# Patient Record
Sex: MALE | Race: WHITE | Employment: UNEMPLOYED | URBAN - METROPOLITAN AREA
[De-identification: names, ages, dates, MRNs, and addresses within clinical notes are randomized per-mention and may not be internally consistent; named-entity substitution may affect disease eponyms.]

---

## 2019-10-15 ENCOUNTER — HOSPITAL ENCOUNTER (OUTPATIENT)
Dept: MRI IMAGING | Age: 64
Discharge: HOME | End: 2019-10-15
Attending: ORTHOPAEDIC SURGERY
Payer: COMMERCIAL

## 2019-10-15 DIAGNOSIS — M25.552 BILATERAL HIP PAIN: ICD-10-CM

## 2019-10-15 DIAGNOSIS — M25.551 BILATERAL HIP PAIN: ICD-10-CM

## 2019-10-15 DIAGNOSIS — M51.36 DDD (DEGENERATIVE DISC DISEASE), LUMBAR: ICD-10-CM

## 2019-10-15 PROCEDURE — 72148 MRI LUMBAR SPINE W/O DYE: CPT

## 2019-10-30 ENCOUNTER — HOSPITAL ENCOUNTER (OUTPATIENT)
Dept: INTERVENTIONAL RADIOLOGY/VASCULAR | Age: 64
Discharge: HOME | End: 2019-10-30
Attending: ORTHOPAEDIC SURGERY | Admitting: ORTHOPAEDIC SURGERY
Payer: COMMERCIAL

## 2019-10-30 VITALS — WEIGHT: 194 LBS | HEIGHT: 71 IN | BODY MASS INDEX: 27.16 KG/M2

## 2019-10-30 DIAGNOSIS — M70.62 TROCHANTERIC BURSITIS OF BOTH HIPS: ICD-10-CM

## 2019-10-30 DIAGNOSIS — Z98.1 HISTORY OF LUMBAR FUSION: ICD-10-CM

## 2019-10-30 DIAGNOSIS — M70.61 TROCHANTERIC BURSITIS OF BOTH HIPS: ICD-10-CM

## 2019-10-30 DIAGNOSIS — M51.36 DDD (DEGENERATIVE DISC DISEASE), LUMBAR: ICD-10-CM

## 2019-10-30 PROCEDURE — 77030003666 HC NDL SPINAL BD -A

## 2019-10-30 PROCEDURE — 74011636320 HC RX REV CODE- 636/320: Performed by: RADIOLOGY

## 2019-10-30 PROCEDURE — 64483 NJX AA&/STRD TFRM EPI L/S 1: CPT

## 2019-10-30 PROCEDURE — 74011250636 HC RX REV CODE- 250/636: Performed by: RADIOLOGY

## 2019-10-30 PROCEDURE — 64484 NJX AA&/STRD TFRM EPI L/S EA: CPT

## 2019-10-30 PROCEDURE — 74011000250 HC RX REV CODE- 250: Performed by: RADIOLOGY

## 2019-10-30 RX ORDER — SODIUM CHLORIDE 9 MG/ML
10 INJECTION INTRAMUSCULAR; INTRAVENOUS; SUBCUTANEOUS ONCE
Status: DISCONTINUED | OUTPATIENT
Start: 2019-10-30 | End: 2019-10-30 | Stop reason: HOSPADM

## 2019-10-30 RX ORDER — DEXAMETHASONE SODIUM PHOSPHATE 10 MG/ML
10 INJECTION INTRAMUSCULAR; INTRAVENOUS
Status: DISCONTINUED | OUTPATIENT
Start: 2019-10-30 | End: 2019-10-30 | Stop reason: HOSPADM

## 2019-10-30 RX ORDER — LIDOCAINE HYDROCHLORIDE 10 MG/ML
5 INJECTION, SOLUTION EPIDURAL; INFILTRATION; INTRACAUDAL; PERINEURAL ONCE
Status: COMPLETED | OUTPATIENT
Start: 2019-10-30 | End: 2019-10-30

## 2019-10-30 RX ADMIN — LIDOCAINE HYDROCHLORIDE 10 ML: 10 INJECTION, SOLUTION EPIDURAL; INFILTRATION; INTRACAUDAL; PERINEURAL at 09:01

## 2019-10-30 RX ADMIN — IOPAMIDOL 10 ML: 408 INJECTION, SOLUTION INTRATHECAL at 09:04

## 2019-10-30 RX ADMIN — DEXAMETHASONE SODIUM PHOSPHATE 10 MG: 10 INJECTION, SOLUTION INTRAMUSCULAR; INTRAVENOUS at 09:03

## 2019-10-30 NOTE — PROGRESS NOTES
TRANSFER - IN REPORT:    Verbal report received from Yasmine RN(name) on [facility]  being received from [facility](unit) for ordered procedure      Report consisted of patients Situation, Background, Assessment and   Recommendations(SBAR). Information from the following report(s) SBAR was reviewed with the receiving nurse. Opportunity for questions and clarification was provided. Assessment completed upon patients arrival to unit and care assumed.

## 2019-10-30 NOTE — PROGRESS NOTES
TRANSFER - OUT REPORT:    Verbal report given to Betty Masters RN(name) on [facility]       Report consisted of patient's Situation, Background, Assessment and   Recommendations(SBAR). Information from the following report(s) Procedure Summary was reviewed with the receiving nurse. Opportunity for questions and clarification was provided.       Patient transported with:   Monitor

## 2019-10-30 NOTE — PROGRESS NOTES
0800 Received patient from waiting area. Armband and allergies verbally confirmed with patient. Procedure explained and consents signed. [id number] Dr. Marleny Hess in to see risks and benefits explained  0845 TRANSFER - OUT REPORT:    Verbal report given to Luz Maria(name) on Deborah Koroma  being transferred to [facility]io lab(unit) for routine progression of care       Report consisted of patients Situation, Background, Assessment and   Recommendations(SBAR). Information from the following report(s) Procedure Summary was reviewed with the receiving nurse. Lines:       Opportunity for questions and clarification was provided. Patient transported with:   Registered Nurse   4311 TRANSFER - IN REPORT:    Verbal report received from Luz Maria(name) on Deborah Koroma  being received from angio lab(unit) for routine progression of care      Report consisted of patients Situation, Background, Assessment and   Recommendations(SBAR). Information from the following report(s) Procedure Summary was reviewed with the receiving nurse. Opportunity for questions and clarification was provided. Assessment completed upon patients arrival to unit and care assumed. 0930 pt able to move legs and feet stand, reviewed discharge instructions with pt and wife, bandaids times two on lower back dry in tact preparing for discharge,Copy of printed discharge instructions given to patient and wife, Patient escorted via wheelchair to entrance. Wife  driving. Patient discharged into care of wife. Joshua Whatley

## 2019-10-30 NOTE — DISCHARGE INSTRUCTIONS
Patient Education        Learning About Lumbar Epidural Steroid Injections  What is a lumbar epidural steroid injection? A doctor may give you a lumbar epidural steroid injection to try to decrease pain, tingling, or numbness in your back, buttock, or leg. These might be the result of a back or disc problem. The injection goes directly into your epidural space. This is the area in your back around your spinal cord. This injection may have both a local anesthetic and a steroid medicine. Or it may only have a steroid. Local anesthetic medicines numb your nerves right away for a short time. Steroids reduce swelling and pain. But they take a few days to start working. Some people get a series of these injections over weeks or months. How is a lumbar epidural done? The doctor may use an imaging test before or during your injection. This can be an MRI, a CT scan, or an X-ray. These tests can show where your nerve problems are. After finding the right spot, the doctor may inject a numbing medicine into the skin where you will get the steroid injection. Then he or she puts the needle for the steroid into the numbed area. You may feel some pressure. You could feel some stinging or burning during the injection. It takes about 10 to 15 minutes to get this injection. You will probably go home about 20 to 30 minutes after you get it. You will need someone to drive you home. What can you expect after a lumbar epidural?  If your injection had local anesthetic and a steroid, your legs may feel heavy or numb right after. You will probably be able to walk. But you may need to be extra careful. Take care not to lose your balance and be sure to follow your doctor's instructions. If your injection contained local anesthetic, you may feel better right away. But this pain relief will last only a few hours. Your pain will probably return. This is because the steroids have not started working yet.  Before the steroids start to work, your back may be sore for a few days. These injections don't always work. When they do, it takes 1 to 5 days. This pain relief can last for several days to a few months or longer. You may want to do less than normal for a few days. But you may also be able to return to your daily routine. Some people are dizzy or feel sick to their stomach after getting this injection. These symptoms usually do not last very long. If your pain is better, you may be able to keep doing your normal activities or physical therapy. But try not to overdo it, even if your back pain has improved a lot. If your pain is only a little better or if it comes back, your doctor may recommend another injection in a few weeks. If your pain has not changed, talk to your doctor about other treatment choices. Side effects from an epidural steroid injection include headache, fever, or infection. Serious side effects are rare. But they can include stroke, paralysis, or loss of vision. Follow-up care is a key part of your treatment and safety. Be sure to make and go to all appointments, and call your doctor if you are having problems. It's also a good idea to know your test results and keep a list of the medicines you take. Where can you learn more? Go to http://lan-patrice.info/. Enter Elisa Davis in the search box to learn more about \"Learning About Lumbar Epidural Steroid Injections. \"  Current as of: June 26, 2019  Content Version: 12.2  © 1899-1321 [email or web address]. Care instructions adapted under license by [employer] (which disclaims liability or warranty for this information). If you have questions about a medical condition or this instruction, always ask your healthcare professional. [address] any warranty or liability for your use of this information.

## 2019-10-31 NOTE — H&P
0800 Received patient from waiting area. Armband and allergies verbally confirmed with patient. Procedure explained and consents signed. 0072 Dr. Roman Coon in to see risks and benefits explained  0845 TRANSFER - OUT REPORT:     Verbal report given to Luz Maria(name) on Karrie Arzate  being transferred to angio lab(unit) for routine progression of care        Report consisted of patients Situation, Background, Assessment and   Recommendations(SBAR).    Information from the following report(s) Procedure Summary was reviewed with the receiving nurse.     Lines:        Opportunity for questions and clarification was provided.       Patient transported with:   Registered Nurse   0139 TRANSFER - IN REPORT:     Verbal report received from Luz Maria(name) on Karrie Arzate  being received from angio lab(unit) for routine progression of care       Report consisted of patients Situation, Background, Assessment and   Recommendations(SBAR).    Information from the following report(s) Procedure Summary was reviewed with the receiving nurse.     Opportunity for questions and clarification was provided.       Assessment completed upon patients arrival to unit and care assumed. 0930 pt able to move legs and feet stand, reviewed discharge instructions with pt and wife, bandaids times two on lower back dry in tact preparing for discharge,Copy of printed discharge instructions given to patient and wife, Patient escorted via wheelchair to entrance. Wife  driving. Patient discharged into care of wife.               .

## 2021-09-28 ENCOUNTER — TRANSCRIBE ORDER (OUTPATIENT)
Dept: SCHEDULING | Age: 66
End: 2021-09-28

## 2021-09-28 DIAGNOSIS — M51.36 DEGENERATION OF LUMBAR INTERVERTEBRAL DISC: ICD-10-CM

## 2021-09-28 DIAGNOSIS — M48.061 SPINAL STENOSIS, LUMBAR REGION, WITHOUT NEUROGENIC CLAUDICATION: Primary | ICD-10-CM

## 2021-09-28 DIAGNOSIS — Z98.1 ARTHRODESIS STATUS: ICD-10-CM

## 2021-10-01 ENCOUNTER — HOSPITAL ENCOUNTER (OUTPATIENT)
Dept: INTERVENTIONAL RADIOLOGY/VASCULAR | Age: 66
Discharge: HOME | End: 2021-10-01
Attending: ORTHOPAEDIC SURGERY | Admitting: ORTHOPAEDIC SURGERY
Payer: COMMERCIAL

## 2021-10-01 VITALS — WEIGHT: 203.8 LBS | HEIGHT: 71 IN | BODY MASS INDEX: 28.53 KG/M2

## 2021-10-01 DIAGNOSIS — M51.36 DEGENERATION OF LUMBAR INTERVERTEBRAL DISC: ICD-10-CM

## 2021-10-01 DIAGNOSIS — Z98.1 ARTHRODESIS STATUS: ICD-10-CM

## 2021-10-01 DIAGNOSIS — M48.061 SPINAL STENOSIS, LUMBAR REGION, WITHOUT NEUROGENIC CLAUDICATION: ICD-10-CM

## 2021-10-01 PROCEDURE — 74011000250 HC RX REV CODE- 250: Performed by: RADIOLOGY

## 2021-10-01 PROCEDURE — 64483 NJX AA&/STRD TFRM EPI L/S 1: CPT

## 2021-10-01 PROCEDURE — 74011250636 HC RX REV CODE- 250/636: Performed by: RADIOLOGY

## 2021-10-01 PROCEDURE — 74011000636 HC RX REV CODE- 636: Performed by: RADIOLOGY

## 2021-10-01 RX ORDER — DEXAMETHASONE SODIUM PHOSPHATE 10 MG/ML
10 INJECTION INTRAMUSCULAR; INTRAVENOUS ONCE
Status: COMPLETED | OUTPATIENT
Start: 2021-10-01 | End: 2021-10-01

## 2021-10-01 RX ORDER — LIDOCAINE HYDROCHLORIDE 10 MG/ML
8 INJECTION, SOLUTION EPIDURAL; INFILTRATION; INTRACAUDAL; PERINEURAL
Status: DISCONTINUED | OUTPATIENT
Start: 2021-10-01 | End: 2021-10-01 | Stop reason: HOSPADM

## 2021-10-01 RX ADMIN — DEXAMETHASONE SODIUM PHOSPHATE 10 MG: 10 INJECTION, SOLUTION INTRAMUSCULAR; INTRAVENOUS at 08:54

## 2021-10-01 RX ADMIN — DEXAMETHASONE SODIUM PHOSPHATE 10 MG: 10 INJECTION, SOLUTION INTRAMUSCULAR; INTRAVENOUS at 08:45

## 2021-10-01 RX ADMIN — LIDOCAINE HYDROCHLORIDE 8 ML: 10 INJECTION, SOLUTION EPIDURAL; INFILTRATION; INTRACAUDAL; PERINEURAL at 08:44

## 2021-10-01 RX ADMIN — IOPAMIDOL 3 ML: 408 INJECTION, SOLUTION INTRATHECAL at 08:44

## 2021-10-01 NOTE — DISCHARGE INSTRUCTIONS
Patient Education        Lumbar Epidural Steroid Injection: What to Expect at [address] [place] [address]  During your lumbar epidural injection, your doctor injected steroid medicine into the area around your spinal cord to help with pain, tingling, or numbness. Steroids don't always work. And when they do, it takes a few days. But the pain relief can last for several days to a few months or longer. Your injection may also have included a numbing medicine that works right away for a short time. It may leave your legs feeling heavy or numb at first. You will probably be able to walk. But you may need to be extra careful. The effects of the numbing medicine should wear off in a few hours. This care sheet gives you a general idea about how long it will take for you to recover. But each person recovers at a different pace. Follow the steps below to feel better as quickly as possible. How can you care for yourself at home? Activity    · You may want to do less than normal for a few days. But you may also be able to return to your daily routine.     · You may shower if your doctor okays it. Do not take a bath for the first 24 hours, or until your doctor tells you it is okay. Diet    · You can eat your normal diet. Medicines    · Your doctor will tell you if and when you can restart your medicines. He or she will also give you instructions about taking any new medicines.     · If you take aspirin or some other blood thinner, ask your doctor if and when to start taking it again. Make sure that you understand exactly what your doctor wants you to do.     · Be safe with medicines. Read and follow all instructions on the label. ? If the doctor gave you a prescription medicine for pain, take it as prescribed. ? If you are not taking a prescription pain medicine, ask your doctor if you can take an over-the-counter medicine.    Ice    · If the site of your injection feels sore or tender, put ice or a cold pack on it for 10 to 20 minutes at a time. Put a thin cloth between the ice and your skin. Follow-up care is a key part of your treatment and safety. Be sure to make and go to all appointments, and call your doctor if you are having problems. It's also a good idea to know your test results and keep a list of the medicines you take. When should you call for help? Call 911 anytime you think you may need emergency care. For example, call if:    · You passed out (lost consciousness).     · You have trouble breathing. Call your doctor now or seek immediate medical care if:    · You have new pain, or your pain gets worse.     · You have new numbness in your buttocks or legs.     · You have new weakness in your buttocks or legs.     · You have a severe headache.     · You have new loss of bowel or bladder control.     · You have signs of infection, such as:  ? Increased pain, swelling, warmth, or redness. ? A fever. Watch closely for any changes in your health, and be sure to contact your doctor if:    · You do not get better as expected. Where can you learn more? Go to http://www.gray.com/  Enter K493 in the search box to learn more about \"Lumbar Epidural Steroid Injection: What to Expect at Home. \"  Current as of: July 1, 2021               Content Version: 13.0  © 2006-2021 [employer]. Care instructions adapted under license by [employer] (which disclaims liability or warranty for this information). If you have questions about a medical condition or this instruction, always ask your healthcare professional. [place] [address] any warranty or liability for your use of this information.

## 2021-10-01 NOTE — PROGRESS NOTES
7:55 AM  Patient arrived. ID and allergies verified verbally with patient. Pt voices understanding of procedure to be performed. Consent obtained. Pt prepped for procedure. Pt denies contrast allergy. Patient denies taking any blood thinners. 9:01 AM  TRANSFER - IN REPORT:    Verbal report received from [facility]) on Meghan Gamez  being received from angio lab(unit) for routine post - op      Report consisted of patients Situation, Background, Assessment and   Recommendations(SBAR). Information from the following report(s) SBAR and Procedure Summary was reviewed with the receiving nurse. Opportunity for questions and clarification was provided. Assessment completed upon patients arrival to unit and care assumed.     9:01 AM  Patient laying flat for five minutes post procedure. Procedural site dry and intact. No signs of bleeding, swelling, or hematoma. Patient having left foot pain, present on arrival. Sensation present in bilateral lower extremities. 9:06 AM  Patient sitting 60 degrees for five minutes post procedure. Procedural site dry and intact. No signs of bleeding, swelling, or hematoma. Patient having left foot pain, present on arrival. Sensation present in bilateral lower extremities. 9:12 AM  Patient sitting on side of bed. Procedural site dry and intact. No signs of bleeding, swelling, or hematoma. Patient having left foot pain, present on arrival. Sensation present in bilateral lower extremities. Patient standing with this RN, patient states he can feel floor under his feet, patient has steady gait. Patient able to dress independently. 9:15 AM  Discharge instructions reviewed with patient and family. Voiced understanding. Patient given copy of discharge instructions to take home. 9:25 AM  Pt discharged via wheelchair with spouse. Personal belongings with patient upon discharge.

## 2021-11-04 ENCOUNTER — HOSPITAL ENCOUNTER (OUTPATIENT)
Dept: PREADMISSION TESTING | Age: 66
Discharge: HOME | End: 2021-11-04
Attending: ORTHOPAEDIC SURGERY
Payer: COMMERCIAL

## 2021-11-04 VITALS
OXYGEN SATURATION: 96 % | SYSTOLIC BLOOD PRESSURE: 184 MMHG | WEIGHT: 206.79 LBS | TEMPERATURE: 98 F | RESPIRATION RATE: 20 BRPM | BODY MASS INDEX: 28.95 KG/M2 | HEART RATE: 92 BPM | DIASTOLIC BLOOD PRESSURE: 67 MMHG | HEIGHT: 71 IN

## 2021-11-04 LAB
ABO + RH BLD: NORMAL
ALBUMIN SERPL-MCNC: 3 G/DL (ref 3.5–5)
ALBUMIN/GLOB SERPL: 0.7 {RATIO} (ref 1.1–2.2)
ALP SERPL-CCNC: 162 U/L (ref 45–117)
ALT SERPL-CCNC: 25 U/L (ref 12–78)
ANION GAP SERPL CALC-SCNC: 7 MMOL/L (ref 5–15)
APPEARANCE UR: CLEAR
AST SERPL-CCNC: 15 U/L (ref 15–37)
ATRIAL RATE: 77 BPM
BACTERIA URNS QL MICRO: NEGATIVE /HPF
BILIRUB SERPL-MCNC: 0.2 MG/DL (ref 0.2–1)
BILIRUB UR QL: NEGATIVE
BLOOD GROUP ANTIBODIES SERPL: NORMAL
BUN SERPL-MCNC: 7 MG/DL (ref 6–20)
BUN/CREAT SERPL: 11 (ref 12–20)
CALCIUM SERPL-MCNC: 9.5 MG/DL (ref 8.5–10.1)
CALCULATED P AXIS, ECG09: 73 DEGREES
CALCULATED R AXIS, ECG10: -32 DEGREES
CALCULATED T AXIS, ECG11: 40 DEGREES
CHLORIDE SERPL-SCNC: 105 MMOL/L (ref 97–108)
CO2 SERPL-SCNC: 29 MMOL/L (ref 21–32)
COLOR UR: NORMAL
CREAT SERPL-MCNC: 0.64 MG/DL (ref 0.7–1.3)
DIAGNOSIS, 93000: NORMAL
EPITH CASTS URNS QL MICRO: NORMAL /LPF
ERYTHROCYTE [DISTWIDTH] IN BLOOD BY AUTOMATED COUNT: 13 % (ref 11.5–14.5)
EST. AVERAGE GLUCOSE BLD GHB EST-MCNC: 120 MG/DL
GLOBULIN SER CALC-MCNC: 4.6 G/DL (ref 2–4)
GLUCOSE SERPL-MCNC: 129 MG/DL (ref 65–100)
GLUCOSE UR STRIP.AUTO-MCNC: NEGATIVE MG/DL
HBA1C MFR BLD: 5.8 % (ref 4–5.6)
HCT VFR BLD AUTO: 35.9 % (ref 36.6–50.3)
HGB BLD-MCNC: 11.5 G/DL (ref 12.1–17)
HGB UR QL STRIP: NEGATIVE
HYALINE CASTS URNS QL MICRO: NORMAL /LPF (ref 0–5)
INR PPP: 1 (ref 0.9–1.1)
KETONES UR QL STRIP.AUTO: NEGATIVE MG/DL
LEUKOCYTE ESTERASE UR QL STRIP.AUTO: NEGATIVE
MCH RBC QN AUTO: 33.3 PG (ref 26–34)
MCHC RBC AUTO-ENTMCNC: 32 G/DL (ref 30–36.5)
MCV RBC AUTO: 104.1 FL (ref 80–99)
NITRITE UR QL STRIP.AUTO: NEGATIVE
NRBC # BLD: 0 K/UL (ref 0–0.01)
NRBC BLD-RTO: 0 PER 100 WBC
P-R INTERVAL, ECG05: 204 MS
PH UR STRIP: 6 [PH] (ref 5–8)
PLATELET # BLD AUTO: 276 K/UL (ref 150–400)
PMV BLD AUTO: 10.7 FL (ref 8.9–12.9)
POTASSIUM SERPL-SCNC: 3.6 MMOL/L (ref 3.5–5.1)
PROT SERPL-MCNC: 7.6 G/DL (ref 6.4–8.2)
PROT UR STRIP-MCNC: NEGATIVE MG/DL
PROTHROMBIN TIME: 10.6 SEC (ref 9–11.1)
Q-T INTERVAL, ECG07: 414 MS
QRS DURATION, ECG06: 106 MS
QTC CALCULATION (BEZET), ECG08: 468 MS
RBC # BLD AUTO: 3.45 M/UL (ref 4.1–5.7)
RBC #/AREA URNS HPF: NORMAL /HPF (ref 0–5)
SODIUM SERPL-SCNC: 141 MMOL/L (ref 136–145)
SP GR UR REFRACTOMETRY: 1.02 (ref 1–1.03)
SPECIMEN EXP DATE BLD: NORMAL
UA: UC IF INDICATED,UAUC: NORMAL
UROBILINOGEN UR QL STRIP.AUTO: 0.2 EU/DL (ref 0.2–1)
VENTRICULAR RATE, ECG03: 77 BPM
WBC # BLD AUTO: 9.5 K/UL (ref 4.1–11.1)
WBC URNS QL MICRO: NORMAL /HPF (ref 0–4)

## 2021-11-04 PROCEDURE — 93005 ELECTROCARDIOGRAM TRACING: CPT

## 2021-11-04 PROCEDURE — 80053 COMPREHEN METABOLIC PANEL: CPT

## 2021-11-04 PROCEDURE — 36415 COLL VENOUS BLD VENIPUNCTURE: CPT

## 2021-11-04 PROCEDURE — 83036 HEMOGLOBIN GLYCOSYLATED A1C: CPT

## 2021-11-04 PROCEDURE — 85610 PROTHROMBIN TIME: CPT

## 2021-11-04 PROCEDURE — 86901 BLOOD TYPING SEROLOGIC RH(D): CPT

## 2021-11-04 PROCEDURE — 85027 COMPLETE CBC AUTOMATED: CPT

## 2021-11-04 PROCEDURE — 81001 URINALYSIS AUTO W/SCOPE: CPT

## 2021-11-04 PROCEDURE — 97116 GAIT TRAINING THERAPY: CPT

## 2021-11-04 PROCEDURE — 97161 PT EVAL LOW COMPLEX 20 MIN: CPT

## 2021-11-04 RX ORDER — ALBUTEROL SULFATE 0.63 MG/3ML
0.63 SOLUTION RESPIRATORY (INHALATION)
COMMUNITY

## 2021-11-04 RX ORDER — LISINOPRIL 20 MG/1
20 TABLET ORAL EVERY MORNING
COMMUNITY

## 2021-11-04 RX ORDER — BUDESONIDE, GLYCOPYRROLATE, AND FORMOTEROL FUMARATE 160; 9; 4.8 UG/1; UG/1; UG/1
2 AEROSOL, METERED RESPIRATORY (INHALATION) 2 TIMES DAILY
COMMUNITY

## 2021-11-04 RX ORDER — HYDROCODONE BITARTRATE AND ACETAMINOPHEN 5; 325 MG/1; MG/1
1 TABLET ORAL
COMMUNITY
End: 2021-12-04

## 2021-11-04 RX ORDER — PANTOPRAZOLE SODIUM 40 MG/1
40 TABLET, DELAYED RELEASE ORAL EVERY MORNING
COMMUNITY

## 2021-11-04 NOTE — PERIOP NOTES
Hibiclens/Chlorhexidine    Preventing Infections Before and After - Your Surgery    IMPORTANT INSTRUCTIONS    Please read and follow these instructions carefully. If you are unable to comply with the below instructions your procedure will be cancelled. Every Night for Three (3) nights before your surgery:  1. Shower with an antibacterial soap, such as Dial, or the soap provided at your preassessment appointment. A shower is better than a bath for cleaning your skin. 2. If needed, ask someone to help you reach all areas of your body. Dont forget to clean your belly button with every shower. The night before your surgery: If you lose your Hibiclens/chlorhexidine please contact surgery center or you can purchase it at a local pharmacy  1. On the night before your surgery, shower with an antibacterial soap, such as Dial, or the soap provided at your preassessment appointment. 2. With one packet of Hibiclens/Chlorhexidine in hand, turn water off.  3. Apply Hibiclens antiseptic skin cleanser with a clean, freshly washed washcloth. ? Gently apply to your body from chin to toes (except the genital area) and especially the area(s) where your incision(s) will be. ? Leave Hibiclens/Chlorhexidine on your skin for at least 20 seconds. CAUTION: If needed, Hibiclens/chlorhexidine may be used to clean the folds of skin of the legs (such as in the area of the groin) and on your buttocks and hips. However, do not use Hibiclens/Chlorhexidine above the neck or in the genital area (your bottom) or put inside any area of your body. 4. Turn the water back on and rinse. 5. Dry gently with a clean, freshly washed towel. 6. After your shower, do not use any powder, deodorant, perfumes or lotion. 7. Use clean, freshly washed towels and washcloths every time you shower. 8. Wear clean, freshly washed pajamas to bed the night before surgery. 9. Sleep on clean, freshly washed sheets.   10. Do not allow pets to sleep in your bed with you. The Morning of your surgery:  1. Shower again thoroughly with an antibacterial soap, such as Dial or the soap provided at your preassessment appointment. If needed, ask someone for help to reach all areas of your body. Dont forget to clean your belly button! Rinse. 2. Dry gently with a clean, freshly washed towel. 3. After your shower, do not use any powder, deodorant, perfumes or lotion prior to surgery. 4. Put on clean, freshly washed clothing. Tips to help prevent infections after your surgery:  1. Protect your surgical wound from germs:  ? Hand washing is the most important thing you and your caregivers can do to prevent infections. ? Keep your bandage clean and dry! ? Do not touch your surgical wound. 2. Use clean, freshly washed towels and washcloths every time you shower; do not share bath linens with others. 3. Until your surgical wound is healed, wear clothing and sleep on bed linens each day that are clean and freshly washed. 4. Do not allow pets to sleep in your bed with you or touch your surgical wound. 5. Do not smoke - smoking delays wound healing. This may be a good time to stop smoking. 6. If you have diabetes, it is important for you to manage your blood sugar levels properly before your surgery as well as after your surgery. Poorly managed blood sugar levels slow down wound healing and prevent you from healing completely. If you lose your Hibiclens/chlorhexidine, please call the [facility], or it is available for purchase at your pharmacy.                ___________________      ___________________      ________________  (Signature of Patient)          (Witness)                   (Date and Time)

## 2021-11-04 NOTE — PROGRESS NOTES
[facility]  Physical Therapy Pre-surgery evaluation  [address] [facility] [facility], [address]    PHYSICAL THERAPY PRE SPINE SURGERY EVALUATION  Patient:Antoni Wyatt (74 y.o. male)  Date: 11/4/2021    pat  Procedure(s) (LRB):  REVISION LAMINECTOMY L3-S1 WITH L3-L5 REVISION FUSION (N/A)     Precautions:         ASSESSMENT :  Based on the objective data described below, the patient presents with impaired gait, balance, overall strength and activity tolerance due to end stage degenerative joint disease in the spinal level: lumbar spine. Pt reports pain in L low back radiating to LLE down to toe in L4-L5 distribution. He does reports some stumbling due to pain but no falls. This is his 4th back surgery with the last one 11 years ago. Discussed anticipated disposition to home with possible discharge within a 1 to 2 day time frame post-surgery. Patient and  in agreement. Anticipate patient will need acute PT orders based on current and exptected deficits post surgery. GOALS: (Goals have been discussed and agreed upon with patient.)  DISCHARGE GOALS: Time Frame: 1 DAY  1. Patient will demonstrate increased strength, range of motion, and pain control via a home exercise program in order to minimize functional deficits in preparation for their upcoming surgery. This will be achieved by using education, demonstration and through the use of an informational handout including a home exercise program.  REHABILITATION POTENTIAL FOR STATED GOALS: Good     RECOMMENDATIONS AND PLANNED INTERVENTIONS: (Benefits and precautions of physical therapy have been discussed with the patient.)  · Home Exercise Program  TREATMENT PLAN EFFECTIVE DATES: 11/4/2021 to 11/4/2021   FREQUENCY/DURATION: Patient to continue to perform home exercise program at least twice daily until surgery. SUBJECTIVE:   Patient stated this is my 4th surgery.     OBJECTIVE DATA SUMMARY:   HISTORY:      Past Medical History:   Diagnosis Date    Chronic obstructive pulmonary disease (HCC)     GERD (gastroesophageal reflux disease)     Hypertension     Polyp of colon     Sleep apnea     Uses CPAP     Past Surgical History:   Procedure Laterality Date    HX COLONOSCOPY      HX LUMBAR FUSION      x 3    HX LUMBAR LAMINECTOMY      x 3    HX VEIN STRIPPING      IR INJ FORAMIN EPID LUMB ANES/STER SNGL  10/30/2019    IR INJ FORAMIN EPID LUMB ANES/STER SNGL  10/1/2021    [place] CARD[place]AC SUR[place] PROCEDURE UNLIST  2019    negative Cath Lab @ [facility]    [place] CARDI[place] PROCEDURE UNLIST      negative cardiac stress @ [facility]       Prior Level of Function/Home Situation: independent without device since starting on pain meds, prior when very painful used SPC; independent with ADLS and still does some light work around the house  Personal factors and/or comorbidities impacting plan of care: 3 previous back surgeries      Home Situation  Home Environment: Private residence  # Steps to Enter: 5  Rails to Enter: Yes  Hand Rails : Left  One/Two Story Residence: One story  Living Alone: Yes  Support Systems: Spouse/Significant Other  Current DME Used/Available at Home: Cane, straight, Other (comment) (built in shower seat)  Tub or Shower Type: Shower        EXAMINATION/PRESENTATION/DECISION MAKING:     ADLs (Current Functional Status):    Bathing/Showering:   [x] Independent  [] Requires Assistance from Someone  [] Sponge Bath Only   Ambulation:  [x] Independent  [] Walk Indoors Only  [] Walk Outdoors  [x] Use Assistive Device (did use one when pain was worse)  [] Use Wheelchair Only     Dressing:  [x] [address] from Someone for:  [] Sock/Shoes  [] Pants  [] Everything   Household Activities:  [] Routine house and yard work  [x] Light Housework Only  [] None       Critical Behavior:  Neurologic State: Alert  Orientation Level: Oriented X4  Cognition: Follows commands       Strength:     Strength: Generally decreased, functional (painful for testing LLE)                       Tone & Sensation:   Tone: Normal              Sensation: Impaired (LLE in L4-L5 distribution)                  Range Of Motion:     AROM: Within functional limits                            Coordination:   Coordination: Within functional limits    Functional Mobility:  Transfers:  Sit to Stand: Modified independent, Additional time  Stand to Sit: Modified independent, Additional time                       Balance:   Sitting: Intact  Standing: Impaired  Standing - Static: Good  Standing - Dynamic : Good, Other (comment) (states he stumbles occasionally d/t pain; no falls)  Ambulation/Gait Training:  Distance (ft): 50 Feet (ft)  Assistive Device:  (none)  Ambulation - Level of Assistance: Independent        Gait Abnormalities: Antalgic        Base of Support: Widened, Shift to right     Speed/Cathie: Slow  Step Length: Right shortened, Left shortened         Functional Measure:  10 Meter walk test:  (Specify if any supplemental oxygen is used, the type, pre, during and post sats.)    Self-Selected Or Fast-Velocity: Self Selected Velocity  Note: pt only able to tolerate one pass of amb and needed to sit due to pain  Trial 1 (Time to Walk 10 Meters): 18.91 Seconds  Trial 2 (Time to Walk 10 Meters): 18.91 Seconds  Trial 3 (Time to Walk 10 Meters): 18.91 Seconds  Average : 18.9 Seconds  Score (Meters/Second): 0.5 Meters/Second             Walking Speed (m/s)  Modifier Scale Age 52-63 Age 61-76 Age 66-77 Age 80-80    Male Female Male Female Male Female Male Female   CH   0% Impaired ?  1.39 ? 1.40 ? 1.36 ? 1.30 ? 1.33 ? 1.27 ? 1.21 ? 1.15   CI   1-19% Impaired 1.11-1.38 1.12-1.39 1.09-1.35 1.04-1.29 1.06-1.32 1.01-1.26 0.96-1.20 0.92-1.14   CJ   20-39% Impaired 0.83-1.10 0.84-1.11 0.82-1.08 0.78-1.03 0.80-1.05 0.76-1.00 0.72-0.95 0.69-0.91   CK   40-59% Impaired 0.56-0.82 0.57-0.83 0.54-0.81 0.52-0.77 0.53-0.79 0.51-0.75 0.48-0.71 0.46-0.68   CL   60-79% Impaired 0. 28-0.55 0.28-0.56 0.27-0.53 0.26-0.51 0.27-0.52 0.25-0.50 0.24-0.49 0.23-0.45   CM   80-99% Impaired 0.01-0.28 < 0.01-0.28 < 0.01-0.27 < 0.01-0.26 0.01-0.27 0.01-0.24 0.01-0.23 0.01-0.22   CN   100% Impaired Cannot Perform   Minimal Detectable Change (MDC-90) = 0.1 m/s  Linsey DUBOIS. \"Comfortable and maximum walking speed of adults aged 20-79 years: reference values and determinants. \" Age and Agin Volume 26(1):15-9. Zaki Batres. \"Age- and gender-related test performance in community-dwelling elderly people: Six-Minute Walk Test, Stark Balance Scale, Timed Up & Go Test, and gait speeds. \" Physical Therapy: 2002 Volume 82(2):128-37. Valeria DM, Yeny PW, Jelly Jeong JD, [facility] ORR. \"Assessing stability and change of four performance measures: a longitudinal study evaluating outcome following total hip and knee arthroplasty. \" [facility] Musculoskeletal Disorders: 2005 Volume 6(3). Moni Caceres, PhD; Taisha Swartz, PhD. Juan Luis Maloney Paper: \"Walking Speed: the Sixth Vital Sign\" Journal of Geriatric Physical Therapy: 2009 - Volume 32 - Issue 2 - p 2-5 . Pain:  Pain Scale 1: Numeric (0 - 10)  Pain Intensity 1: 8  Pain Location 1: Back; Leg  Pain Orientation 1: Left  Pain Description 1: Constant; Aching; Burning; Tingling; Numb       Radicular pain:   LLE pain and numbness low back to buttocks to top of foot and toe    Activity Tolerance:   Limited by pain, only able to tolerate one pass of amb for 10M walk test   Patient []   does  [x]   does not demonstrate signs/symptoms of shortness of breath/dyspnea on exertion/respiratory distress. COMMUNICATION/EDUCATION:   The patient was educated on:  [x]         Early post operative mobility is imperative to achieve a patient's desired outcomes and to restore biological function. [x]         Post operative spinal precautions may/may not be applicable.  These precautions are based on the patient's physician and the procedure(s) performed. [x]         Spinal precautions including:  ·   No bending forward, sideways, or backwards  ·   No twisting   ·   No lifting more than 5-10 pounds  ·   No sitting longer than 30-60 minutes at a time  ·   Justin brace when out of bed and mobilizing    The patients plan of care was discussed as follows:   [x]         The patient verbalized understanding of his/her plan in preparation for their upcoming surgery  [x]         The patient's  was present for this session  []        The patient reports that he/she does not have a  identified at this time  [x]         The  verbalized understanding of the education regarding the patient's upcoming surgery  [x]         Patient/family agree to work toward stated goals and plan of care. []         Patient understands intent and goals of therapy, but is neutral about his/her participation. []         Patient is unable to participate in goal setting and plan of care.       Thank you for this referral.  Nicolette Vinson, PT    Time Calculation: 15 mins

## 2021-11-04 NOTE — H&P
Denisse Mckeon  Location: - [facility] OFFICE  Patient #: [id number]  : 1955   / Language: English / Race: White  Male      History of Present Illness   The patient is a 74year old male who presents for a recheck of Low Back Pain. Note for \"Low Back Pain\": The patient comes in today for follow-up. Tyra Leal has new left-sided radicular symptoms.  His history is significant for previous fusion by myself approximately in .  The pain radiates roughly in the L5-S1 distribution. Blenda Lair is severe. Tyra Leal is unable to sleep. Tyra Leal has been trying conservative treatment without improvement.  He denies any bowel bladder difficulties.  The pain is worse with prolonged standing and walking.  Is better with sitting. He comes in today for follow-up of his epidural injections. Tyra Leal has had 1 set of epidurals that did not provide significant relief.  He still has left leg pain with radiation what appears to be in the L4 distribution. Tyra Leal has weakness in dorsiflexion of the left foot.  Increased pain with any type of prolonged standing walking.  His L5-S1 injection did not provide any relief.  He denies any bowel bladder difficulties.  His ADLs are significantly decreased. Problem List/Past Medical   REVIEW OF SYSTEMS: Systems were reviewed by the provider.    Trochanteric bursitis of both hips (726.5  M70.61, M70.62)    Bilateral hip pain (719.45  M25.551, M25.552)    Lumbar stenosis (724.02  M48.061)    DDD (degenerative disc disease), lumbar (722.52  M51.36)    History of lumbar fusion (V45.4  Z98.1)      Allergies  Penicillins   [2021]:  No Known Drug Allergies   [2021]:    Social History   Alcohol use   3-5 drinks per occasion, Drinks beer, Drinks hard liquor, Drinks wine, Rarely drinks more than 5 drinks per occasion. Caffeine use   1-2 drinks per day, Coffee. Current work status   Disabled. Exercise   1-2 times per week, walking. Marital status   .   No drug use    Seat Belt Use   Always uses seat belts. Sun Exposure   Occasionally. Tobacco / smoke exposure   None. Tobacco use   Former smoker, Smokes 1 pack of cigarettes per day. Medication History   Medications Reconciled     Past Surgical History   Joint Fusion    Spinal Decompression   lower back  Spinal Fusion   lower back  Spinal Surgery    Vasectomy        Physical Exam   Neurologic  Sensory  Light Touch - Intact - Globally. Overall Assessment of Muscle Strength and Tone reveals  Lower Extremities - Right Iliopsoas - 5/5. Left Iliopsoas - 5/5. Right Tibialis Anterior - 5/5. Left Tibialis Anterior - 4-/5. Right Gastroc-Soleus - 5/5. Left Gastroc-Soleus - 5/5. Right EHL - 5/5. Left EHL - 4-/5. General Assessment of Reflexes  Right Ankle - Clonus is not present. Left Ankle - Clonus is not present. Reflexes (Dermatomes)  2/2 Normal - Left Achilles (L5-S2), Left Knee (L2-4), Right Achilles (L5-S2) and Right Knee (L2-4). Musculoskeletal  Global Assessment  Examination of related systems reveals - well-developed, well-nourished, in no acute distress, alert and oriented x 3. Gait and Station - normal gait and station and normal posture. Right Lower Extremity - normal strength and tone, normal range of motion without pain and no instability, subluxation or laxity. Left Lower Extremity - normal strength and tone, normal range of motion without pain and no instability, subluxation or laxity. Spine/Ribs/Pelvis  Cervical Spine - Examination of the cervical spine reveals - no tenderness to palpation, no pain, no swelling, edema or erythema, normal cervical spine movements and normal sensation. Thoracic (Dorsal) Spine - Examination of the thoracic spine reveals - no tenderness over thoracic vertebrae, no pain, normal sensation and normal thoracic spine movements. Lumbosacral Spine - Examination of the lumbosacral spine reveals - no known fractures or deformities. Inspection and Palpation - Tenderness - moderate.  Assessment of pain reveals the following findings - The pain is characterized as - moderate (The patient continues with severe low back pain left-hand side.) . Location - pain refers to lower back bilaterally. ROJM - Trunk Extension - 15 degrees. Lumbar Spine Flexion - 35 °. Lumbosacral Spine - Functional Testing - Babinski Test negative, Prone Knee Bending Test negative, Slump Test negative, Straight Leg Raising Test negative. Assessment & Plan    Lumbar stenosis (724.02  M48.061)  Impression: At this point he is having severe left leg symptoms that have not improved with the injections. He also has had a foot drop now. I think he is develops fairly significant stenosis at L3-4 above his prior fusion. I think is a candidate for revision laminectomy from L3-S1 and ex exploration of the L4 nerve root as well as the L5 nerve root. I think he will need extension of his fusion to L3. Risk and benefits were discussed with him and his wife. The risks and benefits were discussed at length with the patient and the patient has elected to proceed. Indications for surgery include failed conservative treatment. Alternative treatments, risks and the perioperative course were discussed with the patient. All questions were answered. The risks and benefits of the procedure were explained. Benefits include definitive diagnosis, relief of pain, elimination of deformity and improved function. Risks of surgery including bleeding, infection, weakness, numbness, CSF leak, failure to improve symptoms, exacerbation of medical co-morbidities and even death were discussed with the patient. Current Plans  Started HYDROcodone-Acetaminophen 5-325 MG Oral Tablet, 1 (one) Tablet every 4 to 6 hours as needed for pain, #50, 10/28/2021, No Refill.     Spondylolisthesis (Acquired) (738.4  M43.10)      Treatment options were discussed with the patient in full.(V65.49)    Current Plans  Presurgical planning was preformed with the patient today  Surgery to be scheduled  Procedure: Revision laminectomy L3-S1 with L3-L5 revision fusion        Signed by Christiano Gibson MD

## 2021-11-04 NOTE — PERIOP NOTES
Orthopedic and Spine Patients: Instructions on When You Can   Eat or Drink Before Surgery      You have been provided 2 pre-surgery drinks received at your pre-admission testing appointment.  Night before surgery:  o You should drink one bottle of the  pre-surgery drink at bedtime. No food after midnight!  Day of Surgery:  o Complete 2nd bottle of the pre-surgery drink 1 hour prior to arrival at hospital.  For questions call Pre-Admission Testing at [phone]. They are available from 8:00am-5:00pm, Monday through Friday.

## 2021-11-04 NOTE — PERIOP NOTES
[facility]  \"We've Got Your Back! Caring For Yourself Before and After Spine Surgery\" handbook was provided & reviewed with the patient during the pre-admission testing (PAT) appointment. An opportunity for questions was provided, patient verbalized understanding.

## 2021-11-04 NOTE — PERIOP NOTES
[facility]  Joint/Spine Preoperative Instructions        Surgery Date Fri., 11/12/21          Time of Arrival Maria Isabel Maza @ [phone] (wife Neela Blanco)    1. On the day of your surgery, please report to the Surgical Services Registration Desk and sign in at your designated time. The Surgery Center is located to the right of the Emergency Room. 2. You must have someone with you to drive you home. You should not drive a car for 24 hours following surgery. Please make arrangements for a friend or family member to stay with you for the first 24 hours after your surgery. 3. No food after midnight Thu., 11/11/21. Medications morning of surgery should be taken with a sip of water. Please follow pre-surgery drink instructions that were given at your Pre Admission Testing appointment. 4. We recommend you do not drink any alcoholic beverages for 24 hours before and after your surgery. 5. Contact your surgeons office for instructions on the following medications: non-steroidal anti-inflammatory drugs (i.e. Advil, Aleve), vitamins, and supplements. (Some surgeons will want you to stop these medications prior to surgery and others may allow you to take them)  **If you are currently taking Plavix, Coumadin, Aspirin and/or other blood-thinning agents, contact your surgeon for instructions. ** Your surgeon will partner with the physician prescribing these medications to determine if it is safe to stop or if you need to continue taking. Please do not stop taking these medications without instructions from your surgeon    6. Wear comfortable clothes. Wear glasses instead of contacts. Do not bring any money or jewelry. Please bring picture ID, insurance card, and any prearranged co-payment or hospital payment. Do not wear make-up, particularly mascara the morning of your surgery. Do not wear nail polish, particularly if you are having foot /hand surgery.   Wear your hair loose or down, no ponytails, buns, sharron pins or clips. All body piercings must be removed. Please shower with antibacterial soap for three consecutive days before and on the morning of surgery, but do not apply any lotions, powders or deodorants after the shower on the day of surgery. Please use a fresh towels after each shower. Please sleep in clean clothes and change bed linens the night before surgery. Please do not shave for 48 hours prior to surgery. Shaving of the face is acceptable. 7. You should understand that if you do not follow these instructions your surgery may be cancelled. If your physical condition changes (I.e. fever, cold or flu) please contact your surgeon as soon as possible. 8. It is important that you be on time. If a situation occurs where you may be late, please call [phone] (OR Holding Area). 9. If you have any questions and or problems, please call [phone] (Pre-admission Testing). 10. Your surgery time may be subject to change. You will receive a phone call the evening prior if your time changes. 11.  If having outpatient surgery, you must have someone to drive you here, stay with you during the duration of your stay, and to drive you home at time of discharge. 12. The following link is for the educational video for patients and/or families. [email or web address]-[email or web address]-[email or web address]-materials    Special Instructions:   * Patient is scheduled for a Covid-19 test @ [facility] between 7am - 12/noon on Mon., 11/8/21; patient was instructed to self quarantine after test through day of surgery/procedure. * Bring CPAP & inhalers day of surgery      TAKE ALL MEDICATIONS THE DAY OF SURGERY EXCEPT: Follow your physician/surgeon instructions for holding all non-steroidal anti-inflammatory drugs/NSAIDs, blood-thinning agents, vitamins & supplements prior to surgery.         I understand a pre-operative phone call will be made to verify my surgery time. In the event that I am not available, I give permission for a message to be left on my answering service and/or with another person?   yes         ___________________      __________   _________    (Signature of Patient)             (Witness)                (Date and Time)

## 2021-11-05 LAB
BACTERIA SPEC CULT: NORMAL
BACTERIA SPEC CULT: NORMAL
SERVICE CMNT-IMP: NORMAL

## 2021-11-08 ENCOUNTER — TRANSCRIBE ORDER (OUTPATIENT)
Dept: REGISTRATION | Age: 66
End: 2021-11-08

## 2021-11-08 ENCOUNTER — HOSPITAL ENCOUNTER (OUTPATIENT)
Dept: PREADMISSION TESTING | Age: 66
Discharge: HOME | End: 2021-11-08

## 2021-11-08 ENCOUNTER — HOSPITAL ENCOUNTER (OUTPATIENT)
Dept: LAB | Age: 66
Discharge: HOME | End: 2021-11-08
Payer: COMMERCIAL

## 2021-11-08 DIAGNOSIS — Z20.822 ENCOUNTER FOR PREOPERATIVE SCREENING LABORATORY TESTING FOR COVID-19 VIRUS: ICD-10-CM

## 2021-11-08 DIAGNOSIS — Z01.812 ENCOUNTER FOR PREOPERATIVE SCREENING LABORATORY TESTING FOR COVID-19 VIRUS: ICD-10-CM

## 2021-11-08 DIAGNOSIS — Z01.812 ENCOUNTER FOR PREOPERATIVE SCREENING LABORATORY TESTING FOR COVID-19 VIRUS: Primary | ICD-10-CM

## 2021-11-08 DIAGNOSIS — Z20.822 ENCOUNTER FOR PREOPERATIVE SCREENING LABORATORY TESTING FOR COVID-19 VIRUS: Primary | ICD-10-CM

## 2021-11-08 PROCEDURE — U0003 INFECTIOUS AGENT DETECTION BY NUCLEIC ACID (DNA OR RNA); SEVERE ACUTE RESPIRATORY SYNDROME CORONAVIRUS 2 (SARS-COV-2) (CORONAVIRUS DISEASE [COVID-19]), AMPLIFIED PROBE TECHNIQUE, MAKING USE OF HIGH THROUGHPUT TECHNOLOGIES AS DESCRIBED BY CMS-2020-01-R: HCPCS

## 2021-11-08 NOTE — ADVANCED PRACTICE NURSE
PAT Nurse Practitioner   Pre-Operative Chart Review/Assessment:-ORTHOPEDIC/NEUROSURGICAL SPINE                Patient Name:  Brenna Sanchez                                                           Age:   74 y.o.    :  1955     Today's Date:  2021     Date of PAT:   21     Date of Surgery:    2021      Procedure(s):    Revision Laminectomy L3-S1 with revision L3-L5 revision fusion      Surgeon:   Radha     Medical Clearance:  Dr. Sanchez Marking                   PLAN:      1)  Cardiac Clearance:  Not requested       2)  Program for Diabetes Health Consult:  Not indicated-A1C 5.8      3)  Sleep Apnea evaluation:   Has dx of EDWARD-compliant w/ CPAP      4) Treatment for MRSA/Staph Aureus:  Negative      5) Additional Concerns:  COPD, EDWARD, former smoker                 Vital Signs:         Visit Vitals  BP (!) 184/67 (BP 1 Location: Left upper arm, BP Patient Position: Sitting) Comment: pt. has not taken lisinopril yet today and has used albuterol & Breztri inhalers   Pulse 92   Temp 98 °F (36.7 °C)   Resp 20   Ht 5' 10.5\" (1.791 m)   Wt 93.8 kg (206 lb 12.7 oz)   SpO2 96%   BMI 29.25 kg/m²                        ____________________________________________  PAST MEDICAL HISTORY  Past Medical History:   Diagnosis Date    Chronic obstructive pulmonary disease (HCC)     GERD (gastroesophageal reflux disease)     Hypertension     Polyp of colon     Sleep apnea     Uses CPAP      ____________________________________________  PAST SURGICAL HISTORY  Past Surgical History:   Procedure Laterality Date    HX COLONOSCOPY      HX LUMBAR FUSION      x 3    HX LUMBAR LAMINECTOMY      x 3    HX VEIN STRIPPING      IR INJ FORAMIN EPID LUMB ANES/STER SNGL  10/30/2019    IR INJ FORAMIN EPID LUMB ANES/STER SNGL  10/1/2021    LA CARDIAC SURG PROCEDURE UNLIST  2019    negative Cath Lab @ [facility]    LA CARDIAC SURG PROCEDURE UNLIST      negative cardiac stress @ [facility] ____________________________________________  HOME MEDICATIONS    Current Outpatient Medications   Medication Sig    pantoprazole (PROTONIX) 40 mg tablet Take 40 mg by mouth Every morning.  HYDROcodone-acetaminophen (NORCO) 5-325 mg per tablet Take 1 Tablet by mouth every four (4) hours as needed for Pain.  lisinopriL (PRINIVIL, ZESTRIL) 20 mg tablet Take 20 mg by mouth Every morning.  budesonide-glycopyr-formoterol (Breztri Aerosphere) 160-9-4.8 mcg/actuation HFAA Take 2 Puffs by inhalation two (2) times a day.  albuterol (ACCUNEB) 0.63 mg/3 mL nebulizer solution 0.63 mg by Nebulization route every six (6) hours as needed for Wheezing. No current facility-administered medications for this encounter.      ____________________________________________  ALLERGIES  Allergies   Allergen Reactions    Pcn [Penicillins] Rash     Has taken amoxicillin without a reaction;       ____________________________________________  SOCIAL HISTORY  Social History     Tobacco Use    Smoking status: Former Smoker     Packs/day: 2.00     Years: 50.00     Pack years: 100.00     Quit date: 2021     Years since quittin.3    Smokeless tobacco: Never Used   Substance Use Topics    Alcohol use:  Yes     Alcohol/week: 21.0 standard drinks     Types: 21 Shots of liquor per week      ____________________________________________  COVID VACCINATION STATUS:      Internal Administration   First Dose COVID-19, [address] [place], Primary or Immunocompromised Series, MRNA, PF, 100mcg/0.5mL  2021   Second Dose COVID-19, [address] [place], Primary or Immunocompromised Series, MRNA, PF, 100mcg/0.5mL  2021      Last COVID Lab No results found for: [facility], RCV2CT, CVD2M, COV2, XPLCVT, [id number], JDGVVXW2BLEX, COVV, [facility] Outpatient Visit on 2021   Component Date Value Ref Range Status    WBC 2021 9.5  4.1 - 11.1 K/uL Final    RBC 2021 3.45* 4.10 - 5.70 M/uL Final    HGB 11/04/2021 11.5* 12.1 - 17.0 g/dL Final    HCT 11/04/2021 35.9* 36.6 - 50.3 % Final    MCV 11/04/2021 104.1* 80.0 - 99.0 FL Final    MCH 11/04/2021 33.3  26.0 - 34.0 PG Final    MCHC 11/04/2021 32.0  30.0 - 36.5 g/dL Final    RDW 11/04/2021 13.0  11.5 - 14.5 % Final    PLATELET 67/74/3603 572  150 - 400 K/uL Final    MPV 11/04/2021 10.7  8.9 - 12.9 FL Final    NRBC 11/04/2021 0.0  0  WBC Final    ABSOLUTE NRBC 11/04/2021 0.00  0.00 - 0.01 K/uL Final    Special Requests: 11/04/2021 NO SPECIAL REQUESTS    Final    Culture result: 11/04/2021 MRSA NOT PRESENT    Final    Culture result: 11/04/2021 Screening of patient nares for MRSA is for surveillance purposes and, if positive, to facilitate isolation considerations in high risk settings. It is not intended for automatic decolonization interventions per se as regimens are not sufficiently effective to warrant routine use.     Final    Ventricular Rate 11/04/2021 77  BPM Final    Atrial Rate 11/04/2021 77  BPM Final    P-R Interval 11/04/2021 204  ms Final    QRS Duration 11/04/2021 106  ms Final    Q-T Interval 11/04/2021 414  ms Final    QTC Calculation (Bezet) 11/04/2021 468  ms Final    Calculated P Axis 11/04/2021 73  degrees Final    Calculated R Axis 11/04/2021 -32  degrees Final    Calculated T Axis 11/04/2021 40  degrees Final    Diagnosis 11/04/2021    Final                    Value:Normal sinus rhythm  Left axis deviation  Loss of anteroseptal forces  When compared with ECG of 15-OCT-2015 22:15,  No significant change was found  Confirmed by Darren Del Castillo ([id number]) on 11/4/2021 11:13:01 AM      Hemoglobin A1c 11/04/2021 5.8* 4.0 - 5.6 % Final    Comment: NEW METHOD  PLEASE NOTE NEW REFERENCE RANGE  (NOTE)  HbA1C Interpretive Ranges  <5.7              Normal  5.7 - 6.4         Consider Prediabetes  >6.5              Consider Diabetes      Est. average glucose 11/04/2021 120  mg/dL Final    INR 11/04/2021 1.0  0.9 - 1.1   Final    A single therapeutic range for Vit K antagonists may not be optimal for all indications - see June, 2008 issue of Chest, American College of Chest Physicians Evidence-Based Clinical Practice Guidelines, 8th Edition.  Prothrombin time 11/04/2021 10.6  9.0 - 11.1 sec Final    Color 11/04/2021 YELLOW/STRAW    Final    Color Reference Range: Straw, Yellow or Dark Yellow    Appearance 11/04/2021 CLEAR  CLEAR   Final    Specific gravity 11/04/2021 1.017  1.003 - 1.030   Final    pH (UA) 11/04/2021 6.0  5.0 - 8.0   Final    Protein 11/04/2021 Negative  NEG mg/dL Final    Glucose 11/04/2021 Negative  NEG mg/dL Final    Ketone 11/04/2021 Negative  NEG mg/dL Final    Bilirubin 11/04/2021 Negative  NEG   Final    Blood 11/04/2021 Negative  NEG   Final    Urobilinogen 11/04/2021 0.2  0.2 - 1.0 EU/dL Final    Nitrites 11/04/2021 Negative  NEG   Final    Leukocyte Esterase 11/04/2021 Negative  NEG   Final    WBC 11/04/2021 0-4  0 - 4 /hpf Final    RBC 11/04/2021 0-5  0 - 5 /hpf Final    Epithelial cells 11/04/2021 FEW  FEW /lpf Final    Epithelial cell category consists of squamous cells and /or transitional urothelial cells. Renal tubular cells, if present, are separately identified as such.     Bacteria 11/04/2021 Negative  NEG /hpf Final    UA:UC IF INDICATED 11/04/2021 CULTURE NOT INDICATED BY UA RESULT  CNI   Final    Hyaline cast 11/04/2021 0-2  0 - 5 /lpf Final    Sodium 11/04/2021 141  136 - 145 mmol/L Final    Potassium 11/04/2021 3.6  3.5 - 5.1 mmol/L Final    Chloride 11/04/2021 105  97 - 108 mmol/L Final    CO2 11/04/2021 29  21 - 32 mmol/L Final    Anion gap 11/04/2021 7  5 - 15 mmol/L Final    Glucose 11/04/2021 129* 65 - 100 mg/dL Final    BUN 11/04/2021 7  6 - 20 MG/DL Final    Creatinine 11/04/2021 0.64* 0.70 - 1.30 MG/DL Final    BUN/Creatinine ratio 11/04/2021 11* 12 - 20   Final    GFR est AA 11/04/2021 >60  >60 ml/min/1.73m2 Final    GFR est non-AA 11/04/2021 >60  >60 ml/min/1.73m2 Final    Estimated GFR is calculated using the IDMS-traceable Modification of Diet in Renal Disease (MDRD) Study equation, reported for both  Americans (GFRAA) and non- Americans (GFRNA), and normalized to 1.73m2 body surface area. The physician must decide which value applies to the patient.  Calcium 11/04/2021 9.5  8.5 - 10.1 MG/DL Final    Bilirubin, total 11/04/2021 0.2  0.2 - 1.0 MG/DL Final    ALT (SGPT) 11/04/2021 25  12 - 78 U/L Final    AST (SGOT) 11/04/2021 15  15 - 37 U/L Final    Alk. phosphatase 11/04/2021 162* 45 - 117 U/L Final    Protein, total 11/04/2021 7.6  6.4 - 8.2 g/dL Final    Albumin 11/04/2021 3.0* 3.5 - 5.0 g/dL Final    Globulin 11/04/2021 4.6* 2.0 - 4.0 g/dL Final    A-G Ratio 11/04/2021 0.7* 1.1 - 2.2   Final    Crossmatch Expiration 11/04/2021 11/15/2021,2359   Final    ABO/Rh(D) 11/04/2021 B POSITIVE   Final    Antibody screen 11/04/2021 NEG   Final        XR Results (most recent):    Results from [place] encounter on 02/10/10    XR SPINE CERV SINGLE VIEW CROSS TABLE LAT    Narrative  ***Final Report***      ICD Codes / Adm. Diagnosis:    /   STENOSIS  Examination:  SPINE 1   - [id number] - Feb 10 2010  5:54PM  Accession No:  [id number]  Reason:  X-TABLE LUMBAR      REPORT:  INDICATION:  See above    FINDINGS:  Single  portable lateral view of the lumbar spine demonstrates fusion at the  presumed L4-L5 level. Alignment is maintained. IMPRESSION:  Lumbar fusion as described. Interpreting/Reading Doctor: Evette Treadwell ([id number])  Transcribed: n/a on 02/10/2010  Approved: Evette Treadwell ([id number])  02/10/2010        Distribution:  Attending Doctor: Charisse Woo  Alternate Doctor: Charisse Woo         Skin:   Denies open wounds, cuts, sores, rashes or other areas of concern in PAT assessment.         Mayra Prado, NP

## 2021-11-09 LAB
SARS-COV-2, XPLCVT: NOT DETECTED
SOURCE, COVRS: NORMAL

## 2021-11-12 ENCOUNTER — HOSPITAL ENCOUNTER (INPATIENT)
Age: 66
LOS: 2 days | Discharge: HOME | DRG: 153 | End: 2021-11-14
Attending: ORTHOPAEDIC SURGERY | Admitting: ORTHOPAEDIC SURGERY
Payer: COMMERCIAL

## 2021-11-12 ENCOUNTER — APPOINTMENT (OUTPATIENT)
Dept: GENERAL RADIOLOGY | Age: 66
DRG: 153 | End: 2021-11-12
Attending: ANESTHESIOLOGY
Payer: COMMERCIAL

## 2021-11-12 ENCOUNTER — ANESTHESIA (OUTPATIENT)
Dept: SURGERY | Age: 66
DRG: 153 | End: 2021-11-12
Payer: COMMERCIAL

## 2021-11-12 ENCOUNTER — TELEPHONE (OUTPATIENT)
Dept: ORTHOPEDIC SURGERY | Age: 66
End: 2021-11-12

## 2021-11-12 ENCOUNTER — ANESTHESIA EVENT (OUTPATIENT)
Dept: SURGERY | Age: 66
DRG: 153 | End: 2021-11-12
Payer: COMMERCIAL

## 2021-11-12 ENCOUNTER — APPOINTMENT (OUTPATIENT)
Dept: GENERAL RADIOLOGY | Age: 66
DRG: 153 | End: 2021-11-12
Attending: ORTHOPAEDIC SURGERY
Payer: COMMERCIAL

## 2021-11-12 PROBLEM — M48.062 LUMBAR STENOSIS WITH NEUROGENIC CLAUDICATION: Status: ACTIVE | Noted: 2021-11-12

## 2021-11-12 LAB

## 2021-11-12 PROCEDURE — 74011250637 HC RX REV CODE- 250/637: Performed by: ORTHOPAEDIC SURGERY

## 2021-11-12 PROCEDURE — 77030031139 HC SUT VCRL2 J&J -A: Performed by: ORTHOPAEDIC SURGERY

## 2021-11-12 PROCEDURE — 94640 AIRWAY INHALATION TREATMENT: CPT

## 2021-11-12 PROCEDURE — 65660000000 HC RM CCU STEPDOWN

## 2021-11-12 PROCEDURE — 74011250637 HC RX REV CODE- 250/637: Performed by: ANESTHESIOLOGY

## 2021-11-12 PROCEDURE — 76210000008 HC OR PH I REC 6 TO 6.5 HR: Performed by: ORTHOPAEDIC SURGERY

## 2021-11-12 PROCEDURE — 74011250636 HC RX REV CODE- 250/636: Performed by: NURSE ANESTHETIST, CERTIFIED REGISTERED

## 2021-11-12 PROCEDURE — 2709999900 HC NON-CHARGEABLE SUPPLY: Performed by: ORTHOPAEDIC SURGERY

## 2021-11-12 PROCEDURE — 74011250636 HC RX REV CODE- 250/636: Performed by: ANESTHESIOLOGY

## 2021-11-12 PROCEDURE — 71045 X-RAY EXAM CHEST 1 VIEW: CPT

## 2021-11-12 PROCEDURE — 77030026438 HC STYL ET INTUB CARD -A: Performed by: NURSE ANESTHETIST, CERTIFIED REGISTERED

## 2021-11-12 PROCEDURE — 77030014007 HC SPNG HEMSTAT J&J -B: Performed by: ORTHOPAEDIC SURGERY

## 2021-11-12 PROCEDURE — 74011250636 HC RX REV CODE- 250/636: Performed by: INTERNAL MEDICINE

## 2021-11-12 PROCEDURE — 77030040361 HC SLV COMPR DVT MDII -B: Performed by: ORTHOPAEDIC SURGERY

## 2021-11-12 PROCEDURE — 76060000033 HC ANESTHESIA 1 TO 1.5 HR: Performed by: ORTHOPAEDIC SURGERY

## 2021-11-12 PROCEDURE — 76010000161 HC OR TIME 1 TO 1.5 HR INTENSV-TIER 1: Performed by: ORTHOPAEDIC SURGERY

## 2021-11-12 PROCEDURE — 74011000250 HC RX REV CODE- 250: Performed by: ANESTHESIOLOGY

## 2021-11-12 PROCEDURE — 77030013079 HC BLNKT BAIR HGGR 3M -A: Performed by: NURSE ANESTHETIST, CERTIFIED REGISTERED

## 2021-11-12 PROCEDURE — 76000 FLUOROSCOPY <1 HR PHYS/QHP: CPT

## 2021-11-12 PROCEDURE — 77030038600 HC TU BPLR IRR DISP STRY -B: Performed by: ORTHOPAEDIC SURGERY

## 2021-11-12 PROCEDURE — 77030008684 HC TU ET CUF COVD -B: Performed by: NURSE ANESTHETIST, CERTIFIED REGISTERED

## 2021-11-12 PROCEDURE — 0202U NFCT DS 22 TRGT SARS-COV-2: CPT

## 2021-11-12 PROCEDURE — 74011250636 HC RX REV CODE- 250/636: Performed by: ORTHOPAEDIC SURGERY

## 2021-11-12 PROCEDURE — 74011250637 HC RX REV CODE- 250/637: Performed by: NURSE ANESTHETIST, CERTIFIED REGISTERED

## 2021-11-12 PROCEDURE — 74011000250 HC RX REV CODE- 250

## 2021-11-12 PROCEDURE — 74011000250 HC RX REV CODE- 250: Performed by: NURSE ANESTHETIST, CERTIFIED REGISTERED

## 2021-11-12 PROCEDURE — 77030005513 HC CATH URETH FOL11 MDII -B: Performed by: ORTHOPAEDIC SURGERY

## 2021-11-12 PROCEDURE — 74011250637 HC RX REV CODE- 250/637: Performed by: INTERNAL MEDICINE

## 2021-11-12 PROCEDURE — 74011000250 HC RX REV CODE- 250: Performed by: ORTHOPAEDIC SURGERY

## 2021-11-12 RX ORDER — OXYCODONE HYDROCHLORIDE 5 MG/1
5 TABLET ORAL
Status: CANCELLED | OUTPATIENT
Start: 2021-11-12

## 2021-11-12 RX ORDER — CYCLOBENZAPRINE HCL 10 MG
10 TABLET ORAL
Status: CANCELLED | OUTPATIENT
Start: 2021-11-12

## 2021-11-12 RX ORDER — BUDESONIDE AND FORMOTEROL FUMARATE DIHYDRATE 160; 4.5 UG/1; UG/1
2 AEROSOL RESPIRATORY (INHALATION)
Status: DISCONTINUED | OUTPATIENT
Start: 2021-11-12 | End: 2021-11-14 | Stop reason: HOSPADM

## 2021-11-12 RX ORDER — SODIUM CHLORIDE 9 MG/ML
25 INJECTION, SOLUTION INTRAVENOUS CONTINUOUS
Status: DISCONTINUED | OUTPATIENT
Start: 2021-11-12 | End: 2021-11-12 | Stop reason: HOSPADM

## 2021-11-12 RX ORDER — MIDAZOLAM HYDROCHLORIDE 1 MG/ML
1 INJECTION, SOLUTION INTRAMUSCULAR; INTRAVENOUS AS NEEDED
Status: DISCONTINUED | OUTPATIENT
Start: 2021-11-12 | End: 2021-11-12 | Stop reason: HOSPADM

## 2021-11-12 RX ORDER — SODIUM CHLORIDE, SODIUM LACTATE, POTASSIUM CHLORIDE, CALCIUM CHLORIDE 600; 310; 30; 20 MG/100ML; MG/100ML; MG/100ML; MG/100ML
100 INJECTION, SOLUTION INTRAVENOUS CONTINUOUS
Status: DISCONTINUED | OUTPATIENT
Start: 2021-11-12 | End: 2021-11-12 | Stop reason: HOSPADM

## 2021-11-12 RX ORDER — FENTANYL CITRATE 50 UG/ML
50 INJECTION, SOLUTION INTRAMUSCULAR; INTRAVENOUS AS NEEDED
Status: DISCONTINUED | OUTPATIENT
Start: 2021-11-12 | End: 2021-11-12 | Stop reason: HOSPADM

## 2021-11-12 RX ORDER — IPRATROPIUM BROMIDE 0.5 MG/2.5ML
0.5 SOLUTION RESPIRATORY (INHALATION)
Status: DISCONTINUED | OUTPATIENT
Start: 2021-11-12 | End: 2021-11-12 | Stop reason: HOSPADM

## 2021-11-12 RX ORDER — FACIAL-BODY WIPES
10 EACH TOPICAL DAILY PRN
Status: CANCELLED | OUTPATIENT
Start: 2021-11-14

## 2021-11-12 RX ORDER — SODIUM CHLORIDE, SODIUM LACTATE, POTASSIUM CHLORIDE, CALCIUM CHLORIDE 600; 310; 30; 20 MG/100ML; MG/100ML; MG/100ML; MG/100ML
25 INJECTION, SOLUTION INTRAVENOUS CONTINUOUS
Status: DISCONTINUED | OUTPATIENT
Start: 2021-11-12 | End: 2021-11-12 | Stop reason: HOSPADM

## 2021-11-12 RX ORDER — DIPHENHYDRAMINE HYDROCHLORIDE 50 MG/ML
12.5 INJECTION, SOLUTION INTRAMUSCULAR; INTRAVENOUS
Status: CANCELLED | OUTPATIENT
Start: 2021-11-12 | End: 2021-11-13

## 2021-11-12 RX ORDER — ROPIVACAINE HYDROCHLORIDE 5 MG/ML
30 INJECTION, SOLUTION EPIDURAL; INFILTRATION; PERINEURAL AS NEEDED
Status: DISCONTINUED | OUTPATIENT
Start: 2021-11-12 | End: 2021-11-12 | Stop reason: HOSPADM

## 2021-11-12 RX ORDER — SODIUM CHLORIDE 0.9 % (FLUSH) 0.9 %
5-40 SYRINGE (ML) INJECTION EVERY 8 HOURS
Status: CANCELLED | OUTPATIENT
Start: 2021-11-12

## 2021-11-12 RX ORDER — MORPHINE SULFATE 2 MG/ML
2 INJECTION, SOLUTION INTRAMUSCULAR; INTRAVENOUS
Status: DISCONTINUED | OUTPATIENT
Start: 2021-11-12 | End: 2021-11-12 | Stop reason: HOSPADM

## 2021-11-12 RX ORDER — HYDROCODONE BITARTRATE AND ACETAMINOPHEN 5; 325 MG/1; MG/1
1 TABLET ORAL
Status: COMPLETED | OUTPATIENT
Start: 2021-11-12 | End: 2021-11-12

## 2021-11-12 RX ORDER — PANTOPRAZOLE SODIUM 40 MG/1
40 TABLET, DELAYED RELEASE ORAL EVERY MORNING
Status: DISCONTINUED | OUTPATIENT
Start: 2021-11-13 | End: 2021-11-14 | Stop reason: HOSPADM

## 2021-11-12 RX ORDER — HYDROMORPHONE HYDROCHLORIDE 1 MG/ML
0.5 INJECTION, SOLUTION INTRAMUSCULAR; INTRAVENOUS; SUBCUTANEOUS
Status: DISCONTINUED | OUTPATIENT
Start: 2021-11-12 | End: 2021-11-12 | Stop reason: HOSPADM

## 2021-11-12 RX ORDER — AMOXICILLIN 250 MG
1 CAPSULE ORAL 2 TIMES DAILY
Status: CANCELLED | OUTPATIENT
Start: 2021-11-12

## 2021-11-12 RX ORDER — DIPHENHYDRAMINE HYDROCHLORIDE 50 MG/ML
12.5 INJECTION, SOLUTION INTRAMUSCULAR; INTRAVENOUS AS NEEDED
Status: DISCONTINUED | OUTPATIENT
Start: 2021-11-12 | End: 2021-11-12 | Stop reason: HOSPADM

## 2021-11-12 RX ORDER — SODIUM CHLORIDE 0.9 % (FLUSH) 0.9 %
5-40 SYRINGE (ML) INJECTION AS NEEDED
Status: CANCELLED | OUTPATIENT
Start: 2021-11-12

## 2021-11-12 RX ORDER — HYDROCODONE BITARTRATE AND ACETAMINOPHEN 5; 325 MG/1; MG/1
TABLET ORAL
Status: DISPENSED
Start: 2021-11-12 | End: 2021-11-13

## 2021-11-12 RX ORDER — FUROSEMIDE 10 MG/ML
20 INJECTION INTRAMUSCULAR; INTRAVENOUS
Status: COMPLETED | OUTPATIENT
Start: 2021-11-12 | End: 2021-11-12

## 2021-11-12 RX ORDER — ALBUTEROL SULFATE 90 UG/1
AEROSOL, METERED RESPIRATORY (INHALATION) AS NEEDED
Status: DISCONTINUED | OUTPATIENT
Start: 2021-11-12 | End: 2021-11-12 | Stop reason: HOSPADM

## 2021-11-12 RX ORDER — FENTANYL CITRATE 50 UG/ML
INJECTION, SOLUTION INTRAMUSCULAR; INTRAVENOUS AS NEEDED
Status: DISCONTINUED | OUTPATIENT
Start: 2021-11-12 | End: 2021-11-12 | Stop reason: HOSPADM

## 2021-11-12 RX ORDER — SODIUM CHLORIDE 0.9 % (FLUSH) 0.9 %
5-40 SYRINGE (ML) INJECTION AS NEEDED
Status: DISCONTINUED | OUTPATIENT
Start: 2021-11-12 | End: 2021-11-12 | Stop reason: HOSPADM

## 2021-11-12 RX ORDER — SODIUM CHLORIDE 9 MG/ML
125 INJECTION, SOLUTION INTRAVENOUS CONTINUOUS
Status: CANCELLED | OUTPATIENT
Start: 2021-11-12 | End: 2021-11-13

## 2021-11-12 RX ORDER — MORPHINE SULFATE 2 MG/ML
2 INJECTION, SOLUTION INTRAMUSCULAR; INTRAVENOUS
Status: CANCELLED | OUTPATIENT
Start: 2021-11-12 | End: 2021-11-13

## 2021-11-12 RX ORDER — POLYETHYLENE GLYCOL 3350 17 G/17G
17 POWDER, FOR SOLUTION ORAL DAILY
Status: CANCELLED | OUTPATIENT
Start: 2021-11-12

## 2021-11-12 RX ORDER — IPRATROPIUM BROMIDE AND ALBUTEROL SULFATE 2.5; .5 MG/3ML; MG/3ML
SOLUTION RESPIRATORY (INHALATION)
Status: DISPENSED
Start: 2021-11-12 | End: 2021-11-12

## 2021-11-12 RX ORDER — LISINOPRIL 20 MG/1
20 TABLET ORAL EVERY MORNING
Status: DISCONTINUED | OUTPATIENT
Start: 2021-11-13 | End: 2021-11-14 | Stop reason: HOSPADM

## 2021-11-12 RX ORDER — MIDAZOLAM HYDROCHLORIDE 1 MG/ML
INJECTION, SOLUTION INTRAMUSCULAR; INTRAVENOUS AS NEEDED
Status: DISCONTINUED | OUTPATIENT
Start: 2021-11-12 | End: 2021-11-12 | Stop reason: HOSPADM

## 2021-11-12 RX ORDER — PROPOFOL 10 MG/ML
INJECTION, EMULSION INTRAVENOUS
Status: DISCONTINUED | OUTPATIENT
Start: 2021-11-12 | End: 2021-11-12 | Stop reason: HOSPADM

## 2021-11-12 RX ORDER — IPRATROPIUM BROMIDE AND ALBUTEROL SULFATE 2.5; .5 MG/3ML; MG/3ML
3 SOLUTION RESPIRATORY (INHALATION)
Status: DISCONTINUED | OUTPATIENT
Start: 2021-11-12 | End: 2021-11-12

## 2021-11-12 RX ORDER — KETAMINE HYDROCHLORIDE 10 MG/ML
INJECTION, SOLUTION INTRAMUSCULAR; INTRAVENOUS AS NEEDED
Status: DISCONTINUED | OUTPATIENT
Start: 2021-11-12 | End: 2021-11-12 | Stop reason: HOSPADM

## 2021-11-12 RX ORDER — PROPOFOL 10 MG/ML
INJECTION, EMULSION INTRAVENOUS AS NEEDED
Status: DISCONTINUED | OUTPATIENT
Start: 2021-11-12 | End: 2021-11-12 | Stop reason: HOSPADM

## 2021-11-12 RX ORDER — SODIUM CHLORIDE 0.9 % (FLUSH) 0.9 %
5-40 SYRINGE (ML) INJECTION EVERY 8 HOURS
Status: DISCONTINUED | OUTPATIENT
Start: 2021-11-12 | End: 2021-11-12 | Stop reason: HOSPADM

## 2021-11-12 RX ORDER — IPRATROPIUM BROMIDE AND ALBUTEROL SULFATE 2.5; .5 MG/3ML; MG/3ML
SOLUTION RESPIRATORY (INHALATION)
Status: DISPENSED
Start: 2021-11-12 | End: 2021-11-13

## 2021-11-12 RX ORDER — GUAIFENESIN 600 MG/1
1200 TABLET, EXTENDED RELEASE ORAL 2 TIMES DAILY
Status: DISCONTINUED | OUTPATIENT
Start: 2021-11-12 | End: 2021-11-14 | Stop reason: HOSPADM

## 2021-11-12 RX ORDER — IPRATROPIUM BROMIDE AND ALBUTEROL SULFATE 2.5; .5 MG/3ML; MG/3ML
SOLUTION RESPIRATORY (INHALATION) AS NEEDED
Status: DISCONTINUED | OUTPATIENT
Start: 2021-11-12 | End: 2021-11-12 | Stop reason: HOSPADM

## 2021-11-12 RX ORDER — NALOXONE HYDROCHLORIDE 0.4 MG/ML
0.4 INJECTION, SOLUTION INTRAMUSCULAR; INTRAVENOUS; SUBCUTANEOUS AS NEEDED
Status: CANCELLED | OUTPATIENT
Start: 2021-11-12

## 2021-11-12 RX ORDER — OXYCODONE HYDROCHLORIDE 5 MG/1
10 TABLET ORAL
Status: CANCELLED | OUTPATIENT
Start: 2021-11-12

## 2021-11-12 RX ORDER — SUCCINYLCHOLINE CHLORIDE 20 MG/ML
INJECTION INTRAMUSCULAR; INTRAVENOUS AS NEEDED
Status: DISCONTINUED | OUTPATIENT
Start: 2021-11-12 | End: 2021-11-12 | Stop reason: HOSPADM

## 2021-11-12 RX ORDER — LIDOCAINE HYDROCHLORIDE 20 MG/ML
INJECTION, SOLUTION EPIDURAL; INFILTRATION; INTRACAUDAL; PERINEURAL AS NEEDED
Status: DISCONTINUED | OUTPATIENT
Start: 2021-11-12 | End: 2021-11-12 | Stop reason: HOSPADM

## 2021-11-12 RX ORDER — FENTANYL CITRATE 50 UG/ML
25 INJECTION, SOLUTION INTRAMUSCULAR; INTRAVENOUS
Status: DISCONTINUED | OUTPATIENT
Start: 2021-11-12 | End: 2021-11-12 | Stop reason: HOSPADM

## 2021-11-12 RX ORDER — ONDANSETRON 2 MG/ML
4 INJECTION INTRAMUSCULAR; INTRAVENOUS AS NEEDED
Status: DISCONTINUED | OUTPATIENT
Start: 2021-11-12 | End: 2021-11-12 | Stop reason: HOSPADM

## 2021-11-12 RX ORDER — MIDAZOLAM HYDROCHLORIDE 1 MG/ML
0.5 INJECTION, SOLUTION INTRAMUSCULAR; INTRAVENOUS
Status: DISCONTINUED | OUTPATIENT
Start: 2021-11-12 | End: 2021-11-12 | Stop reason: HOSPADM

## 2021-11-12 RX ORDER — ALBUTEROL SULFATE 0.83 MG/ML
2.5 SOLUTION RESPIRATORY (INHALATION)
Status: DISCONTINUED | OUTPATIENT
Start: 2021-11-12 | End: 2021-11-12

## 2021-11-12 RX ORDER — ENOXAPARIN SODIUM 100 MG/ML
40 INJECTION SUBCUTANEOUS
Status: DISCONTINUED | OUTPATIENT
Start: 2021-11-13 | End: 2021-11-14 | Stop reason: HOSPADM

## 2021-11-12 RX ORDER — ACETAMINOPHEN 500 MG
1000 TABLET ORAL EVERY 6 HOURS
Status: CANCELLED | OUTPATIENT
Start: 2021-11-12

## 2021-11-12 RX ORDER — ALBUTEROL SULFATE 90 UG/1
2 AEROSOL, METERED RESPIRATORY (INHALATION)
Status: DISCONTINUED | OUTPATIENT
Start: 2021-11-12 | End: 2021-11-14 | Stop reason: HOSPADM

## 2021-11-12 RX ORDER — ONDANSETRON 2 MG/ML
4 INJECTION INTRAMUSCULAR; INTRAVENOUS
Status: CANCELLED | OUTPATIENT
Start: 2021-11-12 | End: 2021-11-13

## 2021-11-12 RX ORDER — MORPHINE SULFATE IN 0.9 % NACL 150MG/30ML
PATIENT CONTROLLED ANALGESIA SYRINGE INTRAVENOUS
Status: CANCELLED | OUTPATIENT
Start: 2021-11-12

## 2021-11-12 RX ORDER — HYDROCODONE BITARTRATE AND ACETAMINOPHEN 5; 325 MG/1; MG/1
1 TABLET ORAL
Status: DISCONTINUED | OUTPATIENT
Start: 2021-11-12 | End: 2021-11-14 | Stop reason: HOSPADM

## 2021-11-12 RX ORDER — ACETAMINOPHEN 325 MG/1
650 TABLET ORAL ONCE
Status: COMPLETED | OUTPATIENT
Start: 2021-11-12 | End: 2021-11-12

## 2021-11-12 RX ORDER — PHENYLEPHRINE HCL IN 0.9% NACL 0.4MG/10ML
SYRINGE (ML) INTRAVENOUS
Status: DISCONTINUED | OUTPATIENT
Start: 2021-11-12 | End: 2021-11-12 | Stop reason: HOSPADM

## 2021-11-12 RX ORDER — LIDOCAINE HYDROCHLORIDE 10 MG/ML
0.1 INJECTION, SOLUTION EPIDURAL; INFILTRATION; INTRACAUDAL; PERINEURAL AS NEEDED
Status: DISCONTINUED | OUTPATIENT
Start: 2021-11-12 | End: 2021-11-12 | Stop reason: HOSPADM

## 2021-11-12 RX ORDER — DEXAMETHASONE SODIUM PHOSPHATE 4 MG/ML
INJECTION, SOLUTION INTRA-ARTICULAR; INTRALESIONAL; INTRAMUSCULAR; INTRAVENOUS; SOFT TISSUE AS NEEDED
Status: DISCONTINUED | OUTPATIENT
Start: 2021-11-12 | End: 2021-11-12 | Stop reason: HOSPADM

## 2021-11-12 RX ADMIN — HYDROCODONE BITARTRATE AND ACETAMINOPHEN 1 TABLET: 5; 325 TABLET ORAL at 15:36

## 2021-11-12 RX ADMIN — ALBUTEROL SULFATE 4 PUFF: 90 AEROSOL, METERED RESPIRATORY (INHALATION) at 10:47

## 2021-11-12 RX ADMIN — IPRATROPIUM BROMIDE AND ALBUTEROL 1 PUFF: 20; 100 SPRAY, METERED RESPIRATORY (INHALATION) at 23:30

## 2021-11-12 RX ADMIN — IPRATROPIUM BROMIDE AND ALBUTEROL 1 PUFF: 20; 100 SPRAY, METERED RESPIRATORY (INHALATION) at 20:59

## 2021-11-12 RX ADMIN — IPRATROPIUM BROMIDE 0.5 MG: 0.5 SOLUTION RESPIRATORY (INHALATION) at 13:00

## 2021-11-12 RX ADMIN — LIDOCAINE HYDROCHLORIDE 100 MG: 20 INJECTION, SOLUTION INTRAVENOUS at 10:23

## 2021-11-12 RX ADMIN — MIDAZOLAM HYDROCHLORIDE 2 MG: 1 INJECTION, SOLUTION INTRAMUSCULAR; INTRAVENOUS at 10:16

## 2021-11-12 RX ADMIN — Medication 40 MCG/MIN: at 10:30

## 2021-11-12 RX ADMIN — ALBUTEROL SULFATE 4 PUFF: 90 AEROSOL, METERED RESPIRATORY (INHALATION) at 10:52

## 2021-11-12 RX ADMIN — HYDROCODONE BITARTRATE AND ACETAMINOPHEN 1 TABLET: 5; 325 TABLET ORAL at 20:07

## 2021-11-12 RX ADMIN — IPRATROPIUM BROMIDE AND ALBUTEROL SULFATE 3 ML: .5; 2.5 SOLUTION RESPIRATORY (INHALATION) at 11:25

## 2021-11-12 RX ADMIN — SODIUM CHLORIDE, POTASSIUM CHLORIDE, SODIUM LACTATE AND CALCIUM CHLORIDE 25 ML/HR: 600; 310; 30; 20 INJECTION, SOLUTION INTRAVENOUS at 08:56

## 2021-11-12 RX ADMIN — METHYLPREDNISOLONE SODIUM SUCCINATE 40 MG: 40 INJECTION, POWDER, FOR SOLUTION INTRAMUSCULAR; INTRAVENOUS at 20:06

## 2021-11-12 RX ADMIN — FUROSEMIDE 5 MG: 10 INJECTION, SOLUTION INTRAMUSCULAR; INTRAVENOUS at 14:58

## 2021-11-12 RX ADMIN — PROPOFOL 50 MG: 10 INJECTION, EMULSION INTRAVENOUS at 10:38

## 2021-11-12 RX ADMIN — WATER 2 G: 1 INJECTION INTRAMUSCULAR; INTRAVENOUS; SUBCUTANEOUS at 10:35

## 2021-11-12 RX ADMIN — ACETAMINOPHEN 650 MG: 325 TABLET ORAL at 08:56

## 2021-11-12 RX ADMIN — Medication 3 AMPULE: at 08:56

## 2021-11-12 RX ADMIN — KETAMINE HYDROCHLORIDE 40 MG: 10 INJECTION, SOLUTION INTRAMUSCULAR; INTRAVENOUS at 10:30

## 2021-11-12 RX ADMIN — PROPOFOL 150 MG: 10 INJECTION, EMULSION INTRAVENOUS at 10:23

## 2021-11-12 RX ADMIN — RACEPINEPHRINE HYDROCHLORIDE: 11.25 SOLUTION RESPIRATORY (INHALATION) at 15:00

## 2021-11-12 RX ADMIN — GUAIFENESIN 1200 MG: 600 TABLET ORAL at 20:06

## 2021-11-12 RX ADMIN — SUCCINYLCHOLINE CHLORIDE 160 MG: 20 INJECTION, SOLUTION INTRAMUSCULAR; INTRAVENOUS at 10:16

## 2021-11-12 RX ADMIN — PROPOFOL 75 MCG/KG/MIN: 10 INJECTION, EMULSION INTRAVENOUS at 10:25

## 2021-11-12 RX ADMIN — FENTANYL CITRATE 100 MCG: 50 INJECTION, SOLUTION INTRAMUSCULAR; INTRAVENOUS at 10:23

## 2021-11-12 RX ADMIN — DEXAMETHASONE SODIUM PHOSPHATE 10 MG: 4 INJECTION, SOLUTION INTRAMUSCULAR; INTRAVENOUS at 10:29

## 2021-11-12 NOTE — ROUTINE PROCESS
TRANSFER - IN REPORT:    Verbal report received from Tony Mackenzie(name) on Sheridan Goltz  being received from PACU(unit) for routine progression of care      Report consisted of patients Situation, Background, Assessment and   Recommendations(SBAR). Information from the following report(s) SBAR, Kardex, Intake/Output, MAR and Accordion was reviewed with the receiving nurse. Opportunity for questions and clarification was provided.

## 2021-11-12 NOTE — ADDENDUM NOTE
Addendum  created 11/12/21 1320 by Juanjose Fontaine CRNA    Intraprocedure Event edited, Intraprocedure Meds edited

## 2021-11-12 NOTE — ANESTHESIA PREPROCEDURE EVALUATION
Relevant Problems   No relevant active problems       Anesthetic History   No history of anesthetic complications            Review of Systems / Medical History  Patient summary reviewed, nursing notes reviewed and pertinent labs reviewed    Pulmonary  Within defined limits  COPD    Sleep apnea           Neuro/Psych   Within defined limits           Cardiovascular  Within defined limits  Hypertension              Exercise tolerance: >4 METS     GI/Hepatic/Renal  Within defined limits   GERD           Endo/Other  Within defined limits           Other Findings              Physical Exam    Airway  Mallampati: II  TM Distance: > 6 cm  Neck ROM: normal range of motion   Mouth opening: Normal     Cardiovascular  Regular rate and rhythm,  S1 and S2 normal,  no murmur, click, rub, or gallop             Dental  No notable dental hx       Pulmonary  Breath sounds clear to auscultation               Abdominal  GI exam deferred       Other Findings            Anesthetic Plan    ASA: 2  Anesthesia type: general          Induction: Intravenous  Anesthetic plan and risks discussed with: Patient

## 2021-11-12 NOTE — PROGRESS NOTES
Called wife to give him an update. Patient's wife is extremely upset that we are testing him for Covid amongst other viruses. I explained that we are also treating him for COPD and giving him breathing treatments and steroids. Patient's wife continued to be extremely upset. I asked what we should do differently to help the situation, she just states that she does not like the situation and condescendingly laughed. I said if there is anything else that we could do for her please let us know and we will accommodate.

## 2021-11-12 NOTE — PROGRESS NOTES
Pt brought home BIPAP. [employer] notified for safe use in the hospital check of home bipap. Confirmation number [id number] given.

## 2021-11-12 NOTE — PROGRESS NOTES
Dr. Jodi Mitchell paged per nursing supervisor's request for new bed type due to pt's new orders for droplet plus/airborne for Covid rule out due to symptoms. Call back number [phone] given. Order for medical bed given.

## 2021-11-12 NOTE — PERIOP NOTES
TRANSFER - OUT REPORT:    Verbal report given to RN on Verdie Ee  being transferred to [address] [place] [address] routine progression of care       Report consisted of patients Situation, Background, Assessment and   Recommendations(SBAR). Information from the following report(s) SBAR was reviewed with the receiving nurse. Opportunity for questions and clarification was provided.       Patient transported with:   O2 @ 2 liters  Registered Nurse  [facility]

## 2021-11-12 NOTE — PERIOP NOTES
[facility] to hospitalist per Dr. Juan Jose Kolb. Awaiting call back. 0 Spoke with Admitting hospitalist.  He will review with Director and reach back out. 1550 Patient sitting up in bed, eating a grilled cheese sandwich. Some Dyspnea noted, sat's decrease to 90-92% while eating. [address] [facility] Dr. [place] Client returned call and requesting to have Dr. Juan Jose Kolb contact him. Dr. Juan Jose Kolb called and number provided.

## 2021-11-12 NOTE — PERIOP NOTES
Dr. Don Marrufo at bedside evaluating patient. Patient admitted with history of COPD and currently has a cold per patient. Patient sounds congested when speaking. Patient requested to be brought back in wheelchair due to SOB and back pain. Patient was 90-91% on room air and was placed on 3 liters oxygen for comfort and saturation went to 96-97%. Dr. Don Marrufo request to take patient off oxygen and see how he does on room air. Patient currently 95% on room air. Patient stated he used his inhalers this morning. Upon auscultation patient lung sounds diminished, and left lower lobe expiratory wheezing. Incentive spirometer given to patient per Dr. Don Marrufo request. Patient states no SOB at present.

## 2021-11-12 NOTE — ROUTINE PROCESS
TRANSFER - OUT REPORT:    Verbal report given to ConyRn(name) on Ivana Liang  being transferred to PCU(unit) for routine progression of care       Report consisted of patients Situation, Background, Assessment and   Recommendations(SBAR). Information from the following report(s) SBAR, Kardex, Intake/Output, MAR and Accordion was reviewed with the receiving nurse. Lines:   Peripheral IV 11/12/21 Anterior; Right Forearm (Active)   Site Assessment Clean, dry, & intact 11/12/21 1127   Phlebitis Assessment 0 11/12/21 1127   Infiltration Assessment 0 11/12/21 1127   Dressing Status Clean, dry, & intact 11/12/21 1127   Dressing Type Transparent 11/12/21 1127   Hub Color/Line Status Pink 11/12/21 1127        Opportunity for questions and clarification was provided. Patient transported with:   O2 @ 2 liters    Pt's wife is so upset about transferring him as covid rullout, stating I will take him home tomorrow, asked dr Brandie Worthy to call wife.

## 2021-11-12 NOTE — PERIOP NOTES
TRANSFER - IN REPORT:    Verbal report received from [facility] RN on Verfredricke Ee  being received from [facility] routine progression of care      Report consisted of patients Situation, Background, Assessment and   Recommendations(SBAR). Information from the following report(s) OR Summary, Procedure Summary, Intake/Output and MAR was reviewed with the receiving nurse. Opportunity for questions and clarification was provided. Assessment completed upon patients arrival to unit and care assumed.

## 2021-11-12 NOTE — H&P
Hospitalist Admission Note    NAME: Ivana Liang   :  1955   MRN:  [id number]     Date/Time:  2021 4:54 PM    Patient PCP: Corrine Bai MD  _____________________________________________________________________  Given the patient's current clinical presentation, I have a high level of concern for decompensation if discharged from the emergency department. Complex decision making was performed, which includes reviewing the patient's available past medical records, laboratory results, and x-ray films. My assessment of this patient's clinical condition and my plan of care is as follows. Assessment / Plan:  COPD with hypoxia  Viral infection  Check respiratory biofire  Maintain SPO2 > 88 %  Mucinex  Solumedrol 40 mg IV Q12  duonebs ATC  Sputum culture    HTN  C/w lisinopril    GERD  C/w PPI    Sleep apnea  BiPAP QHs      Code Status: Full  Surrogate Decision Maker: Wife, Alecia Resendiz    DVT Prophylaxis: lovenox  GI Prophylaxis: not indicated    Baseline: Independent        Subjective:   CHIEF COMPLAINT:  SOB, cough    HISTORY OF PRESENT ILLNESS:     Ivana Liang is a 74 y.o. male with past medical history is taken for COPD who does not wear oxygen at home, hypertension, lower back pain who presented to the hospital to undergo a laminectomy today. However, patient was hypoxic and cannot undergo the procedure due to poor ventilation. Patient states she started not feel well this past Tuesday. He states his granddaughter came over on Monday who was not feeling well at the time. Then, on Monday patient began to feel rundown and short of breath. Patient had exertional dyspnea. Patient was very fatigued and had a slight productive cough. Patient had been feeling better, but woke up very congested this morning, but then felt better in the car. He denies chest pain. He said he's been tight.   He has been taking his inhalers as prescribed and he has been sleeping with BiPAP QHs. He denies fevers/chills. We were asked to admit for work up and evaluation of the above problems. Past Medical History:   Diagnosis Date    Chronic obstructive pulmonary disease (HCC)     GERD (gastroesophageal reflux disease)     Hypertension     Polyp of colon     Sleep apnea     Uses CPAP        Past Surgical History:   Procedure Laterality Date    HX COLONOSCOPY      HX LUMBAR FUSION      x 3    HX LUMBAR LAMINECTOMY      x 3    HX VEIN STRIPPING      IR INJ FORAMIN EPID LUMB ANES/STER SNGL  10/30/2019    IR INJ FORAMIN EPID LUMB ANES/STER SNGL  10/1/2021    [place] CARDIAC SURG PROCEDURE UNLIST  2019    negative Cath Lab @ [facility]    CO CARDIAC SURG PROCEDURE UNLIST      negative cardiac stress @ [facility]       Social History     Tobacco Use    Smoking status: Former Smoker     Packs/day: 2.00     Years: 50.00     Pack years: 100.00     Quit date: 2021     Years since quittin.3    Smokeless tobacco: Never Used   Substance Use Topics    Alcohol use: Yes     Alcohol/week: 21.0 standard drinks     Types: 21 Shots of liquor per week        Family History   Problem Relation Age of Onset    COPD Mother     Hypertension Mother     High Cholesterol Mother     Alcohol abuse Father     Stroke Father     No Known Problems Sister      Allergies   Allergen Reactions    Pcn [Penicillins] Rash     Has taken amoxicillin without a reaction;         Prior to Admission medications    Medication Sig Start Date End Date Taking? Authorizing Provider   pantoprazole (PROTONIX) 40 mg tablet Take 40 mg by mouth Every morning. Yes Provider, Historical   HYDROcodone-acetaminophen (NORCO) 5-325 mg per tablet Take 1 Tablet by mouth every four (4) hours as needed for Pain. Yes Provider, Historical   lisinopriL (PRINIVIL, ZESTRIL) 20 mg tablet Take 20 mg by mouth Every morning.    Yes Provider, Historical   budesonide-glycopyr-formoterol (Breztri Aerosphere) 160-9-4.8 mcg/actuation HFAA Take 2 Puffs by inhalation two (2) times a day. Yes Provider, Historical   albuterol (ACCUNEB) 0.63 mg/3 mL nebulizer solution 0.63 mg by Nebulization route every six (6) hours as needed for Wheezing. Yes Provider, Historical       REVIEW OF SYSTEMS:     I am not able to complete the review of systems because: The patient is intubated and sedated    The patient has altered mental status due to his acute medical problems    The patient has baseline aphasia from prior stroke(s)    The patient has baseline dementia and is not reliable historian    The patient is in acute medical distress and unable to provide information           Total of 12 systems reviewed as follows:       POSITIVE= underlined text  Negative = text not underlined  General:  fever, chills, sweats, generalized weakness, weight loss/gain,      loss of appetite   Eyes:    blurred vision, eye pain, loss of vision, double vision  ENT:    rhinorrhea, pharyngitis   Cardiology:   chest pain, palpitations, orthopnea, PND, edema, syncope   Gastrointestinal:  abdominal pain , N/V, diarrhea, dysphagia, constipation, bleeding   Genitourinary:  frequency, urgency, dysuria, hematuria, incontinence   Muskuloskeletal :  arthralgia, myalgia, back pain  Hematology:  easy bruising, nose or gum bleeding, lymphadenopathy   Dermatological: rash, ulceration, pruritis, color change / jaundice  Endocrine:   hot flashes or polydipsia   Neurological:  headache, dizziness, confusion, focal weakness, paresthesia,     Speech difficulties, memory loss, gait difficulty  Psychological: Feelings of anxiety, depression, agitation    Objective:   VITALS:    Visit Vitals  /69 (BP 1 Location: Left upper arm, BP Patient Position: At rest)   Pulse (!) 102   Temp 98 °F (36.7 °C)   Resp 17   Ht 5' 10.5\" (1.791 m)   Wt 90.1 kg (198 lb 10.2 oz)   SpO2 95%   BMI 28.10 kg/m²       PHYSICAL EXAM:    General:    Alert, cooperative, no distress, appears stated age.      HEENT: Atraumatic, anicteric sclerae, pink conjunctivae     No oral ulcers, mucosa moist, throat clear, dentition fair  Neck:  Supple, symmetrical,  thyroid: non tender  Lungs:   Decreased breath sounds throughout. Expiratory wheezing throughout  Chest wall:  No tenderness  No Accessory muscle use. Heart:   Regular  rhythm,  No  murmur   No edema  Abdomen:   Soft, non-tender. Not distended. Bowel sounds normal  Extremities: No cyanosis. No clubbing,      Skin turgor normal, Capillary refill normal, Radial dial pulse 2+  Skin:     Not pale. Not Jaundiced  No rashes   Psych:  Good insight. Not depressed. Not anxious or agitated. Neurologic: EOMs intact. No facial asymmetry. No aphasia or slurred speech. Symmetrical strength, Sensation grossly intact. Alert and oriented X 4.     _______________________________________________________________________  Care Plan discussed with:    Comments   Patient     Family      RN     Care Manager                    Consultant:      _______________________________________________________________________  Expected  Disposition:   Home with Family    HH/PT/OT/RN    SNF/LTC    NICHELLE    ________________________________________________________________________  TOTAL TIME:  32  Minutes    Critical Care Provided     Minutes non procedure based      Comments     Reviewed previous records   >50% of visit spent in counseling and coordination of care  Discussion with patient and/or family and questions answered       Given the patient's current clinical presentation, I have a high level of concern for decompensation if discharged from the ED. Complex decision making was performed which includes reviewing the patient's available past medical records, laboratory results, and Xray films.  I have also directly communicated my plan and discussed this case with the involved ED physician.     ____________________________________________________________________  Nesha Huang MD    Procedures: see electronic medical records for all procedures/Xrays and details which were not copied into this note but were reviewed prior to creation of Plan. LAB DATA REVIEWED:    No results found for this or any previous visit (from the past 24 hour(s)).

## 2021-11-12 NOTE — PERIOP NOTES
TRANSFER - OUT REPORT:    Verbal report given to RN on [facility]  being transferred to [facility] [id number] for routine progression of care       Report consisted of patients Situation, Background, Assessment and   Recommendations(SBAR). Information from the following report(s) SBAR was reviewed with the receiving nurse. Opportunity for questions and clarification was provided.       Patient transported with:   O2 @ 2 liters  Registered Nurse

## 2021-11-13 PROCEDURE — 74011250636 HC RX REV CODE- 250/636: Performed by: INTERNAL MEDICINE

## 2021-11-13 PROCEDURE — 94640 AIRWAY INHALATION TREATMENT: CPT

## 2021-11-13 PROCEDURE — 65270000029 HC RM PRIVATE

## 2021-11-13 PROCEDURE — 77010033678 HC OXYGEN DAILY

## 2021-11-13 PROCEDURE — 74011250637 HC RX REV CODE- 250/637: Performed by: INTERNAL MEDICINE

## 2021-11-13 PROCEDURE — 74011250637 HC RX REV CODE- 250/637: Performed by: ORTHOPAEDIC SURGERY

## 2021-11-13 PROCEDURE — 74011250637 HC RX REV CODE- 250/637: Performed by: STUDENT IN AN ORGANIZED HEALTH CARE EDUCATION/TRAINING PROGRAM

## 2021-11-13 RX ORDER — SIMETHICONE 80 MG
80 TABLET,CHEWABLE ORAL
Status: DISCONTINUED | OUTPATIENT
Start: 2021-11-13 | End: 2021-11-14 | Stop reason: HOSPADM

## 2021-11-13 RX ADMIN — IPRATROPIUM BROMIDE AND ALBUTEROL 1 PUFF: 20; 100 SPRAY, METERED RESPIRATORY (INHALATION) at 15:32

## 2021-11-13 RX ADMIN — BUDESONIDE AND FORMOTEROL FUMARATE DIHYDRATE 2 PUFF: 160; 4.5 AEROSOL RESPIRATORY (INHALATION) at 20:28

## 2021-11-13 RX ADMIN — GUAIFENESIN 1200 MG: 600 TABLET ORAL at 18:18

## 2021-11-13 RX ADMIN — HYDROCODONE BITARTRATE AND ACETAMINOPHEN 1 TABLET: 5; 325 TABLET ORAL at 08:41

## 2021-11-13 RX ADMIN — SALINE NASAL SPRAY 2 SPRAY: 1.5 SOLUTION NASAL at 14:43

## 2021-11-13 RX ADMIN — ENOXAPARIN SODIUM 40 MG: 100 INJECTION SUBCUTANEOUS at 08:41

## 2021-11-13 RX ADMIN — IPRATROPIUM BROMIDE AND ALBUTEROL 1 PUFF: 20; 100 SPRAY, METERED RESPIRATORY (INHALATION) at 03:40

## 2021-11-13 RX ADMIN — Medication 1 AMPULE: at 08:41

## 2021-11-13 RX ADMIN — BUDESONIDE AND FORMOTEROL FUMARATE DIHYDRATE 2 PUFF: 160; 4.5 AEROSOL RESPIRATORY (INHALATION) at 08:43

## 2021-11-13 RX ADMIN — HYDROCODONE BITARTRATE AND ACETAMINOPHEN 1 TABLET: 5; 325 TABLET ORAL at 14:43

## 2021-11-13 RX ADMIN — SALINE NASAL SPRAY 2 SPRAY: 1.5 SOLUTION NASAL at 18:19

## 2021-11-13 RX ADMIN — IPRATROPIUM BROMIDE AND ALBUTEROL 1 PUFF: 20; 100 SPRAY, METERED RESPIRATORY (INHALATION) at 11:31

## 2021-11-13 RX ADMIN — IPRATROPIUM BROMIDE AND ALBUTEROL 1 PUFF: 20; 100 SPRAY, METERED RESPIRATORY (INHALATION) at 08:42

## 2021-11-13 RX ADMIN — SIMETHICONE 80 MG: 80 TABLET, CHEWABLE ORAL at 15:34

## 2021-11-13 RX ADMIN — IPRATROPIUM BROMIDE AND ALBUTEROL 1 PUFF: 20; 100 SPRAY, METERED RESPIRATORY (INHALATION) at 20:27

## 2021-11-13 RX ADMIN — PANTOPRAZOLE SODIUM 40 MG: 40 TABLET, DELAYED RELEASE ORAL at 08:41

## 2021-11-13 RX ADMIN — METHYLPREDNISOLONE SODIUM SUCCINATE 40 MG: 40 INJECTION, POWDER, FOR SOLUTION INTRAMUSCULAR; INTRAVENOUS at 08:41

## 2021-11-13 RX ADMIN — METHYLPREDNISOLONE SODIUM SUCCINATE 40 MG: 40 INJECTION, POWDER, FOR SOLUTION INTRAMUSCULAR; INTRAVENOUS at 21:21

## 2021-11-13 RX ADMIN — GUAIFENESIN 1200 MG: 600 TABLET ORAL at 08:41

## 2021-11-13 RX ADMIN — LISINOPRIL 20 MG: 20 TABLET ORAL at 08:41

## 2021-11-13 RX ADMIN — HYDROCODONE BITARTRATE AND ACETAMINOPHEN 1 TABLET: 5; 325 TABLET ORAL at 00:50

## 2021-11-13 RX ADMIN — HYDROCODONE BITARTRATE AND ACETAMINOPHEN 1 TABLET: 5; 325 TABLET ORAL at 18:54

## 2021-11-13 NOTE — PROGRESS NOTES
Hospitalist Progress Note    NAME: Crissy Oneal   :  1955   MRN:  [id number]       Assessment / Plan:  COPD with hypoxia  Viral upper respiratory infection due to rhinovirus  Respiratory virus panel is positive for rhinovirus  Maintain SPO2 > 88 %, patient is currently on 3 L  Mucinex  Solumedrol 40 mg IV Q12  duonebs ATC  Sputum culture  Reports nasal congestion, add Ocean nasal spray     HTN  C/w lisinopril     GERD  C/w PPI     Sleep apnea  BiPAP QHs        Code Status: Full  Surrogate Decision Maker: Wife, Albert Rodriguez     DVT Prophylaxis: lovenox  GI Prophylaxis: not indicated     Baseline: Independent        Recommended Disposition: Home w/Family     Subjective:     Chief Complaint / Reason for Physician Visit  \" Ports improvement in shortness of breath but mentions nasal congestion and upper respiratory symptoms. \". Discussed with RN events overnight. Review of Systems:  Symptom Y/N Comments  Symptom Y/N Comments   Fever/Chills n   Chest Pain n    Poor Appetite n   Edema n    Cough n   Abdominal Pain n    Sputum n   Joint Pain     SOB/MONTEMAYOR n   Pruritis/Rash     Nausea/vomit n   Tolerating PT/OT     Diarrhea n   Tolerating Diet     Constipation n   Other       Could NOT obtain due to:      Objective:     VITALS:   Last 24hrs VS reviewed since prior progress note.  Most recent are:  Patient Vitals for the past 24 hrs:   Temp Pulse Resp BP SpO2   21 1132 -- -- -- -- 94 %   21 1038 98.1 °F (36.7 °C) 88 22 (!) 158/67 94 %   21 0846 -- -- -- -- 94 %   21 0757 97.6 °F (36.4 °C) 86 17 (!) 158/86 92 %   21 0400 98 °F (36.7 °C) 75 16 (!) 150/74 98 %   21 0340 -- -- -- -- 95 %   21 0000 98.3 °F (36.8 °C) 88 18 (!) 153/68 97 %   21 2330 -- -- -- -- 95 %   21 2100 -- -- -- -- 96 %   21 98 °F (36.7 °C) 89 14 134/63 96 %   21 1900 98 °F (36.7 °C) 92 15 (!) 158/74 96 %   21 1840 -- 95 14 (!) 175/68 91 %   21 1724 98 °F (36.7 °C) 97 13 131/76 92 %   11/12/21 1630 -- 92 11 (!) 140/70 96 %   11/12/21 1600 -- (!) 102 17 117/69 95 %   11/12/21 1545 -- 93 13 -- 100 %   11/12/21 1530 -- 96 14 (!) 162/69 96 %   11/12/21 1515 -- 95 11 -- 95 %   11/12/21 1500 -- 86 14 (!) 151/76 95 %   11/12/21 1445 -- 86 13 (!) 178/26 94 %   11/12/21 1430 -- 89 15 (!) 156/82 90 %   11/12/21 1415 -- 82 11 (!) 151/63 96 %   11/12/21 1400 -- 87 12 119/66 92 %   11/12/21 1345 -- 85 16 (!) 123/57 93 %   11/12/21 1315 -- 84 10 (!) 119/53 90 %   11/12/21 1300 -- 86 -- (!) 119/48 93 %   11/12/21 1245 98 °F (36.7 °C) 88 9 (!) 118/47 92 %   11/12/21 1230 -- 88 14 (!) 134/44 96 %   11/12/21 1215 -- 83 11 (!) 140/65 100 %   11/12/21 1200 -- 86 16 (!) 126/109 92 %   11/12/21 1145 -- 84 15 (!) 129/50 99 %   11/12/21 1140 -- 84 15 115/65 98 %       Intake/Output Summary (Last 24 hours) at 11/13/2021 1137  Last data filed at 11/13/2021 0000  Gross per 24 hour   Intake 480 ml   Output 1000 ml   Net -520 ml        PHYSICAL EXAM:  General: WD, WN. Alert, cooperative, no acute distress    EENT:  EOMI. Anicteric sclerae. MMM  Resp:  Prolonged expiration, wheezing in all the lung fields. No accessory muscle use  CV:  Regular  rhythm,  No edema  GI:  Soft, Non distended, Non tender.  +Bowel sounds  Neurologic:  Alert and oriented X 3, normal speech,   Psych:   Good insight. Not anxious nor agitated  Skin:  No rashes.   No jaundice    Reviewed most current lab test results and cultures  YES  Reviewed most current radiology test results   YES  Review and summation of old records today    NO  Reviewed patient's current orders and MAR    YES  PMH/SH reviewed - no change compared to H&P  ________________________________________________________________________  Care Plan discussed with:    Comments   Patient x    Family      RN x    Care Manager     Consultant                        Multidiciplinary team rounds were held today with , nursing, pharmacist and clinical coordinator. Patient's plan of care was discussed; medications were reviewed and discharge planning was addressed. ________________________________________________________________________  Total NON critical care TIME: 30  Minutes    Total CRITICAL CARE TIME Spent:   Minutes non procedure based      Comments   >50% of visit spent in counseling and coordination of care     ________________________________________________________________________  Bala Pyle MD     Procedures: see electronic medical records for all procedures/Xrays and details which were not copied into this note but were reviewed prior to creation of Plan. LABS:  I reviewed today's most current labs and imaging studies. Pertinent labs include:  No results for input(s): WBC, HGB, HCT, PLT, HGBEXT, HCTEXT, PLTEXT in the last 72 hours. No results for input(s): NA, K, CL, CO2, GLU, BUN, CREA, CA, MG, PHOS, ALB, TBIL, TBILI, ALT, INR, INREXT in the last 72 hours.     No lab exists for component: SGOT    Signed: Bala Pyle MD

## 2021-11-14 VITALS
SYSTOLIC BLOOD PRESSURE: 158 MMHG | WEIGHT: 198.63 LBS | HEART RATE: 87 BPM | TEMPERATURE: 98.2 F | HEIGHT: 71 IN | RESPIRATION RATE: 16 BRPM | BODY MASS INDEX: 27.81 KG/M2 | DIASTOLIC BLOOD PRESSURE: 70 MMHG | OXYGEN SATURATION: 91 %

## 2021-11-14 PROCEDURE — 74011250636 HC RX REV CODE- 250/636: Performed by: INTERNAL MEDICINE

## 2021-11-14 PROCEDURE — 74011636637 HC RX REV CODE- 636/637: Performed by: INTERNAL MEDICINE

## 2021-11-14 PROCEDURE — 74011250637 HC RX REV CODE- 250/637: Performed by: INTERNAL MEDICINE

## 2021-11-14 PROCEDURE — 77010033678 HC OXYGEN DAILY

## 2021-11-14 PROCEDURE — 94640 AIRWAY INHALATION TREATMENT: CPT

## 2021-11-14 PROCEDURE — 74011250637 HC RX REV CODE- 250/637: Performed by: STUDENT IN AN ORGANIZED HEALTH CARE EDUCATION/TRAINING PROGRAM

## 2021-11-14 RX ORDER — PREDNISONE 20 MG/1
40 TABLET ORAL
Status: DISCONTINUED | OUTPATIENT
Start: 2021-11-14 | End: 2021-11-14 | Stop reason: HOSPADM

## 2021-11-14 RX ORDER — PREDNISONE 20 MG/1
40 TABLET ORAL
Qty: 10 TABLET | Refills: 0 | Status: SHIPPED | OUTPATIENT
Start: 2021-11-15 | End: 2021-11-20

## 2021-11-14 RX ADMIN — IPRATROPIUM BROMIDE AND ALBUTEROL 1 PUFF: 20; 100 SPRAY, METERED RESPIRATORY (INHALATION) at 08:45

## 2021-11-14 RX ADMIN — HYDROCODONE BITARTRATE AND ACETAMINOPHEN 1 TABLET: 5; 325 TABLET ORAL at 09:03

## 2021-11-14 RX ADMIN — PANTOPRAZOLE SODIUM 40 MG: 40 TABLET, DELAYED RELEASE ORAL at 09:04

## 2021-11-14 RX ADMIN — BUDESONIDE AND FORMOTEROL FUMARATE DIHYDRATE 2 PUFF: 160; 4.5 AEROSOL RESPIRATORY (INHALATION) at 08:45

## 2021-11-14 RX ADMIN — IPRATROPIUM BROMIDE AND ALBUTEROL 1 PUFF: 20; 100 SPRAY, METERED RESPIRATORY (INHALATION) at 00:01

## 2021-11-14 RX ADMIN — IPRATROPIUM BROMIDE AND ALBUTEROL 1 PUFF: 20; 100 SPRAY, METERED RESPIRATORY (INHALATION) at 04:00

## 2021-11-14 RX ADMIN — IPRATROPIUM BROMIDE AND ALBUTEROL 1 PUFF: 20; 100 SPRAY, METERED RESPIRATORY (INHALATION) at 11:24

## 2021-11-14 RX ADMIN — ENOXAPARIN SODIUM 40 MG: 100 INJECTION SUBCUTANEOUS at 09:04

## 2021-11-14 RX ADMIN — PREDNISONE 40 MG: 20 TABLET ORAL at 09:03

## 2021-11-14 RX ADMIN — LISINOPRIL 20 MG: 20 TABLET ORAL at 09:04

## 2021-11-14 RX ADMIN — GUAIFENESIN 1200 MG: 600 TABLET ORAL at 09:03

## 2021-11-14 NOTE — PROGRESS NOTES
Bedside and Verbal shift change report given to DOMINGO meneses (oncoming nurse) by Nate Arnett (offgoing nurse). Report included the following information SBAR, Kardex, MAR, Recent Results and Cardiac Rhythm NSR.

## 2021-11-14 NOTE — RT PROTOCOL NOTE
ADULT PROTOCOL: JET AEROSOL ASSESSMENT    Patient  Hollis Salazar     74 y.o.   male     2021  10:24 AM    Breath Sounds Pre Procedure: Right Breath Sounds: Expiratory wheezing                               Left Breath Sounds: Expiratory wheezing    Breath Sounds Post Procedure: Right Breath Sounds: Expiratory wheezing                                 Left Breath Sounds: Expiratory wheezing    Breathing pattern: Pre procedure Breathing Pattern: Regular          Post procedure Breathing Pattern: Regular    Heart Rate: Pre procedure Pulse: 86           Post procedure Pulse: 83    Resp Rate: Pre procedure Respirations: 16           Post procedure Respirations: 20    Cough: Pre procedure Cough: Strong               Post procedure      Suctioned: NO    Sputum: Pre procedure                   Post procedure      Oxygen: O2 Device: Nasal cannula   O2 Flow Rate (L/min): 2 l/min     Changed: NO    SpO2: Pre procedure SpO2: 92 %   with oxygen              Post procedure SpO2: 97 %  with oxygen    Nebulizer Therapy: Current medications Aerosolized Medications: Flovent (Combivent)      Changed: NO    Smoking History:   Social History     Tobacco Use   Smoking Status Former Smoker    Packs/day: 2.00    Years: 50.00    Pack years: 100.00    Quit date: 2021    Years since quittin.3   Smokeless Tobacco Never Used       Problem List:   Patient Active Problem List   Diagnosis Code    Lumbar stenosis with neurogenic claudication M48.062       Respiratory Therapist: Carlos Castro Capp, RT

## 2021-11-14 NOTE — DISCHARGE INSTRUCTIONS
DISCHARGE SUMMARY from Nurse    PATIENT INSTRUCTIONS:                        HOSPITALIST DISCHARGE INSTRUCTIONS    NAME: Geetha Curyr   :  1955   MRN:  [id number]     Date/Time:  2021 10:13 AM    ADMIT DATE: 2021     DISCHARGE DATE: 2021     DISCHARGE DIAGNOSIS:  COPD Exacerbation due to Rhinovirus Infection    MEDICATIONS:  · It is important that you take the medication exactly as they are prescribed. · Keep your medication in the bottles provided by the pharmacist and keep a list of the medication names, dosages, and times to be taken in your wallet. · Do not take other medications without consulting your doctor. Pain Management: per above medications    What to do at Home    Recommended diet:  Regular Diet    Recommended activity: Activity as tolerated    If you have questions regarding the hospital related prescriptions or hospital related issues please call [facility] kristen Leal at [phone]. If you experience any of the following symptoms then please call your primary care physician or return to the emergency room if you cannot get hold of your doctor:  Fever, chills, nausea, vomiting, diarrhea, change in mentation, falling, bleeding, shortness of breath    Follow Up:  Dr. Nancy Pabon MD  you are to call and set up an appointment to see them in 7-10 days. Information obtained by :  I understand that if any problems occur once I am at home I am to contact my physician. I understand and acknowledge receipt of the instructions indicated above.                                                                                                                                            Physician's or R.N.'s Signature                                                                  Date/Time                                                                                                                                              Patient or Representative Signature Date/Time    After general anesthesia or intravenous sedation, for 24 hours or while taking prescription Narcotics:  · Limit your activities  · Do not drive and operate hazardous machinery  · Do not make important personal or business decisions  · Do  not drink alcoholic beverages  · If you have not urinated within 8 hours after discharge, please contact your surgeon on call.    :  ·   · Temperature over 100.5  · Nausea and vomiting lasting longer than 4 hours or if unable to take medications  ·   Any questions , any chest pain or difficulty breathing. A common side effect of anesthesia following surgery is nausea and/or vomiting. In order to decrease symptoms, it is wise to avoid foods that are high in fat, greasy foods, milk products, and spicy foods for the first 24 hours. Followup to reschedule            Please take time to review all of your Home Care Instructions and Medication Information sheets provided in your discharge packet. If you have any questions, please contact your surgeons office. Thank you.   ·       ________________________________________________________________________

## 2021-11-14 NOTE — DISCHARGE SUMMARY
Hospitalist Discharge Summary     Patient ID:  Hollis Salazar  [id number]  81 y.o.  1955 11/12/2021    PCP on record: Nanette Dumont MD    Admit date: 11/12/2021  Discharge date and time: 11/14/2021    DISCHARGE DIAGNOSIS:    COPD with hypoxia  Viral upper respiratory infection due to rhinovirus  HTN  GERD  Sleep apnea      CONSULTATIONS:  IP CONSULT TO HOSPITALIST    Excerpted HPI from H&P of Kamila Main MD:    Hollis Salazar is a 74 y.o. male with past medical history is taken for COPD who does not wear oxygen at home, hypertension, lower back pain who presented to the hospital to undergo a laminectomy today. However, patient was hypoxic and cannot undergo the procedure due to poor ventilation. Patient states she started not feel well this past Tuesday. He states his granddaughter came over on Monday who was not feeling well at the time. Then, on Monday patient began to feel rundown and short of breath. Patient had exertional dyspnea. Patient was very fatigued and had a slight productive cough. Patient had been feeling better, but woke up very congested this morning, but then felt better in the car. He denies chest pain. He said he's been tight. He has been taking his inhalers as prescribed and he has been sleeping with BiPAP QHs. He denies fevers/chills.     ______________________________________________________________________  DISCHARGE SUMMARY/HOSPITAL COURSE:  for full details see H&P, daily progress notes, labs, consult notes.        COPD with hypoxia  Viral upper respiratory infection due to rhinovirus  Respiratory virus panel is positive for rhinovirus  Maintain SPO2 > 88 %, patient is currently on1 L down from 3 liters  S/p Solumedrol 40 mg IV Q12, transitioned to Prednsione 40mg daily for 5 days  Wheezing improved  DC home with PCP follow up  Patient has oxygen at home which he uses with CPAP, advised to use PRN to maintain sats >88%    HTN  C/w lisinopril     GERD  C/w PPI     Sleep apnea  BiPAP QHs         _______________________________________________________________________  Patient seen and examined by me on discharge day. Pertinent Findings:  Gen:    Not in distress  Chest: Clear lungs  CVS:   Regular rhythm. No edema  Abd:  Soft, not distended, not tender  Neuro:  Alert, oriented x4  _______________________________________________________________________  DISCHARGE MEDICATIONS:   Current Discharge Medication List      START taking these medications    Details   predniSONE (DELTASONE) 20 mg tablet Take 40 mg by mouth daily (with breakfast) for 5 days. Qty: 10 Tablet, Refills: 0  Start date: 11/15/2021, End date: 11/20/2021         CONTINUE these medications which have NOT CHANGED    Details   pantoprazole (PROTONIX) 40 mg tablet Take 40 mg by mouth Every morning. HYDROcodone-acetaminophen (NORCO) 5-325 mg per tablet Take 1 Tablet by mouth every four (4) hours as needed for Pain. lisinopriL (PRINIVIL, ZESTRIL) 20 mg tablet Take 20 mg by mouth Every morning. budesonide-glycopyr-formoterol (Breztri Aerosphere) 160-9-4.8 mcg/actuation HFAA Take 2 Puffs by inhalation two (2) times a day. albuterol (ACCUNEB) 0.63 mg/3 mL nebulizer solution 0.63 mg by Nebulization route every six (6) hours as needed for Wheezing. Patient Follow Up Instructions:    Activity: Activity as tolerated  Diet: Regular Diet  Wound Care: None needed    Follow-up with PCP in one week   Follow-up tests/labs none  Follow-up Information     Follow up With Specialties Details Why Contact Info    Dennise Olivarez MD Orthopedic Surgery   [address] [facility]  [phone]      Irasema Pollard MD Internal Medicine In 1 week  [address] [address]  [phone]          ________________________________________________________________    Risk of deterioration: High    Condition at Discharge: Stable  __________________________________________________________________    Disposition  Home with family, no needs    ____________________________________________________________________    Code Status: Full Code  ___________________________________________________________________      Total time in minutes spent coordinating this discharge (includes going over instructions, follow-up, prescriptions, and preparing report for sign off to her PCP) :  35 minutes    Signed:  Vonda Schmid MD

## 2021-11-23 NOTE — PERIOP NOTES
SPOKE WITH PATIENT'S WIFE, ANGÉLICA. SHE SAID PATIENT WILL HAVE COVID TEST DONE AT [facility]. INSTRUCTED HER TO CONTACT [facility] PRE ADMISSION TESTING TO CONFIRM TESTING SITE LOCATION AND HOURS. UNDERSTANDS NEED TO QUARANTINE FROM TIME OF TEST UNTIL DOS.

## 2021-11-24 NOTE — H&P
Juan Carlos Banda  : 1955   / Language: English / Race: White  Male      History of Present Illness   The patient is a 74year old male who presents for a recheck of Low Back Pain. Note for \"Low Back Pain\": The patient comes in today for follow-up. Walker Bains has new left-sided radicular symptoms.  His history is significant for previous fusion by myself approximately in .  The pain radiates roughly in the L5-S1 distribution. [place] is severe. Walker Bains is unable to sleep. Walker Bains has been trying conservative treatment without improvement.  He denies any bowel bladder difficulties.  The pain is worse with prolonged standing and walking.  Is better with sitting. He comes in today for follow-up of his epidural injections. Walker Bains has had 1 set of epidurals that did not provide significant relief.  He still has left leg pain with radiation what appears to be in the L4 distribution. Walker Bains has weakness in dorsiflexion of the left foot.  Increased pain with any type of prolonged standing walking.  His L5-S1 injection did not provide any relief.  He denies any bowel bladder difficulties.  His ADLs are significantly decreased. Problem List/Past Medical  REVIEW OF SYSTEMS: Systems were reviewed by the provider.    Trochanteric bursitis of both hips (726.5  M70.61, M70.62)    Bilateral hip pain (719.45  M25.551, M25.552)    Lumbar stenosis (724.02  M48.061)    DDD (degenerative disc disease), lumbar (722.52  M51.36)    History of lumbar fusion (V45.4  Z98.1)      Allergies   Penicillins   [2021]:  No Known Drug Allergies   [2021]:    Social History   Alcohol use   3-5 drinks per occasion, Drinks beer, Drinks hard liquor, Drinks wine, Rarely drinks more than 5 drinks per occasion. Caffeine use   1-2 drinks per day, Coffee. Current work status   Disabled. Exercise   1-2 times per week, walking. Marital status   . No drug use    Seat Belt Use   Always uses seat belts.   Samira Milan Exposure   Occasionally. Tobacco / smoke exposure   None. Tobacco use   Former smoker, Smokes 1 pack of cigarettes per day. Medication History   Medications Reconciled     Past Surgical History  Joint Fusion    Spinal Decompression   lower back  Spinal Fusion   lower back  Spinal Surgery    Vasectomy        Physical Exam   Neurologic  Sensory  Light Touch - Intact - Globally. Overall Assessment of Muscle Strength and Tone reveals  Lower Extremities - Right Iliopsoas - 5/5. Left Iliopsoas - 5/5. Right Tibialis Anterior - 5/5. Left Tibialis Anterior - 4-/5. Right Gastroc-Soleus - 5/5. Left Gastroc-Soleus - 5/5. Right EHL - 5/5. Left EHL - 4-/5. General Assessment of Reflexes  Right Ankle - Clonus is not present. Left Ankle - Clonus is not present. Reflexes (Dermatomes)  2/2 Normal - Left Achilles (L5-S2), Left Knee (L2-4), Right Achilles (L5-S2) and Right Knee (L2-4). Musculoskeletal  Global Assessment  Examination of related systems reveals - well-developed, well-nourished, in no acute distress, alert and oriented x 3. Gait and Station - normal gait and station and normal posture. Right Lower Extremity - normal strength and tone, normal range of motion without pain and no instability, subluxation or laxity. Left Lower Extremity - normal strength and tone, normal range of motion without pain and no instability, subluxation or laxity. Spine/Ribs/Pelvis  Cervical Spine - Examination of the cervical spine reveals - no tenderness to palpation, no pain, no swelling, edema or erythema, normal cervical spine movements and normal sensation. Thoracic (Dorsal) Spine - Examination of the thoracic spine reveals - no tenderness over thoracic vertebrae, no pain, normal sensation and normal thoracic spine movements. Lumbosacral Spine - Examination of the lumbosacral spine reveals - no known fractures or deformities. Inspection and Palpation - Tenderness - moderate.  Assessment of pain reveals the following findings - The pain is characterized as - moderate (The patient continues with severe low back pain left-hand side.) . Location - pain refers to lower back bilaterally. ROJM - Trunk Extension - 15 degrees. Lumbar Spine Flexion - 35 °. Lumbosacral Spine - Functional Testing - Babinski Test negative, Prone Knee Bending Test negative, Slump Test negative, Straight Leg Raising Test negative. Assessment & Plan     Lumbar stenosis (724.02  M48.061)  Impression: At this point he is having severe left leg symptoms that have not improved with the injections. He also has had a foot drop now. I think he is develops fairly significant stenosis at L3-4 above his prior fusion. I think is a candidate for revision laminectomy from L3-S1 and ex exploration of the L4 nerve root as well as the L5 nerve root. I think he will need extension of his fusion to L3. Risk and benefits were discussed with him and his wife. The risks and benefits were discussed at length with the patient and the patient has elected to proceed. Indications for surgery include failed conservative treatment. Alternative treatments, risks and the perioperative course were discussed with the patient. All questions were answered. The risks and benefits of the procedure were explained. Benefits include definitive diagnosis, relief of pain, elimination of deformity and improved function. Risks of surgery including bleeding, infection, weakness, numbness, CSF leak, failure to improve symptoms, exacerbation of medical co-morbidities and even death were discussed with the patient.       Spondylolisthesis (Acquired) (738.4  M43.10)

## 2021-11-29 ENCOUNTER — TRANSCRIBE ORDER (OUTPATIENT)
Dept: REGISTRATION | Age: 66
End: 2021-11-29

## 2021-11-29 ENCOUNTER — HOSPITAL ENCOUNTER (OUTPATIENT)
Dept: LAB | Age: 66
Discharge: HOME | End: 2021-11-29
Payer: COMMERCIAL

## 2021-11-29 DIAGNOSIS — Z01.812 PRE-OPERATIVE LABORATORY EXAMINATION: Primary | ICD-10-CM

## 2021-11-29 DIAGNOSIS — Z01.812 PRE-OPERATIVE LABORATORY EXAMINATION: ICD-10-CM

## 2021-11-29 PROCEDURE — U0003 INFECTIOUS AGENT DETECTION BY NUCLEIC ACID (DNA OR RNA); SEVERE ACUTE RESPIRATORY SYNDROME CORONAVIRUS 2 (SARS-COV-2) (CORONAVIRUS DISEASE [COVID-19]), AMPLIFIED PROBE TECHNIQUE, MAKING USE OF HIGH THROUGHPUT TECHNOLOGIES AS DESCRIBED BY CMS-2020-01-R: HCPCS

## 2021-11-30 LAB
SARS-COV-2, XPLCVT: NOT DETECTED
SOURCE, COVRS: NORMAL

## 2021-12-02 ENCOUNTER — ANESTHESIA (OUTPATIENT)
Dept: SURGERY | Age: 66
DRG: 460 | End: 2021-12-02
Payer: COMMERCIAL

## 2021-12-02 ENCOUNTER — ANESTHESIA EVENT (OUTPATIENT)
Dept: SURGERY | Age: 66
DRG: 460 | End: 2021-12-02
Payer: COMMERCIAL

## 2021-12-02 ENCOUNTER — APPOINTMENT (OUTPATIENT)
Dept: GENERAL RADIOLOGY | Age: 66
DRG: 460 | End: 2021-12-02
Attending: ORTHOPAEDIC SURGERY
Payer: COMMERCIAL

## 2021-12-02 ENCOUNTER — HOSPITAL ENCOUNTER (INPATIENT)
Age: 66
LOS: 2 days | Discharge: HOME | DRG: 460 | End: 2021-12-04
Attending: ORTHOPAEDIC SURGERY | Admitting: ORTHOPAEDIC SURGERY
Payer: COMMERCIAL

## 2021-12-02 DIAGNOSIS — M48.062 SPINAL STENOSIS OF LUMBAR REGION WITH NEUROGENIC CLAUDICATION: ICD-10-CM

## 2021-12-02 PROBLEM — M48.061 LUMBAR STENOSIS: Status: ACTIVE | Noted: 2021-12-02

## 2021-12-02 LAB
ABO + RH BLD: NORMAL
BLOOD GROUP ANTIBODIES SERPL: NORMAL
GLUCOSE BLD STRIP.AUTO-MCNC: 112 MG/DL (ref 65–117)
SERVICE CMNT-IMP: NORMAL
SPECIMEN EXP DATE BLD: NORMAL

## 2021-12-02 PROCEDURE — 77030013079 HC BLNKT BAIR HGGR 3M -A: Performed by: ANESTHESIOLOGY

## 2021-12-02 PROCEDURE — 22633 ARTHRD CMBN 1NTRSPC LUMBAR: CPT | Performed by: PHYSICIAN ASSISTANT

## 2021-12-02 PROCEDURE — C1713 ANCHOR/SCREW BN/BN,TIS/BN: HCPCS | Performed by: ORTHOPAEDIC SURGERY

## 2021-12-02 PROCEDURE — 77030004391 HC BUR FLUT MEDT -C: Performed by: ORTHOPAEDIC SURGERY

## 2021-12-02 PROCEDURE — 22633 ARTHRD CMBN 1NTRSPC LUMBAR: CPT | Performed by: ORTHOPAEDIC SURGERY

## 2021-12-02 PROCEDURE — 77030019908 HC STETH ESOPH SIMS -A: Performed by: ANESTHESIOLOGY

## 2021-12-02 PROCEDURE — 22853 INSJ BIOMECHANICAL DEVICE: CPT | Performed by: ORTHOPAEDIC SURGERY

## 2021-12-02 PROCEDURE — 77030005513 HC CATH URETH FOL11 MDII -B: Performed by: ORTHOPAEDIC SURGERY

## 2021-12-02 PROCEDURE — 77030034475 HC MISC IMPL SPN: Performed by: ORTHOPAEDIC SURGERY

## 2021-12-02 PROCEDURE — 36415 COLL VENOUS BLD VENIPUNCTURE: CPT

## 2021-12-02 PROCEDURE — 74011250637 HC RX REV CODE- 250/637: Performed by: PHYSICIAN ASSISTANT

## 2021-12-02 PROCEDURE — 74011250636 HC RX REV CODE- 250/636: Performed by: STUDENT IN AN ORGANIZED HEALTH CARE EDUCATION/TRAINING PROGRAM

## 2021-12-02 PROCEDURE — 65270000029 HC RM PRIVATE

## 2021-12-02 PROCEDURE — 22853 INSJ BIOMECHANICAL DEVICE: CPT | Performed by: PHYSICIAN ASSISTANT

## 2021-12-02 PROCEDURE — 22842 INSERT SPINE FIXATION DEVICE: CPT | Performed by: ORTHOPAEDIC SURGERY

## 2021-12-02 PROCEDURE — 00UT0JZ SUPPLEMENT SPINAL MENINGES WITH SYNTHETIC SUBSTITUTE, OPEN APPROACH: ICD-10-PCS | Performed by: ORTHOPAEDIC SURGERY

## 2021-12-02 PROCEDURE — 76060000037 HC ANESTHESIA 3 TO 3.5 HR: Performed by: ORTHOPAEDIC SURGERY

## 2021-12-02 PROCEDURE — 74011250636 HC RX REV CODE- 250/636: Performed by: PHYSICIAN ASSISTANT

## 2021-12-02 PROCEDURE — 77030012893

## 2021-12-02 PROCEDURE — 0ST20ZZ RESECTION OF LUMBAR VERTEBRAL DISC, OPEN APPROACH: ICD-10-PCS | Performed by: ORTHOPAEDIC SURGERY

## 2021-12-02 PROCEDURE — 77030040922 HC BLNKT HYPOTHRM STRY -A

## 2021-12-02 PROCEDURE — 74011250636 HC RX REV CODE- 250/636: Performed by: NURSE ANESTHETIST, CERTIFIED REGISTERED

## 2021-12-02 PROCEDURE — 74011250636 HC RX REV CODE- 250/636: Performed by: ANESTHESIOLOGY

## 2021-12-02 PROCEDURE — 86901 BLOOD TYPING SEROLOGIC RH(D): CPT

## 2021-12-02 PROCEDURE — 74011000258 HC RX REV CODE- 258: Performed by: NURSE ANESTHETIST, CERTIFIED REGISTERED

## 2021-12-02 PROCEDURE — 77030003152 HC GRFT COLGN DURAL INLC -H: Performed by: ORTHOPAEDIC SURGERY

## 2021-12-02 PROCEDURE — 63047 LAM FACETEC & FORAMOT LUMBAR: CPT | Performed by: ORTHOPAEDIC SURGERY

## 2021-12-02 PROCEDURE — 72100 X-RAY EXAM L-S SPINE 2/3 VWS: CPT

## 2021-12-02 PROCEDURE — 22830 EXPLORATION OF SPINAL FUSION: CPT | Performed by: ORTHOPAEDIC SURGERY

## 2021-12-02 PROCEDURE — 77030008684 HC TU ET CUF COVD -B: Performed by: ANESTHESIOLOGY

## 2021-12-02 PROCEDURE — 82962 GLUCOSE BLOOD TEST: CPT

## 2021-12-02 PROCEDURE — 74011000250 HC RX REV CODE- 250: Performed by: NURSE ANESTHETIST, CERTIFIED REGISTERED

## 2021-12-02 PROCEDURE — 77030031139 HC SUT VCRL2 J&J -A: Performed by: ORTHOPAEDIC SURGERY

## 2021-12-02 PROCEDURE — 77030029099 HC BN WAX SSPC -A: Performed by: ORTHOPAEDIC SURGERY

## 2021-12-02 PROCEDURE — 77030038552 HC DRN WND MDII -A: Performed by: ORTHOPAEDIC SURGERY

## 2021-12-02 PROCEDURE — 77030032958 HC GRFT BN SUB ELITE TRNTY MUSC -K1: Performed by: ORTHOPAEDIC SURGERY

## 2021-12-02 PROCEDURE — 2709999900 HC NON-CHARGEABLE SUPPLY: Performed by: ORTHOPAEDIC SURGERY

## 2021-12-02 PROCEDURE — 22842 INSERT SPINE FIXATION DEVICE: CPT | Performed by: PHYSICIAN ASSISTANT

## 2021-12-02 PROCEDURE — 76210000006 HC OR PH I REC 0.5 TO 1 HR: Performed by: ORTHOPAEDIC SURGERY

## 2021-12-02 PROCEDURE — 74011250636 HC RX REV CODE- 250/636: Performed by: ORTHOPAEDIC SURGERY

## 2021-12-02 PROCEDURE — 51798 US URINE CAPACITY MEASURE: CPT

## 2021-12-02 PROCEDURE — 77030037808 HC GRFT SPNG OSTEOAMP BTUS -H1: Performed by: ORTHOPAEDIC SURGERY

## 2021-12-02 PROCEDURE — 77030038600 HC TU BPLR IRR DISP STRY -B: Performed by: ORTHOPAEDIC SURGERY

## 2021-12-02 PROCEDURE — 0SG00AJ FUSION OF LUMBAR VERTEBRAL JOINT WITH INTERBODY FUSION DEVICE, POSTERIOR APPROACH, ANTERIOR COLUMN, OPEN APPROACH: ICD-10-PCS | Performed by: ORTHOPAEDIC SURGERY

## 2021-12-02 PROCEDURE — 01NB0ZZ RELEASE LUMBAR NERVE, OPEN APPROACH: ICD-10-PCS | Performed by: ORTHOPAEDIC SURGERY

## 2021-12-02 PROCEDURE — 74011000250 HC RX REV CODE- 250: Performed by: ORTHOPAEDIC SURGERY

## 2021-12-02 PROCEDURE — 63047 LAM FACETEC & FORAMOT LUMBAR: CPT | Performed by: PHYSICIAN ASSISTANT

## 2021-12-02 PROCEDURE — 77030026438 HC STYL ET INTUB CARD -A: Performed by: ANESTHESIOLOGY

## 2021-12-02 PROCEDURE — 4A11X4G MONITORING OF PERIPHERAL NERVOUS ELECTRICAL ACTIVITY, INTRAOPERATIVE, EXTERNAL APPROACH: ICD-10-PCS | Performed by: ORTHOPAEDIC SURGERY

## 2021-12-02 PROCEDURE — 74011250637 HC RX REV CODE- 250/637: Performed by: ANESTHESIOLOGY

## 2021-12-02 PROCEDURE — 77030013951 HC SEAL TISS ADH DURASL KT INLC -G1: Performed by: ORTHOPAEDIC SURGERY

## 2021-12-02 PROCEDURE — 74011000250 HC RX REV CODE- 250: Performed by: STUDENT IN AN ORGANIZED HEALTH CARE EDUCATION/TRAINING PROGRAM

## 2021-12-02 PROCEDURE — 76010000173 HC OR TIME 3 TO 3.5 HR INTENSV-TIER 1: Performed by: ORTHOPAEDIC SURGERY

## 2021-12-02 PROCEDURE — 22830 EXPLORATION OF SPINAL FUSION: CPT | Performed by: PHYSICIAN ASSISTANT

## 2021-12-02 DEVICE — SCREW 55840007545 5.5/6 MAS 7.5X45 CC
Type: IMPLANTABLE DEVICE | Site: SPINE LUMBAR | Status: FUNCTIONAL
Brand: CD HORIZON® SPINAL SYSTEM

## 2021-12-02 DEVICE — GRAFT BNE SUB M W20XH7XL30MM COMPRESSIBLE SPNG STRP PROVIDE: Type: IMPLANTABLE DEVICE | Site: SPINE LUMBAR | Status: FUNCTIONAL

## 2021-12-02 DEVICE — GRAFT BNE SUB L CANC FRZN MORSELIZED W/ VIABLE CELL TRINITY: Type: IMPLANTABLE DEVICE | Site: SPINE LUMBAR | Status: FUNCTIONAL

## 2021-12-02 DEVICE — DURAGEN® PLUS DURAL REGENERATION MATRIX, 2 IN X 2 IN (5 CM X 5 CM)
Type: IMPLANTABLE DEVICE | Site: SPINE LUMBAR | Status: FUNCTIONAL
Brand: DURAGEN® PLUS

## 2021-12-02 DEVICE — GRAFT BNE SUB 15GM GRN CA COMPND PTTY AND SILICATE BASE INJ: Type: IMPLANTABLE DEVICE | Site: SPINE LUMBAR | Status: FUNCTIONAL

## 2021-12-02 RX ORDER — POLYETHYLENE GLYCOL 3350 17 G/17G
17 POWDER, FOR SOLUTION ORAL DAILY
Status: DISCONTINUED | OUTPATIENT
Start: 2021-12-03 | End: 2021-12-04 | Stop reason: HOSPADM

## 2021-12-02 RX ORDER — ONDANSETRON 2 MG/ML
4 INJECTION INTRAMUSCULAR; INTRAVENOUS
Status: ACTIVE | OUTPATIENT
Start: 2021-12-02 | End: 2021-12-03

## 2021-12-02 RX ORDER — PHENYLEPHRINE HCL IN 0.9% NACL 0.4MG/10ML
SYRINGE (ML) INTRAVENOUS AS NEEDED
Status: DISCONTINUED | OUTPATIENT
Start: 2021-12-02 | End: 2021-12-02 | Stop reason: HOSPADM

## 2021-12-02 RX ORDER — SODIUM CHLORIDE 0.9 % (FLUSH) 0.9 %
5-40 SYRINGE (ML) INJECTION AS NEEDED
Status: DISCONTINUED | OUTPATIENT
Start: 2021-12-02 | End: 2021-12-02 | Stop reason: HOSPADM

## 2021-12-02 RX ORDER — REMIFENTANIL HYDROCHLORIDE 1 MG/ML
INJECTION, POWDER, LYOPHILIZED, FOR SOLUTION INTRAVENOUS
Status: DISCONTINUED | OUTPATIENT
Start: 2021-12-02 | End: 2021-12-02 | Stop reason: HOSPADM

## 2021-12-02 RX ORDER — DIPHENHYDRAMINE HYDROCHLORIDE 50 MG/ML
12.5 INJECTION, SOLUTION INTRAMUSCULAR; INTRAVENOUS AS NEEDED
Status: DISCONTINUED | OUTPATIENT
Start: 2021-12-02 | End: 2021-12-02 | Stop reason: HOSPADM

## 2021-12-02 RX ORDER — ROCURONIUM BROMIDE 10 MG/ML
INJECTION, SOLUTION INTRAVENOUS AS NEEDED
Status: DISCONTINUED | OUTPATIENT
Start: 2021-12-02 | End: 2021-12-02 | Stop reason: HOSPADM

## 2021-12-02 RX ORDER — OXYCODONE HYDROCHLORIDE 5 MG/1
10 TABLET ORAL
Status: DISCONTINUED | OUTPATIENT
Start: 2021-12-02 | End: 2021-12-04 | Stop reason: HOSPADM

## 2021-12-02 RX ORDER — MIDAZOLAM HYDROCHLORIDE 1 MG/ML
0.5 INJECTION, SOLUTION INTRAMUSCULAR; INTRAVENOUS
Status: DISCONTINUED | OUTPATIENT
Start: 2021-12-02 | End: 2021-12-02 | Stop reason: HOSPADM

## 2021-12-02 RX ORDER — SUCCINYLCHOLINE CHLORIDE 20 MG/ML
INJECTION INTRAMUSCULAR; INTRAVENOUS AS NEEDED
Status: DISCONTINUED | OUTPATIENT
Start: 2021-12-02 | End: 2021-12-02 | Stop reason: HOSPADM

## 2021-12-02 RX ORDER — FACIAL-BODY WIPES
10 EACH TOPICAL DAILY PRN
Status: DISCONTINUED | OUTPATIENT
Start: 2021-12-04 | End: 2021-12-04 | Stop reason: HOSPADM

## 2021-12-02 RX ORDER — AMOXICILLIN 250 MG
1 CAPSULE ORAL 2 TIMES DAILY
Status: DISCONTINUED | OUTPATIENT
Start: 2021-12-02 | End: 2021-12-04 | Stop reason: HOSPADM

## 2021-12-02 RX ORDER — MIDAZOLAM HYDROCHLORIDE 1 MG/ML
1 INJECTION, SOLUTION INTRAMUSCULAR; INTRAVENOUS AS NEEDED
Status: DISCONTINUED | OUTPATIENT
Start: 2021-12-02 | End: 2021-12-02 | Stop reason: HOSPADM

## 2021-12-02 RX ORDER — NALOXONE HYDROCHLORIDE 0.4 MG/ML
0.4 INJECTION, SOLUTION INTRAMUSCULAR; INTRAVENOUS; SUBCUTANEOUS AS NEEDED
Status: DISCONTINUED | OUTPATIENT
Start: 2021-12-02 | End: 2021-12-04 | Stop reason: HOSPADM

## 2021-12-02 RX ORDER — KETOROLAC TROMETHAMINE 30 MG/ML
15 INJECTION, SOLUTION INTRAMUSCULAR; INTRAVENOUS EVERY 6 HOURS
Status: COMPLETED | OUTPATIENT
Start: 2021-12-02 | End: 2021-12-03

## 2021-12-02 RX ORDER — SODIUM CHLORIDE 0.9 % (FLUSH) 0.9 %
5-40 SYRINGE (ML) INJECTION EVERY 8 HOURS
Status: DISCONTINUED | OUTPATIENT
Start: 2021-12-02 | End: 2021-12-02 | Stop reason: HOSPADM

## 2021-12-02 RX ORDER — ONDANSETRON 2 MG/ML
INJECTION INTRAMUSCULAR; INTRAVENOUS AS NEEDED
Status: DISCONTINUED | OUTPATIENT
Start: 2021-12-02 | End: 2021-12-02 | Stop reason: HOSPADM

## 2021-12-02 RX ORDER — SODIUM CHLORIDE 0.9 % (FLUSH) 0.9 %
5-40 SYRINGE (ML) INJECTION AS NEEDED
Status: DISCONTINUED | OUTPATIENT
Start: 2021-12-02 | End: 2021-12-04 | Stop reason: HOSPADM

## 2021-12-02 RX ORDER — MORPHINE SULFATE 2 MG/ML
2 INJECTION, SOLUTION INTRAMUSCULAR; INTRAVENOUS
Status: ACTIVE | OUTPATIENT
Start: 2021-12-02 | End: 2021-12-03

## 2021-12-02 RX ORDER — OXYCODONE HYDROCHLORIDE 5 MG/1
5 TABLET ORAL
Status: DISCONTINUED | OUTPATIENT
Start: 2021-12-02 | End: 2021-12-04 | Stop reason: HOSPADM

## 2021-12-02 RX ORDER — SODIUM CHLORIDE 9 MG/ML
125 INJECTION, SOLUTION INTRAVENOUS CONTINUOUS
Status: DISPENSED | OUTPATIENT
Start: 2021-12-02 | End: 2021-12-03

## 2021-12-02 RX ORDER — MIDAZOLAM HYDROCHLORIDE 1 MG/ML
INJECTION, SOLUTION INTRAMUSCULAR; INTRAVENOUS AS NEEDED
Status: DISCONTINUED | OUTPATIENT
Start: 2021-12-02 | End: 2021-12-02 | Stop reason: HOSPADM

## 2021-12-02 RX ORDER — HYDROMORPHONE HYDROCHLORIDE 1 MG/ML
0.5 INJECTION, SOLUTION INTRAMUSCULAR; INTRAVENOUS; SUBCUTANEOUS
Status: COMPLETED | OUTPATIENT
Start: 2021-12-02 | End: 2021-12-02

## 2021-12-02 RX ORDER — HYDROMORPHONE HYDROCHLORIDE 2 MG/ML
INJECTION, SOLUTION INTRAMUSCULAR; INTRAVENOUS; SUBCUTANEOUS AS NEEDED
Status: DISCONTINUED | OUTPATIENT
Start: 2021-12-02 | End: 2021-12-02 | Stop reason: HOSPADM

## 2021-12-02 RX ORDER — SODIUM CHLORIDE, SODIUM LACTATE, POTASSIUM CHLORIDE, CALCIUM CHLORIDE 600; 310; 30; 20 MG/100ML; MG/100ML; MG/100ML; MG/100ML
25 INJECTION, SOLUTION INTRAVENOUS CONTINUOUS
Status: DISCONTINUED | OUTPATIENT
Start: 2021-12-02 | End: 2021-12-02 | Stop reason: HOSPADM

## 2021-12-02 RX ORDER — FENTANYL CITRATE 50 UG/ML
50 INJECTION, SOLUTION INTRAMUSCULAR; INTRAVENOUS AS NEEDED
Status: DISCONTINUED | OUTPATIENT
Start: 2021-12-02 | End: 2021-12-02 | Stop reason: HOSPADM

## 2021-12-02 RX ORDER — VANCOMYCIN HYDROCHLORIDE 1 G/20ML
INJECTION, POWDER, LYOPHILIZED, FOR SOLUTION INTRAVENOUS AS NEEDED
Status: DISCONTINUED | OUTPATIENT
Start: 2021-12-02 | End: 2021-12-02 | Stop reason: HOSPADM

## 2021-12-02 RX ORDER — FENTANYL CITRATE 50 UG/ML
INJECTION, SOLUTION INTRAMUSCULAR; INTRAVENOUS AS NEEDED
Status: DISCONTINUED | OUTPATIENT
Start: 2021-12-02 | End: 2021-12-02 | Stop reason: HOSPADM

## 2021-12-02 RX ORDER — DEXAMETHASONE SODIUM PHOSPHATE 4 MG/ML
INJECTION, SOLUTION INTRA-ARTICULAR; INTRALESIONAL; INTRAMUSCULAR; INTRAVENOUS; SOFT TISSUE AS NEEDED
Status: DISCONTINUED | OUTPATIENT
Start: 2021-12-02 | End: 2021-12-02 | Stop reason: HOSPADM

## 2021-12-02 RX ORDER — GLYCOPYRROLATE 0.2 MG/ML
INJECTION INTRAMUSCULAR; INTRAVENOUS AS NEEDED
Status: DISCONTINUED | OUTPATIENT
Start: 2021-12-02 | End: 2021-12-02 | Stop reason: HOSPADM

## 2021-12-02 RX ORDER — LIDOCAINE HYDROCHLORIDE 10 MG/ML
0.1 INJECTION, SOLUTION EPIDURAL; INFILTRATION; INTRACAUDAL; PERINEURAL AS NEEDED
Status: DISCONTINUED | OUTPATIENT
Start: 2021-12-02 | End: 2021-12-02 | Stop reason: HOSPADM

## 2021-12-02 RX ORDER — SODIUM CHLORIDE, SODIUM LACTATE, POTASSIUM CHLORIDE, CALCIUM CHLORIDE 600; 310; 30; 20 MG/100ML; MG/100ML; MG/100ML; MG/100ML
100 INJECTION, SOLUTION INTRAVENOUS CONTINUOUS
Status: DISCONTINUED | OUTPATIENT
Start: 2021-12-02 | End: 2021-12-02 | Stop reason: HOSPADM

## 2021-12-02 RX ORDER — LIDOCAINE HYDROCHLORIDE 20 MG/ML
INJECTION, SOLUTION EPIDURAL; INFILTRATION; INTRACAUDAL; PERINEURAL AS NEEDED
Status: DISCONTINUED | OUTPATIENT
Start: 2021-12-02 | End: 2021-12-02 | Stop reason: HOSPADM

## 2021-12-02 RX ORDER — PROPOFOL 10 MG/ML
INJECTION, EMULSION INTRAVENOUS
Status: DISCONTINUED | OUTPATIENT
Start: 2021-12-02 | End: 2021-12-02 | Stop reason: HOSPADM

## 2021-12-02 RX ORDER — NEOSTIGMINE METHYLSULFATE 1 MG/ML
INJECTION, SOLUTION INTRAVENOUS AS NEEDED
Status: DISCONTINUED | OUTPATIENT
Start: 2021-12-02 | End: 2021-12-02 | Stop reason: HOSPADM

## 2021-12-02 RX ORDER — KETAMINE HYDROCHLORIDE 10 MG/ML
INJECTION, SOLUTION INTRAMUSCULAR; INTRAVENOUS AS NEEDED
Status: DISCONTINUED | OUTPATIENT
Start: 2021-12-02 | End: 2021-12-02 | Stop reason: HOSPADM

## 2021-12-02 RX ORDER — PANTOPRAZOLE SODIUM 40 MG/1
40 TABLET, DELAYED RELEASE ORAL EVERY MORNING
Status: DISCONTINUED | OUTPATIENT
Start: 2021-12-03 | End: 2021-12-04 | Stop reason: HOSPADM

## 2021-12-02 RX ORDER — DIPHENHYDRAMINE HYDROCHLORIDE 50 MG/ML
12.5 INJECTION, SOLUTION INTRAMUSCULAR; INTRAVENOUS
Status: ACTIVE | OUTPATIENT
Start: 2021-12-02 | End: 2021-12-03

## 2021-12-02 RX ORDER — SODIUM CHLORIDE 9 MG/ML
25 INJECTION, SOLUTION INTRAVENOUS CONTINUOUS
Status: DISCONTINUED | OUTPATIENT
Start: 2021-12-02 | End: 2021-12-02 | Stop reason: HOSPADM

## 2021-12-02 RX ORDER — MORPHINE SULFATE 2 MG/ML
2 INJECTION, SOLUTION INTRAMUSCULAR; INTRAVENOUS
Status: DISCONTINUED | OUTPATIENT
Start: 2021-12-02 | End: 2021-12-02 | Stop reason: HOSPADM

## 2021-12-02 RX ORDER — LISINOPRIL 20 MG/1
20 TABLET ORAL EVERY MORNING
Status: DISCONTINUED | OUTPATIENT
Start: 2021-12-03 | End: 2021-12-04 | Stop reason: HOSPADM

## 2021-12-02 RX ORDER — PROPOFOL 10 MG/ML
INJECTION, EMULSION INTRAVENOUS AS NEEDED
Status: DISCONTINUED | OUTPATIENT
Start: 2021-12-02 | End: 2021-12-02 | Stop reason: HOSPADM

## 2021-12-02 RX ORDER — ALBUTEROL SULFATE 0.83 MG/ML
2.5 SOLUTION RESPIRATORY (INHALATION)
Status: DISCONTINUED | OUTPATIENT
Start: 2021-12-02 | End: 2021-12-04 | Stop reason: HOSPADM

## 2021-12-02 RX ORDER — SODIUM CHLORIDE 0.9 % (FLUSH) 0.9 %
5-40 SYRINGE (ML) INJECTION EVERY 8 HOURS
Status: DISCONTINUED | OUTPATIENT
Start: 2021-12-02 | End: 2021-12-04 | Stop reason: HOSPADM

## 2021-12-02 RX ORDER — FENTANYL CITRATE 50 UG/ML
25 INJECTION, SOLUTION INTRAMUSCULAR; INTRAVENOUS
Status: DISCONTINUED | OUTPATIENT
Start: 2021-12-02 | End: 2021-12-02 | Stop reason: HOSPADM

## 2021-12-02 RX ORDER — ACETAMINOPHEN 325 MG/1
650 TABLET ORAL ONCE
Status: COMPLETED | OUTPATIENT
Start: 2021-12-02 | End: 2021-12-02

## 2021-12-02 RX ORDER — CYCLOBENZAPRINE HCL 10 MG
10 TABLET ORAL
Status: DISCONTINUED | OUTPATIENT
Start: 2021-12-02 | End: 2021-12-04 | Stop reason: HOSPADM

## 2021-12-02 RX ORDER — ROPIVACAINE HYDROCHLORIDE 5 MG/ML
30 INJECTION, SOLUTION EPIDURAL; INFILTRATION; PERINEURAL AS NEEDED
Status: DISCONTINUED | OUTPATIENT
Start: 2021-12-02 | End: 2021-12-02 | Stop reason: HOSPADM

## 2021-12-02 RX ORDER — ONDANSETRON 2 MG/ML
4 INJECTION INTRAMUSCULAR; INTRAVENOUS AS NEEDED
Status: DISCONTINUED | OUTPATIENT
Start: 2021-12-02 | End: 2021-12-02 | Stop reason: HOSPADM

## 2021-12-02 RX ORDER — MORPHINE SULFATE IN 0.9 % NACL 150MG/30ML
PATIENT CONTROLLED ANALGESIA SYRINGE INTRAVENOUS
Status: DISCONTINUED | OUTPATIENT
Start: 2021-12-02 | End: 2021-12-03

## 2021-12-02 RX ORDER — ACETAMINOPHEN 500 MG
1000 TABLET ORAL EVERY 6 HOURS
Status: DISCONTINUED | OUTPATIENT
Start: 2021-12-02 | End: 2021-12-04 | Stop reason: HOSPADM

## 2021-12-02 RX ADMIN — TRANEXAMIC ACID 1 G: 100 INJECTION, SOLUTION INTRAVENOUS at 09:59

## 2021-12-02 RX ADMIN — PROPOFOL 150 MCG/KG/MIN: 10 INJECTION, EMULSION INTRAVENOUS at 09:26

## 2021-12-02 RX ADMIN — KETOROLAC TROMETHAMINE 15 MG: 30 INJECTION, SOLUTION INTRAMUSCULAR; INTRAVENOUS at 12:44

## 2021-12-02 RX ADMIN — DEXAMETHASONE SODIUM PHOSPHATE 4 MG: 4 INJECTION, SOLUTION INTRAMUSCULAR; INTRAVENOUS at 09:51

## 2021-12-02 RX ADMIN — GLYCOPYRROLATE 0.4 MG: 0.2 INJECTION, SOLUTION INTRAMUSCULAR; INTRAVENOUS at 12:01

## 2021-12-02 RX ADMIN — HYDROMORPHONE HYDROCHLORIDE 0.6 MG: 2 INJECTION, SOLUTION INTRAMUSCULAR; INTRAVENOUS; SUBCUTANEOUS at 12:33

## 2021-12-02 RX ADMIN — DOCUSATE SODIUM 50 MG AND SENNOSIDES 8.6 MG 1 TABLET: 8.6; 5 TABLET, FILM COATED ORAL at 17:49

## 2021-12-02 RX ADMIN — PHENYLEPHRINE HYDROCHLORIDE 50 MCG/MIN: 10 INJECTION INTRAVENOUS at 09:39

## 2021-12-02 RX ADMIN — FENTANYL CITRATE 25 MCG: 50 INJECTION, SOLUTION INTRAMUSCULAR; INTRAVENOUS at 12:34

## 2021-12-02 RX ADMIN — Medication 5 ML: at 18:00

## 2021-12-02 RX ADMIN — SODIUM CHLORIDE, POTASSIUM CHLORIDE, SODIUM LACTATE AND CALCIUM CHLORIDE 25 ML/HR: 600; 310; 30; 20 INJECTION, SOLUTION INTRAVENOUS at 07:42

## 2021-12-02 RX ADMIN — NEOSTIGMINE METHYLSULFATE 3 MG: 1 INJECTION, SOLUTION INTRAVENOUS at 12:01

## 2021-12-02 RX ADMIN — HYDROMORPHONE HYDROCHLORIDE 0.5 MG: 1 INJECTION, SOLUTION INTRAMUSCULAR; INTRAVENOUS; SUBCUTANEOUS at 13:17

## 2021-12-02 RX ADMIN — MIDAZOLAM HYDROCHLORIDE 0.5 MG: 1 INJECTION, SOLUTION INTRAMUSCULAR; INTRAVENOUS at 13:15

## 2021-12-02 RX ADMIN — SUCCINYLCHOLINE CHLORIDE 160 MG: 20 INJECTION, SOLUTION INTRAMUSCULAR; INTRAVENOUS at 09:27

## 2021-12-02 RX ADMIN — ONDANSETRON HYDROCHLORIDE 4 MG: 2 INJECTION, SOLUTION INTRAMUSCULAR; INTRAVENOUS at 11:51

## 2021-12-02 RX ADMIN — SODIUM CHLORIDE 125 ML/HR: 9 INJECTION, SOLUTION INTRAVENOUS at 21:40

## 2021-12-02 RX ADMIN — REMIFENTANIL HYDROCHLORIDE 0.3 MCG/KG/MIN: 1 INJECTION, POWDER, LYOPHILIZED, FOR SOLUTION INTRAVENOUS at 09:26

## 2021-12-02 RX ADMIN — KETAMINE HYDROCHLORIDE 30 MG: 10 INJECTION, SOLUTION INTRAMUSCULAR; INTRAVENOUS at 09:57

## 2021-12-02 RX ADMIN — SODIUM CHLORIDE 125 ML/HR: 9 INJECTION, SOLUTION INTRAVENOUS at 13:12

## 2021-12-02 RX ADMIN — FENTANYL CITRATE 25 MCG: 50 INJECTION, SOLUTION INTRAMUSCULAR; INTRAVENOUS at 12:33

## 2021-12-02 RX ADMIN — FENTANYL CITRATE 50 MCG: 50 INJECTION, SOLUTION INTRAMUSCULAR; INTRAVENOUS at 09:26

## 2021-12-02 RX ADMIN — MIDAZOLAM 2 MG: 1 INJECTION INTRAMUSCULAR; INTRAVENOUS at 09:13

## 2021-12-02 RX ADMIN — MIDAZOLAM HYDROCHLORIDE 0.5 MG: 1 INJECTION, SOLUTION INTRAMUSCULAR; INTRAVENOUS at 13:00

## 2021-12-02 RX ADMIN — HYDROMORPHONE HYDROCHLORIDE 0.5 MG: 1 INJECTION, SOLUTION INTRAMUSCULAR; INTRAVENOUS; SUBCUTANEOUS at 12:46

## 2021-12-02 RX ADMIN — PROPOFOL 150 MG: 10 INJECTION, EMULSION INTRAVENOUS at 09:26

## 2021-12-02 RX ADMIN — HYDROMORPHONE HYDROCHLORIDE 0.6 MG: 2 INJECTION, SOLUTION INTRAMUSCULAR; INTRAVENOUS; SUBCUTANEOUS at 12:09

## 2021-12-02 RX ADMIN — ROCURONIUM BROMIDE 5 MG: 10 SOLUTION INTRAVENOUS at 09:26

## 2021-12-02 RX ADMIN — ROCURONIUM BROMIDE 10 MG: 10 SOLUTION INTRAVENOUS at 10:07

## 2021-12-02 RX ADMIN — KETOROLAC TROMETHAMINE 15 MG: 30 INJECTION, SOLUTION INTRAMUSCULAR; INTRAVENOUS at 17:49

## 2021-12-02 RX ADMIN — ACETAMINOPHEN 1000 MG: 500 TABLET ORAL at 17:49

## 2021-12-02 RX ADMIN — ROCURONIUM BROMIDE 25 MG: 10 SOLUTION INTRAVENOUS at 09:32

## 2021-12-02 RX ADMIN — Medication 80 MCG: at 11:47

## 2021-12-02 RX ADMIN — Medication 80 MCG: at 09:48

## 2021-12-02 RX ADMIN — ACETAMINOPHEN 650 MG: 325 TABLET ORAL at 08:33

## 2021-12-02 RX ADMIN — LIDOCAINE HYDROCHLORIDE 60 MG: 20 INJECTION, SOLUTION EPIDURAL; INFILTRATION; INTRACAUDAL; PERINEURAL at 09:26

## 2021-12-02 RX ADMIN — Medication: at 13:04

## 2021-12-02 RX ADMIN — WATER 2 G: 1 INJECTION INTRAMUSCULAR; INTRAVENOUS; SUBCUTANEOUS at 09:46

## 2021-12-02 RX ADMIN — HYDROMORPHONE HYDROCHLORIDE 0.4 MG: 2 INJECTION, SOLUTION INTRAMUSCULAR; INTRAVENOUS; SUBCUTANEOUS at 12:16

## 2021-12-02 NOTE — ROUTINE PROCESS
Patient: Vanessa Whitehead MRN: [id number]  SSN: [id number]   YOB: 1955  Age: 74 y.o. Sex: male     Patient is status post Procedure(s):  REVISION LAMINECTOMY L3-S1 WITH L3-L5 REVISION FUSION. Surgeon(s) and Role:      Fan Bean MD - Primary    Local/Dose/Irrigation:  60 mL surgical pain solution                  Peripheral IV 12/02/21 Left Hand (Active)                Dressing/Packing:  Incision 12/02/21 Back-Dressing/Treatment: Other (Comment) (mastisol, steri strips, ABD, tape) (12/02/21 4258)

## 2021-12-02 NOTE — PERIOP NOTES
TRANSFER - OUT REPORT:    Verbal report given to [address] RN(name) on [facility]  being transferred to 54(unit) for routine post - op       Report consisted of patients Situation, Background, Assessment and   Recommendations(SBAR). Time Pre op antibiotic given:946  Anesthesia Stop time: 1185  Magana Present on Transfer to floor:na  Order for Magana on Chart:na  Discharge Prescriptions with Chart:na    Information from the following report(s) SBAR, Kardex, Intake/Output and MAR was reviewed with the receiving nurse. Opportunity for questions and clarification was provided. Is the patient on 02? NO       L/Min na       Other na    Is the patient on a monitor? NO    Is the nurse transporting with the patient? NO    Surgical Waiting Area notified of patient's transfer from PACU? YES      Clohes sent with pt    The following personal items collected during your admission accompanied patient upon transfer:   Dental Appliance: Dental Appliances: None  Vision:    Hearing Aid:    Jewelry: Jewelry: None  Clothing: Clothing: Other (comment) (CLOTHING & SHOES TO PACU)  Other Valuables:  Other Valuables: None  Valuables sent to safe:

## 2021-12-02 NOTE — BRIEF OP NOTE
Brief Postoperative Note    Patient: Vanessa Whitehead  YOB: 1955  MRN: [id number]    Date of Procedure: 12/2/2021     Pre-Op Diagnosis: STENOSIS  SPONDYLOLISTHESIS    Post-Op Diagnosis: Same as preoperative diagnosis. Procedure(s):  REVISION LAMINECTOMY L3-S1 WITH L3-L5 REVISION FUSION    Surgeon(s):  Nataly Dela Cruz MD    Surgical Assistant: Physician Assistant: MISSY Zacarias    Anesthesia: General     Estimated Blood Loss (mL): less than 426     Complications: None    Specimens: * No specimens in log *     Implants:   Implant Name Type Inv. Item Serial No.  Lot No. LRB No. Used Action   GRAFT BNE SUB 15GM GRN CA COMPND PTTY AND SILICATE BASE INJ - SN/A  GRAFT BNE SUB 15GM GRN CA COMPND PTTY AND SILICATE BASE INJ N/A [employer] [id number] N/A 1 Implanted   GRAFT BNE SUB L CANC FRZN MORSELIZED W/ VIABLE CELL AGNES - [id number]  GRAFT BNE SUB L CANC FRZN MORSELIZED W/ VIABLE CELL [place] [id number] MUSCULOSKELETAL TRANSPLANT [facility] N/A N/A 1 Implanted   GRAFT BNE SUB M [id number] COMPRESSIBLE SPNG STRP PROVIDE - [id number]  GRAFT BNE SUB M [id number] COMPRESSIBLE SPNG STRP PROVIDE [id number] BIOVENTUS [facility] N/A N/A 1 Implanted   Medtronic Catalyft PL Expandable Internbody System Interbody Cage    N/A MED[employer]ONIC [id number] N/A 2 Implanted   GRAFT DURA [id number] ULTRAPURE DURAGN + 5 PER PK - SN/A  GRAFT DURA [id number] ULTRAPURE DURAGN + 5 PER PK N/A [facility] [id number] N/A 1 Implanted   SCREW SPNL MULTAXL 6.5X50 MM REDUCTION CD HORZ SOLERA 5.5/6 - SNA  SCREW SPNL MULTAXL 6.5X50 MM REDUCTION CD HORZ SOLERA 5.5/6 NA [employer] [place] [employer]_ NA N/A 2 Implanted   SCREW SPNL L45MM DIA7. 5MM POST THORACOLUMBOSACRAL CO CHROM - SNA  SCREW SPNL L45MM DIA7. 5MM POST THORACOLUMBOSACRAL CO CHROM NA MEDTRONIC SOFAMOR DANEK_WD NA N/A 4 Implanted   SET SCR SPNL L6MM DIA5. 5MM TI BRK OFF RHEA W/ DETACH [address] - SNA  SET SCR SPNL L6MM DIA5. 5MM TI [facility] OFF RHEA W/ DETACH [address] NA MEDTRONIC SOFAMOR DANEK_WD NA N/A 6 Implanted   ANDREW SPNL L60MM DIA5. 5MM ANT POST THORACOLUMBOSACRAL TI - SNA  ANDREW SPNL L60MM DIA5. 5MM ANT POST THORACOLUMBOSACRAL TI NA MEDTRONIC SOFAMOR DANEK_WD NA N/A 2 Implanted       Drains:   Hemovac Lower Back (Active)       Findings: stenosis    Electronically Signed by MISSY Henry on 12/2/2021 at 12:16 PM

## 2021-12-03 ENCOUNTER — APPOINTMENT (OUTPATIENT)
Dept: VASCULAR SURGERY | Age: 66
DRG: 460 | End: 2021-12-03
Attending: PHYSICIAN ASSISTANT
Payer: COMMERCIAL

## 2021-12-03 LAB
ANION GAP SERPL CALC-SCNC: 8 MMOL/L (ref 5–15)
BUN SERPL-MCNC: 15 MG/DL (ref 6–20)
BUN/CREAT SERPL: 23 (ref 12–20)
CALCIUM SERPL-MCNC: 8.3 MG/DL (ref 8.5–10.1)
CHLORIDE SERPL-SCNC: 108 MMOL/L (ref 97–108)
CO2 SERPL-SCNC: 23 MMOL/L (ref 21–32)
CREAT SERPL-MCNC: 0.65 MG/DL (ref 0.7–1.3)
GLUCOSE SERPL-MCNC: 122 MG/DL (ref 65–100)
HGB BLD-MCNC: 11.3 G/DL (ref 12.1–17)
POTASSIUM SERPL-SCNC: 4 MMOL/L (ref 3.5–5.1)
SODIUM SERPL-SCNC: 139 MMOL/L (ref 136–145)

## 2021-12-03 PROCEDURE — 74011250637 HC RX REV CODE- 250/637: Performed by: PHYSICIAN ASSISTANT

## 2021-12-03 PROCEDURE — 85018 HEMOGLOBIN: CPT

## 2021-12-03 PROCEDURE — 36415 COLL VENOUS BLD VENIPUNCTURE: CPT

## 2021-12-03 PROCEDURE — 74011250636 HC RX REV CODE- 250/636: Performed by: PHYSICIAN ASSISTANT

## 2021-12-03 PROCEDURE — L0628 LSO FLEX NO RI STAYS PRE OTS: HCPCS

## 2021-12-03 PROCEDURE — 80048 BASIC METABOLIC PNL TOTAL CA: CPT

## 2021-12-03 PROCEDURE — 97161 PT EVAL LOW COMPLEX 20 MIN: CPT

## 2021-12-03 PROCEDURE — 65270000029 HC RM PRIVATE

## 2021-12-03 PROCEDURE — 51798 US URINE CAPACITY MEASURE: CPT

## 2021-12-03 PROCEDURE — 97165 OT EVAL LOW COMPLEX 30 MIN: CPT

## 2021-12-03 PROCEDURE — 74011250637 HC RX REV CODE- 250/637: Performed by: ORTHOPAEDIC SURGERY

## 2021-12-03 PROCEDURE — 93971 EXTREMITY STUDY: CPT

## 2021-12-03 PROCEDURE — 97116 GAIT TRAINING THERAPY: CPT

## 2021-12-03 RX ORDER — OXYCODONE HYDROCHLORIDE 5 MG/1
5-10 TABLET ORAL
Qty: 60 TABLET | Refills: 0 | Status: SHIPPED | OUTPATIENT
Start: 2021-12-03 | End: 2021-12-09 | Stop reason: SDUPTHER

## 2021-12-03 RX ORDER — TAMSULOSIN HYDROCHLORIDE 0.4 MG/1
0.4 CAPSULE ORAL DAILY
Status: DISCONTINUED | OUTPATIENT
Start: 2021-12-04 | End: 2021-12-03

## 2021-12-03 RX ORDER — TAMSULOSIN HYDROCHLORIDE 0.4 MG/1
0.4 CAPSULE ORAL DAILY
Status: DISCONTINUED | OUTPATIENT
Start: 2021-12-03 | End: 2021-12-04 | Stop reason: HOSPADM

## 2021-12-03 RX ORDER — CELECOXIB 200 MG/1
200 CAPSULE ORAL 2 TIMES DAILY
Status: COMPLETED | OUTPATIENT
Start: 2021-12-03 | End: 2021-12-04

## 2021-12-03 RX ORDER — GABAPENTIN 300 MG/1
300 CAPSULE ORAL 2 TIMES DAILY
Status: DISCONTINUED | OUTPATIENT
Start: 2021-12-03 | End: 2021-12-04 | Stop reason: HOSPADM

## 2021-12-03 RX ORDER — DEXAMETHASONE SODIUM PHOSPHATE 4 MG/ML
4 INJECTION, SOLUTION INTRA-ARTICULAR; INTRALESIONAL; INTRAMUSCULAR; INTRAVENOUS; SOFT TISSUE EVERY 6 HOURS
Status: COMPLETED | OUTPATIENT
Start: 2021-12-03 | End: 2021-12-04

## 2021-12-03 RX ADMIN — OXYCODONE 10 MG: 5 TABLET ORAL at 11:52

## 2021-12-03 RX ADMIN — DEXAMETHASONE SODIUM PHOSPHATE 4 MG: 4 INJECTION, SOLUTION INTRAMUSCULAR; INTRAVENOUS at 19:32

## 2021-12-03 RX ADMIN — DOCUSATE SODIUM 50 MG AND SENNOSIDES 8.6 MG 1 TABLET: 8.6; 5 TABLET, FILM COATED ORAL at 10:11

## 2021-12-03 RX ADMIN — CELECOXIB 200 MG: 200 CAPSULE ORAL at 17:39

## 2021-12-03 RX ADMIN — VANCOMYCIN HYDROCHLORIDE 1000 MG: 1 INJECTION, POWDER, LYOPHILIZED, FOR SOLUTION INTRAVENOUS at 00:20

## 2021-12-03 RX ADMIN — PANTOPRAZOLE SODIUM 40 MG: 40 TABLET, DELAYED RELEASE ORAL at 07:12

## 2021-12-03 RX ADMIN — DEXAMETHASONE SODIUM PHOSPHATE 4 MG: 4 INJECTION, SOLUTION INTRAMUSCULAR; INTRAVENOUS at 15:08

## 2021-12-03 RX ADMIN — DOCUSATE SODIUM 50 MG AND SENNOSIDES 8.6 MG 1 TABLET: 8.6; 5 TABLET, FILM COATED ORAL at 17:39

## 2021-12-03 RX ADMIN — KETOROLAC TROMETHAMINE 15 MG: 30 INJECTION, SOLUTION INTRAMUSCULAR; INTRAVENOUS at 00:20

## 2021-12-03 RX ADMIN — Medication 10 ML: at 15:08

## 2021-12-03 RX ADMIN — LISINOPRIL 20 MG: 20 TABLET ORAL at 07:12

## 2021-12-03 RX ADMIN — TAMSULOSIN HYDROCHLORIDE 0.4 MG: 0.4 CAPSULE ORAL at 15:08

## 2021-12-03 RX ADMIN — ACETAMINOPHEN 1000 MG: 500 TABLET ORAL at 07:04

## 2021-12-03 RX ADMIN — ACETAMINOPHEN 1000 MG: 500 TABLET ORAL at 11:52

## 2021-12-03 RX ADMIN — OXYCODONE 10 MG: 5 TABLET ORAL at 19:32

## 2021-12-03 RX ADMIN — OXYCODONE 10 MG: 5 TABLET ORAL at 15:08

## 2021-12-03 RX ADMIN — ACETAMINOPHEN 1000 MG: 500 TABLET ORAL at 00:21

## 2021-12-03 RX ADMIN — GABAPENTIN 300 MG: 300 CAPSULE ORAL at 17:39

## 2021-12-03 RX ADMIN — POLYETHYLENE GLYCOL 3350 17 G: 17 POWDER, FOR SOLUTION ORAL at 10:11

## 2021-12-03 RX ADMIN — KETOROLAC TROMETHAMINE 15 MG: 30 INJECTION, SOLUTION INTRAMUSCULAR; INTRAVENOUS at 07:05

## 2021-12-03 RX ADMIN — SODIUM CHLORIDE 125 ML/HR: 9 INJECTION, SOLUTION INTRAVENOUS at 07:01

## 2021-12-03 RX ADMIN — OXYCODONE 10 MG: 5 TABLET ORAL at 08:13

## 2021-12-03 NOTE — PROGRESS NOTES
Order acknowledged and chart reviewed. Per MD, \"elevate HOB 30 degrees in am; advance in PM.\"  Per nursing, hold therapy today. Will f/u tomorrow. Thanks!

## 2021-12-03 NOTE — PROGRESS NOTES
Orthopedic Spine Progress Note  Post Op day: 1 Day Post-Op    December 3, 2021 7:45 AM   Admit Date: 2021  Procedure: Procedure(s):  REVISION LAMINECTOMY L3-S1 WITH L3-L5 REVISION FUSION    Subjective: Jason Morales has no complaints. No headaches noted. Tolerating diet. No N/V. Pain Control:   Pain Assessment  Pain Scale 1: Numeric (0 - 10)  Pain Intensity 1: 7  Pain Location 1: Back, Leg  Pain Orientation 1: Lower, Left  Pain Description 1: Aching, Burning, Numb, Tingling, Radiating    Objective:          Physical Exam:  General:  Alert and oriented. No acute distress. Heart:  Respirations unlabored. Abdomen:   Extremities: Soft, non-tender. No evidence of cyanosis. Pulses palpable in both upper and lower extremities. Neurologic:  Musculoskeletal:  No new motor deficits. Neurovascular exam within normal limits. Sensation stable. Motor: unchanged C5-T1 and L2-S1. Weakness of dorsiflexion and plantar flexion on left. Chad's sign negative in bilateral lower extremities. Calves soft, nontender upon palpation and with passive twitch. Moves both upper and lower extremities. Incision: clean, dry, and intact. No significant erythema or swelling. No active drainage noted. Vital Signs:    Blood pressure 127/74, pulse 70, temperature 97.7 °F (36.5 °C), resp. rate 16, height 5' 10.5\" (1.791 m), weight 205 lb (93 kg), SpO2 92 %. Temp (24hrs), Av °F (36.7 °C), Min:97.7 °F (36.5 °C), Max:98.4 °F (36.9 °C)      LAB:    Recent Labs     21  0051   HGB 11.3*     Lab Results   Component Value Date/Time    Sodium 139 2021 12:51 AM    Potassium 4.0 2021 12:51 AM    Chloride 108 2021 12:51 AM    CO2 23 2021 12:51 AM    Glucose 122 (H) 2021 12:51 AM    BUN 15 2021 12:51 AM    Creatinine 0.65 (L) 2021 12:51 AM    Calcium 8.3 (L) 2021 12:51 AM       Intake/Output:No intake/output data recorded.    1901 -  0700  In: 900 [I.V.:900]  Out: 56 [Urine:890]    PT/OT:   Gait:                    Assessment:   Patient is 1 Day Post-Op s/p Procedure(s):  REVISION LAMINECTOMY L3-S1 WITH L3-L5 REVISION FUSION    Plan:     1. Continue PT/OT  2. Continue established methods of pain control  3. VTE Prophylaxes - TEDS &/or SCDs   4. Advance diet  5. Elevate head of bed 30 degrees in am; advance in PM   6.   Decadron IV      Signed By: Milan Addison MD

## 2021-12-03 NOTE — PROGRESS NOTES
OCCUPATIONAL THERAPY EVALUATION/DISCHARGE  Patient: Gianna Wright (14 y.o. male)  Date: 12/3/2021  Primary Diagnosis: Lumbar stenosis [M48.061]  Procedure(s) (LRB):  REVISION LAMINECTOMY L3-S1 WITH L3-L5 REVISION FUSION (N/A) 1 Day Post-Op   Precautions: Back       ASSESSMENT  Based on the objective data described below, the patient presents with back precautions and limited mobility. Supportive wife present in the room. This is pt's 4th back surgery and is cognizant of all back precautions. [facility] to [facility] with all mobility and min to mod assist with LB dressing. Per wife, pt has all the AE at home for LB dressing, but doesn't use it. Wife plans on helping him at discharge. Physical Therapy to continue to follow. No further skilled acute OT needed at this time. Will complete order. Current Level of Function (ADLs/self-care): Other factors to consider for discharge: none noted     PLAN :  Recommend with staff:     Recommendation for discharge: (in order for the patient to meet his/her long term goals)  No skilled occupational therapy/ follow up rehabilitation needs identified at this time. This discharge recommendation:  A follow-up discussion with the attending provider and/or case management is planned    IF patient discharges home will need the following DME: none       SUBJECTIVE:   Patient stated Debbi Humphrey will help me.     OBJECTIVE DATA SUMMARY:   HISTORY:   Past Medical History:   Diagnosis Date    Chronic obstructive pulmonary disease ([address]     GERD (gastroesophageal reflux disease)     Hypertension     Polyp of colon     Sleep apnea     Uses CPAP     Past Surgical History:   Procedure Laterality Date    HX COLONOSCOPY      HX LUMBAR FUSION      x 3    HX LUMBAR LAMINECTOMY      x 3    HX VEIN STRIPPING      IR INJ FORAMIN EPID LUMB ANES/STER SNGL  10/30/2019    IR INJ FORAMIN EPID LUMB ANES/STER SNGL  10/1/2021    OK CARDIAC SURG PROCEDURE UNLIST  2019    negative Cath Lab @ [facility]    PA CARDIAC SURG PROCEDURE UNLIST      negative cardiac stress @ [facility]       Prior Level of Function/Environment/Context:   Expanded or extensive additional review of patient history:   Home Situation  Home Environment: Private residence  # Steps to Enter: 5  Rails to Enter: Yes  Hand Rails : Left  One/Two Story Residence: One story  Living Alone: No  Support Systems: Spouse/Significant Other  Patient Expects to be Discharged to[de-identified] House  Current DME Used/Available at Home: Cane, straight, Shower chair, Raised toilet seat  Tub or Shower Type: Shower        EXAMINATION OF PERFORMANCE DEFICITS:  Cognitive/Behavioral Status:  Neurologic State: Alert  Orientation Level: Oriented X4                Skin: intact    Edema:  None noted    Hearing:       Vision/Perceptual:                                     Range of Motion:    AROM: Within functional limits                         Strength:    Strength: Generally decreased, functional                Coordination:  Coordination: Within functional limits            Tone & Sensation:    Tone: Normal  Sensation: Intact                      Balance:  Sitting: Intact  Standing: With support;  Intact    Functional Mobility and Transfers for ADLs:  Bed Mobility:       Transfers:  Sit to Stand: Contact guard assistance  Stand to Sit: Stand-by assistance  Bed to Chair: Stand-by assistance  Bathroom Mobility: Stand-by assistance  Toilet Transfer : Stand-by assistance    ADL Assessment:  Feeding: Independent    Oral Facial Hygiene/Grooming: Independent         Upper Body Dressing: Independent    Lower Body Dressing: Minimum assistance    Toileting: Stand by assistance                Occupational Therapy Evaluation Charge Determination   History Examination Decision-Making   LOW Complexity : Brief history review  LOW Complexity : 1-3 performance deficits relating to physical, cognitive , or psychosocial skils that result in activity limitations and / or participation restrictions  LOW Complexity : No comorbidities that affect functional and no verbal or physical assistance needed to complete eval tasks       Based on the above components, the patient evaluation is determined to be of the following complexity level: LOW   Pain Rating:      Activity Tolerance:   Good    After treatment patient left in no apparent distress:    Sitting in chair, Call bell within reach and Caregiver / family present    COMMUNICATION/EDUCATION:   The patients plan of care was discussed with: Physical therapist and Registered nurse.      Thank you for this referral.  GIULIANA Bills/L  Time Calculation: 15 mins

## 2021-12-03 NOTE — PROGRESS NOTES
Spine Surgery Progress Note    Admit Date: 12/2/2021   LOS: 1 day      Daily Progress Note: 12/3/2021    POD:1 Day Post-Op    S/P: Procedure(s):  REVISION LAMINECTOMY L3-S1 WITH L3-L5 REVISION FUSION    Patient doing well overall. No nausea or vomiting. Pain well controlled on current medications. Denies nausea, vomiting, fever, chills, chest pain, dyspnea, headache. Visit Vitals  BP (!) 143/72 (BP Patient Position: Sitting)   Pulse 80   Temp 98.4 °F (36.9 °C)   Resp 18   Ht 5' 10.5\" (1.791 m)   Wt 93 kg (205 lb)   SpO2 95%   BMI 29.00 kg/m²      Lab Results   Component Value Date/Time    HGB 11.3 (L) 12/03/2021 12:51 AM    INR 1.0 11/04/2021 07:30 AM     Calves soft/NTTP Bilaterally. Moving LE well. LLE plantar and dorsiflexion weakness; remained of neurovasc exam WNL  Sensation intact. Incision OK; no drainage. Dressing clean and dry.     Plan:  Bladder checks per protocol; add flomax  Pain control PRN  Decadron x 3 doses  Daily protonix  Daily miralax & pericolace  Return to lay flat if develops HA  Doppler for LLE swelling and pain  Daily dressing change to incision  OK to work with PT/OT as long as no HA; lumbar brace    Discharge pending PT clearance    MISSY Aguilar

## 2021-12-03 NOTE — OP NOTES
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 POSTOPERATIVE DIAGNOSES:  Status post lumbar fusion, L4-L5; junctional stenosis, L3-L4, L5-S1. PROCEDURES PERFORMED:  Exploration of fusion, revision laminectomy with partial facet resection at L3-L4 and partial facet decompression at L5-S1. Posterior revision fusion L3 to L5, transforaminal interbody fusion at L3-L4. SURGEON:  Kerrie Parra MD    ASSISTANT:  Deidre Rm PA-C    ANESTHESIA:  Anesthesia. COMPLICATIONS:  None. SPECIMENS REMOVED:  No specimens. IMPLANTS:  Instrumentation includes posterior Medtronic Solera screws. ESTIMATED BLOOD LOSS:  Approximately 100 mL. INDICATIONS:  This is a 58-year-old gentleman with ongoing severe back pain and left leg symptoms. He is several years status post L4-L5 posterior lumbar fusion, now with retrolisthesis and junctional stenosis bilaterally left greater than right at L3-L4. He had failed extensive conservative treatment, understood the risks and benefits and elected to proceed. PROCEDURE:  The patient was identified, brought to the operative suite, underwent general anesthesia without difficulty. Preoperative neuromonitoring was placed, baselines obtained. There was some mild decrease SSEPs on the left hand side although we removed some of the bolsters  with stabilization. No other changes noted. After time-out was confirmed, we did a midline incision. Dissection was carried down to the midline. We exposed previous instrumentation hardware at L4-L5 which was stable. We also identified the previous laminectomy site at L4-L5 and the residual lamina of L3, inferior portion of L2.   Transverse processes of L3 were identified as well as the facet joints at L3-L4 and the previous fusion mass. We did dissect down to the posterior pelvis and L5 lamina region and sacrum. We identified that he did have basically an area of spina bifida and had some areas of thinning in that region. This was covered with a Duragen patch as well with no further issues noted. We then started wide revision laminectomy removing the remainder of the spinous process of L3. Central laminectomy was performed undercutting the lamina and the remainder of the ligamentum for lateral recess and foraminal decompression. Facet resections were performed bilaterally with the exiting transverse nerve roots identified. At which time, the wide laminectomy was performed with the exiting L3 nerve roots completely decompressed as well as the transversing L4. No central stenosis residual.  Then, using standard bony landmarks, we did a Midas bur to bur the initial  hole followed by gearshift to 50 mm followed by tapping and placement of pedicle screw instrumentation. Good fixation was obtained and these tested out greater than 20 milliamps as well. We then did bilateral annulotomies, removed the remainder of the disk material at L3-L4. I did evacuate the disk and then did trial spacers to approximately 11 mm height. We then placed an OsteoAMP sponge soaked in the patient's blood in the anterior one-third column followed by 2 Medtronic Elevate interbody grafts which were expanded to approximately 11 mm of height. These were large Medtronic Catalyft implants. Neuromonitoring was stable. Wound was irrigated with Irrisept as well as pulse irrigation. We then decorticated the transverse process of previous fusion mass at L4-L5. The previous instrumentation at L4-L5 had been exchanged for Medtronic Solera pedicle screws. 60-mm rods attached to the pedicle screws and set screws and final tightening performed.   Allograft autograft was placed into the lateral gutters followed by final x-rays which demonstrated stable fixation. At that time, we irrigated again lightly. Peg Fetch patch was placed over the area of the spina bifida occulta area that had the thinning followed by some Duragen gel for standard closure. #1 Stratafix on the fascial layer, 2-0 Stratafix on the subcutaneous layer, and 3-0 Monocryl on the skin. Steri-Strips placed. The patient returned to the PACU in stable condition. The physician assistant was present during the entire operative procedure and assisted in all critical elements of the surgery. No surgical assist or resident was available.      Kaden Salas MD      [id number]  D:  12/02/2021 12:06  T:  12/02/2021 23:18  JOB #:  [id number]

## 2021-12-03 NOTE — PROGRESS NOTES
Primary Nurse Alena Schmitz RN and Lori Lai RN performed a dual skin assessment on this patient No impairment noted  Kyle score is 19

## 2021-12-03 NOTE — ANESTHESIA POSTPROCEDURE EVALUATION
Procedure(s):  REVISION LAMINECTOMY L3-S1 WITH L3-L5 REVISION FUSION. general    Anesthesia Post Evaluation        Patient location during evaluation: PACU  Note status: Adequate. Level of consciousness: responsive to verbal stimuli and sleepy but conscious  Pain management: satisfactory to patient  Airway patency: patent  Anesthetic complications: no  Cardiovascular status: acceptable  Respiratory status: acceptable  Hydration status: acceptable  Comments: +Post-Anesthesia Evaluation and Assessment    Patient: Germain Joyner MRN: [id number]  SSN: [id number]   YOB: 1955  Age: 74 y.o. Sex: male          Cardiovascular Function/Vital Signs    BP (!) 143/73 (BP 1 Location: Left arm, BP Patient Position: At rest)   Pulse 71   Temp 36.4 °C (97.5 °F)   Resp 16   Ht 5' 10.5\" (1.791 m)   Wt 93 kg (205 lb)   SpO2 96%   BMI 29.00 kg/m²     Patient is status post Procedure(s):  REVISION LAMINECTOMY L3-S1 WITH L3-L5 REVISION FUSION. Nausea/Vomiting: Controlled. Postoperative hydration reviewed and adequate. Pain:  Pain Scale 1: Numeric (0 - 10) (12/03/21 0757)  Pain Intensity 1: 7 (12/03/21 0757)   Managed. Neurological Status:   Neuro (WDL): Within Defined Limits (12/02/21 0739)   At baseline. Mental Status and Level of Consciousness: Arousable. Pulmonary Status:   O2 Device: None (Room air) (12/03/21 0757)   Adequate oxygenation and airway patent. Complications related to anesthesia: None    Post-anesthesia assessment completed. No concerns. I have evaluated the patient and the patient is stable and ready to be discharged from PACU . Signed By: Yohana Samaniego MD    12/3/2021        INITIAL Post-op Vital signs:   Vitals Value Taken Time   /70 12/02/21 1600   Temp 36.9 °C (98.4 °F) 12/02/21 1233   Pulse 94 12/02/21 1550   Resp 18 12/02/21 1550   SpO2 84 % 12/02/21 1641   Vitals shown include unvalidated device data.

## 2021-12-03 NOTE — DISCHARGE INSTRUCTIONS
After Hospital Care Plan:  Discharge Instructions Lumbar Fusion Surgery   Dr. Ray Ahumada   Patient Name: Gianna Wright    Date of procedure: 12/2/2021  Date of discharge: 12/3/2021    Procedure: Procedure(s):  REVISION LAMINECTOMY L3-S1 WITH L3-L5 REVISION FUSION  PCP: Pascual Hernandez MD    Follow up appointments  -follow up with Dr. Dr. Ray Ahumada in 2 weeks. Call [phone] to make an appointment as soon as you get home from the hospital.    [address] [place], [place] [address]y: ____________________   phone: _______________________  The agency will contact you to arrange dates/times for visits. Please call them if you do not hear from them within 24 hours after you are discharged  Physical therapy 3 times a week for 3 weeks  Nursing-initial assessment and as needed    When to call your Orthopaedic Surgeon:  -Signs of infection-if your incision is red; continues to have drainage; drainage has a foul odor or if you have a persistent fever over 101 degrees for 24 hours  -Nausea or vomiting, severe headache  -Loss of bowel or bladder function, inability to urinate  -Changes in sensation in your arms or legs (numbness, tingling, loss of color)  -Increased weakness-greater than before your surgery  -Severe pain or pain not relieved by medications  -Signs of a blood clot in your leg-calf pain, tenderness, redness, swelling of lower leg    When to call your Primary Care Physician:  -Concerns about medical conditions such as diabetes, high blood pressure, asthma, congestive heart failure  -Call if blood sugars are elevated, persistent headache or dizziness, coughing or congestion, constipation or diarrhea, burning with urination, abnormal heart rate    When to call 911 and go to the nearest emergency room:  -Acute onset of chest pain, shortness of breath, difficulty breathing    Activity  -You are going home a well person, be as active as possible. Your only exercise should be walking.   Start with short frequent walks and increase your walking distance each day.  -Limit the amount of time you sit to 20-30 minute intervals. Sitting for prolonged periods of time will be uncomfortable for you following surgery.  -Do NOT lift anything over 5 pounds  -Do NOT do any straining, twisting or bending  -When you are in bed, you may lay on your back or on either side. Do NOT lie on your stomach    Brace  -If you have a back brace, you should wear your brace at all times when you are out of bed. Do not wear the brace while in bed or showering.  -Remember to always wear a cotton t-shirt underneath your brace.  -Do not bend or twist when your brace is off    Diet  -Resume usual diet; drink plenty of fluids; eat foods high in fiber  -It is important to have regular bowel movements. Pain medications may cause constipation. You may want to take a stool softener (such as Senokot-S or Colace) to prevent constipation.   -If constipation occurs, take a laxative (such as Dulcolax tablets, Milk of Magnesia, or a suppository). Laxatives should only be used if the above preventable measures have failed and you still have not had a bowel movement after three days    Driving  -You may not drive or return to work until instructed by your physician. However, you may ride in the car for short periods of time. Incision Care  Your incision has been closed with absorbable sutures and the Zipline skin closure system. This will assist with healing. The Zachary Mulders is to remain on your incision for 2 weeks. A dry dressing (ABD and tape) will be placed over it and should be changed daily, for at least the first several days after your surgery. If you have no incisional drainage, you may leave the incision open to air if you wish, still leaving the Zipline in place. Please make sure to wash your hands prior to touching your dressing. You may take brief showers but do not run the water directly onto the wound.  After your shower, blot your incision dry with a soft towel and replace the dry dressing. Do not allow the tape to come in contact with the Zipline. Do not rub or apply any lotions or ointments to your incision site. Do not soak or scrub your wound. The Zipline dressing will be removed during your two week follow-up appointment. If you experience drainage leaking from underneath the Zipline or if it peels off before 2 weeks, please contact your orthopedic surgeons office. Showering  -You may shower in approximately 4 days after your surgery.    -Leave the dressing on during your shower. Do NOT allow the water to run directly onto your dressing. Once you get out of the shower, gently pat the dressing dry. -Reminder- your brace can be removed while showering. Remember to not bend or twist while your brace is off.    -Do not take a tub bath. Preventing blood clots  -You have been given T.E.D. stockings to wear. Continue to wear these for 7 days after your discharge. Put them on in the morning and take them off at night.    -They are used to increase your circulation and prevent blood clots from forming in your legs  -T. E.D. stockings can be machine washed, temperature not to exceed 160° F (71°C) and machine dried for 15 to 20 minutes, temperature not to exceed 250° F (121°C). Pain management  -Take pain medication as prescribed; decrease the amount you use as your pain lessens  -DO not wait until you are in extreme pain to take your medication.  -Avoid alcoholic beverages while taking pain medication    Pain Medication Safety  DO:  -Read the Medication Guide   -Take your medicine exactly as prescribed   -Store your medicine away from children and in a safe place   -Flush unused medicine down the toilet   -Call your healthcare provider for medical advice about side effects. You may report side effects to FDA at 0-874-FDA-4250.   -Please be aware that many medications contain Tylenol.   We do not want you to over medicate so please read the information below as a guide. Do not take more than 4 Grams of Tylenol in a 24 hour period. (There are 1000 milligrams in one Gram)                                                                                                                                                                                                                                                Percocet contains 325 mg of Tylenol per tablet (do not take more than 12 tablets in 24 hours)  Lortab contains 500 mg of Tylenol per tablet (do not take more than 8 tablets in 24 hours)  Norco contains 325 mg of Tylenol per tablet (do not take more than 12 tablets in 24 hours). DO NOT:  -Do not give your medicine to others   -Do not take medicine unless it was prescribed for you   -Do not stop taking your medicine without talking to your healthcare provider   -Do not break, chew, crush, dissolve, or inject your medicine. If you cannot swallow your medicine whole, talk to your healthcare provider.  -Do not drink alcohol while taking this medicine  -Do not take anti-inflammatory medications or aspirin unless instructed by your     physician.

## 2021-12-03 NOTE — PROGRESS NOTES
Physical Therapy  12/3/2021    Orders received and acknowledged. Chart reviewed and spoke with RN. MD note specified elevate head in AM and progress this afternoon. On hold for the AM will follow up this afternoon as able and appropriate.      Thank Maryam Bergman, PT, DPT'

## 2021-12-04 VITALS
WEIGHT: 205 LBS | BODY MASS INDEX: 28.7 KG/M2 | SYSTOLIC BLOOD PRESSURE: 166 MMHG | RESPIRATION RATE: 18 BRPM | DIASTOLIC BLOOD PRESSURE: 83 MMHG | TEMPERATURE: 97.9 F | HEIGHT: 71 IN | HEART RATE: 78 BPM | OXYGEN SATURATION: 98 %

## 2021-12-04 LAB — HGB BLD-MCNC: 11 G/DL (ref 12.1–17)

## 2021-12-04 PROCEDURE — 85018 HEMOGLOBIN: CPT

## 2021-12-04 PROCEDURE — 74011250636 HC RX REV CODE- 250/636: Performed by: PHYSICIAN ASSISTANT

## 2021-12-04 PROCEDURE — 36415 COLL VENOUS BLD VENIPUNCTURE: CPT

## 2021-12-04 PROCEDURE — 74011250637 HC RX REV CODE- 250/637: Performed by: PHYSICIAN ASSISTANT

## 2021-12-04 PROCEDURE — 74011250637 HC RX REV CODE- 250/637: Performed by: ORTHOPAEDIC SURGERY

## 2021-12-04 PROCEDURE — 97116 GAIT TRAINING THERAPY: CPT

## 2021-12-04 RX ADMIN — OXYCODONE 10 MG: 5 TABLET ORAL at 09:46

## 2021-12-04 RX ADMIN — POLYETHYLENE GLYCOL 3350 17 G: 17 POWDER, FOR SOLUTION ORAL at 09:46

## 2021-12-04 RX ADMIN — OXYCODONE 10 MG: 5 TABLET ORAL at 01:00

## 2021-12-04 RX ADMIN — OXYCODONE 10 MG: 5 TABLET ORAL at 12:33

## 2021-12-04 RX ADMIN — PANTOPRAZOLE SODIUM 40 MG: 40 TABLET, DELAYED RELEASE ORAL at 06:56

## 2021-12-04 RX ADMIN — OXYCODONE 10 MG: 5 TABLET ORAL at 06:53

## 2021-12-04 RX ADMIN — DOCUSATE SODIUM 50 MG AND SENNOSIDES 8.6 MG 1 TABLET: 8.6; 5 TABLET, FILM COATED ORAL at 09:46

## 2021-12-04 RX ADMIN — LISINOPRIL 20 MG: 20 TABLET ORAL at 06:56

## 2021-12-04 RX ADMIN — ACETAMINOPHEN 1000 MG: 500 TABLET ORAL at 12:33

## 2021-12-04 RX ADMIN — GABAPENTIN 300 MG: 300 CAPSULE ORAL at 09:46

## 2021-12-04 RX ADMIN — CELECOXIB 200 MG: 200 CAPSULE ORAL at 09:46

## 2021-12-04 RX ADMIN — ACETAMINOPHEN 1000 MG: 500 TABLET ORAL at 06:53

## 2021-12-04 RX ADMIN — TAMSULOSIN HYDROCHLORIDE 0.4 MG: 0.4 CAPSULE ORAL at 09:46

## 2021-12-04 RX ADMIN — ACETAMINOPHEN 1000 MG: 500 TABLET ORAL at 01:00

## 2021-12-04 RX ADMIN — DEXAMETHASONE SODIUM PHOSPHATE 4 MG: 4 INJECTION, SOLUTION INTRAMUSCULAR; INTRAVENOUS at 01:00

## 2021-12-04 RX ADMIN — Medication 10 ML: at 06:53

## 2021-12-04 NOTE — PROGRESS NOTES
Problem: Falls - Risk of  Goal: *Absence of Falls  Description: Document Cj Herrera Fall Risk and appropriate interventions in the flowsheet.   12/4/2021 1414 by Yola Khan RN  Outcome: Resolved/Met  12/4/2021 1414 by Yola Khan, RN  Outcome: Progressing Towards Goal  Note: Fall Risk Interventions:

## 2021-12-04 NOTE — PROGRESS NOTES
Spine Surgery Progress Note    Admit Date: 12/2/2021   LOS: 2 days      Daily Progress Note: 12/4/2021    POD:2 Days Post-Op    S/P: Procedure(s):  REVISION LAMINECTOMY L3-S1 WITH L3-L5 REVISION FUSION    Patient doing well overall. No nausea or vomiting. Pain well controlled on current medications. Has been OOB with brace. Denies nausea, vomiting, fever, chills, chest pain, dyspnea, headache. Visit Vitals  BP (!) 159/83 (BP 1 Location: Right arm, BP Patient Position: At rest)   Pulse 78   Temp 98.2 °F (36.8 °C)   Resp 16   Ht 5' 10.5\" (1.791 m)   Wt 205 lb (93 kg)   SpO2 98%   BMI 29.00 kg/m²      Lab Results   Component Value Date/Time    HGB 11.0 (L) 12/04/2021 01:07 AM    INR 1.0 11/04/2021 07:30 AM     Calves soft/NTTP Bilaterally. Moving LE well. LLE plantar and dorsiflexion weakness; remained of neurovasc exam WNL  Sensation intact. Incision OK; no drainage. Dressing clean and dry.     Plan:  Bladder checks per protocol; add flomax  Pain control PRN  Decadron x 3 doses  Daily protonix  Daily miralax & pericolace  Return to lay flat if develops HA  Doppler for LLE swelling and pain  Daily dressing change to incision  OK to work with PT/OT as long as no HA; lumbar brace    Discharge pending PT clearance    Kimani Beauchamp PA-C

## 2021-12-04 NOTE — PROGRESS NOTES
Problem: Mobility Impaired (Adult and Pediatric)  Goal: *Acute Goals and Plan of Care (Insert Text)  Description: FUNCTIONAL STATUS PRIOR TO ADMISSION: Patient was independent and active without use of DME. This is patient's 4th back surgery and wife has assisted after each surgery. HOME SUPPORT PRIOR TO ADMISSION: The patient lived with spouse but did not require assist.    Physical Therapy Goals  Initiated 12/3/2021    1. Patient will move from supine to sit and sit to supine , scoot up and down, and roll side to side in bed with independence within 4 days. 2. Patient will perform sit to stand with independence within 4 days. 3. Patient will ambulate with independence for 300 feet with the least restrictive device within 4 days. 4. Patient will ascend/descend 5 stairs with 1 handrail(s) with independence within 4 days. 5. Patient will verbalize and demonstrate understanding of spinal precautions (No bending, lifting greater than 5 lbs, or twisting; log-roll technique; frequent repositioning as instructed) within 4 days. Outcome: Progressing Towards Goal   PHYSICAL THERAPY TREATMENT  Patient: Corey Benedict (89 y.o. male)  Date: 12/4/2021  Diagnosis: Lumbar stenosis [M48.061]   <principal problem not specified>  Procedure(s) (LRB):  REVISION LAMINECTOMY L3-S1 WITH L3-L5 REVISION FUSION (N/A) 2 Days Post-Op  Precautions: Fall, Back  Chart, physical therapy assessment, plan of care and goals were reviewed. ASSESSMENT  Patient continues with skilled PT services and is progressing towards goals. He demonstrates the ability to transition supine to sit with HOB elevated, use of bed rail and CGA. Patient and spouse report they have an adjustable bed to increase ease to supine to sit transition. He demonstrates the ability to ambulate with SBA increased distance. No over LOB is noted. Patient ambulates on the lateral borders of bilateral feel and he reports significant increased L foot pain.  He believes this is due to steroid completion. He requires seated rest break between gait and stairs due to O2 sats and foot pain. Patient is then able to ascend and descend 5 stairs with R rail and good stability. Patient is cleared by PT at this time. Documentation for home O2:     ROOM AIR    AT REST   O2 SATS  85   (1) LITERS OF O2 WITH ACTIVITY O2 SATS  87   (2) LITERS OF O2 WITH ACTIVITY O2 SATS  92   (2    )LITERS OF 02 PATIENT LEFT COMFORTABLY  SITTING/SUPINE 02 SATS  94     Current Level of Function Impacting Discharge (mobility/balance): SBA     Other factors to consider for discharge: medical stability          PLAN :  Patient continues to benefit from skilled intervention to address the above impairments. Continue treatment per established plan of care. to address goals. Recommendation for discharge: (in order for the patient to meet his/her long term goals)  No skilled physical therapy/ follow up rehabilitation needs identified at this time. This discharge recommendation:  Has been made in collaboration with the attending provider and/or case management    IF patient discharges home will need the following DME: patient owns DME required for discharge       SUBJECTIVE:   Patient stated i'm ready to get out of here.     OBJECTIVE DATA SUMMARY:   Critical Behavior:  Neurologic State: Alert  Orientation Level: Oriented X4  Cognition: Appropriate decision making     Functional Mobility Training:  Bed Mobility:     Supine to Sit: Contact guard assistance     Scooting: Contact guard assistance        Transfers:  Sit to Stand: Contact guard assistance  Stand to Sit: Contact guard assistance        Bed to Chair: Contact guard assistance                    Balance:  Sitting: Intact  Standing: Intact;  Without support  Ambulation/Gait Training:  Distance (ft): 100 Feet (ft)  Assistive Device: Gait belt; Brace/Splint  Ambulation - Level of Assistance: Stand-by assistance        Gait Abnormalities: Decreased step clearance; Antalgic        Base of Support: Widened     Speed/Cathie: Slow  Step Length: Right shortened; Left shortened                    Stairs:  Number of Stairs Trained: 5  Stairs - Level of Assistance: Stand-by assistance   Rail Use: Right     Therapeutic Exercises:     Pain Rating:      Activity Tolerance:   Good and desaturates with exertion and requires oxygen    After treatment patient left in no apparent distress:   Sitting in chair, Call bell within reach, and Caregiver / family present    COMMUNICATION/COLLABORATION:   The patients plan of care was discussed with: Registered nurse.      Jim You PT   Time Calculation: 34 mins

## 2021-12-08 NOTE — DISCHARGE SUMMARY
Procedure(s):  REVISION LAMINECTOMY L3-S1 WITH L3-L5 REVISION FUSION Operative Report      Date of Surgery: 12/2/2021     Preoperative Diagnosis:  STENOSIS  SPONDYLOLISTHESIS    Postoperative Diagnosis: STENOSIS  SPONDYLOLISTHESIS    Procedure: Procedure(s):  REVISION LAMINECTOMY L3-S1 WITH L3-L5 REVISION FUSION     Surgeon: Richar Wilks MD    History and Hospital Course: Pamela He is a pleasant 74y.o. year old male who has complaints of low back pain. Diagnostic testing found lumbar stenosis. Having failed conservative treatment, the patient elected to undergo operative intervention. He tolerated the procedure well and was admitted post-operatively for antibiotics and pain control. On post-op day 1, the patient was doing well. He had some complaints of lower back pain. He was started on a clear liquid diet. He was allowed to dangle on the edge of the bed with physical therapy. PCA was discontinued. IV antibiotics were continued. On post-op day 2, the patient was deemed ready for discharge. He was discharged to home. He was tolerating a regular diet and pain was well controlled with oral pain medications. The patient was discharged with prescriptions for pain medications. He was instructed to wear his brace at all times when out of bed. He was instructed to limit his bending, lifting and twisting. The patient will follow-up in 10-14 days for repeat x-rays and a wound check.       Signed By: Richar Wilks MD

## 2021-12-09 DIAGNOSIS — Z98.1 S/P SPINAL FUSION: Primary | ICD-10-CM

## 2021-12-09 DIAGNOSIS — M48.062 SPINAL STENOSIS OF LUMBAR REGION WITH NEUROGENIC CLAUDICATION: ICD-10-CM

## 2021-12-09 RX ORDER — CYCLOBENZAPRINE HCL 10 MG
TABLET ORAL
COMMUNITY
Start: 2021-10-09 | End: 2021-12-09 | Stop reason: SDUPTHER

## 2021-12-09 RX ORDER — GABAPENTIN 600 MG/1
600 TABLET ORAL 2 TIMES DAILY
Qty: 60 TABLET | Refills: 0 | Status: SHIPPED | OUTPATIENT
Start: 2021-12-09 | End: 2021-12-11 | Stop reason: SDUPTHER

## 2021-12-09 RX ORDER — OXYCODONE HYDROCHLORIDE 5 MG/1
5-10 TABLET ORAL
Qty: 60 TABLET | Refills: 0 | Status: SHIPPED | OUTPATIENT
Start: 2021-12-09 | End: 2021-12-11 | Stop reason: SDUPTHER

## 2021-12-09 RX ORDER — CYCLOBENZAPRINE HCL 10 MG
10 TABLET ORAL
Qty: 30 TABLET | Refills: 0 | Status: SHIPPED | OUTPATIENT
Start: 2021-12-09 | End: 2021-12-27 | Stop reason: SDUPTHER

## 2021-12-09 RX ORDER — GABAPENTIN 600 MG/1
600 TABLET ORAL 2 TIMES DAILY
COMMUNITY
Start: 2021-09-21 | End: 2021-12-09 | Stop reason: SDUPTHER

## 2021-12-10 ENCOUNTER — PATIENT MESSAGE (OUTPATIENT)
Dept: ORTHOPEDIC SURGERY | Age: 66
End: 2021-12-10

## 2021-12-10 DIAGNOSIS — M48.062 LUMBAR STENOSIS WITH NEUROGENIC CLAUDICATION: Primary | ICD-10-CM

## 2021-12-10 DIAGNOSIS — M48.062 SPINAL STENOSIS OF LUMBAR REGION WITH NEUROGENIC CLAUDICATION: ICD-10-CM

## 2021-12-11 DIAGNOSIS — Z98.1 S/P SPINAL FUSION: ICD-10-CM

## 2021-12-11 DIAGNOSIS — M48.062 SPINAL STENOSIS OF LUMBAR REGION WITH NEUROGENIC CLAUDICATION: ICD-10-CM

## 2021-12-11 RX ORDER — OXYCODONE HYDROCHLORIDE 5 MG/1
5-10 TABLET ORAL
Qty: 60 TABLET | Refills: 0 | Status: SHIPPED | OUTPATIENT
Start: 2021-12-11 | End: 2021-12-18

## 2021-12-11 RX ORDER — GABAPENTIN 600 MG/1
600 TABLET ORAL 2 TIMES DAILY
Qty: 60 TABLET | Refills: 0 | Status: SHIPPED | OUTPATIENT
Start: 2021-12-11 | End: 2022-01-19 | Stop reason: SDUPTHER

## 2021-12-14 ENCOUNTER — OFFICE VISIT (OUTPATIENT)
Dept: ORTHOPEDIC SURGERY | Age: 66
End: 2021-12-14
Payer: COMMERCIAL

## 2021-12-14 DIAGNOSIS — Z98.1 S/P SPINAL FUSION: Primary | ICD-10-CM

## 2021-12-14 PROCEDURE — 99024 POSTOP FOLLOW-UP VISIT: CPT | Performed by: ORTHOPAEDIC SURGERY

## 2021-12-14 RX ORDER — ESCITALOPRAM OXALATE 10 MG/1
TABLET ORAL
COMMUNITY
Start: 2021-11-10

## 2021-12-14 RX ORDER — METHYLPREDNISOLONE 4 MG/1
TABLET ORAL
Qty: 1 DOSE PACK | Refills: 0 | Status: SHIPPED | OUTPATIENT
Start: 2021-12-14

## 2021-12-14 RX ORDER — ALBUTEROL SULFATE 90 UG/1
AEROSOL, METERED RESPIRATORY (INHALATION)
COMMUNITY
Start: 2021-12-11

## 2021-12-14 NOTE — PROGRESS NOTES
Felipa Post (: 1955) is a 74 y.o. male, patient, here for evaluation of the following chief complaint(s):  Surgical Follow-up       ASSESSMENT/PLAN:  Below is the assessment and plan developed based on review of pertinent history, physical exam, labs, studies, and medications. Patient presents today approximately 2 weeks status post recent revision fusion. I am happy with his progress thus far. He is currently taking 1-2 tabs of 5 mg oxycodone every 4-6 hours. He is also taking Flexeril twice daily and Tylenol every 4-6 hours. He does complain of bilateral low back pain, as well as tingling into his left leg. Incision clean, dry, without drainage for the last few days, wound edges well approximated. Medrol dosepack prescribed. Continued restrictions discussed. He will remain in his [facility] until his next visit. Radiologic findings reviewed in patient with the detail. Informed the patient to call the office with any worsening of symptoms. He will follow up in 4 weeks. 1. S/P spinal fusion  -     XR SPINE LUMB 2 OR 3 V; Future      Return in about 4 weeks (around 2022). SUBJECTIVE/OBJECTIVE:  Felipa Post (: 1955) is a 74 y.o. male. No flowsheet data found. Patient presents today approximately 2 weeks status post recent revision fusion. He is doing well overall. He does of bilateral low back pain, as well as tingling into his left lower leg. He does state that his shooting pains in his lower extremities has resolved. He is currently taking 1-2 tabs of oxycodone every 4-6 hours, Flexeril twice daily and Tylenol every 4-6 hours. His wife reports mild amount of incisional drainage on dressings, which has resolved over the last few days. Denies fevers or chills. He has been compliant with wearing his brace. He has been compliant with his bending, lifting and twisting restrictions. Denies changes in bowel or bladder control.     Imaging:    XR Results (most recent):  Results from Appointment encounter on 12/14/21    XR SPINE LUMB 2 OR 3 V    Narrative  AP and lateral of the lumbar spine reviewed today. There is a stable L3-L5 extension fusion with no acute changes. MRI Results (most recent):  Results from [place] encounter on 10/15/19    MRI LUMB SPINE WO CONT    Narrative  EXAM: MRI LUMB SPINE WO CONT    INDICATION: DDD (degenerative disc disease), lumbar. Other intervertebral disc  degeneration, lumbar region    COMPARISON: No recent comparisons    TECHNIQUE: MR imaging of the lumbar spine was performed using the following  sequences: sagittal T1, T2, STIR;  axial T1, T2.    CONTRAST:  None. FINDINGS:    There is normal alignment of the lumbar spine. Vertebral body heights are  maintained. Marrow signal is normal. Patient is status post laminectomy and  posterior fusion at L4-5. There is severe disc space narrowing at that level. The conus medullaris terminates at L1. Signal and caliber of the distal spinal  cord are within normal limits. The paraspinal soft tissues are within normal limits. Lower thoracic spine: No herniation or stenosis. L1-L2: No herniation or stenosis. L2-L3: No herniation or stenosis. L3-L4: Mild disc bulge and facet arthrosis with mild canal stenosis. Neural  foramen are patent. L4-L5: Post surgical changes. No evidence of canal stenosis or definite  foraminal narrowing    L5-S1: Small left paracentral disc protrusion without canal stenosis or  foraminal narrowing    Impression  IMPRESSION:  Status post L4-5 laminectomy and fusion. Mild degenerative changes at L3-4 with  canal stenosis.          Allergies   Allergen Reactions    Pcn [Penicillins] Rash     Has taken amoxicillin without a reaction;        Current Outpatient Medications   Medication Sig    escitalopram oxalate (LEXAPRO) 10 mg tablet     albuterol (PROVENTIL HFA, VENTOLIN HFA, PROAIR HFA) 90 mcg/actuation inhaler     oxyCODONE IR (ROXICODONE) 5 mg immediate release tablet Take 1-2 Tablets by mouth every four (4) hours as needed for Pain for up to 7 days. Max Daily Amount: 60 mg.    gabapentin (NEURONTIN) 600 mg tablet Take 1 Tablet by mouth two (2) times a day. Max Daily Amount: 1,200 mg.  cyclobenzaprine (FLEXERIL) 10 mg tablet Take 1 Tablet by mouth three (3) times daily as needed for Muscle Spasm(s).  pantoprazole (PROTONIX) 40 mg tablet Take 40 mg by mouth Every morning.  lisinopriL (PRINIVIL, ZESTRIL) 20 mg tablet Take 20 mg by mouth Every morning.  budesonide-glycopyr-formoterol (Breztri Aerosphere) 160-9-4.8 mcg/actuation HFAA Take 2 Puffs by inhalation two (2) times a day.  albuterol (ACCUNEB) 0.63 mg/3 mL nebulizer solution 0.63 mg by Nebulization route every six (6) hours as needed for Wheezing. No current facility-administered medications for this visit.        Past Medical History:   Diagnosis Date    Chronic obstructive pulmonary disease (HCC)     GERD (gastroesophageal reflux disease)     Hypertension     Polyp of colon     Sleep apnea     Uses CPAP        Past Surgical History:   Procedure Laterality Date    HX COLONOSCOPY      HX LUMBAR FUSION      x 3    HX LUMBAR LAMINECTOMY      x 3    HX VEIN STRIPPING      IR INJ FORAMIN EPID LUMB ANES/STER SNGL  10/30/2019    IR INJ FORAMIN EPID LUMB ANES/STER SNGL  10/1/2021    [place] CARDIAC SURG PROCEDURE UNLIST  2019    negative [facility] @ [facility]    [place] CARDIAC SURG PROCEDURE UNLIST      negative cardiac stress @ [facility]       Family History   Problem Relation Age of Onset    COPD Mother     Hypertension Mother     High Cholesterol Mother     Alcohol abuse Father     Stroke Father     No Known Problems Sister         Social History     Tobacco Use    Smoking status: Former Smoker     Packs/day: 2.00     Years: 50.00     Pack years: 100.00     Quit date: 2021     Years since quittin.4    Smokeless tobacco: Never Used   Vaping Use    Vaping Use: Never used   Substance Use Topics    Alcohol use: Yes     Alcohol/week: 21.0 standard drinks     Types: 21 Shots of liquor per week    Drug use: Yes     Types: Prescription, OTC, Marijuana        Review of Systems   Constitutional: Negative for chills, fatigue and fever. Respiratory: Negative for cough and shortness of breath. Cardiovascular: Negative for chest pain. Gastrointestinal: Negative for nausea and vomiting. Musculoskeletal: Positive for back pain. Negative for neck pain. Skin: Negative for pallor and wound. Neurological: Negative for dizziness, weakness and numbness. Vitals: There were no vitals taken for this visit. There is no height or weight on file to calculate BMI. Physical Exam  Integumentary  Assessment of Surgical Incision - healing and consistent with normal anticipated wound healing. Wound edges well approximated. No erythema, non-fluctuant. Non-tender to palpation. Neurologic  Sensory  Light Touch - Intact - Globally. Overall Assessment of Muscle Strength and Tone reveals  Lower Extremities - Right Iliopsoas - 5/5. Left Iliopsoas - 5/5. Right Tibialis Anterior - 5/5. Left Tibialis Anterior - 4/5. Right Gastroc-Soleus - 5/5. Left Gastroc-Soleus - 5/5. Right EHL - 5/5. Left EHL - 4/5. General Assessment of Reflexes  Right Ankle - Clonus is not present. Left Ankle - Clonus is not present. Reflexes (Dermatomes)  2/2 Normal - Left Achilles (L5-S2), Left Knee (L2-4), Right Achilles (L5-S2) and Right Knee (L2-4). Musculoskeletal  Global Assessment  Examination of related systems reveals - well-developed, well-nourished, in no acute distress, alert and oriented x 3 and normal coordination. Spine/Ribs/Pelvis  Lumbosacral Spine - Examination of the lumbosacral spine reveals - no known fractures or deformities. Inspection and Palpation - Tenderness - moderate. Assessment of pain reveals the following findings - The pain is characterized as - moderate.  Location - pain refers to lower back bilaterally. Dr. Rio Ramires was available for immediate consult during this encounter. An electronic signature was used to authenticate this note.   -- MISSY Sanford

## 2021-12-27 RX ORDER — CYCLOBENZAPRINE HCL 10 MG
10 TABLET ORAL
Qty: 30 TABLET | Refills: 0 | Status: SHIPPED | OUTPATIENT
Start: 2021-12-27 | End: 2022-02-17

## 2021-12-27 RX ORDER — OXYCODONE HYDROCHLORIDE 5 MG/1
10 TABLET ORAL
Qty: 30 TABLET | Refills: 0 | Status: SHIPPED | OUTPATIENT
Start: 2021-12-27 | End: 2021-12-30

## 2021-12-27 NOTE — TELEPHONE ENCOUNTER
PROCEDURES PERFORMED:  Exploration of fusion, revision laminectomy with partial facet resection at L3-L4 and partial facet decompression at L5-S1. Posterior revision fusion L3 to L5, transforaminal interbody fusion at L3-L4. ASSESSMENT/PLAN: 12/14/2021  Below is the assessment and plan developed based on review of pertinent history, physical exam, labs, studies, and medications.     Patient presents today approximately 2 weeks status post recent revision fusion. I am happy with his progress thus far. He is currently taking 1-2 tabs of 5 mg oxycodone every 4-6 hours. He is also taking Flexeril twice daily and Tylenol every 4-6 hours. He does complain of bilateral low back pain, as well as tingling into his left leg. Incision clean, dry, without drainage for the last few days, wound edges well approximated. Medrol dosepack prescribed. Continued restrictions discussed. He will remain in his [facility] until his next visit. Radiologic findings reviewed in patient with the detail. Informed the patient to call the office with any worsening of symptoms. He will follow up in 4 weeks. Orthopedic

## 2022-01-18 ENCOUNTER — OFFICE VISIT (OUTPATIENT)
Dept: ORTHOPEDIC SURGERY | Age: 67
End: 2022-01-18
Payer: COMMERCIAL

## 2022-01-18 VITALS — HEIGHT: 71 IN | BODY MASS INDEX: 29.12 KG/M2 | WEIGHT: 208 LBS

## 2022-01-18 DIAGNOSIS — Z98.1 S/P SPINAL FUSION: Primary | ICD-10-CM

## 2022-01-18 PROCEDURE — 99024 POSTOP FOLLOW-UP VISIT: CPT | Performed by: ORTHOPAEDIC SURGERY

## 2022-01-18 RX ORDER — OXYCODONE HYDROCHLORIDE 5 MG/1
5 TABLET ORAL
COMMUNITY

## 2022-01-18 NOTE — PATIENT INSTRUCTIONS
Lumbar Laminectomy: What to Expect at [address] [facility]  A lumbar laminectomy is surgery to ease pressure on the spinal cord and nerves of the lower spine. The doctor took out pieces of bone that were squeezing the spinal cord and nerves. You can expect your back to feel stiff or sore after surgery. This should improve in the weeks after surgery. You may have trouble sitting or standing in one position for very long and may need pain medicine in the weeks after your surgery. Your doctor may advise you to work with a physical therapist to strengthen the muscles around your spine and trunk. You will need to learn how to lift, twist, and bend so that you don't put too much strain on your back. This care sheet gives you a general idea about how long it will take for you to recover. But each person recovers at a different pace. Follow the steps below to get better as quickly as possible. How can you care for yourself at home? Activity    · Rest when you feel tired. Getting enough sleep will help you recover.     · Try to walk each day. Start by walking a little more than you did the day before. Bit by bit, increase the amount you walk. Walking boosts blood flow and helps prevent pneumonia and constipation. Walking may also decrease your muscle soreness after surgery.     · If advised by your doctor, you may need to avoid lifting anything that would cause excessive strain on your back. This may include a child, heavy grocery bags and milk containers, a heavy briefcase or backpack, cat litter or dog food bags, or a vacuum .     · Avoid strenuous activities, such as bicycle riding, jogging, weight lifting, or aerobic exercise, until your doctor says it is okay.     · Do not drive for 2 to 4 weeks after your surgery or until your doctor says it is okay.     · Avoid riding in a car for more than 30 minutes at a time for 2 to 4 weeks after surgery.  If you must ride in a car for a longer distance, stop often to walk and stretch your legs.     · Try to change your position about every 30 minutes while sitting or standing. This will help decrease your back pain while you are healing.     · You will probably need to take 4 to 6 weeks off from work. It depends on the type of work you do and how you feel.     · You may have sex as soon as you feel able, but avoid positions that put stress on your back or cause pain. Diet    · You can eat your normal diet. If your stomach is upset, try bland, low-fat foods like plain rice, broiled chicken, toast, and yogurt.     · Drink plenty of fluids (unless your doctor tells you not to).     · You may notice that your bowel movements are not regular right after your surgery. This is common. Try to avoid constipation and straining with bowel movements. You may want to take a fiber supplement every day. If you have not had a bowel movement after a couple of days, ask your doctor about taking a mild laxative. Medicines    · Your doctor will tell you if and when you can restart your medicines. He or she will also give you instructions about taking any new medicines.     · If you take aspirin or some other blood thinner, ask your doctor if and when to start taking it again. Make sure that you understand exactly what your doctor wants you to do.     · Take pain medicines exactly as directed. ? If the doctor gave you a prescription medicine for pain, take it as prescribed. ? If you are not taking a prescription pain medicine, ask your doctor if you can take an over-the-counter medicine.     · If your doctor prescribed antibiotics, take them as directed. Do not stop taking them just because you feel better. You need to take the full course of antibiotics.     · If you think your pain medicine is making you sick to your stomach:  ? Take your medicine after meals (unless your doctor has told you not to). ? Ask your doctor for a different pain medicine.    Incision care    · If you have strips of tape on the cut (incision) the doctor made, leave the tape on for a week or until it falls off.     · Wash the area daily with warm, soapy water and pat it dry.     · Keep the area clean and dry. You may cover it with a gauze bandage if it weeps or rubs against clothing. Change the bandage every day. Exercise    · Do back exercises as instructed by your doctor.     · Your doctor may advise you to work with a physical therapist to improve the strength and flexibility of your back. Other instructions    · To reduce stiffness and help sore muscles, use a warm water bottle, a heating pad set on low, or a warm cloth on your back. Do not put heat right over the incision. Do not go to sleep with a heating pad on your skin. Follow-up care is a key part of your treatment and safety. Be sure to make and go to all appointments, and call your doctor if you are having problems. It's also a good idea to know your test results and keep a list of the medicines you take. When should you call for help? Call 911 anytime you think you may need emergency care. For example, call if:    · You passed out (lost consciousness).     · You have sudden chest pain and shortness of breath, or you cough up blood.     · You are unable to move a leg at all. Call your doctor now or seek immediate medical care if:    · You have new or worse symptoms in your legs or buttocks. Symptoms may include:  ? Numbness or tingling. ? Weakness. ? Pain.     · You lose bladder or bowel control.     · You have loose stitches, or your incision comes open.     · You have blood or fluid draining from the incision.     · You have signs of infection, such as:  ? Increased pain, swelling, warmth, or redness. ? Pus draining from the incision. ? A fever. ? Red streaks leading from the incision.    Watch closely for changes in your health, and be sure to contact your doctor if:    · You do not have a bowel movement after taking a laxative.     · You are not getting better as expected. Where can you learn more? Go to http://www.gray.com/  Enter G962 in the search box to learn more about \"Lumbar Laminectomy: What to Expect at Home. \"  Current as of: July 1, 2021               Content Version: 13.0  © 2606-2448 [employer]. Care instructions adapted under license by [employer] (which disclaims liability or warranty for this information). If you have questions about a medical condition or this instruction, always ask your healthcare professional. [place] [address] any warranty or liability for your use of this information.

## 2022-01-18 NOTE — PROGRESS NOTES
Oren Watts (: 1955) is a 74 y.o. male, patient, here for evaluation of the following chief complaint(s):  Surgical Follow-up       ASSESSMENT/PLAN:    Below is the assessment and plan developed based on review of pertinent history, physical exam, labs, studies, and medications. Overall doing fairly well. He has minimal back pain. His only complaints are some distal L5-S1 nerve root type symptoms residual from the surgery. It is improving. I will put him on some oral Neurontin interim. We will see him back in 6 weeks to start physical therapy. 1. S/P spinal fusion  -     XR SPINE LUMB 2 OR 3 V; Future      No follow-ups on file. SUBJECTIVE/OBJECTIVE:  Oren Watts (: 1955) is a 74 y.o. male. No flowsheet data found. He comes in today for a follow-up. He 6 weeks out. Minimal back pain at this time. His real complaints at this time is the left lower extremity distal to the knee. He is having some numbness and tingling in roughly the L5 and S1 distribution. Initially had some weakness but that is improving. He only takes oxycodone occasionally    Imaging:          XR Results (most recent):  Results from Appointment encounter on 22    XR SPINE LUMB 2 OR 3 V    Narrative  AP and lateral of the lumbar spine were reviewed today. He has a stable L3-L5 revision fusion with no acute changes       MRI Results (most recent):  Results from Hospital Encounter encounter on 10/15/19    MRI LUMB SPINE WO CONT    Narrative  EXAM: MRI LUMB SPINE WO CONT    INDICATION: DDD (degenerative disc disease), lumbar. Other intervertebral disc  degeneration, lumbar region    COMPARISON: No recent comparisons    TECHNIQUE: MR imaging of the lumbar spine was performed using the following  sequences: sagittal T1, T2, STIR;  axial T1, T2.    CONTRAST:  None. FINDINGS:    There is normal alignment of the lumbar spine. Vertebral body heights are  maintained.  Marrow signal is normal. Patient is status post laminectomy and  posterior fusion at L4-5. There is severe disc space narrowing at that level. The conus medullaris terminates at L1. Signal and caliber of the distal spinal  cord are within normal limits. The paraspinal soft tissues are within normal limits. Lower thoracic spine: No herniation or stenosis. L1-L2: No herniation or stenosis. L2-L3: No herniation or stenosis. L3-L4: Mild disc bulge and facet arthrosis with mild canal stenosis. Neural  foramen are patent. L4-L5: Post surgical changes. No evidence of canal stenosis or definite  foraminal narrowing    L5-S1: Small left paracentral disc protrusion without canal stenosis or  foraminal narrowing    Impression  IMPRESSION:  Status post L4-5 laminectomy and fusion. Mild degenerative changes at L3-4 with  canal stenosis. Allergies   Allergen Reactions    Pcn [Penicillins] Rash     Has taken amoxicillin without a reaction;        Current Outpatient Medications   Medication Sig    oxyCODONE IR (ROXICODONE) 5 mg immediate release tablet Take 5 mg by mouth every four (4) hours as needed for Pain.  cyclobenzaprine (FLEXERIL) 10 mg tablet Take 1 Tablet by mouth three (3) times daily as needed for Muscle Spasm(s).  escitalopram oxalate (LEXAPRO) 10 mg tablet     albuterol (PROVENTIL HFA, VENTOLIN HFA, PROAIR HFA) 90 mcg/actuation inhaler     gabapentin (NEURONTIN) 600 mg tablet Take 1 Tablet by mouth two (2) times a day. Max Daily Amount: 1,200 mg.  pantoprazole (PROTONIX) 40 mg tablet Take 40 mg by mouth Every morning.  lisinopriL (PRINIVIL, ZESTRIL) 20 mg tablet Take 20 mg by mouth Every morning.  budesonide-glycopyr-formoterol (Breztri Aerosphere) 160-9-4.8 mcg/actuation HFAA Take 2 Puffs by inhalation two (2) times a day.  albuterol (ACCUNEB) 0.63 mg/3 mL nebulizer solution 0.63 mg by Nebulization route every six (6) hours as needed for Wheezing.     methylPREDNISolone (MEDROL DOSEPACK) 4 mg tablet Follow packet instructions (Patient not taking: Reported on 2022)     No current facility-administered medications for this visit. Past Medical History:   Diagnosis Date    Chronic obstructive pulmonary disease (HCC)     GERD (gastroesophageal reflux disease)     Hypertension     Polyp of colon     Sleep apnea     Uses CPAP        Past Surgical History:   Procedure Laterality Date    HX COLONOSCOPY      HX LUMBAR FUSION      x 3    HX LUMBAR LAMINECTOMY      x 3    HX VEIN STRIPPING      IR INJ FORAMIN EPID LUMB ANES/STER SNGL  10/30/2019    IR INJ FORAMIN EPID LUMB ANES/STER SNGL  10/1/2021    [place] CARDIAC SURG PROCEDURE UNLIST  2019    negative Cath Lab @ [facility]    [place] CARDIAC SURG PROCEDURE UNLIST      negative cardiac stress @ [facility]       Family History   Problem Relation Age of Onset    COPD Mother     Hypertension Mother     High Cholesterol Mother     Alcohol abuse Father     Stroke Father     No Known Problems Sister         Social History     Tobacco Use    Smoking status: Former Smoker     Packs/day: 2.00     Years: 50.00     Pack years: 100.00     Quit date: 2021     Years since quittin.5    Smokeless tobacco: Never Used   Vaping Use    Vaping Use: Never used   Substance Use Topics    Alcohol use: Yes     Alcohol/week: 21.0 standard drinks     Types: 21 Shots of liquor per week    Drug use: Yes     Types: Prescription, OTC, Marijuana        Review of Systems       Vitals:  Ht 5' 11\" (1.803 m)   Wt 208 lb (94.3 kg)   BMI 29.01 kg/m²    Body mass index is 29.01 kg/m². Ortho Exam       Integumentary  Assessment of Surgical Incision - healing and consistent with normal anticipated wound healing. Neurologic  Sensory  Light Touch - Intact - Globally. Some decreased sensation along the L5 dermatome and S1 dermatome on left side. Overall Assessment of Muscle Strength and Tone reveals  Lower Extremities - Right Iliopsoas - 5/5. Left Iliopsoas - 5/5.  Right Tibialis Anterior - 5/5. Left Tibialis Anterior - 5/5. Right Gastroc-Soleus - 5/5. Left Gastroc-Soleus - 3/5. Right EHL - 5/5. Left EHL - 3/5. General Assessment of Reflexes  Right Ankle - Clonus is not present. Left Ankle - Clonus is not present. Reflexes (Dermatomes)  2/2 Normal - Left Achilles (L5-S2), Left Knee (L2-4), Right Achilles (L5-S2) and Right Knee (L2-4). Musculoskeletal  Global Assessment  Examination of related systems reveals - well-developed, well-nourished, in no acute distress, alert and oriented x 3 and normal coordination. Spine/Ribs/Pelvis  Lumbosacral Spine - Examination of the lumbosacral spine reveals - no known fractures or deformities. Inspection and Palpation - Tenderness - moderate. Assessment of pain reveals the following findings - The pain is characterized as - moderate. Location - pain refers to lower back bilaterally. An electronic signature was used to authenticate this note.   -- Bartolo Browning MD

## 2022-01-19 DIAGNOSIS — Z98.1 S/P SPINAL FUSION: ICD-10-CM

## 2022-01-19 DIAGNOSIS — M48.062 SPINAL STENOSIS OF LUMBAR REGION WITH NEUROGENIC CLAUDICATION: ICD-10-CM

## 2022-01-19 RX ORDER — GABAPENTIN 600 MG/1
600 TABLET ORAL 2 TIMES DAILY
Qty: 60 TABLET | Refills: 0 | Status: SHIPPED | OUTPATIENT
Start: 2022-01-19 | End: 2022-02-17

## 2022-01-20 DIAGNOSIS — Z98.1 S/P LUMBAR FUSION: Primary | ICD-10-CM

## 2022-01-20 RX ORDER — HYDROCODONE BITARTRATE AND ACETAMINOPHEN 5; 325 MG/1; MG/1
1 TABLET ORAL
Qty: 30 TABLET | Refills: 0 | Status: SHIPPED | OUTPATIENT
Start: 2022-01-20 | End: 2022-01-23

## 2022-02-17 DIAGNOSIS — M48.062 SPINAL STENOSIS OF LUMBAR REGION WITH NEUROGENIC CLAUDICATION: ICD-10-CM

## 2022-02-17 DIAGNOSIS — Z98.1 S/P SPINAL FUSION: ICD-10-CM

## 2022-02-17 DIAGNOSIS — M48.062 LUMBAR STENOSIS WITH NEUROGENIC CLAUDICATION: ICD-10-CM

## 2022-02-17 RX ORDER — CYCLOBENZAPRINE HCL 10 MG
TABLET ORAL
Qty: 30 TABLET | Refills: 0 | Status: SHIPPED | OUTPATIENT
Start: 2022-02-17

## 2022-02-17 RX ORDER — GABAPENTIN 600 MG/1
600 TABLET ORAL 2 TIMES DAILY
Qty: 60 TABLET | Refills: 0 | Status: SHIPPED | OUTPATIENT
Start: 2022-02-17 | End: 2022-07-05

## 2022-03-01 ENCOUNTER — OFFICE VISIT (OUTPATIENT)
Dept: ORTHOPEDIC SURGERY | Age: 67
End: 2022-03-01
Payer: COMMERCIAL

## 2022-03-01 VITALS — WEIGHT: 210 LBS | HEIGHT: 71 IN | BODY MASS INDEX: 29.4 KG/M2

## 2022-03-01 DIAGNOSIS — Z98.1 S/P SPINAL FUSION: Primary | ICD-10-CM

## 2022-03-01 PROCEDURE — 99024 POSTOP FOLLOW-UP VISIT: CPT | Performed by: ORTHOPAEDIC SURGERY

## 2022-03-01 NOTE — LETTER
3/1/2022    Patient: Era Foster   YOB: 1955   Date of Visit: 3/1/2022     Karishma Shepherd MD  [address] [facility]. [address]  Via Fax: [phone]    Dear Karishma Shepherd MD,      Thank you for referring Mr. Alyssa Schmidt to [facility] for evaluation. My notes for this consultation are attached. If you have questions, please do not hesitate to call me. I look forward to following your patient along with you.       Sincerely,    Citlalli Benoit MD

## 2022-03-01 NOTE — PROGRESS NOTES
Nathaniel Colon (: 1955) is a 74 y.o. male, patient, here for evaluation of the following chief complaint(s):  LOW BACK PAIN       ASSESSMENT/PLAN:    Below is the assessment and plan developed based on review of pertinent history, physical exam, labs, studies, and medications. Overall doing fairly well. He has minimal back pain. He is having some resolution of the nerve symptoms that he was having postoperatively. He is not taking any pain medication. He is increasing activities. I will let him do home exercises. We will see me back in 3 months for repeat x-rays. He can see me sooner if any symptoms arise. 1. S/P spinal fusion  -     XR SPINE LUMB 2 OR 3 V; Future      No follow-ups on file. SUBJECTIVE/OBJECTIVE:  Nathaniel Colon (: 1955) is a 74 y.o. male. No flowsheet data found. He comes in today for a follow-up. He is 3 months out from his revision lumbar fusion. His radicular symptoms do seem to improve significantly recently. He is on minimal medications. He is increasing his activities around the house. He denies any bowel bladder difficulties. XR Results (most recent):  Results from Appointment encounter on 22    XR SPINE LUMB 2 OR 3 V    Narrative  AP and lateral of the lumbar spine were reviewed today. He has a stable L3-L5 revision fusion with no acute changes       MRI Results (most recent):  Results from Hospital Encounter encounter on 10/15/19    MRI LUMB SPINE WO CONT    Narrative  EXAM: MRI LUMB SPINE WO CONT    INDICATION: DDD (degenerative disc disease), lumbar. Other intervertebral disc  degeneration, lumbar region    COMPARISON: No recent comparisons    TECHNIQUE: MR imaging of the lumbar spine was performed using the following  sequences: sagittal T1, T2, STIR;  axial T1, T2.    CONTRAST:  None. FINDINGS:    There is normal alignment of the lumbar spine. Vertebral body heights are  maintained.  Marrow signal is normal. Patient is status post laminectomy and  posterior fusion at L4-5. There is severe disc space narrowing at that level. The conus medullaris terminates at L1. Signal and caliber of the distal spinal  cord are within normal limits. The paraspinal soft tissues are within normal limits. Lower thoracic spine: No herniation or stenosis. L1-L2: No herniation or stenosis. L2-L3: No herniation or stenosis. L3-L4: Mild disc bulge and facet arthrosis with mild canal stenosis. Neural  foramen are patent. L4-L5: Post surgical changes. No evidence of canal stenosis or definite  foraminal narrowing    L5-S1: Small left paracentral disc protrusion without canal stenosis or  foraminal narrowing    Impression  IMPRESSION:  Status post L4-5 laminectomy and fusion. Mild degenerative changes at L3-4 with  canal stenosis. Allergies   Allergen Reactions    Pcn [Penicillins] Rash     Has taken amoxicillin without a reaction;        Current Outpatient Medications   Medication Sig    escitalopram oxalate (LEXAPRO) 10 mg tablet     albuterol (PROVENTIL HFA, VENTOLIN HFA, PROAIR HFA) 90 mcg/actuation inhaler     pantoprazole (PROTONIX) 40 mg tablet Take 40 mg by mouth Every morning.  lisinopriL (PRINIVIL, ZESTRIL) 20 mg tablet Take 20 mg by mouth Every morning.  budesonide-glycopyr-formoterol (Breztri Aerosphere) 160-9-4.8 mcg/actuation HFAA Take 2 Puffs by inhalation two (2) times a day.  albuterol (ACCUNEB) 0.63 mg/3 mL nebulizer solution 0.63 mg by Nebulization route every six (6) hours as needed for Wheezing.  cyclobenzaprine (FLEXERIL) 10 mg tablet TAKE 1 TABLET BY MOUTH THREE TIMES A DAY AS NEEDED FOR MUSCLE SPASMS (Patient not taking: Reported on 3/1/2022)    gabapentin (NEURONTIN) 600 mg tablet TAKE 1 TABLET BY MOUTH TWO (2) TIMES A DAY. MAX DAILY AMOUNT: 1,200 MG.  (Patient not taking: Reported on 3/1/2022)    oxyCODONE IR (ROXICODONE) 5 mg immediate release tablet Take 5 mg by mouth every four (4) hours as needed for Pain. (Patient not taking: Reported on 3/1/2022)    methylPREDNISolone (MEDROL DOSEPACK) 4 mg tablet Follow packet instructions (Patient not taking: Reported on 3/1/2022)     No current facility-administered medications for this visit. Past Medical History:   Diagnosis Date    Chronic obstructive pulmonary disease (HCC)     GERD (gastroesophageal reflux disease)     Hypertension     Polyp of colon     Sleep apnea     Uses CPAP        Past Surgical History:   Procedure Laterality Date    HX COLONOSCOPY      HX LUMBAR FUSION      x 3    HX LUMBAR LAMINECTOMY      x 3    HX VEIN STRIPPING      IR INJ FORAMIN EPID LUMB ANES/STER SNGL  10/30/2019    IR INJ FORAMIN EPID LUMB ANES/STER SNGL  10/1/2021    [place] CARD[place] PROCEDURE [place]  2019    negative Cath Lab [facility]    [place] CARD[place] PROCEDURE [place]LIST      negative cardiac stress @ [facility]       Family History   Problem Relation Age of Onset    COPD Mother     Hypertension Mother     High Cholesterol Mother     Alcohol abuse Father     Stroke Father     No Known Problems Sister         Social History     Tobacco Use    Smoking status: Former Smoker     Packs/day: 2.00     Years: 50.00     Pack years: 100.00     Quit date: 2021     Years since quittin.6    Smokeless tobacco: Never Used   Vaping Use    Vaping Use: Never used   Substance Use Topics    Alcohol use: Yes     Alcohol/week: 21.0 standard drinks     Types: 21 Shots of liquor per week    Drug use: Yes     Types: Prescription, OTC, Marijuana        Review of Systems   Musculoskeletal: Positive for back pain. All other systems reviewed and are negative. Vitals:  Ht 5' 11\" (1.803 m)   Wt 210 lb (95.3 kg)   BMI 29.29 kg/m²    Body mass index is 29.29 kg/m². Ortho Exam       Integumentary  Assessment of Surgical Incision - healing and consistent with normal anticipated wound healing.     Neurologic  Sensory  Light Touch - Intact - Globally. Some decreased sensation along the L5 dermatome and S1 dermatome on left side. Overall Assessment of Muscle Strength and Tone reveals  Lower Extremities - Right Iliopsoas - 5/5. Left Iliopsoas - 5/5. Right Tibialis Anterior - 5/5. Left Tibialis Anterior - 5/5. Right Gastroc-Soleus - 5/5. Left Gastroc-Soleus - 3/5. Right EHL - 5/5. Left EHL - 3/5. General Assessment of Reflexes  Right Ankle - Clonus is not present. Left Ankle - Clonus is not present. Reflexes (Dermatomes)  2/2 Normal - Left Achilles (L5-S2), Left Knee (L2-4), Right Achilles (L5-S2) and Right Knee (L2-4). Musculoskeletal  Global Assessment  Examination of related systems reveals - well-developed, well-nourished, in no acute distress, alert and oriented x 3 and normal coordination. Spine/Ribs/Pelvis  Lumbosacral Spine - Examination of the lumbosacral spine reveals - no known fractures or deformities. Inspection and Palpation - Tenderness - moderate. Assessment of pain reveals the following findings - The pain is characterized as - moderate. Location - pain refers to lower back bilaterally. An electronic signature was used to authenticate this note.   -- Sravani Retana MD

## 2022-03-01 NOTE — PATIENT INSTRUCTIONS
Spondylolysis and Spondylolisthesis: Exercises  Introduction  Here are some examples of exercises for you to try. The exercises may be suggested for a condition or for rehabilitation. Start each exercise slowly. Ease off the exercises if you start to have pain. You will be told when to start these exercises and which ones will work best for you. How to do the exercises  Single knee-to-chest    1. Lie on your back with your knees bent and your feet flat on the floor. You can put a small pillow under your head and neck if it is more comfortable. 2. Bring one knee to your chest, keeping the other foot flat on the floor. 3. Keep your lower back pressed to the floor. Hold for 15 to 30 seconds. 4. Relax, and lower the knee to the starting position. 5. Repeat with the other leg. Repeat 2 to 4 times with each leg. 6. To get more stretch, put your other leg flat on the floor while pulling your knee to your chest.  Double knee-to-chest    1. Lie on your back with your knees bent and your feet flat on the floor. You can put a small pillow under your head and neck if it is more comfortable. 2. Bring both knees to your chest.  3. Keep your lower back pressed to the floor. Hold for 15 to 30 seconds. 4. Relax, and lower your knees to the starting position. 5. Repeat 2 to 4 times. Alternate arm and leg (bird dog) exercise    Do this exercise slowly. Try to keep your body straight at all times. 1. Start on the floor, on your hands and knees. 2. Tighten your belly muscles by pulling your belly button in toward your spine. Be sure you continue to breathe normally and do not hold your breath. 3. Raise one arm off the floor, and hold it straight out in front of you. Be careful not to let your shoulder drop down, because that will twist your trunk. 4. Hold for about 6 seconds, then lower your arm and switch to your other arm. 5. Repeat 8 to 12 times on each arm.   6. When you can do this exercise with ease and no pain, repeat steps 1 through 5. But this time do it with one leg raised off the floor, holding your leg straight out behind you. Be careful not to let your hip drop down, because that will twist your trunk. 7. When holding your leg straight out becomes easier, try raising your opposite arm at the same time, and repeat steps 1 through 5. Bridging    1. Lie on your back with both knees bent. Your knees should be bent about 90 degrees. 2. Then push your feet into the floor, squeeze your buttocks, and lift your hips off the floor until your shoulders, hips, and knees are all in a straight line. 3. Hold for about 6 seconds as you continue to breathe normally, and then slowly lower your hips back down to the floor and rest for up to 10 seconds. 4. Repeat 8 to 12 times. Curl-ups    1. Lie on the floor on your back with your knees bent at a 90-degree angle. Your feet should be flat on the floor, about 12 inches from your buttocks. 2. Cross your arms over your chest. If this bothers your neck, try putting your hands behind your neck (not your head), with your elbows spread apart. 3. Slowly tighten your belly muscles and raise your shoulder blades off the floor. 4. Keep your head in line with your body, and do not press your chin to your chest.  5. Hold this position for 1 or 2 seconds, then slowly lower yourself back down to the floor. 6. Repeat 8 to 12 times. Plank    Do this exercise slowly. Try to keep your body straight at all times, and do not let one hip drop lower than the other. 1. Lie on your stomach, resting your upper body on your forearms. 2. Tighten your belly muscles by pulling your belly button in toward your spine. 3. Keeping your knees on the floor, press down with your forearms to lift your upper body off the floor. 4. Hold for about 6 seconds, then lower your body to the floor. Rest for up to 10 seconds. 5. Repeat 8 to 12 times.   6. Over time, work up to holding for 15 to 30 seconds each time.  7. If this exercise is easy to do with your knees on the floor, try doing this exercise with your knees and legs straight, supported by your toes on the floor. Follow-up care is a key part of your treatment and safety. Be sure to make and go to all appointments, and call your doctor if you are having problems. It's also a good idea to know your test results and keep a list of the medicines you take. Where can you learn more? Go to http://www.fox.com/  Enter M245 in the search box to learn more about \"Spondylolysis and Spondylolisthesis: Exercises. \"  Current as of: July 1, 2021               Content Version: 13.0  © 2006-2021 [employer]. Care instructions adapted under license by [employer] (which disclaims liability or warranty for this information). If you have questions about a medical condition or this instruction, always ask your healthcare professional. [address] any warranty or liability for your use of this information.

## 2022-03-19 PROBLEM — M48.062 LUMBAR STENOSIS WITH NEUROGENIC CLAUDICATION: Status: ACTIVE | Noted: 2021-11-12

## 2022-03-20 PROBLEM — M48.061 LUMBAR STENOSIS: Status: ACTIVE | Noted: 2021-12-02

## 2022-06-07 ENCOUNTER — OFFICE VISIT (OUTPATIENT)
Dept: ORTHOPEDIC SURGERY | Age: 67
End: 2022-06-07
Payer: COMMERCIAL

## 2022-06-07 VITALS — BODY MASS INDEX: 29.29 KG/M2 | WEIGHT: 210 LBS

## 2022-06-07 DIAGNOSIS — Z98.1 S/P SPINAL FUSION: Primary | ICD-10-CM

## 2022-06-07 DIAGNOSIS — G62.9 NEUROPATHY: ICD-10-CM

## 2022-06-07 PROCEDURE — 99214 OFFICE O/P EST MOD 30 MIN: CPT | Performed by: ORTHOPAEDIC SURGERY

## 2022-06-07 PROCEDURE — 1123F ACP DISCUSS/DSCN MKR DOCD: CPT | Performed by: ORTHOPAEDIC SURGERY

## 2022-06-07 RX ORDER — PREGABALIN 75 MG/1
75 CAPSULE ORAL 2 TIMES DAILY
Qty: 60 CAPSULE | Refills: 0 | Status: SHIPPED | OUTPATIENT
Start: 2022-06-07 | End: 2022-07-05

## 2022-06-07 RX ORDER — IBUPROFEN 200 MG
TABLET ORAL
COMMUNITY

## 2022-06-07 NOTE — PROGRESS NOTES
Akbar Monique (: 1955) is a 74 y.o. male, patient, here for evaluation of the following chief complaint(s):  LOW BACK PAIN       ASSESSMENT/PLAN:    Below is the assessment and plan developed based on review of pertinent history, physical exam, labs, studies, and medications. Overall doing fairly well. He has minimal back pain. He is having some resolution of the nerve symptoms that he was having postoperatively. 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 No other changes. The Neurontin did not do much. We will switch him to Lyrica at this time. I am also going to send him for some EMGs of his left lower extremity. He will see me back after the results are completed. We will repeat his x-rays in 3 months of his lower back. 1. S/P spinal fusion  -     XR SPINE LUMB MIN 4 V; Future      No follow-ups on file. SUBJECTIVE/OBJECTIVE:  Akbar Monique (: 1955) is a 74 y.o. male. No flowsheet data found. He comes in today for a follow-up. He is 3 months out from his revision lumbar fusion. His radicular symptoms do seem to improve significantly recently. He is on minimal medications. He is increasing his activities around the house. He denies any bowel bladder difficulties. XR Results (most recent):  Results from Appointment encounter on 22    XR SPINE LUMB 2 OR 3 V    Narrative  AP and lateral of the lumbar spine today demonstrates a stable L3-L5 revision fusion. No acute changes noted. MRI Results (most recent):  Results from [place] encounter on 10/15/19    MRI LUMB SPINE WO CONT    Narrative  EXAM: MRI LUMB SPINE WO CONT    INDICATION: DDD (degenerative disc disease), lumbar.  Other intervertebral disc  degeneration, lumbar region    COMPARISON: No recent comparisons    TECHNIQUE: MR imaging of the lumbar spine was performed using the following  sequences: sagittal T1, T2, STIR;  axial T1, T2.    CONTRAST:  None. FINDINGS:    There is normal alignment of the lumbar spine. Vertebral body heights are  maintained. Marrow signal is normal. Patient is status post laminectomy and  posterior fusion at L4-5. There is severe disc space narrowing at that level. The conus medullaris terminates at L1. Signal and caliber of the distal spinal  cord are within normal limits. The paraspinal soft tissues are within normal limits. Lower thoracic spine: No herniation or stenosis. L1-L2: No herniation or stenosis. L2-L3: No herniation or stenosis. L3-L4: Mild disc bulge and facet arthrosis with mild canal stenosis. Neural  foramen are patent. L4-L5: Post surgical changes. No evidence of canal stenosis or definite  foraminal narrowing    L5-S1: Small left paracentral disc protrusion without canal stenosis or  foraminal narrowing    Impression  IMPRESSION:  Status post L4-5 laminectomy and fusion. Mild degenerative changes at L3-4 with  canal stenosis. Allergies   Allergen Reactions    Pcn [Penicillins] Rash     Has taken amoxicillin without a reaction;        Current Outpatient Medications   Medication Sig    ibuprofen (AdviL) 200 mg tablet Take  by mouth.  cyclobenzaprine (FLEXERIL) 10 mg tablet TAKE 1 TABLET BY MOUTH THREE TIMES A DAY AS NEEDED FOR MUSCLE SPASMS    escitalopram oxalate (LEXAPRO) 10 mg tablet     albuterol (PROVENTIL HFA, VENTOLIN HFA, PROAIR HFA) 90 mcg/actuation inhaler     pantoprazole (PROTONIX) 40 mg tablet Take 40 mg by mouth Every morning.  lisinopriL (PRINIVIL, ZESTRIL) 20 mg tablet Take 20 mg by mouth Every morning.  budesonide-glycopyr-formoterol (Breztri Aerosphere) 160-9-4.8 mcg/actuation HFAA Take 2 Puffs by inhalation two (2) times a day.  albuterol (ACCUNEB) 0.63 mg/3 mL nebulizer solution 0.63 mg by Nebulization route every six (6) hours as needed for Wheezing.     gabapentin (NEURONTIN) 600 mg tablet TAKE 1 TABLET BY MOUTH TWO (2) TIMES A DAY. MAX DAILY AMOUNT: 1,200 MG. (Patient not taking: Reported on 3/1/2022)    oxyCODONE IR (ROXICODONE) 5 mg immediate release tablet Take 5 mg by mouth every four (4) hours as needed for Pain. (Patient not taking: Reported on 3/1/2022)    methylPREDNISolone (MEDROL DOSEPACK) 4 mg tablet Follow packet instructions (Patient not taking: Reported on 3/1/2022)     No current facility-administered medications for this visit. Past Medical History:   Diagnosis Date    Chronic obstructive pulmonary disease (HCC)     GERD (gastroesophageal reflux disease)     Hypertension     Polyp of colon     Sleep apnea     Uses CPAP        Past Surgical History:   Procedure Laterality Date    HX COLONOSCOPY      HX LUMBAR FUSION      x 3    HX LUMBAR LAMINECTOMY      x 3    HX VEIN STRIPPING      IR INJ FORAMIN EPID LUMB ANES/STER SNGL  10/30/2019    IR INJ FORAMIN EPID LUMB ANES/STER SNGL  10/1/2021    NV CARDIAC SURG PROCEDURE UNLIST  2019    negative Cath Lab @ [facility]    NV CARDIAC SURG PROCEDURE UNLIST      negative cardiac stress @ [facility]       Family History   Problem Relation Age of Onset    COPD Mother     Hypertension Mother     High Cholesterol Mother     Alcohol abuse Father     Stroke Father     No Known Problems Sister         Social History     Tobacco Use    Smoking status: Former Smoker     Packs/day: 2.00     Years: 50.00     Pack years: 100.00     Quit date: 2021     Years since quittin.9    Smokeless tobacco: Never Used   Vaping Use    Vaping Use: Never used   Substance Use Topics    Alcohol use: Yes     Alcohol/week: 21.0 standard drinks     Types: 21 Shots of liquor per week    Drug use: Yes     Types: Prescription, OTC, Marijuana        Review of Systems   Musculoskeletal: Positive for back pain. All other systems reviewed and are negative. Vitals: Wt 210 lb (95.3 kg)   BMI 29.29 kg/m²    Body mass index is 29.29 kg/m².     Ortho Exam       Integumentary  Assessment of Surgical Incision - healing and consistent with normal anticipated wound healing. Neurologic  Sensory  Light Touch - Intact - Globally. Some decreased sensation along the L5 dermatome and S1 dermatome on left side. Overall Assessment of Muscle Strength and Tone reveals  Lower Extremities - Right Iliopsoas - 5/5. Left Iliopsoas - 5/5. Right Tibialis Anterior - 5/5. Left Tibialis Anterior - 5/5. Right Gastroc-Soleus - 5/5. Left Gastroc-Soleus - 3/5. Right EHL - 5/5. Left EHL - 3/5. General Assessment of Reflexes  Right Ankle - Clonus is not present. Left Ankle - Clonus is not present. Reflexes (Dermatomes)  2/2 Normal - Left Achilles (L5-S2), Left Knee (L2-4), Right Achilles (L5-S2) and Right Knee (L2-4). Musculoskeletal  Global Assessment  Examination of related systems reveals - well-developed, well-nourished, in no acute distress, alert and oriented x 3 and normal coordination. Spine/Ribs/Pelvis  Lumbosacral Spine - Examination of the lumbosacral spine reveals - no known fractures or deformities. Inspection and Palpation - Tenderness - moderate. Assessment of pain reveals the following findings - The pain is characterized as - moderate. Location - pain refers to lower back bilaterally. An electronic signature was used to authenticate this note.   -- Justin Jaeger MD

## 2022-07-05 DIAGNOSIS — G62.9 NEUROPATHY: ICD-10-CM

## 2022-07-05 DIAGNOSIS — Z98.1 S/P SPINAL FUSION: ICD-10-CM

## 2022-07-05 RX ORDER — PREGABALIN 75 MG/1
75 CAPSULE ORAL 2 TIMES DAILY
Qty: 60 CAPSULE | Refills: 0 | Status: SHIPPED | OUTPATIENT
Start: 2022-07-05 | End: 2022-08-17

## 2022-08-16 DIAGNOSIS — Z98.1 S/P SPINAL FUSION: ICD-10-CM

## 2022-08-16 DIAGNOSIS — G62.9 NEUROPATHY: ICD-10-CM

## 2022-08-17 RX ORDER — PREGABALIN 75 MG/1
75 CAPSULE ORAL 2 TIMES DAILY
Qty: 60 CAPSULE | Refills: 0 | Status: SHIPPED | OUTPATIENT
Start: 2022-08-17 | End: 2022-09-20

## 2022-09-20 DIAGNOSIS — Z98.1 S/P SPINAL FUSION: ICD-10-CM

## 2022-09-20 DIAGNOSIS — G62.9 NEUROPATHY: ICD-10-CM

## 2022-09-20 RX ORDER — PREGABALIN 75 MG/1
75 CAPSULE ORAL 2 TIMES DAILY
Qty: 60 CAPSULE | Refills: 0 | Status: SHIPPED | OUTPATIENT
Start: 2022-09-20 | End: 2022-11-02

## 2022-11-02 DIAGNOSIS — G62.9 NEUROPATHY: ICD-10-CM

## 2022-11-02 DIAGNOSIS — Z98.1 S/P SPINAL FUSION: ICD-10-CM

## 2022-11-02 RX ORDER — PREGABALIN 75 MG/1
75 CAPSULE ORAL 2 TIMES DAILY
Qty: 60 CAPSULE | Refills: 0 | Status: SHIPPED | OUTPATIENT
Start: 2022-11-02

## 2022-12-06 DIAGNOSIS — Z98.1 S/P SPINAL FUSION: ICD-10-CM

## 2022-12-06 DIAGNOSIS — G62.9 NEUROPATHY: ICD-10-CM

## 2022-12-06 RX ORDER — PREGABALIN 75 MG/1
75 CAPSULE ORAL 2 TIMES DAILY
Qty: 60 CAPSULE | Refills: 0 | Status: SHIPPED | OUTPATIENT
Start: 2022-12-06

## 2022-12-12 ENCOUNTER — TELEPHONE (OUTPATIENT)
Dept: ORTHOPEDIC SURGERY | Age: 67
End: 2022-12-12

## 2022-12-12 DIAGNOSIS — G62.9 NEUROPATHY: ICD-10-CM

## 2022-12-12 DIAGNOSIS — Z98.1 S/P SPINAL FUSION: ICD-10-CM

## 2022-12-12 NOTE — TELEPHONE ENCOUNTER
Patient is  out of town on [address] road trip. He was not able to  his medications from [facility]. We need to re send rx for Pregabalin to [facility] Patient will need Tele visit.

## 2022-12-13 RX ORDER — PREGABALIN 75 MG/1
75 CAPSULE ORAL 2 TIMES DAILY
Qty: 60 CAPSULE | Refills: 0 | Status: SHIPPED | OUTPATIENT
Start: 2022-12-13

## 2023-01-03 ENCOUNTER — VIRTUAL VISIT (OUTPATIENT)
Dept: ORTHOPEDIC SURGERY | Age: 68
End: 2023-01-03
Payer: COMMERCIAL

## 2023-01-03 DIAGNOSIS — G62.9 NEUROPATHY: ICD-10-CM

## 2023-01-03 DIAGNOSIS — Z98.1 S/P SPINAL FUSION: ICD-10-CM

## 2023-01-03 PROCEDURE — 1123F ACP DISCUSS/DSCN MKR DOCD: CPT | Performed by: ORTHOPAEDIC SURGERY

## 2023-01-03 PROCEDURE — 99213 OFFICE O/P EST LOW 20 MIN: CPT | Performed by: ORTHOPAEDIC SURGERY

## 2023-01-03 RX ORDER — PREGABALIN 75 MG/1
75 CAPSULE ORAL 2 TIMES DAILY
Qty: 60 CAPSULE | Refills: 0 | Status: SHIPPED | OUTPATIENT
Start: 2023-01-03

## 2023-01-03 NOTE — PATIENT INSTRUCTIONS
Spondylolysis and Spondylolisthesis: Exercises  Introduction  Here are some examples of exercises for you to try. The exercises may be suggested for a condition or for rehabilitation. Start each exercise slowly. Ease off the exercises if you start to have pain. You will be told when to start these exercises and which ones will work best for you. How to do the exercises  Single knee-to-chest  Lie on your back with your knees bent and your feet flat on the floor. You can put a small pillow under your head and neck if it is more comfortable. Bring one knee to your chest, keeping the other foot flat on the floor. Keep your lower back pressed to the floor. Hold for 15 to 30 seconds. Relax, and lower the knee to the starting position. Repeat with the other leg. Repeat 2 to 4 times with each leg. To get more stretch, put your other leg flat on the floor while pulling your knee to your chest.  Double knee-to-chest  Lie on your back with your knees bent and your feet flat on the floor. You can put a small pillow under your head and neck if it is more comfortable. Bring both knees to your chest.  Keep your lower back pressed to the floor. Hold for 15 to 30 seconds. Relax, and lower your knees to the starting position. Repeat 2 to 4 times. Alternate arm and leg (bird dog) exercise  Do this exercise slowly. Try to keep your body straight at all times. Start on the floor, on your hands and knees. Tighten your belly muscles by pulling your belly button in toward your spine. Be sure you continue to breathe normally and do not hold your breath. Raise one arm off the floor, and hold it straight out in front of you. Be careful not to let your shoulder drop down, because that will twist your trunk. Hold for about 6 seconds, then lower your arm and switch to your other arm. Repeat 8 to 12 times on each arm. When you can do this exercise with ease and no pain, repeat steps 1 through 5.  But this time do it with one leg raised off the floor, holding your leg straight out behind you. Be careful not to let your hip drop down, because that will twist your trunk. When holding your leg straight out becomes easier, try raising your opposite arm at the same time, and repeat steps 1 through 5. Bridging  Lie on your back with both knees bent. Your knees should be bent about 90 degrees. Then push your feet into the floor, squeeze your buttocks, and lift your hips off the floor until your shoulders, hips, and knees are all in a straight line. Hold for about 6 seconds as you continue to breathe normally, and then slowly lower your hips back down to the floor and rest for up to 10 seconds. Repeat 8 to 12 times. Curl-ups  Lie on the floor on your back with your knees bent at a 90-degree angle. Your feet should be flat on the floor, about 12 inches from your buttocks. Cross your arms over your chest. If this bothers your neck, try putting your hands behind your neck (not your head), with your elbows spread apart. Slowly tighten your belly muscles and raise your shoulder blades off the floor. Keep your head in line with your body, and do not press your chin to your chest.  Hold this position for 1 or 2 seconds, then slowly lower yourself back down to the floor. Repeat 8 to 12 times. Plank  Do this exercise slowly. Try to keep your body straight at all times, and do not let one hip drop lower than the other. Lie on your stomach, resting your upper body on your forearms. Tighten your belly muscles by pulling your belly button in toward your spine. Keeping your knees on the floor, press down with your forearms to lift your upper body off the floor. Hold for about 6 seconds, then lower your body to the floor. Rest for up to 10 seconds. Repeat 8 to 12 times. Over time, work up to holding for 15 to 30 seconds each time.   If this exercise is easy to do with your knees on the floor, try doing this exercise with your knees and legs straight, supported by your toes on the floor. Follow-up care is a key part of your treatment and safety. Be sure to make and go to all appointments, and call your doctor if you are having problems. It's also a good idea to know your test results and keep a list of the medicines you take. Current as of: March 9, 2022               Content Version: 13.4  © 2006-2022 [employer]. Care instructions adapted under license by [employer] (which disclaims liability or warranty for this information). If you have questions about a medical condition or this instruction, always ask your healthcare professional. [place] [address] any warranty or liability for your use of this information. Spondylolysis and Spondylolisthesis: Exercises  Introduction  Here are some examples of exercises for you to try. The exercises may be suggested for a condition or for rehabilitation. Start each exercise slowly. Ease off the exercises if you start to have pain. You will be told when to start these exercises and which ones will work best for you. How to do the exercises  Single knee-to-chest  Lie on your back with your knees bent and your feet flat on the floor. You can put a small pillow under your head and neck if it is more comfortable. Bring one knee to your chest, keeping the other foot flat on the floor. Keep your lower back pressed to the floor. Hold for 15 to 30 seconds. Relax, and lower the knee to the starting position. Repeat with the other leg. Repeat 2 to 4 times with each leg. To get more stretch, put your other leg flat on the floor while pulling your knee to your chest.  Double knee-to-chest  Lie on your back with your knees bent and your feet flat on the floor. You can put a small pillow under your head and neck if it is more comfortable. Bring both knees to your chest.  Keep your lower back pressed to the floor. Hold for 15 to 30 seconds.   Relax, and lower your knees to the starting position. Repeat 2 to 4 times. Alternate arm and leg (bird dog) exercise  Do this exercise slowly. Try to keep your body straight at all times. Start on the floor, on your hands and knees. Tighten your belly muscles by pulling your belly button in toward your spine. Be sure you continue to breathe normally and do not hold your breath. Raise one arm off the floor, and hold it straight out in front of you. Be careful not to let your shoulder drop down, because that will twist your trunk. Hold for about 6 seconds, then lower your arm and switch to your other arm. Repeat 8 to 12 times on each arm. When you can do this exercise with ease and no pain, repeat steps 1 through 5. But this time do it with one leg raised off the floor, holding your leg straight out behind you. Be careful not to let your hip drop down, because that will twist your trunk. When holding your leg straight out becomes easier, try raising your opposite arm at the same time, and repeat steps 1 through 5. Bridging  Lie on your back with both knees bent. Your knees should be bent about 90 degrees. Then push your feet into the floor, squeeze your buttocks, and lift your hips off the floor until your shoulders, hips, and knees are all in a straight line. Hold for about 6 seconds as you continue to breathe normally, and then slowly lower your hips back down to the floor and rest for up to 10 seconds. Repeat 8 to 12 times. Curl-ups  Lie on the floor on your back with your knees bent at a 90-degree angle. Your feet should be flat on the floor, about 12 inches from your buttocks. Cross your arms over your chest. If this bothers your neck, try putting your hands behind your neck (not your head), with your elbows spread apart. Slowly tighten your belly muscles and raise your shoulder blades off the floor.   Keep your head in line with your body, and do not press your chin to your chest.  Hold this position for 1 or 2 seconds, then slowly lower yourself back down to the floor. Repeat 8 to 12 times. Plank  Do this exercise slowly. Try to keep your body straight at all times, and do not let one hip drop lower than the other. Lie on your stomach, resting your upper body on your forearms. Tighten your belly muscles by pulling your belly button in toward your spine. Keeping your knees on the floor, press down with your forearms to lift your upper body off the floor. Hold for about 6 seconds, then lower your body to the floor. Rest for up to 10 seconds. Repeat 8 to 12 times. Over time, work up to holding for 15 to 30 seconds each time. If this exercise is easy to do with your knees on the floor, try doing this exercise with your knees and legs straight, supported by your toes on the floor. Follow-up care is a key part of your treatment and safety. Be sure to make and go to all appointments, and call your doctor if you are having problems. It's also a good idea to know your test results and keep a list of the medicines you take. Current as of: March 9, 2022               Content Version: 13.4  © 2006-2022 Healthwise, Inc[employer]. Care instructions adapted under license by [employer] (which disclaims liability or warranty for this information). If you have questions about a medical condition or this instruction, always ask your healthcare professional. [address] any warranty or liability for your use of this information.

## 2023-01-03 NOTE — PROGRESS NOTES
Alejandro Johnston is a 69 y.o. male who was seen by synchronous (real-time) audio-video technology on 1/3/2023 for LOW BACK PAIN        Assessment & Plan:   Overall he is doing very well. He does have occasional numbness in the left foot as well as some lower back pain. All symptoms are currently maintained with his Lyrica. I have refilled this for him. If he continues to be medicated with the Lyrica we will see him back in 6 more months. If he has any other symptoms he will come in for a live visit. I spent at least 15 minutes on this visit with this established patient. [id number]  Subjective:       Prior to Admission medications    Medication Sig Start Date End Date Taking? Authorizing Provider   pregabalin (Lyrica) 75 mg capsule Take 1 Capsule by mouth two (2) times a day. Max Daily Amount: 150 mg. 12/13/22   Valentina Jain MD   pregabalin (LYRICA) 75 mg capsule Take 1 Capsule by mouth two (2) times a day. Max Daily Amount: 150 mg. 12/13/22   Valentina Jain MD   ibuprofen (AdviL) 200 mg tablet Take  by mouth. Provider, Historical   cyclobenzaprine (FLEXERIL) 10 mg tablet TAKE 1 TABLET BY MOUTH THREE TIMES A DAY AS NEEDED FOR MUSCLE SPASMS 2/17/22   Valentina Jain MD   oxyCODONE IR (ROXICODONE) 5 mg immediate release tablet Take 5 mg by mouth every four (4) hours as needed for Pain. Patient not taking: Reported on 3/1/2022    Provider, Historical   escitalopram oxalate (LEXAPRO) 10 mg tablet  11/10/21   Provider, Historical   albuterol (PROVENTIL HFA, VENTOLIN HFA, PROAIR HFA) 90 mcg/actuation inhaler  12/11/21   Provider, Historical   methylPREDNISolone (MEDROL DOSEPACK) 4 mg tablet Follow packet instructions  Patient not taking: Reported on 3/1/2022 12/14/21   Charmayne Pippins, PA   pantoprazole (PROTONIX) 40 mg tablet Take 40 mg by mouth Every morning. Provider, Historical   lisinopriL (PRINIVIL, ZESTRIL) 20 mg tablet Take 20 mg by mouth Every morning.     Provider, Historical budesonide-glycopyr-formoterol (Breztri Aerosphere) 160-9-4.8 mcg/actuation HFAA Take 2 Puffs by inhalation two (2) times a day. Provider, Historical   albuterol (ACCUNEB) 0.63 mg/3 mL nebulizer solution 0.63 mg by Nebulization route every six (6) hours as needed for Wheezing. Provider, Historical         ROS    Objective:   No flowsheet data found. General: alert, cooperative, no distress   Mental  status: normal mood, behavior, speech, dress, motor activity, and thought processes, able to follow commands   HENT: NCAT   Neck: no visualized mass   Resp: no respiratory distress   Neuro: no gross deficits   Skin: no discoloration or lesions of concern on visible areas   Psychiatric: normal affect, consistent with stated mood, no evidence of hallucinations     Additional exam findings:     XR Results (most recent):  Results from Appointment encounter on 06/07/22    XR SPINE LUMB MIN 4 V    Narrative  AP and lateral and flexion-extension lumbar spine reviewed today. Stable L3-L5 revision fusion. No hardware difficulties noted. Maintained lumbar lordosis. We discussed the expected course, resolution and complications of the diagnosis(es) in detail. Medication risks, benefits, costs, interactions, and alternatives were discussed as indicated. I advised him to contact the office if his condition worsens, changes or fails to improve as anticipated. He expressed understanding with the diagnosis(es) and plan. Alexander Espitia, was evaluated through a synchronous (real-time) audio-video encounter. The patient (or guardian if applicable) is aware that this is a billable service, which includes applicable co-pays. This Virtual Visit was conducted with patient's (and/or legal guardian's) consent.  The visit was conducted pursuant to the emergency declaration under the [facility], 1135 waiver authority and the [employer] Appropriations Act. Patient identification was verified, and a caregiver was present when appropriate. The patient was located at: Home: [address] [place] [id number]  The provider was located at:  Facility (Appt Department): [address]        Ramez Huber MD

## 2023-03-09 DIAGNOSIS — G62.9 NEUROPATHY: ICD-10-CM

## 2023-03-09 DIAGNOSIS — Z98.1 S/P SPINAL FUSION: ICD-10-CM

## 2023-03-10 RX ORDER — PREGABALIN 75 MG/1
75 CAPSULE ORAL 2 TIMES DAILY
Qty: 60 CAPSULE | Refills: 0 | Status: SHIPPED | OUTPATIENT
Start: 2023-03-10

## 2024-02-19 NOTE — ANESTHESIA POSTPROCEDURE EVALUATION
"Pediatric Otolaryngology - Head and Neck Surgery Outpatient Note    Chief Concern:  Noisy breathing, Snoring    Referring Provider: Mary Shoemaker AP*    History Of Present Illness  Tri Justin is a 3 m.o. female presenting today for evaluation of noisy breathing . Accompanied by her mother who provides history. Her mother notes intermittent noisy breathing that she does not notice any initiating factors. It is not worsened when eating, laying flat or during play.  She has no history of ear infections. She occasionally snores. Denies pausing, gasping and extensive mouth breathing. She has no other medical concerns.    She and her sister are in .    Notes from 1/5/2024 from Mary Shoemaker, APRN: \"Noisy breathing - likely from small nasal passages.  Patient was referred to ENT for further evaluation and treatment. \", snoring was also reported.    Prenatal/Birth History  Uncomplicated pregnancy Yes   Full term Yes  No NICU stay No  Passed New Born Hearing Screen Yes  Vaccinations Up-to-date Yes     Past Medical History  She has no past medical history on file.    Surgical History  She has no past surgical history on file.     Social History  She has no history on file for tobacco use, alcohol use, and drug use.    Family History  No family history on file.     Allergies  Patient has no known allergies.    Review of Systems  A 12-point review of systems was performed and noted be negative except for that which was mentioned in the history of present illness     Last Recorded Vitals  There were no vitals taken for this visit.     PHYSICAL EXAMINATION:  General:  Well-developed, well-nourished child in no acute distress.  Voice: Grossly normal. No stridor or stertor   Head and Facial: Atraumatic, nontender to palpation.  No obvious mass.  Neurological:  Normal, symmetric facial motion.  Tongue protrusion and palatal lift are symmetric and midline.  Eyes:  Pupils equal round and reactive.  Extraocular " Procedure(s):  REVISION LAMINECTOMY L3-S1 WITH L3-L5 REVISION FUSION. general    Anesthesia Post Evaluation        Patient location during evaluation: PACU  Note status: Adequate. Level of consciousness: responsive to verbal stimuli and sleepy but conscious  Pain management: satisfactory to patient  Airway patency: patent  Anesthetic complications: no  Cardiovascular status: acceptable  Respiratory status: acceptable  Hydration status: acceptable  Comments: +Post-Anesthesia Evaluation and Assessment    Patient: South García MRN: [id number]  SSN: [id number]   YOB: 1955  Age: 74 y.o. Sex: male          Cardiovascular Function/Vital Signs    BP (!) 118/47 (BP 1 Location: Left upper arm, BP Patient Position: At rest)   Pulse 88   Temp 36.7 °C (98 °F)   Resp 9   Ht 5' 10.5\" (1.791 m)   Wt 90.1 kg (198 lb 10.2 oz)   SpO2 92%   BMI 28.10 kg/m²     Patient is status post Procedure(s):  REVISION LAMINECTOMY L3-S1 WITH L3-L5 REVISION FUSION. Nausea/Vomiting: Controlled. Postoperative hydration reviewed and adequate. Pain:  Pain Scale 1: Numeric (0 - 10) (11/12/21 1245)  Pain Intensity 1: 0 (11/12/21 1245)   Managed. Neurological Status:   Neuro (WDL): Exceptions to WDL (11/12/21 1245)   At baseline. Mental Status and Level of Consciousness: Arousable. Pulmonary Status:   O2 Device: None (Room air) (11/12/21 1245)   Adequate oxygenation and airway patent. Complications related to anesthesia: None    Post-anesthesia assessment completed. No concerns. I have evaluated the patient and the patient is stable and ready to be discharged from PACU . Signed By: Demarco Aden MD    11/12/2021        INITIAL Post-op Vital signs:   Vitals Value Taken Time   /47 11/12/21 1245   Temp 36.7 °C (98 °F) 11/12/21 1245   Pulse 86 11/12/21 1259   Resp 10 11/12/21 1259   SpO2 93 % 11/12/21 1259   Vitals shown include unvalidated device data. movements normal.  Ears:  Normal tympanic membranes, no fluid or retraction.  Auricles normal without lesions, normal EAC´s.  Nose: Dorsum midline.  No mass or lesion.  Intranasal:  Normal inferior turbinates, septum midline. Crusting with scabbing  Sinuses: No tenderness to palpation.  Oral cavity: No masses or lesions.  Mucous membranes moist and pink.  Oropharynx:  Normal, symmetric tonsils without exudate.  Normal position of base of tongue.  Posterior pharyngeal mucosa normal.  No palatal or tonsillar lesions.  Normal uvula.  Salivary Glands:  Parotid and submandibular glands normal to palpation.  No masses.  Neck:   Nontender, no masses or lymphadenopathy.  Trachea is midline.  Thyroid:  Normal to palpation.  Respiratory: no retractions, normal work of breathing.  Cardiovascular: no cyanosis, no peripheral edema    ASSESSMENT:  Tri is a 3 month old female that was referred to ENT by her primary care provider for intermittent nosebleeding.  No concerns were identified from the patient's mother when obtaining her history.  Physical exam significant for crusting and scabbing in the patient's nasal passage.  Scope exam was considered.  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 She will follow up on an as needed basis.    Recommendations are listed below    PLAN:  - Nasal saline drops  - Genital suctioning    I have seen and examined the patient, performed all procedures, and reviewed all records.  I agree with the above history, physical exam, procedure notes, assessment and plan.    Ronald Carvalho MD  Pediatric Otolaryngology - Head and Neck Surgery   [facility]

## 2024-11-19 ENCOUNTER — ANESTHESIA (OUTPATIENT)
Facility: HOSPITAL | Age: 69
End: 2024-11-19
Payer: COMMERCIAL

## 2024-11-19 ENCOUNTER — ANESTHESIA EVENT (OUTPATIENT)
Facility: HOSPITAL | Age: 69
End: 2024-11-19
Payer: COMMERCIAL

## 2024-11-19 ENCOUNTER — HOSPITAL ENCOUNTER (OUTPATIENT)
Facility: HOSPITAL | Age: 69
Setting detail: OUTPATIENT SURGERY
Discharge: HOME | End: 2024-11-19
Attending: INTERNAL MEDICINE | Admitting: INTERNAL MEDICINE
Payer: COMMERCIAL

## 2024-11-19 VITALS
OXYGEN SATURATION: 99 % | HEART RATE: 89 BPM | WEIGHT: 197 LBS | BODY MASS INDEX: 27.58 KG/M2 | HEIGHT: 71 IN | SYSTOLIC BLOOD PRESSURE: 122 MMHG | TEMPERATURE: 97.5 F | RESPIRATION RATE: 16 BRPM | DIASTOLIC BLOOD PRESSURE: 60 MMHG

## 2024-11-19 PROCEDURE — 7100000011 HC PHASE II RECOVERY - ADDTL 15 MIN: Performed by: INTERNAL MEDICINE

## 2024-11-19 PROCEDURE — 88305 TISSUE EXAM BY PATHOLOGIST: CPT

## 2024-11-19 PROCEDURE — 3700000001 HC ADD 15 MINUTES (ANESTHESIA): Performed by: INTERNAL MEDICINE

## 2024-11-19 PROCEDURE — 3600007502: Performed by: INTERNAL MEDICINE

## 2024-11-19 PROCEDURE — 7100000010 HC PHASE II RECOVERY - FIRST 15 MIN: Performed by: INTERNAL MEDICINE

## 2024-11-19 PROCEDURE — 2580000003 HC RX 258: Performed by: NURSE ANESTHETIST, CERTIFIED REGISTERED

## 2024-11-19 PROCEDURE — 3700000000 HC ANESTHESIA ATTENDED CARE: Performed by: INTERNAL MEDICINE

## 2024-11-19 PROCEDURE — 3600007512: Performed by: INTERNAL MEDICINE

## 2024-11-19 PROCEDURE — 6360000002 HC RX W HCPCS: Performed by: NURSE ANESTHETIST, CERTIFIED REGISTERED

## 2024-11-19 PROCEDURE — 2709999900 HC NON-CHARGEABLE SUPPLY: Performed by: INTERNAL MEDICINE

## 2024-11-19 PROCEDURE — 2500000003 HC RX 250 WO HCPCS: Performed by: NURSE ANESTHETIST, CERTIFIED REGISTERED

## 2024-11-19 RX ORDER — SODIUM CHLORIDE 0.9 % (FLUSH) 0.9 %
5-40 SYRINGE (ML) INJECTION EVERY 12 HOURS SCHEDULED
Status: DISCONTINUED | OUTPATIENT
Start: 2024-11-19 | End: 2024-11-19 | Stop reason: HOSPADM

## 2024-11-19 RX ORDER — PROPOFOL 10 MG/ML
INJECTION, EMULSION INTRAVENOUS
Status: DISCONTINUED | OUTPATIENT
Start: 2024-11-19 | End: 2024-11-19 | Stop reason: SDUPTHER

## 2024-11-19 RX ORDER — DEXMEDETOMIDINE HYDROCHLORIDE 100 UG/ML
INJECTION, SOLUTION INTRAVENOUS
Status: DISCONTINUED | OUTPATIENT
Start: 2024-11-19 | End: 2024-11-19 | Stop reason: SDUPTHER

## 2024-11-19 RX ORDER — LIDOCAINE HCL/PF 100 MG/5ML
SYRINGE (ML) INJECTION
Status: DISCONTINUED | OUTPATIENT
Start: 2024-11-19 | End: 2024-11-19 | Stop reason: SDUPTHER

## 2024-11-19 RX ORDER — 0.9 % SODIUM CHLORIDE 0.9 %
INTRAVENOUS SOLUTION INTRAVENOUS
Status: DISCONTINUED | OUTPATIENT
Start: 2024-11-19 | End: 2024-11-19 | Stop reason: SDUPTHER

## 2024-11-19 RX ORDER — SODIUM CHLORIDE 9 MG/ML
INJECTION, SOLUTION INTRAVENOUS PRN
Status: DISCONTINUED | OUTPATIENT
Start: 2024-11-19 | End: 2024-11-19 | Stop reason: HOSPADM

## 2024-11-19 RX ORDER — SODIUM CHLORIDE 0.9 % (FLUSH) 0.9 %
5-40 SYRINGE (ML) INJECTION PRN
Status: DISCONTINUED | OUTPATIENT
Start: 2024-11-19 | End: 2024-11-19 | Stop reason: HOSPADM

## 2024-11-19 RX ADMIN — DEXMEDETOMIDINE 10 MCG: 100 INJECTION, SOLUTION INTRAVENOUS at 09:14

## 2024-11-19 RX ADMIN — SODIUM CHLORIDE 10 ML: 9 INJECTION, SOLUTION INTRAVENOUS at 09:14

## 2024-11-19 RX ADMIN — SODIUM CHLORIDE 20 ML: 9 INJECTION, SOLUTION INTRAVENOUS at 09:47

## 2024-11-19 RX ADMIN — SODIUM CHLORIDE 10 ML: 9 INJECTION, SOLUTION INTRAVENOUS at 09:24

## 2024-11-19 RX ADMIN — LIDOCAINE HYDROCHLORIDE 100 MG: 20 INJECTION INTRAVENOUS at 09:22

## 2024-11-19 RX ADMIN — PROPOFOL 50 MG: 10 INJECTION, EMULSION INTRAVENOUS at 09:22

## 2024-11-19 RX ADMIN — PROPOFOL 200 MCG/KG/MIN: 10 INJECTION, EMULSION INTRAVENOUS at 09:29

## 2024-11-19 ASSESSMENT — PAIN SCALES - GENERAL
PAINLEVEL_OUTOF10: 0

## 2024-11-19 ASSESSMENT — PAIN - FUNCTIONAL ASSESSMENT: PAIN_FUNCTIONAL_ASSESSMENT: 0-10

## 2024-11-19 NOTE — H&P
[facility]  Yunier Jeffery M.D.  [phone]            History and Physical       NAME:  Adrian Hurley   :   1955   MRN:   [id number]       Referring Physician:  Palma Christopher MD      Consult Date: 2024 8:44 AM    Chief Complaint:  RADHA and Colon cancer screening    History of Present Illness:  Patient is a 69 y.o. who is seen for RADHA and colon cancer screening. Denies any ongoing GI complaints.      PMH:  Past Medical History:   Diagnosis Date    COPD (chronic obstructive pulmonary disease) (HCC)     GERD (gastroesophageal reflux disease)     Hyperlipidemia     Hypertension     Polyp of colon     Sleep apnea     Uses CPAP       PSH:  Past Surgical History:   Procedure Laterality Date    COLONOSCOPY      IR LUMBAR TRANSFORAMINAL EPIDURAL SINGLE  10/30/2019    IR LUMBAR TRANSFORAMINAL EPIDURAL SINGLE  10/1/2021    LUMBAR FUSION      x 3    LUMBAR LAMINECTOMY      x 3    CT UNLISTED PROCEDURE CARDIAC SURGERY      negative Cath Lab @ [facility]    CT UNLISTED PROCEDURE CARDIAC SURGERY      negative cardiac stress @ [facility]    VEIN SURGERY         Allergies:  Allergies   Allergen Reactions    Penicillins Rash     Has taken amoxicillin without a reaction;        Home Medications:  Prior to Admission Medications   Prescriptions Last Dose Informant Patient Reported? Taking?   Budeson-Glycopyrrol-Formoterol (BREZTRI AEROSPHERE) 160-9-4.8 MCG/ACT AERO 2024  Yes Yes   Sig: Inhale 2 puffs into the lungs 2 times daily   albuterol (ACCUNEB) 0.63 MG/3ML nebulizer solution Past Month  Yes Yes   Sig: Inhale 3 mLs into the lungs every 6 hours as needed   albuterol sulfate HFA (PROVENTIL;VENTOLIN;PROAIR) 108 (90 Base) MCG/ACT inhaler 2024  Yes Yes   Sig: ceived the following from [facility]: Outside name: albuterol (PROVENTIL HFA, VENTOLIN HFA, PROAIR HFA) 90 mcg/actuation inhaler   cyclobenzaprine (FLEXERIL) 10 MG tablet Not Taking  Yes No   Sig: TAKE 1 TABLET BY

## 2024-11-19 NOTE — PROGRESS NOTES
Adrian XOCHILT Hurley  1955  [id number]    Situation:  Verbal report received from:  JAROD George   Procedure: Procedure(s) with comments:  COLONOSCOPY DIAGNOSTIC  ESOPHAGOGASTRODUODENOSCOPY  ESOPHAGOGASTRODUODENOSCOPY BIOPSY  COLONOSCOPY POLYPECTOMY SNARE/BIOPSY - N    Background:    Preoperative diagnosis: Iron deficiency anemia, unspecified iron deficiency anemia type [D50.9]  Postoperative diagnosis: * No post-op diagnosis entered *    :  Dr. Jeffery   Assistant(s): Circulator: Liam Rucker RN    Specimens:   ID Type Source Tests Collected by Time Destination   1 : duodenal BX Tissue Duodenum SURGICAL PATHOLOGY Yunier Jeffery MD 11/19/2024 0925    2 : gastric bx Tissue Gastric SURGICAL PATHOLOGY Yunier Jeffery MD 11/19/2024 0928    3 : SIGMOID COLON POLYP Tissue Sigmoid Colon SURGICAL PATHOLOGY Yunier Jeffery MD 11/19/2024 0930    4 : DESCENDING COLOM POLYP X2 Tissue Colon-Ascending SURGICAL PATHOLOGY Yunier Jeffery MD 11/19/2024 0933    5 : DESCENDING COLON POLYP X3 Tissue Colon-Descending SURGICAL PATHOLOGY Yunier Jeffery MD 11/19/2024 0940      H. Pylori    yes     Assessment:  Intra-procedure medications     Anesthesia gave intra-procedure sedation and medications, see anesthesia flow sheet    yes    Intravenous fluids: NS@ KVO     Vital signs stable    yes    Abdominal assessment: round and soft    yes    Recommendation:  Discharge patient per MD order   yes.  Return to floor   outpatient   Family or Friend   spouse  Permission to share finding with family or friend    yes

## 2024-11-19 NOTE — DISCHARGE INSTRUCTIONS
[facility]  Yunier Jeffery M.D.  [phone]                 COLON and EGD DISCHARGE INSTRUCTIONS    2024    Adrian Hurley  :  1955  [facility] Medical Record Number:  [id number]      DISCOMFORT:  Sore throat- throat lozenges or warm salt water gargle  Redness at IV site- apply warm compress to area; if redness or soreness persist- contact your physician  There may be a slight amount of blood passed from the rectum  Gaseous discomfort- walking, belching will help relieve any discomfort  You may not operate a vehicle for 12 hours  You may not engage in an occupation involving machinery or appliances for rest of today  You may not drink alcoholic beverages for at least 12 hours  Avoid making any critical decisions for at least 24 hour  DIET:   Regular diet   - however -  remember your colon is empty and a heavy meal will produce gas.   Avoid these foods:  vegetables, fried / greasy foods, carbonated drinks for today     ACTIVITY:  You may  resume your normal daily activities it is recommended that you spend the remainder of the day resting -  avoid any strenuous activity and driving.    CALL M.DClaudia  ANY SIGN OF:   Increasing pain, nausea, vomiting  Abdominal distension (swelling)  New increased bleeding (oral or rectal)  Fever (chills)  Pain in chest area  Bloody discharge from nose or mouth  Shortness of breath      Follow-up Instructions:   Call Dr. Jeffery if any questions at [phone].  Results of procedure / biopsy in 7 to 10 days, we will call you with these results.  Your endoscopy showed normal exam   Your colonoscopy showed 7 polyps removed and mild diverticulosis noted

## 2024-11-19 NOTE — PROCEDURES
[facility]  Yunier Jeffery M.D.  [phone]            2024          Colonoscopy Operative Report  Adrian Hurley  :  1955  [facility] Medical Record Number:  [id number]      Indications:    Screening colonoscopy     :  Yunier Jeffery MD    Assistant -- None  Implants -- None    Referring Provider: Palma Christopher MD    Sedation:  MAC anesthesia Propofol    Pre-Procedural Exam:      Airway: clear,  No airway problems anticipated  Heart: RRR, without gallops or rubs  Lungs: clear bilaterally without wheezes, crackles, or rhonchi  Abdomen: soft, nontender, nondistended, bowel sounds present  Mental Status: awake, alert and oriented to person, place and time     Procedure Details:  After informed consent was obtained with all risks and benefits of procedure explained and preoperative exam completed, the patient was taken to the endoscopy suite and placed in the left lateral decubitus position.  Upon sequential sedation as per above, a digital rectal exam was performed. The Olympus videocolonoscope  was inserted in the rectum and carefully advanced to the cecum, which was identified by the ileocecal valve and appendiceal orifice.  The quality of preparation was good.  The colonoscope was slowly withdrawn with careful inspection and evaluation between folds. Retroflexion in the rectum was performed.    Findings:   Terminal Ileum: not intubated  Cecum: normal  Ascending Colon: 2  Sessile polyp(s), the largest 6 mm in size;  Transverse Colon: 4  Sessile polyp(s), the largest 6 mm in size;  Descending Colon: normal  Sigmoid: 3  Sessile polyp(s), the largest 6 mm in size;  Rectum: normal    Interventions:  7 complete polypectomy were performed using cold snare  and the polyps were  retrieved    Specimen Removed:  specimen #1, 6 mm in size, located in the ascending colon removed by cold snare and retrieved for pathology  #2, 6 mm in size, located in the transverse colon removed by

## 2024-11-19 NOTE — ANESTHESIA PRE PROCEDURE
Component Value Date/Time     12/03/2021 12:51 AM    K 4.0 12/03/2021 12:51 AM     12/03/2021 12:51 AM    CO2 23 12/03/2021 12:51 AM    BUN 15 12/03/2021 12:51 AM    CREATININE 0.65 12/03/2021 12:51 AM    GFRAA >60 12/03/2021 12:51 AM    AGRATIO 0.7 11/04/2021 07:30 AM    GLUCOSE 122 12/03/2021 12:51 AM    CALCIUM 8.3 12/03/2021 12:51 AM    BILITOT 0.2 11/04/2021 07:30 AM    ALKPHOS 162 11/04/2021 07:30 AM    AST 15 11/04/2021 07:30 AM    ALT 25 11/04/2021 07:30 AM       POC Tests: No results for input(s): \"POCGLU\", \"POCNA\", \"POCK\", \"POCCL\", \"POCBUN\", \"POCHEMO\", \"POCHCT\" in the last 72 hours.    Coags:   Lab Results   Component Value Date/Time    PROTIME 10.6 11/04/2021 07:30 AM    INR 1.0 11/04/2021 07:30 AM       HCG (If Applicable): No results found for: \"PREGTESTUR\", \"PREGSERUM\", \"HCG\", \"HCGQUANT\"     ABGs: No results found for: \"PHART\", \"PO2ART\", \"ORC4TFZ\", \"QCH0WNV\", \"BEART\", \"P5JDGPVK\"     Type & Screen (If Applicable):  Lab Results   Component Value Date    ABORH B POSITIVE 12/02/2021    LABANTI NEG 12/02/2021       Drug/Infectious Status (If Applicable):  No results found for: \"HIV\", \"HEPCAB\"    COVID-19 Screening (If Applicable):   Lab Results   Component Value Date/Time    COVID19 Not detected 11/29/2021 10:43 AM           Anesthesia Evaluation  Patient summary reviewed and Nursing notes reviewed  Airway: Mallampati: II  TM distance: >3 FB   Neck ROM: full  Mouth opening: > = 3 FB   Dental: normal exam         Pulmonary: breath sounds clear to auscultation  (+)  COPD:    sleep apnea:                                  Cardiovascular:    (+) hypertension:        Rhythm: regular  Rate: normal                    Neuro/Psych:   Negative Neuro/Psych ROS              GI/Hepatic/Renal:   (+) GERD:          Endo/Other: Negative Endo/Other ROS                    Abdominal:   (+) obese          Vascular:          Other Findings:       Anesthesia Plan      MAC     ASA 2       Induction:

## 2024-11-19 NOTE — PROCEDURES
[facility]  Yunier Jeffery M.D.  [phone]           2024                EGD Operative Report  Adrian Hurley  :  1955  [facility] Medical Record Number:  [id number]      Indication: RADHA     : Yunier Jeffery MD    Assistant -- None  Implants -- None    Referring Provider:  Palma Christopher MD      Anesthesia/Sedation:  MAC anesthesia Propofol    Airway assessment: No airway problems anticipated    Pre-Procedural Exam:      Airway: clear, no airway problems anticipated  Heart: RRR, without gallops or rubs  Lungs: clear bilaterally without wheezes, crackles, or rhonchi  Abdomen: soft, nontender, nondistended, bowel sounds present  Mental Status: awake, alert and oriented to person, place and time       Procedure Details     After infomed consent was obtained for the procedure, with all risks and benefits of procedure explained the patient was taken to the endoscopy suite and placed in the left lateral decubitus position.  Following sequential administration of sedation as per above, the endoscope was inserted into the mouth and advanced under direct vision to second portion of the duodenum.  A careful inspection was made as the gastroscope was withdrawn, including a retroflexed view of the proximal stomach; findings and interventions are described below.      Findings:   Esophagus:normal  Stomach: normal   Duodenum/jejunum: normal    Therapies:  biopsy of stomach  - r/o H.pylori  biopsy of duodenal - r/o celiac disease    Specimens:  as above           Complications:   None; patient tolerated the procedure well.    EBL:  None.           Impression:    Normal EGD     Recommendations:    -Await pathology.  -Proceed with colonoscopy today as planned    Yunier Jeffery MD

## 2024-11-19 NOTE — ANESTHESIA POSTPROCEDURE EVALUATION
Department of Anesthesiology  Postprocedure Note    Patient: Adrian Hurley  MRN: [id number]  YOB: 1955  Date of evaluation: 11/19/2024    Procedure Summary       Date: 11/19/24 Room / Location: [place] [address] / [facility] ENDOSCOPY    Anesthesia Start: 0913 Anesthesia Stop: 0951    Procedures:       COLONOSCOPY DIAGNOSTIC (Lower GI Region)      ESOPHAGOGASTRODUODENOSCOPY (Upper GI Region)      ESOPHAGOGASTRODUODENOSCOPY BIOPSY (Upper GI Region)      COLONOSCOPY POLYPECTOMY SNARE/BIOPSY (Lower GI Region) Diagnosis:       Iron deficiency anemia, unspecified iron deficiency anemia type      (Iron deficiency anemia, unspecified iron deficiency anemia type [D50.9])    Surgeons: Yunier Jeffery MD Responsible Provider: Markus Wall MD    Anesthesia Type: MAC ASA Status: 2            Anesthesia Type: No value filed.    Bere Phase I: Bere Score: 8    Bere Phase II:      Anesthesia Post Evaluation    Patient location during evaluation: PACU  Patient participation: complete - patient participated  Level of consciousness: awake and alert  Pain score: 0  Airway patency: patent  Nausea & Vomiting: no nausea and no vomiting  Cardiovascular status: blood pressure returned to baseline  Respiratory status: acceptable  Hydration status: euvolemic  Pain management: satisfactory to patient    No notable events documented.  
show

## 2024-11-19 NOTE — PROGRESS NOTES
PT up to bathroom. Returned to bed .SAO2=83-85%.  Increased 02 to 4L.  SAO2 UP TO %98.  DR. JACKSON NOTIFIED NO ORDERS

## 2025-02-22 ENCOUNTER — HOSPITAL ENCOUNTER (INPATIENT)
Facility: HOSPITAL | Age: 70
LOS: 8 days | Discharge: HOME | DRG: 189 | End: 2025-03-02
Attending: STUDENT IN AN ORGANIZED HEALTH CARE EDUCATION/TRAINING PROGRAM | Admitting: HOSPITALIST
Payer: COMMERCIAL

## 2025-02-22 ENCOUNTER — APPOINTMENT (OUTPATIENT)
Facility: HOSPITAL | Age: 70
DRG: 189 | End: 2025-02-22
Payer: COMMERCIAL

## 2025-02-22 DIAGNOSIS — J44.1 COPD EXACERBATION (HCC): Primary | ICD-10-CM

## 2025-02-22 PROBLEM — J96.90 RESPIRATORY FAILURE (HCC): Status: ACTIVE | Noted: 2025-02-22

## 2025-02-22 LAB
ALBUMIN SERPL-MCNC: 3.6 G/DL (ref 3.5–5)
ALBUMIN/GLOB SERPL: 0.8 (ref 1.1–2.2)
ALP SERPL-CCNC: 129 U/L (ref 45–117)
ALT SERPL-CCNC: 29 U/L (ref 12–78)
ANION GAP BLD CALC-SCNC: 9 (ref 10–20)
ANION GAP SERPL CALC-SCNC: 5 MMOL/L (ref 2–12)
ARTERIAL PATENCY WRIST A: YES
AST SERPL-CCNC: 26 U/L (ref 15–37)
BASE EXCESS BLD CALC-SCNC: 15.4 MMOL/L
BASE EXCESS BLDA CALC-SCNC: 6.8 MMOL/L
BASE EXCESS BLDV CALC-SCNC: 14.3 MMOL/L
BASOPHILS # BLD: 0.03 K/UL (ref 0–0.1)
BASOPHILS NFR BLD: 0.4 % (ref 0–1)
BDY SITE: ABNORMAL
BILIRUB SERPL-MCNC: 0.5 MG/DL (ref 0.2–1)
BUN SERPL-MCNC: 13 MG/DL (ref 6–20)
BUN/CREAT SERPL: 19 (ref 12–20)
CA-I BLD-MCNC: 1.33 MMOL/L (ref 1.15–1.33)
CALCIUM SERPL-MCNC: 10.7 MG/DL (ref 8.5–10.1)
CHLORIDE BLD-SCNC: 91 MMOL/L (ref 100–111)
CHLORIDE SERPL-SCNC: 94 MMOL/L (ref 97–108)
CO2 BLD-SCNC: 48 MMOL/L (ref 22–29)
CO2 SERPL-SCNC: 41 MMOL/L (ref 21–32)
CREAT SERPL-MCNC: 0.7 MG/DL (ref 0.7–1.3)
CREAT UR-MCNC: 0.8 MG/DL (ref 0.6–1.3)
DIFFERENTIAL METHOD BLD: ABNORMAL
EOSINOPHIL # BLD: 0.07 K/UL (ref 0–0.4)
EOSINOPHIL NFR BLD: 0.9 % (ref 0–7)
ERYTHROCYTE [DISTWIDTH] IN BLOOD BY AUTOMATED COUNT: 12.3 % (ref 11.5–14.5)
FIO2 ON VENT: 30 %
FLUAV RNA SPEC QL NAA+PROBE: NOT DETECTED
FLUBV RNA SPEC QL NAA+PROBE: NOT DETECTED
GLOBULIN SER CALC-MCNC: 4.7 G/DL (ref 2–4)
GLUCOSE BLD STRIP.AUTO-MCNC: 156 MG/DL (ref 74–99)
GLUCOSE SERPL-MCNC: 153 MG/DL (ref 65–100)
HCO3 BLDA-SCNC: 34 MMOL/L (ref 22–26)
HCO3 BLDA-SCNC: 48 MMOL/L
HCO3 BLDV-SCNC: 44.3 MMOL/L (ref 23–28)
HCT VFR BLD AUTO: 45.5 % (ref 36.6–50.3)
HGB BLD-MCNC: 14 G/DL (ref 12.1–17)
IMM GRANULOCYTES # BLD AUTO: 0.02 K/UL (ref 0–0.04)
IMM GRANULOCYTES NFR BLD AUTO: 0.3 % (ref 0–0.5)
IPAP/PIP: 16
LACTATE BLD-SCNC: 1.87 MMOL/L (ref 0.4–2)
LYMPHOCYTES # BLD: 0.95 K/UL (ref 0.8–3.5)
LYMPHOCYTES NFR BLD: 12 % (ref 12–49)
MAGNESIUM SERPL-MCNC: 1.6 MG/DL (ref 1.6–2.4)
MCH RBC QN AUTO: 32.5 PG (ref 26–34)
MCHC RBC AUTO-ENTMCNC: 30.8 G/DL (ref 30–36.5)
MCV RBC AUTO: 105.6 FL (ref 80–99)
MONOCYTES # BLD: 0.45 K/UL (ref 0–1)
MONOCYTES NFR BLD: 5.7 % (ref 5–13)
NEUTS SEG # BLD: 6.4 K/UL (ref 1.8–8)
NEUTS SEG NFR BLD: 80.7 % (ref 32–75)
NRBC # BLD: 0 K/UL (ref 0–0.01)
NRBC BLD-RTO: 0 PER 100 WBC
PCO2 BLDA: 62 MMHG (ref 35–45)
PCO2 BLDV: 84.9 MMHG (ref 41–51)
PCO2 BLDV: 95.8 MMHG (ref 41–51)
PEEP RESPIRATORY: 6
PH BLDA: 7.36 (ref 7.35–7.45)
PH BLDV: 7.31 (ref 7.32–7.42)
PH BLDV: 7.33 (ref 7.32–7.42)
PLATELET # BLD AUTO: 185 K/UL (ref 150–400)
PMV BLD AUTO: 11.8 FL (ref 8.9–12.9)
PO2 BLDA: 96 MMHG (ref 80–100)
PO2 BLDV: 30 MMHG (ref 25–40)
PO2 BLDV: <27 MMHG (ref 25–40)
POTASSIUM BLD-SCNC: 4.3 MMOL/L (ref 3.5–5.5)
POTASSIUM SERPL-SCNC: 3.9 MMOL/L (ref 3.5–5.1)
PROT SERPL-MCNC: 8.3 G/DL (ref 6.4–8.2)
RBC # BLD AUTO: 4.31 M/UL (ref 4.1–5.7)
SAO2 % BLD: 97 % (ref 92–97)
SAO2 % BLDV: 48.1 % (ref 65–88)
SAO2% DEVICE SAO2% SENSOR NAME: ABNORMAL
SARS-COV-2 RNA RESP QL NAA+PROBE: NOT DETECTED
SERVICE CMNT-IMP: ABNORMAL
SERVICE CMNT-IMP: ABNORMAL
SODIUM BLD-SCNC: 148 MMOL/L (ref 136–145)
SODIUM SERPL-SCNC: 140 MMOL/L (ref 136–145)
SOURCE: NORMAL
SPECIMEN SITE: ABNORMAL
SPECIMEN SITE: ABNORMAL
SPECIMEN TYPE: ABNORMAL
TROPONIN I SERPL HS-MCNC: 15 NG/L (ref 0–76)
VENTILATION MODE VENT: ABNORMAL
WBC # BLD AUTO: 7.9 K/UL (ref 4.1–11.1)

## 2025-02-22 PROCEDURE — 94761 N-INVAS EAR/PLS OXIMETRY MLT: CPT

## 2025-02-22 PROCEDURE — 2500000003 HC RX 250 WO HCPCS: Performed by: HOSPITALIST

## 2025-02-22 PROCEDURE — 99285 EMERGENCY DEPT VISIT HI MDM: CPT

## 2025-02-22 PROCEDURE — 96374 THER/PROPH/DIAG INJ IV PUSH: CPT

## 2025-02-22 PROCEDURE — 5A09557 ASSISTANCE WITH RESPIRATORY VENTILATION, GREATER THAN 96 CONSECUTIVE HOURS, CONTINUOUS POSITIVE AIRWAY PRESSURE: ICD-10-PCS | Performed by: STUDENT IN AN ORGANIZED HEALTH CARE EDUCATION/TRAINING PROGRAM

## 2025-02-22 PROCEDURE — 84484 ASSAY OF TROPONIN QUANT: CPT

## 2025-02-22 PROCEDURE — 84295 ASSAY OF SERUM SODIUM: CPT

## 2025-02-22 PROCEDURE — 85025 COMPLETE CBC W/AUTO DIFF WBC: CPT

## 2025-02-22 PROCEDURE — 2580000003 HC RX 258: Performed by: HOSPITALIST

## 2025-02-22 PROCEDURE — 87040 BLOOD CULTURE FOR BACTERIA: CPT

## 2025-02-22 PROCEDURE — 93005 ELECTROCARDIOGRAM TRACING: CPT | Performed by: STUDENT IN AN ORGANIZED HEALTH CARE EDUCATION/TRAINING PROGRAM

## 2025-02-22 PROCEDURE — 83735 ASSAY OF MAGNESIUM: CPT

## 2025-02-22 PROCEDURE — 82803 BLOOD GASES ANY COMBINATION: CPT

## 2025-02-22 PROCEDURE — 2700000000 HC OXYGEN THERAPY PER DAY

## 2025-02-22 PROCEDURE — 71045 X-RAY EXAM CHEST 1 VIEW: CPT

## 2025-02-22 PROCEDURE — 87636 SARSCOV2 & INF A&B AMP PRB: CPT

## 2025-02-22 PROCEDURE — 80053 COMPREHEN METABOLIC PANEL: CPT

## 2025-02-22 PROCEDURE — 82330 ASSAY OF CALCIUM: CPT

## 2025-02-22 PROCEDURE — 94660 CPAP INITIATION&MGMT: CPT

## 2025-02-22 PROCEDURE — 94640 AIRWAY INHALATION TREATMENT: CPT

## 2025-02-22 PROCEDURE — 6360000002 HC RX W HCPCS: Performed by: STUDENT IN AN ORGANIZED HEALTH CARE EDUCATION/TRAINING PROGRAM

## 2025-02-22 PROCEDURE — 6360000002 HC RX W HCPCS: Performed by: HOSPITALIST

## 2025-02-22 PROCEDURE — 36600 WITHDRAWAL OF ARTERIAL BLOOD: CPT

## 2025-02-22 PROCEDURE — 36415 COLL VENOUS BLD VENIPUNCTURE: CPT

## 2025-02-22 PROCEDURE — 6370000000 HC RX 637 (ALT 250 FOR IP): Performed by: STUDENT IN AN ORGANIZED HEALTH CARE EDUCATION/TRAINING PROGRAM

## 2025-02-22 PROCEDURE — 2060000000 HC ICU INTERMEDIATE R&B

## 2025-02-22 PROCEDURE — 2580000003 HC RX 258: Performed by: STUDENT IN AN ORGANIZED HEALTH CARE EDUCATION/TRAINING PROGRAM

## 2025-02-22 PROCEDURE — 82947 ASSAY GLUCOSE BLOOD QUANT: CPT

## 2025-02-22 PROCEDURE — 6370000000 HC RX 637 (ALT 250 FOR IP): Performed by: HOSPITALIST

## 2025-02-22 PROCEDURE — 2500000003 HC RX 250 WO HCPCS: Performed by: STUDENT IN AN ORGANIZED HEALTH CARE EDUCATION/TRAINING PROGRAM

## 2025-02-22 PROCEDURE — 84132 ASSAY OF SERUM POTASSIUM: CPT

## 2025-02-22 RX ORDER — PREGABALIN 75 MG/1
75 CAPSULE ORAL 2 TIMES DAILY
Status: DISCONTINUED | OUTPATIENT
Start: 2025-02-22 | End: 2025-02-22

## 2025-02-22 RX ORDER — 0.9 % SODIUM CHLORIDE 0.9 %
1000 INTRAVENOUS SOLUTION INTRAVENOUS ONCE
Status: COMPLETED | OUTPATIENT
Start: 2025-02-22 | End: 2025-02-22

## 2025-02-22 RX ORDER — ESCITALOPRAM OXALATE 10 MG/1
10 TABLET ORAL DAILY
Status: DISCONTINUED | OUTPATIENT
Start: 2025-02-23 | End: 2025-03-02 | Stop reason: HOSPADM

## 2025-02-22 RX ORDER — ACETAMINOPHEN 650 MG/1
650 SUPPOSITORY RECTAL EVERY 6 HOURS PRN
Status: DISCONTINUED | OUTPATIENT
Start: 2025-02-22 | End: 2025-03-02 | Stop reason: HOSPADM

## 2025-02-22 RX ORDER — DOXYCYCLINE HYCLATE 100 MG
100 TABLET ORAL EVERY 12 HOURS SCHEDULED
Status: DISPENSED | OUTPATIENT
Start: 2025-02-22 | End: 2025-02-27

## 2025-02-22 RX ORDER — ALBUTEROL SULFATE 0.83 MG/ML
0.63 SOLUTION RESPIRATORY (INHALATION) EVERY 6 HOURS PRN
Status: DISCONTINUED | OUTPATIENT
Start: 2025-02-22 | End: 2025-03-02 | Stop reason: HOSPADM

## 2025-02-22 RX ORDER — POTASSIUM CHLORIDE 750 MG/1
40 TABLET, EXTENDED RELEASE ORAL PRN
Status: DISCONTINUED | OUTPATIENT
Start: 2025-02-22 | End: 2025-03-02 | Stop reason: HOSPADM

## 2025-02-22 RX ORDER — IPRATROPIUM BROMIDE AND ALBUTEROL SULFATE 2.5; .5 MG/3ML; MG/3ML
1 SOLUTION RESPIRATORY (INHALATION)
Status: DISCONTINUED | OUTPATIENT
Start: 2025-02-22 | End: 2025-02-24

## 2025-02-22 RX ORDER — ENOXAPARIN SODIUM 100 MG/ML
40 INJECTION SUBCUTANEOUS DAILY
Status: DISCONTINUED | OUTPATIENT
Start: 2025-02-23 | End: 2025-03-02 | Stop reason: HOSPADM

## 2025-02-22 RX ORDER — POTASSIUM CHLORIDE 7.45 MG/ML
10 INJECTION INTRAVENOUS PRN
Status: DISCONTINUED | OUTPATIENT
Start: 2025-02-22 | End: 2025-03-02 | Stop reason: HOSPADM

## 2025-02-22 RX ORDER — SODIUM CHLORIDE 0.9 % (FLUSH) 0.9 %
5-40 SYRINGE (ML) INJECTION PRN
Status: DISCONTINUED | OUTPATIENT
Start: 2025-02-22 | End: 2025-02-28

## 2025-02-22 RX ORDER — ACETAMINOPHEN 325 MG/1
650 TABLET ORAL EVERY 6 HOURS PRN
Status: DISCONTINUED | OUTPATIENT
Start: 2025-02-22 | End: 2025-03-02 | Stop reason: HOSPADM

## 2025-02-22 RX ORDER — ONDANSETRON 2 MG/ML
4 INJECTION INTRAMUSCULAR; INTRAVENOUS EVERY 6 HOURS PRN
Status: DISCONTINUED | OUTPATIENT
Start: 2025-02-22 | End: 2025-03-02 | Stop reason: HOSPADM

## 2025-02-22 RX ORDER — MAGNESIUM SULFATE IN WATER 40 MG/ML
2000 INJECTION, SOLUTION INTRAVENOUS PRN
Status: DISCONTINUED | OUTPATIENT
Start: 2025-02-22 | End: 2025-03-02 | Stop reason: HOSPADM

## 2025-02-22 RX ORDER — SODIUM CHLORIDE 0.9 % (FLUSH) 0.9 %
5-40 SYRINGE (ML) INJECTION EVERY 12 HOURS SCHEDULED
Status: DISCONTINUED | OUTPATIENT
Start: 2025-02-22 | End: 2025-02-28

## 2025-02-22 RX ORDER — PANTOPRAZOLE SODIUM 40 MG/1
40 TABLET, DELAYED RELEASE ORAL EVERY MORNING
Status: DISCONTINUED | OUTPATIENT
Start: 2025-02-23 | End: 2025-03-02 | Stop reason: HOSPADM

## 2025-02-22 RX ORDER — IPRATROPIUM BROMIDE AND ALBUTEROL SULFATE 2.5; .5 MG/3ML; MG/3ML
3 SOLUTION RESPIRATORY (INHALATION)
Status: COMPLETED | OUTPATIENT
Start: 2025-02-22 | End: 2025-02-22

## 2025-02-22 RX ORDER — POLYETHYLENE GLYCOL 3350 17 G/17G
17 POWDER, FOR SOLUTION ORAL DAILY PRN
Status: DISCONTINUED | OUTPATIENT
Start: 2025-02-22 | End: 2025-03-02 | Stop reason: HOSPADM

## 2025-02-22 RX ORDER — GUAIFENESIN 600 MG/1
600 TABLET, EXTENDED RELEASE ORAL 2 TIMES DAILY PRN
Status: DISCONTINUED | OUTPATIENT
Start: 2025-02-22 | End: 2025-02-23

## 2025-02-22 RX ORDER — SODIUM CHLORIDE 9 MG/ML
INJECTION, SOLUTION INTRAVENOUS CONTINUOUS
Status: DISCONTINUED | OUTPATIENT
Start: 2025-02-22 | End: 2025-02-23

## 2025-02-22 RX ORDER — IPRATROPIUM BROMIDE AND ALBUTEROL SULFATE 2.5; .5 MG/3ML; MG/3ML
1 SOLUTION RESPIRATORY (INHALATION)
Status: COMPLETED | OUTPATIENT
Start: 2025-02-22 | End: 2025-02-22

## 2025-02-22 RX ORDER — SODIUM CHLORIDE 9 MG/ML
INJECTION, SOLUTION INTRAVENOUS PRN
Status: DISCONTINUED | OUTPATIENT
Start: 2025-02-22 | End: 2025-03-02 | Stop reason: HOSPADM

## 2025-02-22 RX ORDER — ONDANSETRON 4 MG/1
4 TABLET, ORALLY DISINTEGRATING ORAL EVERY 8 HOURS PRN
Status: DISCONTINUED | OUTPATIENT
Start: 2025-02-22 | End: 2025-03-02 | Stop reason: HOSPADM

## 2025-02-22 RX ADMIN — WATER 125 MG: 1 INJECTION INTRAMUSCULAR; INTRAVENOUS; SUBCUTANEOUS at 14:58

## 2025-02-22 RX ADMIN — SODIUM CHLORIDE 1000 ML: 9 INJECTION, SOLUTION INTRAVENOUS at 15:19

## 2025-02-22 RX ADMIN — METHYLPREDNISOLONE SODIUM SUCCINATE 125 MG: 125 INJECTION, POWDER, LYOPHILIZED, FOR SOLUTION INTRAMUSCULAR; INTRAVENOUS at 17:19

## 2025-02-22 RX ADMIN — IPRATROPIUM BROMIDE AND ALBUTEROL SULFATE 1 DOSE: .5; 3 SOLUTION RESPIRATORY (INHALATION) at 20:15

## 2025-02-22 RX ADMIN — SODIUM CHLORIDE: 9 INJECTION, SOLUTION INTRAVENOUS at 20:07

## 2025-02-22 RX ADMIN — SODIUM CHLORIDE: 9 INJECTION, SOLUTION INTRAVENOUS at 17:21

## 2025-02-22 RX ADMIN — IPRATROPIUM BROMIDE AND ALBUTEROL SULFATE 3 DOSE: .5; 3 SOLUTION RESPIRATORY (INHALATION) at 14:59

## 2025-02-22 RX ADMIN — IPRATROPIUM BROMIDE AND ALBUTEROL SULFATE 1 DOSE: .5; 3 SOLUTION RESPIRATORY (INHALATION) at 17:20

## 2025-02-22 RX ADMIN — WATER 1000 MG: 1 INJECTION INTRAMUSCULAR; INTRAVENOUS; SUBCUTANEOUS at 17:20

## 2025-02-22 RX ADMIN — SODIUM CHLORIDE 1000 ML: 9 INJECTION, SOLUTION INTRAVENOUS at 14:59

## 2025-02-22 NOTE — ED TRIAGE NOTES
Pt arrives to ED via EMS from home c/o difficulty breathing. Hx COPD and reports having cold like symptoms x 2 weeks. Pt baseline 3L NC O2 at home and has increased it to 5L NC while being sick. EMS found pt to be 85% on 5L NC upon arrival to scene and gave pt duoneb en route to ED. Pt O2 sat 100% on duoneb upon arrival to ED.

## 2025-02-22 NOTE — H&P
Reconciliation, Ar   escitalopram (LEXAPRO) 10 MG tablet ceived the following from [facility]: Outside name: escitalopram oxalate (LEXAPRO) 10 mg tablet 11/10/21   Automatic Reconciliation, Ar   ibuprofen (ADVIL;MOTRIN) 200 MG tablet Take by mouth    Automatic Reconciliation, Ar   lisinopril (PRINIVIL;ZESTRIL) 20 MG tablet Take 20 mg by mouth every morning    Automatic Reconciliation, Ar   pantoprazole (PROTONIX) 40 MG tablet Take 1 tablet by mouth every morning    Automatic Reconciliation, Ar   pregabalin (LYRICA) 75 MG capsule Take 1 capsule by mouth 2 times daily. 3/10/23   Automatic Reconciliation, Ar       REVIEW OF SYSTEMS:  See HPI for details  General:  negative for fever, chills, sweats, weakness, weight loss  Eyes: negative for blurred vision, eye pain, loss of vision, diplopia  Ear Nose and Throat: negative for rhinorrhea, pharyngitis, otalgia, tinnitus, speech or swallowing difficulties  Respiratory:  negative for pleuritic pain, cough, sputum production, wheezing, SOB, DEJESUS  Cardiology:  negative for chest pain, palpitations, orthopnea, PND, edema, syncope   Gastrointestinal: negative for abdominal pain, N/V, dysphagia, change in bowel habits, bleeding  Genitourinary: negative for frequency, urgency, dysuria, hematuria, incontinence  Muskuloskeletal : negative for arthralgia, myalgia  Hematology: negative for easy bruising, bleeding, lymphadenopathy  Dermatological: negative for rash, ulceration, mole change, new lesion  Endocrine: negative for hot flashes or polydipsia  Neurological: negative for headache, dizziness, confusion, focal weakness, paresthesia, memory loss, gait disturbance  Psychological: negative for anxiety, depression, agitation      Objective:   VITALS:    Vitals:    02/22/25 1615   BP: (!) 108/56   Pulse: 97   Resp: 16   Temp:    SpO2: 100%     PHYSICAL EXAM:    Physical Exam:    Gen: Well-developed, well-nourished, in  acute distress  HEENT:  Pink conjunctivae, PERRL,

## 2025-02-22 NOTE — ED NOTES
Provider notified of pt's continued low BP. BP checked on BUE per provider's request. Provider going to bedside now

## 2025-02-22 NOTE — ED PROVIDER NOTES
[facility] [place]GENCY DE[place]  EMERGENCY DEPARTMENT ENCOUNTER      Pt Name: Adrina Hurley  MRN: [id number]  Birthdate 1955  Date of evaluation: 2/22/2025  Provider: Coco Benson DO    CHIEF COMPLAINT       Chief Complaint   Patient presents with    Respiratory Distress       PMH   Past Medical History:   Diagnosis Date    COPD (chronic obstructive pulmonary disease) (HCC)     GERD (gastroesophageal reflux disease)     Hyperlipidemia     Hypertension     Polyp of colon     Sleep apnea     Uses CPAP         MDM:   Vitals:    Vitals:    02/22/25 1615   BP: (!) 108/56   Pulse: 97   Resp: 16   Temp:    SpO2: 100%           This is a 69 y.o. male with pmhx COPD on 3L NC chronically, GERD, HTN, HLD who presents today for cc of COPD exacerbation . Patient states he got a cold a week or so ago and hasn't felt right since, has been using more than his usual 3L of oxygen and home and has gone up to 5L NC.  Denies any fever, chills, chest pain, nausea, vomiting. Occasional abdominal pain while coughing but not presently. Feels very tight and wheezy. When EMS arrived he was 86% on 5L NC and they started NRB.     On arrival VS include tachypnea, 100% on NRB, tachycardia, otherwise stable.   Physical Exam  General: Alert, no acute distress  HEENT: Normocephalic, atraumatic. EOMI,  dry oral mucosa, no conjunctival injection  Neck: ROM normal, supple  Cardio: Heart tachycardic and regular rhythm, cap refill <2seconds  Lungs: respiratory distress with tripoding, accessory muscle usage, diffuse expiratory wheeze and generally diminished breath sounds bl    Abdomen: abdomen soft, nontender  MSK:ROM normal, no LE edema  Skin: Warm, dry, no rash  Neuro: No focal neurodeficits, AOx3    Patient with COPD exacerbation on arrival, given 3xduoneb and steroids, started on BiPAP.     Patient re-evaluated, breath sounds significantly improved after duonebs and though wheezing is louder he is definitely moving

## 2025-02-23 ENCOUNTER — APPOINTMENT (OUTPATIENT)
Facility: HOSPITAL | Age: 70
DRG: 189 | End: 2025-02-23
Payer: COMMERCIAL

## 2025-02-23 LAB
AMPHET UR QL SCN: NEGATIVE
ANION GAP SERPL CALC-SCNC: 4 MMOL/L (ref 2–12)
ARTERIAL PATENCY WRIST A: ABNORMAL
B PERT DNA SPEC QL NAA+PROBE: NOT DETECTED
BARBITURATES UR QL SCN: NEGATIVE
BASE EXCESS BLDA CALC-SCNC: 7.2 MMOL/L
BASOPHILS # BLD: 0 K/UL (ref 0–0.1)
BASOPHILS NFR BLD: 0 % (ref 0–1)
BDY SITE: ABNORMAL
BENZODIAZ UR QL: NEGATIVE
BORDETELLA PARAPERTUSSIS BY PCR: NOT DETECTED
BUN SERPL-MCNC: 15 MG/DL (ref 6–20)
BUN/CREAT SERPL: 28 (ref 12–20)
C PNEUM DNA SPEC QL NAA+PROBE: NOT DETECTED
CALCIUM SERPL-MCNC: 9.2 MG/DL (ref 8.5–10.1)
CANNABINOIDS UR QL SCN: NEGATIVE
CHLORIDE SERPL-SCNC: 102 MMOL/L (ref 97–108)
CO2 SERPL-SCNC: 34 MMOL/L (ref 21–32)
COCAINE UR QL SCN: NEGATIVE
CREAT SERPL-MCNC: 0.54 MG/DL (ref 0.7–1.3)
D DIMER PPP FEU-MCNC: 0.66 MG/L FEU (ref 0–0.65)
DIFFERENTIAL METHOD BLD: ABNORMAL
EKG ATRIAL RATE: 119 BPM
EKG DIAGNOSIS: NORMAL
EKG P AXIS: 83 DEGREES
EKG P-R INTERVAL: 178 MS
EKG Q-T INTERVAL: 322 MS
EKG QRS DURATION: 94 MS
EKG QTC CALCULATION (BAZETT): 452 MS
EKG R AXIS: -71 DEGREES
EKG T AXIS: 77 DEGREES
EKG VENTRICULAR RATE: 119 BPM
EOSINOPHIL # BLD: 0 K/UL (ref 0–0.4)
EOSINOPHIL NFR BLD: 0 % (ref 0–7)
ERYTHROCYTE [DISTWIDTH] IN BLOOD BY AUTOMATED COUNT: 12.6 % (ref 11.5–14.5)
FIO2 ON VENT: 40 %
FLUAV SUBTYP SPEC NAA+PROBE: NOT DETECTED
FLUBV RNA SPEC QL NAA+PROBE: NOT DETECTED
GAS FLOW.O2 SETTING OXYMISER: 4
GLUCOSE SERPL-MCNC: 267 MG/DL (ref 65–100)
HADV DNA SPEC QL NAA+PROBE: NOT DETECTED
HCO3 BLDA-SCNC: 36 MMOL/L (ref 22–26)
HCOV 229E RNA SPEC QL NAA+PROBE: NOT DETECTED
HCOV HKU1 RNA SPEC QL NAA+PROBE: NOT DETECTED
HCOV NL63 RNA SPEC QL NAA+PROBE: NOT DETECTED
HCOV OC43 RNA SPEC QL NAA+PROBE: NOT DETECTED
HCT VFR BLD AUTO: 34.5 % (ref 36.6–50.3)
HGB BLD-MCNC: 10.8 G/DL (ref 12.1–17)
HMPV RNA SPEC QL NAA+PROBE: NOT DETECTED
HPIV1 RNA SPEC QL NAA+PROBE: NOT DETECTED
HPIV2 RNA SPEC QL NAA+PROBE: NOT DETECTED
HPIV3 RNA SPEC QL NAA+PROBE: NOT DETECTED
HPIV4 RNA SPEC QL NAA+PROBE: NOT DETECTED
IMM GRANULOCYTES # BLD AUTO: 0.01 K/UL (ref 0–0.04)
IMM GRANULOCYTES NFR BLD AUTO: 0.3 % (ref 0–0.5)
IPAP/PIP: 16
LYMPHOCYTES # BLD: 0.17 K/UL (ref 0.8–3.5)
LYMPHOCYTES NFR BLD: 4.5 % (ref 12–49)
Lab: NORMAL
M PNEUMO DNA SPEC QL NAA+PROBE: NOT DETECTED
MCH RBC QN AUTO: 32.2 PG (ref 26–34)
MCHC RBC AUTO-ENTMCNC: 31.3 G/DL (ref 30–36.5)
MCV RBC AUTO: 103 FL (ref 80–99)
METHADONE UR QL: NEGATIVE
MONOCYTES # BLD: 0.02 K/UL (ref 0–1)
MONOCYTES NFR BLD: 0.5 % (ref 5–13)
NEUTS SEG # BLD: 3.5 K/UL (ref 1.8–8)
NEUTS SEG NFR BLD: 94.7 % (ref 32–75)
NRBC # BLD: 0 K/UL (ref 0–0.01)
NRBC BLD-RTO: 0 PER 100 WBC
OPIATES UR QL: NEGATIVE
PCO2 BLDA: 74 MMHG (ref 35–45)
PCP UR QL: NEGATIVE
PEEP RESPIRATORY: 6
PH BLDA: 7.31 (ref 7.35–7.45)
PLATELET # BLD AUTO: 146 K/UL (ref 150–400)
PMV BLD AUTO: 12.2 FL (ref 8.9–12.9)
PO2 BLDA: 140 MMHG (ref 80–100)
POTASSIUM SERPL-SCNC: 4.4 MMOL/L (ref 3.5–5.1)
RBC # BLD AUTO: 3.35 M/UL (ref 4.1–5.7)
RBC MORPH BLD: ABNORMAL
RSV RNA SPEC QL NAA+PROBE: NOT DETECTED
RV+EV RNA SPEC QL NAA+PROBE: NOT DETECTED
SAO2 % BLD: 99 % (ref 92–97)
SAO2% DEVICE SAO2% SENSOR NAME: ABNORMAL
SARS-COV-2 RNA RESP QL NAA+PROBE: NOT DETECTED
SODIUM SERPL-SCNC: 140 MMOL/L (ref 136–145)
SPECIMEN SITE: ABNORMAL
WBC # BLD AUTO: 3.7 K/UL (ref 4.1–11.1)

## 2025-02-23 PROCEDURE — 82803 BLOOD GASES ANY COMBINATION: CPT

## 2025-02-23 PROCEDURE — 80307 DRUG TEST PRSMV CHEM ANLYZR: CPT

## 2025-02-23 PROCEDURE — 74176 CT ABD & PELVIS W/O CONTRAST: CPT

## 2025-02-23 PROCEDURE — 94761 N-INVAS EAR/PLS OXIMETRY MLT: CPT

## 2025-02-23 PROCEDURE — 6370000000 HC RX 637 (ALT 250 FOR IP): Performed by: HOSPITALIST

## 2025-02-23 PROCEDURE — 80048 BASIC METABOLIC PNL TOTAL CA: CPT

## 2025-02-23 PROCEDURE — 6360000002 HC RX W HCPCS: Performed by: HOSPITALIST

## 2025-02-23 PROCEDURE — 2700000000 HC OXYGEN THERAPY PER DAY

## 2025-02-23 PROCEDURE — 6370000000 HC RX 637 (ALT 250 FOR IP): Performed by: STUDENT IN AN ORGANIZED HEALTH CARE EDUCATION/TRAINING PROGRAM

## 2025-02-23 PROCEDURE — 6370000000 HC RX 637 (ALT 250 FOR IP): Performed by: INTERNAL MEDICINE

## 2025-02-23 PROCEDURE — 6360000002 HC RX W HCPCS: Performed by: INTERNAL MEDICINE

## 2025-02-23 PROCEDURE — 85025 COMPLETE CBC W/AUTO DIFF WBC: CPT

## 2025-02-23 PROCEDURE — 36600 WITHDRAWAL OF ARTERIAL BLOOD: CPT

## 2025-02-23 PROCEDURE — 2500000003 HC RX 250 WO HCPCS: Performed by: HOSPITALIST

## 2025-02-23 PROCEDURE — 71045 X-RAY EXAM CHEST 1 VIEW: CPT

## 2025-02-23 PROCEDURE — 0202U NFCT DS 22 TRGT SARS-COV-2: CPT

## 2025-02-23 PROCEDURE — 2500000003 HC RX 250 WO HCPCS

## 2025-02-23 PROCEDURE — 94640 AIRWAY INHALATION TREATMENT: CPT

## 2025-02-23 PROCEDURE — 6360000002 HC RX W HCPCS: Performed by: STUDENT IN AN ORGANIZED HEALTH CARE EDUCATION/TRAINING PROGRAM

## 2025-02-23 PROCEDURE — 2500000003 HC RX 250 WO HCPCS: Performed by: INTERNAL MEDICINE

## 2025-02-23 PROCEDURE — 36415 COLL VENOUS BLD VENIPUNCTURE: CPT

## 2025-02-23 PROCEDURE — 6360000002 HC RX W HCPCS

## 2025-02-23 PROCEDURE — 2500000003 HC RX 250 WO HCPCS: Performed by: STUDENT IN AN ORGANIZED HEALTH CARE EDUCATION/TRAINING PROGRAM

## 2025-02-23 PROCEDURE — 2060000000 HC ICU INTERMEDIATE R&B

## 2025-02-23 PROCEDURE — 94660 CPAP INITIATION&MGMT: CPT

## 2025-02-23 PROCEDURE — 85379 FIBRIN DEGRADATION QUANT: CPT

## 2025-02-23 PROCEDURE — 6370000000 HC RX 637 (ALT 250 FOR IP)

## 2025-02-23 PROCEDURE — 74018 RADEX ABDOMEN 1 VIEW: CPT

## 2025-02-23 PROCEDURE — 93010 ELECTROCARDIOGRAM REPORT: CPT | Performed by: INTERNAL MEDICINE

## 2025-02-23 RX ORDER — BENZONATATE 100 MG/1
100 CAPSULE ORAL 3 TIMES DAILY PRN
Status: DISCONTINUED | OUTPATIENT
Start: 2025-02-23 | End: 2025-03-02 | Stop reason: HOSPADM

## 2025-02-23 RX ORDER — FOLIC ACID 1 MG/1
1 TABLET ORAL DAILY
Status: DISCONTINUED | OUTPATIENT
Start: 2025-02-23 | End: 2025-03-02 | Stop reason: HOSPADM

## 2025-02-23 RX ORDER — SODIUM CHLORIDE 9 MG/ML
INJECTION, SOLUTION INTRAVENOUS PRN
Status: DISCONTINUED | OUTPATIENT
Start: 2025-02-23 | End: 2025-03-02 | Stop reason: HOSPADM

## 2025-02-23 RX ORDER — SODIUM CHLORIDE 0.9 % (FLUSH) 0.9 %
5-40 SYRINGE (ML) INJECTION PRN
Status: DISCONTINUED | OUTPATIENT
Start: 2025-02-23 | End: 2025-03-02 | Stop reason: HOSPADM

## 2025-02-23 RX ORDER — DIAZEPAM 10 MG/2ML
10 INJECTION, SOLUTION INTRAMUSCULAR; INTRAVENOUS
Status: DISCONTINUED | OUTPATIENT
Start: 2025-02-23 | End: 2025-03-02 | Stop reason: HOSPADM

## 2025-02-23 RX ORDER — DIAZEPAM 10 MG/2ML
5 INJECTION, SOLUTION INTRAMUSCULAR; INTRAVENOUS EVERY 4 HOURS PRN
Status: DISCONTINUED | OUTPATIENT
Start: 2025-02-23 | End: 2025-02-23

## 2025-02-23 RX ORDER — ALBUTEROL SULFATE 1.25 MG/3ML
1.25 SOLUTION RESPIRATORY (INHALATION) EVERY 4 HOURS PRN
Status: DISCONTINUED | OUTPATIENT
Start: 2025-02-23 | End: 2025-03-02 | Stop reason: HOSPADM

## 2025-02-23 RX ORDER — GUAIFENESIN 600 MG/1
1200 TABLET, EXTENDED RELEASE ORAL 2 TIMES DAILY PRN
Status: DISCONTINUED | OUTPATIENT
Start: 2025-02-23 | End: 2025-03-02 | Stop reason: HOSPADM

## 2025-02-23 RX ORDER — ARFORMOTEROL TARTRATE 15 UG/2ML
15 SOLUTION RESPIRATORY (INHALATION)
Status: DISCONTINUED | OUTPATIENT
Start: 2025-02-23 | End: 2025-03-02 | Stop reason: HOSPADM

## 2025-02-23 RX ORDER — LORAZEPAM 1 MG/1
1 TABLET ORAL
Status: DISCONTINUED | OUTPATIENT
Start: 2025-02-23 | End: 2025-03-02 | Stop reason: HOSPADM

## 2025-02-23 RX ORDER — DIAZEPAM 10 MG/2ML
20 INJECTION, SOLUTION INTRAMUSCULAR; INTRAVENOUS
Status: DISCONTINUED | OUTPATIENT
Start: 2025-02-23 | End: 2025-03-02 | Stop reason: HOSPADM

## 2025-02-23 RX ORDER — MULTIVITAMIN WITH IRON
1 TABLET ORAL DAILY
Status: DISCONTINUED | OUTPATIENT
Start: 2025-02-23 | End: 2025-03-02 | Stop reason: HOSPADM

## 2025-02-23 RX ORDER — SIMETHICONE 80 MG
80 TABLET,CHEWABLE ORAL EVERY 6 HOURS PRN
Status: DISCONTINUED | OUTPATIENT
Start: 2025-02-23 | End: 2025-03-02 | Stop reason: HOSPADM

## 2025-02-23 RX ORDER — DIAZEPAM 5 MG/1
20 TABLET ORAL
Status: DISCONTINUED | OUTPATIENT
Start: 2025-02-23 | End: 2025-02-23

## 2025-02-23 RX ORDER — LORAZEPAM 1 MG/1
3 TABLET ORAL
Status: DISCONTINUED | OUTPATIENT
Start: 2025-02-23 | End: 2025-03-02 | Stop reason: HOSPADM

## 2025-02-23 RX ORDER — SODIUM CHLORIDE 0.9 % (FLUSH) 0.9 %
5-40 SYRINGE (ML) INJECTION EVERY 12 HOURS SCHEDULED
Status: DISCONTINUED | OUTPATIENT
Start: 2025-02-23 | End: 2025-03-02 | Stop reason: HOSPADM

## 2025-02-23 RX ORDER — DIAZEPAM 10 MG/2ML
5 INJECTION, SOLUTION INTRAMUSCULAR; INTRAVENOUS
Status: DISCONTINUED | OUTPATIENT
Start: 2025-02-23 | End: 2025-03-02 | Stop reason: HOSPADM

## 2025-02-23 RX ORDER — DIAZEPAM 5 MG/1
10 TABLET ORAL
Status: DISCONTINUED | OUTPATIENT
Start: 2025-02-23 | End: 2025-02-23

## 2025-02-23 RX ORDER — LORAZEPAM 1 MG/1
2 TABLET ORAL
Status: DISCONTINUED | OUTPATIENT
Start: 2025-02-23 | End: 2025-03-02 | Stop reason: HOSPADM

## 2025-02-23 RX ORDER — DIAZEPAM 5 MG/1
5 TABLET ORAL
Status: DISCONTINUED | OUTPATIENT
Start: 2025-02-23 | End: 2025-02-23

## 2025-02-23 RX ORDER — DIAZEPAM 5 MG/1
15 TABLET ORAL
Status: DISCONTINUED | OUTPATIENT
Start: 2025-02-23 | End: 2025-02-23

## 2025-02-23 RX ORDER — DIAZEPAM 10 MG/2ML
15 INJECTION, SOLUTION INTRAMUSCULAR; INTRAVENOUS
Status: DISCONTINUED | OUTPATIENT
Start: 2025-02-23 | End: 2025-03-02 | Stop reason: HOSPADM

## 2025-02-23 RX ORDER — DICYCLOMINE HYDROCHLORIDE 10 MG/1
10 CAPSULE ORAL
Status: DISCONTINUED | OUTPATIENT
Start: 2025-02-23 | End: 2025-03-02 | Stop reason: HOSPADM

## 2025-02-23 RX ORDER — ROFLUMILAST 500 UG/1
500 TABLET ORAL DAILY
Status: DISCONTINUED | OUTPATIENT
Start: 2025-02-23 | End: 2025-03-02 | Stop reason: HOSPADM

## 2025-02-23 RX ORDER — GAUZE BANDAGE 2" X 2"
100 BANDAGE TOPICAL DAILY
Status: DISCONTINUED | OUTPATIENT
Start: 2025-02-23 | End: 2025-03-02 | Stop reason: HOSPADM

## 2025-02-23 RX ORDER — BUDESONIDE 0.5 MG/2ML
0.5 INHALANT ORAL
Status: DISCONTINUED | OUTPATIENT
Start: 2025-02-23 | End: 2025-03-02 | Stop reason: HOSPADM

## 2025-02-23 RX ORDER — LORAZEPAM 1 MG/1
4 TABLET ORAL
Status: DISCONTINUED | OUTPATIENT
Start: 2025-02-23 | End: 2025-03-02 | Stop reason: HOSPADM

## 2025-02-23 RX ADMIN — ROFLUMILAST 500 MCG: 500 TABLET ORAL at 11:39

## 2025-02-23 RX ADMIN — IPRATROPIUM BROMIDE AND ALBUTEROL SULFATE 1 DOSE: .5; 3 SOLUTION RESPIRATORY (INHALATION) at 13:50

## 2025-02-23 RX ADMIN — Medication 100 MG: at 09:20

## 2025-02-23 RX ADMIN — METHYLPREDNISOLONE SODIUM SUCCINATE 80 MG: 125 INJECTION, POWDER, LYOPHILIZED, FOR SOLUTION INTRAMUSCULAR; INTRAVENOUS at 11:28

## 2025-02-23 RX ADMIN — SIMETHICONE 80 MG: 80 TABLET, CHEWABLE ORAL at 12:42

## 2025-02-23 RX ADMIN — DIAZEPAM 10 MG: 5 INJECTION INTRAMUSCULAR; INTRAVENOUS at 19:57

## 2025-02-23 RX ADMIN — SODIUM CHLORIDE, PRESERVATIVE FREE 10 ML: 5 INJECTION INTRAVENOUS at 09:21

## 2025-02-23 RX ADMIN — FOLIC ACID 1 MG: 1 TABLET ORAL at 09:20

## 2025-02-23 RX ADMIN — GUAIFENESIN 600 MG: 600 TABLET ORAL at 00:23

## 2025-02-23 RX ADMIN — ARFORMOTEROL TARTRATE 15 MCG: 15 SOLUTION RESPIRATORY (INHALATION) at 19:23

## 2025-02-23 RX ADMIN — PANTOPRAZOLE SODIUM 40 MG: 40 TABLET, DELAYED RELEASE ORAL at 09:20

## 2025-02-23 RX ADMIN — BUDESONIDE INHALATION 500 MCG: 0.5 SUSPENSION RESPIRATORY (INHALATION) at 19:23

## 2025-02-23 RX ADMIN — METHYLPREDNISOLONE SODIUM SUCCINATE 125 MG: 125 INJECTION, POWDER, LYOPHILIZED, FOR SOLUTION INTRAMUSCULAR; INTRAVENOUS at 05:51

## 2025-02-23 RX ADMIN — SIMETHICONE 80 MG: 80 TABLET, CHEWABLE ORAL at 18:47

## 2025-02-23 RX ADMIN — THERA TABS 1 TABLET: TAB at 09:20

## 2025-02-23 RX ADMIN — METHYLPREDNISOLONE SODIUM SUCCINATE 80 MG: 125 INJECTION, POWDER, LYOPHILIZED, FOR SOLUTION INTRAMUSCULAR; INTRAVENOUS at 17:50

## 2025-02-23 RX ADMIN — SODIUM CHLORIDE, PRESERVATIVE FREE 10 ML: 5 INJECTION INTRAVENOUS at 09:24

## 2025-02-23 RX ADMIN — SALINE NASAL SPRAY 1 SPRAY: 1.5 SOLUTION NASAL at 16:28

## 2025-02-23 RX ADMIN — DICYCLOMINE HYDROCHLORIDE 10 MG: 10 CAPSULE ORAL at 16:30

## 2025-02-23 RX ADMIN — SODIUM CHLORIDE, PRESERVATIVE FREE 10 ML: 5 INJECTION INTRAVENOUS at 21:43

## 2025-02-23 RX ADMIN — SALINE NASAL SPRAY 1 SPRAY: 1.5 SOLUTION NASAL at 00:01

## 2025-02-23 RX ADMIN — ENOXAPARIN SODIUM 40 MG: 100 INJECTION SUBCUTANEOUS at 09:21

## 2025-02-23 RX ADMIN — DOXYCYCLINE HYCLATE 100 MG: 100 TABLET, COATED ORAL at 21:42

## 2025-02-23 RX ADMIN — METHYLPREDNISOLONE SODIUM SUCCINATE 125 MG: 125 INJECTION, POWDER, LYOPHILIZED, FOR SOLUTION INTRAMUSCULAR; INTRAVENOUS at 00:02

## 2025-02-23 RX ADMIN — DOXYCYCLINE HYCLATE 100 MG: 100 TABLET, COATED ORAL at 09:20

## 2025-02-23 RX ADMIN — IPRATROPIUM BROMIDE AND ALBUTEROL SULFATE 1 DOSE: .5; 3 SOLUTION RESPIRATORY (INHALATION) at 19:18

## 2025-02-23 RX ADMIN — SODIUM CHLORIDE, PRESERVATIVE FREE 10 ML: 5 INJECTION INTRAVENOUS at 21:42

## 2025-02-23 RX ADMIN — ESCITALOPRAM OXALATE 10 MG: 10 TABLET ORAL at 09:20

## 2025-02-23 NOTE — CONSULTS
Name: Adrian Hurley Hospital: [facility]   : 1955 Admit Date: 2025   Phone: [phone]  Room: [facility]/   PCP: Palma Christopher MD  MRN: [id number]   Date: 2025  Code: Full Code          Chart and notes reviewed. Data reviewed. I review the patient's current medications in the medical record at each encounter.  I have evaluated and examined the patient.     HPI:    10:04 AM       History was obtained from patient.      I was asked by Erinn Wong MD to see Adrian Hurley in consultation for a chief complaint of acute on chronic respiratory failure.      History of Present Illness:  Mr. Hurley is a 70 yo gentleman with severe COPD (FEV1 0.8L 24% in ), chronic respiratory failure with hypoxia (4L with exertion, 3L with sleep), RADHA (not currently using BiPAP), tobacco use, CAD, HTN, PAD, carotid stenosis, and prior TIA who is admitted with acute on chronic respiratory failure with hypoxia and hypercapnia. He describes about 2 weeks of feeling poorly. It started with cold-like symptoms with nasal/sinus congestion and subsequently went to his lungs. He is short of breath at rest with chest tightness, wheezing and cough. He was noted to be hypoxic to the 80's on 5L. Required BiPAP and now satting well on 4L. CXR without any acute changes.    He is followed by Dr. Zhao of [facility]. Last seen in office . He is managed on Breztri. Daily azithromycin was stopped at that visit as it made him feel worse and dailresp was ordered (just started this as it took some time for insurance to approve and the pharmacy to have available). He has not worn BiPAP in years as it was disruptive to his wife while they are sleeping, but he is interested in resuming this. He is up to date on LDCT, most recently done in September at [facility] with stable 5mm nodule.    Labs: Cr 0.54, WBC 3.7, Hgb 10.8, RVP in lab    Images reviewed:  CXR 2025: no acute process      Past Medical History:

## 2025-02-23 NOTE — CARE COORDINATION
Care Management Initial Assessment  2/23/2025 2:52 PM  If patient is discharged prior to next notation, then this note serves as note for discharge by case management.    Reason for Admission:   Respiratory failure (HCC) [J96.90]  COPD exacerbation (HCC) [J44.1]         Patient Admission Status: Inpatient  Date Admitted to IN: 2/22  RUR: Readmission Risk Score: 11.1    Hospitalization in the last 30 days (Readmission):  No        Advance Care Planning:  Code Status: Full Code  Primary Healthcare Decision Maker: (P) Named in Scanned ACP Document   Advance Directive: has an advanced directive - a copy HAS NOT been provided.     __________________________________________________________________________  Assessment:      02/23/25 1448   Service Assessment   Patient Orientation Alert and Oriented   Cognition Alert   History Provided By Spouse  (Patient currently on Bipap)   Primary Caregiver Self   Accompanied By/Relationship spouse   Support Systems Spouse/Significant Other   Patient's Healthcare Decision Maker is: Named in Scanned ACP Document   PCP Verified by CM Yes   Last Visit to PCP Within last 3 months   Prior Functional Level Assistance with the following:;Bathing;Cooking;Housework;Shopping   Current Functional Level Assistance with the following:;Bathing;Cooking;Housework;Shopping   Can patient return to prior living arrangement Yes   Ability to make needs known: Good   Family able to assist with home care needs: Yes   Would you like for me to discuss the discharge plan with any other family members/significant others, and if so, who? Yes  (spouse at bedside)   Social/Functional History   Lives With Spouse   Type of Home House   Bathroom Shower/Tub Walk-in shower   Bathroom Equipment Shower chair   Home Equipment Oxygen;Electric scooter   Receives Help From Family   Prior Level of Assist for ADLs   (assistance as needed based on respiratory status)   Ambulation Assistance Independent   Prior Level of

## 2025-02-23 NOTE — FLOWSHEET NOTE
Concern for alcohol withdrawal. Patient offers differing dates of last drink. CIWA protocol initiated. Thiamine, folic acid, multivitamin ordered. Check UDS. Ammonia, lipase, B12 added to AM labs. Valium as needed. Continue to monitor.

## 2025-02-24 ENCOUNTER — APPOINTMENT (OUTPATIENT)
Facility: HOSPITAL | Age: 70
DRG: 189 | End: 2025-02-24
Payer: COMMERCIAL

## 2025-02-24 LAB
AMMONIA PLAS-SCNC: 19 UMOL/L
ANION GAP SERPL CALC-SCNC: 2 MMOL/L (ref 2–12)
BUN SERPL-MCNC: 15 MG/DL (ref 6–20)
BUN/CREAT SERPL: 33 (ref 12–20)
CALCIUM SERPL-MCNC: 9.5 MG/DL (ref 8.5–10.1)
CHLORIDE SERPL-SCNC: 101 MMOL/L (ref 97–108)
CO2 SERPL-SCNC: 39 MMOL/L (ref 21–32)
CREAT SERPL-MCNC: 0.46 MG/DL (ref 0.7–1.3)
GLUCOSE SERPL-MCNC: 126 MG/DL (ref 65–100)
LIPASE SERPL-CCNC: 36 U/L (ref 13–75)
POTASSIUM SERPL-SCNC: 4.4 MMOL/L (ref 3.5–5.1)
SODIUM SERPL-SCNC: 142 MMOL/L (ref 136–145)
VIT B12 SERPL-MCNC: 696 PG/ML (ref 193–986)

## 2025-02-24 PROCEDURE — 94761 N-INVAS EAR/PLS OXIMETRY MLT: CPT

## 2025-02-24 PROCEDURE — 6370000000 HC RX 637 (ALT 250 FOR IP): Performed by: INTERNAL MEDICINE

## 2025-02-24 PROCEDURE — 80048 BASIC METABOLIC PNL TOTAL CA: CPT

## 2025-02-24 PROCEDURE — 6370000000 HC RX 637 (ALT 250 FOR IP): Performed by: STUDENT IN AN ORGANIZED HEALTH CARE EDUCATION/TRAINING PROGRAM

## 2025-02-24 PROCEDURE — 97535 SELF CARE MNGMENT TRAINING: CPT

## 2025-02-24 PROCEDURE — 2060000000 HC ICU INTERMEDIATE R&B

## 2025-02-24 PROCEDURE — 6360000002 HC RX W HCPCS

## 2025-02-24 PROCEDURE — 2500000003 HC RX 250 WO HCPCS: Performed by: HOSPITALIST

## 2025-02-24 PROCEDURE — 6360000002 HC RX W HCPCS: Performed by: HOSPITALIST

## 2025-02-24 PROCEDURE — 2500000003 HC RX 250 WO HCPCS

## 2025-02-24 PROCEDURE — 6370000000 HC RX 637 (ALT 250 FOR IP)

## 2025-02-24 PROCEDURE — 83690 ASSAY OF LIPASE: CPT

## 2025-02-24 PROCEDURE — 97116 GAIT TRAINING THERAPY: CPT

## 2025-02-24 PROCEDURE — 97161 PT EVAL LOW COMPLEX 20 MIN: CPT

## 2025-02-24 PROCEDURE — 94660 CPAP INITIATION&MGMT: CPT

## 2025-02-24 PROCEDURE — 2500000003 HC RX 250 WO HCPCS: Performed by: INTERNAL MEDICINE

## 2025-02-24 PROCEDURE — 82607 VITAMIN B-12: CPT

## 2025-02-24 PROCEDURE — 6360000002 HC RX W HCPCS: Performed by: INTERNAL MEDICINE

## 2025-02-24 PROCEDURE — 82140 ASSAY OF AMMONIA: CPT

## 2025-02-24 PROCEDURE — 6370000000 HC RX 637 (ALT 250 FOR IP): Performed by: HOSPITALIST

## 2025-02-24 PROCEDURE — 94640 AIRWAY INHALATION TREATMENT: CPT

## 2025-02-24 PROCEDURE — 97165 OT EVAL LOW COMPLEX 30 MIN: CPT

## 2025-02-24 PROCEDURE — 97530 THERAPEUTIC ACTIVITIES: CPT

## 2025-02-24 RX ORDER — IOPAMIDOL 755 MG/ML
100 INJECTION, SOLUTION INTRAVASCULAR
Status: COMPLETED | OUTPATIENT
Start: 2025-02-24 | End: 2025-02-25

## 2025-02-24 RX ORDER — LORAZEPAM 1 MG/1
1 TABLET ORAL EVERY 6 HOURS PRN
Status: DISCONTINUED | OUTPATIENT
Start: 2025-02-24 | End: 2025-03-02 | Stop reason: HOSPADM

## 2025-02-24 RX ORDER — IPRATROPIUM BROMIDE AND ALBUTEROL SULFATE 2.5; .5 MG/3ML; MG/3ML
1 SOLUTION RESPIRATORY (INHALATION)
Status: DISCONTINUED | OUTPATIENT
Start: 2025-02-24 | End: 2025-03-02 | Stop reason: HOSPADM

## 2025-02-24 RX ADMIN — DIAZEPAM 10 MG: 5 INJECTION INTRAMUSCULAR; INTRAVENOUS at 06:45

## 2025-02-24 RX ADMIN — SODIUM CHLORIDE, PRESERVATIVE FREE 10 ML: 5 INJECTION INTRAVENOUS at 23:21

## 2025-02-24 RX ADMIN — METHYLPREDNISOLONE SODIUM SUCCINATE 80 MG: 125 INJECTION, POWDER, LYOPHILIZED, FOR SOLUTION INTRAMUSCULAR; INTRAVENOUS at 18:51

## 2025-02-24 RX ADMIN — ARFORMOTEROL TARTRATE 15 MCG: 15 SOLUTION RESPIRATORY (INHALATION) at 07:26

## 2025-02-24 RX ADMIN — THERA TABS 1 TABLET: TAB at 08:59

## 2025-02-24 RX ADMIN — IPRATROPIUM BROMIDE AND ALBUTEROL SULFATE 1 DOSE: 2.5; .5 SOLUTION RESPIRATORY (INHALATION) at 20:58

## 2025-02-24 RX ADMIN — PANTOPRAZOLE SODIUM 40 MG: 40 TABLET, DELAYED RELEASE ORAL at 08:59

## 2025-02-24 RX ADMIN — SODIUM CHLORIDE, PRESERVATIVE FREE 10 ML: 5 INJECTION INTRAVENOUS at 09:00

## 2025-02-24 RX ADMIN — ESCITALOPRAM OXALATE 10 MG: 10 TABLET ORAL at 08:59

## 2025-02-24 RX ADMIN — Medication 100 MG: at 08:59

## 2025-02-24 RX ADMIN — METHYLPREDNISOLONE SODIUM SUCCINATE 125 MG: 125 INJECTION, POWDER, LYOPHILIZED, FOR SOLUTION INTRAMUSCULAR; INTRAVENOUS at 12:34

## 2025-02-24 RX ADMIN — BUDESONIDE INHALATION 500 MCG: 0.5 SUSPENSION RESPIRATORY (INHALATION) at 21:03

## 2025-02-24 RX ADMIN — IPRATROPIUM BROMIDE AND ALBUTEROL SULFATE 1 DOSE: 2.5; .5 SOLUTION RESPIRATORY (INHALATION) at 07:21

## 2025-02-24 RX ADMIN — LORAZEPAM 1 MG: 1 TABLET ORAL at 14:39

## 2025-02-24 RX ADMIN — ENOXAPARIN SODIUM 40 MG: 100 INJECTION SUBCUTANEOUS at 08:59

## 2025-02-24 RX ADMIN — ROFLUMILAST 500 MCG: 500 TABLET ORAL at 08:59

## 2025-02-24 RX ADMIN — DOXYCYCLINE HYCLATE 100 MG: 100 TABLET, COATED ORAL at 21:17

## 2025-02-24 RX ADMIN — METHYLPREDNISOLONE SODIUM SUCCINATE 80 MG: 125 INJECTION, POWDER, LYOPHILIZED, FOR SOLUTION INTRAMUSCULAR; INTRAVENOUS at 00:11

## 2025-02-24 RX ADMIN — FOLIC ACID 1 MG: 1 TABLET ORAL at 08:59

## 2025-02-24 RX ADMIN — LORAZEPAM 1 MG: 1 TABLET ORAL at 21:17

## 2025-02-24 RX ADMIN — SODIUM CHLORIDE, PRESERVATIVE FREE 10 ML: 5 INJECTION INTRAVENOUS at 23:22

## 2025-02-24 RX ADMIN — METHYLPREDNISOLONE SODIUM SUCCINATE 80 MG: 125 INJECTION, POWDER, LYOPHILIZED, FOR SOLUTION INTRAMUSCULAR; INTRAVENOUS at 23:19

## 2025-02-24 RX ADMIN — IPRATROPIUM BROMIDE AND ALBUTEROL SULFATE 1 DOSE: 2.5; .5 SOLUTION RESPIRATORY (INHALATION) at 14:01

## 2025-02-24 RX ADMIN — DICYCLOMINE HYDROCHLORIDE 10 MG: 10 CAPSULE ORAL at 06:35

## 2025-02-24 RX ADMIN — ARFORMOTEROL TARTRATE 15 MCG: 15 SOLUTION RESPIRATORY (INHALATION) at 21:03

## 2025-02-24 RX ADMIN — METHYLPREDNISOLONE SODIUM SUCCINATE 80 MG: 125 INJECTION, POWDER, LYOPHILIZED, FOR SOLUTION INTRAMUSCULAR; INTRAVENOUS at 06:34

## 2025-02-24 RX ADMIN — DICYCLOMINE HYDROCHLORIDE 10 MG: 10 CAPSULE ORAL at 16:27

## 2025-02-24 RX ADMIN — DOXYCYCLINE HYCLATE 100 MG: 100 TABLET, COATED ORAL at 08:59

## 2025-02-24 RX ADMIN — BUDESONIDE INHALATION 500 MCG: 0.5 SUSPENSION RESPIRATORY (INHALATION) at 07:26

## 2025-02-24 RX ADMIN — DICYCLOMINE HYDROCHLORIDE 10 MG: 10 CAPSULE ORAL at 12:35

## 2025-02-24 NOTE — CARE COORDINATION
4:18 PM  02/24/25    Care Management Progress Note    Reason for Admission:   Respiratory failure (HCC) [J96.90]  COPD exacerbation (HCC) [J44.1]         Patient Admission Status: Inpatient  Date Admitted to IN: 2/22/25   RUR: Readmission Risk Score: 12.8    Hospitalization in the last 30 days (Readmission):  No        Transition of care plan:  Pt discussed during IDR- pulmonology and therapy following. Pt on 4L O2 at baseline.   Anticipated discharge plan: home with family assistance and Home Health per therapy recs. Pt and wife will discuss and let CM know agency preference.  Date IM given: [x]NA  Outpatient follow-up.  Discharge transport: Family will transport at dc.    CM will continue to follow and assist with discharge needs.    CHRISTOPHER Garcia

## 2025-02-25 ENCOUNTER — APPOINTMENT (OUTPATIENT)
Facility: HOSPITAL | Age: 70
DRG: 189 | End: 2025-02-25
Payer: COMMERCIAL

## 2025-02-25 LAB
ANION GAP SERPL CALC-SCNC: 3 MMOL/L (ref 2–12)
BUN SERPL-MCNC: 20 MG/DL (ref 6–20)
BUN/CREAT SERPL: 39 (ref 12–20)
CALCIUM SERPL-MCNC: 9.7 MG/DL (ref 8.5–10.1)
CHLORIDE SERPL-SCNC: 98 MMOL/L (ref 97–108)
CO2 SERPL-SCNC: 40 MMOL/L (ref 21–32)
CREAT SERPL-MCNC: 0.51 MG/DL (ref 0.7–1.3)
GLUCOSE SERPL-MCNC: 146 MG/DL (ref 65–100)
POTASSIUM SERPL-SCNC: 4.4 MMOL/L (ref 3.5–5.1)
SODIUM SERPL-SCNC: 141 MMOL/L (ref 136–145)

## 2025-02-25 PROCEDURE — 6360000002 HC RX W HCPCS: Performed by: HOSPITALIST

## 2025-02-25 PROCEDURE — 6370000000 HC RX 637 (ALT 250 FOR IP): Performed by: STUDENT IN AN ORGANIZED HEALTH CARE EDUCATION/TRAINING PROGRAM

## 2025-02-25 PROCEDURE — 2500000003 HC RX 250 WO HCPCS

## 2025-02-25 PROCEDURE — 2060000000 HC ICU INTERMEDIATE R&B

## 2025-02-25 PROCEDURE — 6370000000 HC RX 637 (ALT 250 FOR IP)

## 2025-02-25 PROCEDURE — 97530 THERAPEUTIC ACTIVITIES: CPT

## 2025-02-25 PROCEDURE — 6360000002 HC RX W HCPCS: Performed by: STUDENT IN AN ORGANIZED HEALTH CARE EDUCATION/TRAINING PROGRAM

## 2025-02-25 PROCEDURE — 94660 CPAP INITIATION&MGMT: CPT

## 2025-02-25 PROCEDURE — 80048 BASIC METABOLIC PNL TOTAL CA: CPT

## 2025-02-25 PROCEDURE — 6370000000 HC RX 637 (ALT 250 FOR IP): Performed by: HOSPITALIST

## 2025-02-25 PROCEDURE — 2500000003 HC RX 250 WO HCPCS: Performed by: STUDENT IN AN ORGANIZED HEALTH CARE EDUCATION/TRAINING PROGRAM

## 2025-02-25 PROCEDURE — 6360000002 HC RX W HCPCS: Performed by: INTERNAL MEDICINE

## 2025-02-25 PROCEDURE — 2700000000 HC OXYGEN THERAPY PER DAY

## 2025-02-25 PROCEDURE — 94640 AIRWAY INHALATION TREATMENT: CPT

## 2025-02-25 PROCEDURE — 2500000003 HC RX 250 WO HCPCS: Performed by: INTERNAL MEDICINE

## 2025-02-25 PROCEDURE — 94761 N-INVAS EAR/PLS OXIMETRY MLT: CPT

## 2025-02-25 PROCEDURE — 94010 BREATHING CAPACITY TEST: CPT

## 2025-02-25 PROCEDURE — 6370000000 HC RX 637 (ALT 250 FOR IP): Performed by: INTERNAL MEDICINE

## 2025-02-25 PROCEDURE — 71275 CT ANGIOGRAPHY CHEST: CPT

## 2025-02-25 PROCEDURE — 2500000003 HC RX 250 WO HCPCS: Performed by: HOSPITALIST

## 2025-02-25 PROCEDURE — 6360000004 HC RX CONTRAST MEDICATION: Performed by: STUDENT IN AN ORGANIZED HEALTH CARE EDUCATION/TRAINING PROGRAM

## 2025-02-25 RX ADMIN — Medication 100 MG: at 09:48

## 2025-02-25 RX ADMIN — METHYLPREDNISOLONE SODIUM SUCCINATE 80 MG: 125 INJECTION, POWDER, LYOPHILIZED, FOR SOLUTION INTRAMUSCULAR; INTRAVENOUS at 21:03

## 2025-02-25 RX ADMIN — METHYLPREDNISOLONE SODIUM SUCCINATE 80 MG: 125 INJECTION, POWDER, LYOPHILIZED, FOR SOLUTION INTRAMUSCULAR; INTRAVENOUS at 05:20

## 2025-02-25 RX ADMIN — ENOXAPARIN SODIUM 40 MG: 100 INJECTION SUBCUTANEOUS at 09:51

## 2025-02-25 RX ADMIN — DICYCLOMINE HYDROCHLORIDE 10 MG: 10 CAPSULE ORAL at 16:05

## 2025-02-25 RX ADMIN — IOPAMIDOL 70 ML: 755 INJECTION, SOLUTION INTRAVENOUS at 20:10

## 2025-02-25 RX ADMIN — PANTOPRAZOLE SODIUM 40 MG: 40 TABLET, DELAYED RELEASE ORAL at 09:48

## 2025-02-25 RX ADMIN — BUDESONIDE INHALATION 500 MCG: 0.5 SUSPENSION RESPIRATORY (INHALATION) at 08:35

## 2025-02-25 RX ADMIN — BUDESONIDE INHALATION 500 MCG: 0.5 SUSPENSION RESPIRATORY (INHALATION) at 22:17

## 2025-02-25 RX ADMIN — LORAZEPAM 1 MG: 1 TABLET ORAL at 09:47

## 2025-02-25 RX ADMIN — SODIUM CHLORIDE, PRESERVATIVE FREE 10 ML: 5 INJECTION INTRAVENOUS at 21:03

## 2025-02-25 RX ADMIN — DOXYCYCLINE HYCLATE 100 MG: 100 TABLET, COATED ORAL at 09:48

## 2025-02-25 RX ADMIN — ARFORMOTEROL TARTRATE 15 MCG: 15 SOLUTION RESPIRATORY (INHALATION) at 08:35

## 2025-02-25 RX ADMIN — IPRATROPIUM BROMIDE AND ALBUTEROL SULFATE 1 DOSE: 2.5; .5 SOLUTION RESPIRATORY (INHALATION) at 08:35

## 2025-02-25 RX ADMIN — SODIUM CHLORIDE, PRESERVATIVE FREE 10 ML: 5 INJECTION INTRAVENOUS at 21:04

## 2025-02-25 RX ADMIN — ARFORMOTEROL TARTRATE 15 MCG: 15 SOLUTION RESPIRATORY (INHALATION) at 22:17

## 2025-02-25 RX ADMIN — SODIUM CHLORIDE, PRESERVATIVE FREE 10 ML: 5 INJECTION INTRAVENOUS at 09:50

## 2025-02-25 RX ADMIN — DOXYCYCLINE HYCLATE 100 MG: 100 TABLET, COATED ORAL at 21:03

## 2025-02-25 RX ADMIN — DICYCLOMINE HYDROCHLORIDE 10 MG: 10 CAPSULE ORAL at 09:48

## 2025-02-25 RX ADMIN — ROFLUMILAST 500 MCG: 500 TABLET ORAL at 09:48

## 2025-02-25 RX ADMIN — LORAZEPAM 1 MG: 1 TABLET ORAL at 16:05

## 2025-02-25 RX ADMIN — METHYLPREDNISOLONE SODIUM SUCCINATE 80 MG: 125 INJECTION, POWDER, LYOPHILIZED, FOR SOLUTION INTRAMUSCULAR; INTRAVENOUS at 14:39

## 2025-02-25 RX ADMIN — THERA TABS 1 TABLET: TAB at 09:48

## 2025-02-25 RX ADMIN — GUAIFENESIN 1200 MG: 600 TABLET ORAL at 21:12

## 2025-02-25 RX ADMIN — IPRATROPIUM BROMIDE AND ALBUTEROL SULFATE 1 DOSE: 2.5; .5 SOLUTION RESPIRATORY (INHALATION) at 22:10

## 2025-02-25 RX ADMIN — FOLIC ACID 1 MG: 1 TABLET ORAL at 09:48

## 2025-02-25 RX ADMIN — BENZONATATE 100 MG: 100 CAPSULE ORAL at 21:12

## 2025-02-25 RX ADMIN — IPRATROPIUM BROMIDE AND ALBUTEROL SULFATE 1 DOSE: 2.5; .5 SOLUTION RESPIRATORY (INHALATION) at 13:16

## 2025-02-25 RX ADMIN — DICYCLOMINE HYDROCHLORIDE 10 MG: 10 CAPSULE ORAL at 05:20

## 2025-02-25 RX ADMIN — ESCITALOPRAM OXALATE 10 MG: 10 TABLET ORAL at 09:48

## 2025-02-25 ASSESSMENT — PAIN DESCRIPTION - DESCRIPTORS
DESCRIPTORS: TIGHTNESS
DESCRIPTORS: TIGHTNESS

## 2025-02-25 ASSESSMENT — COPD QUESTIONNAIRES: GOLD_GROUP: GROUP E

## 2025-02-25 ASSESSMENT — PAIN DESCRIPTION - ORIENTATION
ORIENTATION: OTHER (COMMENT)
ORIENTATION: OTHER (COMMENT)

## 2025-02-25 ASSESSMENT — PAIN DESCRIPTION - LOCATION
LOCATION: ABDOMEN
LOCATION: ABDOMEN

## 2025-02-25 ASSESSMENT — PAIN SCALES - GENERAL
PAINLEVEL_OUTOF10: 5
PAINLEVEL_OUTOF10: 7
PAINLEVEL_OUTOF10: 4

## 2025-02-25 ASSESSMENT — PULMONARY FUNCTION TESTS: FEV1 (%PREDICTED): 24

## 2025-02-25 NOTE — CARE COORDINATION
11:52 AM  02/25/25    Care Management Progress Note    Reason for Admission:   Respiratory failure (HCC) [J96.90]  COPD exacerbation (HCC) [J44.1]         Patient Admission Status: Inpatient  Date Admitted to IN: 2/22/25   RUR: Readmission Risk Score: 12.8    Hospitalization in the last 30 days (Readmission):  No        Transition of care plan:  Pt discussed during IDR- pulmonology following.    Anticipated discharge plan: Home with family assistance; CM met face to face with pt and his wife. Pt isn't sure he needs PT at home, he will think about it and discuss with wife and let CM know. Pt's wife asked about portable concentrator- pt has home O2 that they have purchased out of pocket but they would like a portable concentrator. CM sent referral to [facility] to see if they accept pt's insurance; awaiting response.    Date IM given: [x]NA    Outpatient follow-up.    Discharge transport: Wife will transport at dc.      CM consult for [employer] noted- pt aware and agreeable; CM sent referral via [facility].    CM will continue to follow.    CHRISTOPHER Garcia

## 2025-02-26 PROCEDURE — 94640 AIRWAY INHALATION TREATMENT: CPT

## 2025-02-26 PROCEDURE — 2500000003 HC RX 250 WO HCPCS: Performed by: HOSPITALIST

## 2025-02-26 PROCEDURE — 6370000000 HC RX 637 (ALT 250 FOR IP): Performed by: HOSPITALIST

## 2025-02-26 PROCEDURE — 2500000003 HC RX 250 WO HCPCS: Performed by: STUDENT IN AN ORGANIZED HEALTH CARE EDUCATION/TRAINING PROGRAM

## 2025-02-26 PROCEDURE — 6360000002 HC RX W HCPCS: Performed by: INTERNAL MEDICINE

## 2025-02-26 PROCEDURE — 6370000000 HC RX 637 (ALT 250 FOR IP): Performed by: INTERNAL MEDICINE

## 2025-02-26 PROCEDURE — 2500000003 HC RX 250 WO HCPCS

## 2025-02-26 PROCEDURE — 6370000000 HC RX 637 (ALT 250 FOR IP)

## 2025-02-26 PROCEDURE — 94761 N-INVAS EAR/PLS OXIMETRY MLT: CPT

## 2025-02-26 PROCEDURE — 97116 GAIT TRAINING THERAPY: CPT

## 2025-02-26 PROCEDURE — 2060000000 HC ICU INTERMEDIATE R&B

## 2025-02-26 PROCEDURE — 6360000002 HC RX W HCPCS: Performed by: HOSPITALIST

## 2025-02-26 PROCEDURE — 2700000000 HC OXYGEN THERAPY PER DAY

## 2025-02-26 PROCEDURE — 6370000000 HC RX 637 (ALT 250 FOR IP): Performed by: STUDENT IN AN ORGANIZED HEALTH CARE EDUCATION/TRAINING PROGRAM

## 2025-02-26 PROCEDURE — 97530 THERAPEUTIC ACTIVITIES: CPT

## 2025-02-26 PROCEDURE — 6360000002 HC RX W HCPCS: Performed by: STUDENT IN AN ORGANIZED HEALTH CARE EDUCATION/TRAINING PROGRAM

## 2025-02-26 PROCEDURE — 94660 CPAP INITIATION&MGMT: CPT

## 2025-02-26 RX ORDER — BUSPIRONE HYDROCHLORIDE 10 MG/1
10 TABLET ORAL 3 TIMES DAILY
Status: DISCONTINUED | OUTPATIENT
Start: 2025-02-26 | End: 2025-03-02 | Stop reason: HOSPADM

## 2025-02-26 RX ADMIN — BUDESONIDE INHALATION 500 MCG: 0.5 SUSPENSION RESPIRATORY (INHALATION) at 21:41

## 2025-02-26 RX ADMIN — METHYLPREDNISOLONE SODIUM SUCCINATE 60 MG: 125 INJECTION, POWDER, LYOPHILIZED, FOR SOLUTION INTRAMUSCULAR; INTRAVENOUS at 22:10

## 2025-02-26 RX ADMIN — METHYLPREDNISOLONE SODIUM SUCCINATE 60 MG: 125 INJECTION, POWDER, LYOPHILIZED, FOR SOLUTION INTRAMUSCULAR; INTRAVENOUS at 10:47

## 2025-02-26 RX ADMIN — Medication 100 MG: at 08:53

## 2025-02-26 RX ADMIN — IPRATROPIUM BROMIDE AND ALBUTEROL SULFATE 1 DOSE: 2.5; .5 SOLUTION RESPIRATORY (INHALATION) at 21:41

## 2025-02-26 RX ADMIN — BUSPIRONE HYDROCHLORIDE 10 MG: 10 TABLET ORAL at 22:09

## 2025-02-26 RX ADMIN — BUDESONIDE INHALATION 500 MCG: 0.5 SUSPENSION RESPIRATORY (INHALATION) at 07:55

## 2025-02-26 RX ADMIN — DICYCLOMINE HYDROCHLORIDE 10 MG: 10 CAPSULE ORAL at 08:53

## 2025-02-26 RX ADMIN — DICYCLOMINE HYDROCHLORIDE 10 MG: 10 CAPSULE ORAL at 16:12

## 2025-02-26 RX ADMIN — THERA TABS 1 TABLET: TAB at 08:53

## 2025-02-26 RX ADMIN — PANTOPRAZOLE SODIUM 40 MG: 40 TABLET, DELAYED RELEASE ORAL at 08:54

## 2025-02-26 RX ADMIN — SALINE NASAL SPRAY 1 SPRAY: 1.5 SOLUTION NASAL at 09:10

## 2025-02-26 RX ADMIN — BUSPIRONE HYDROCHLORIDE 10 MG: 10 TABLET ORAL at 13:10

## 2025-02-26 RX ADMIN — DOXYCYCLINE HYCLATE 100 MG: 100 TABLET, COATED ORAL at 22:09

## 2025-02-26 RX ADMIN — DICYCLOMINE HYDROCHLORIDE 10 MG: 10 CAPSULE ORAL at 10:47

## 2025-02-26 RX ADMIN — FOLIC ACID 1 MG: 1 TABLET ORAL at 08:54

## 2025-02-26 RX ADMIN — IPRATROPIUM BROMIDE AND ALBUTEROL SULFATE 1 DOSE: 2.5; .5 SOLUTION RESPIRATORY (INHALATION) at 07:52

## 2025-02-26 RX ADMIN — SODIUM CHLORIDE, PRESERVATIVE FREE 10 ML: 5 INJECTION INTRAVENOUS at 22:10

## 2025-02-26 RX ADMIN — ARFORMOTEROL TARTRATE 15 MCG: 15 SOLUTION RESPIRATORY (INHALATION) at 07:55

## 2025-02-26 RX ADMIN — IPRATROPIUM BROMIDE AND ALBUTEROL SULFATE 1 DOSE: 2.5; .5 SOLUTION RESPIRATORY (INHALATION) at 13:27

## 2025-02-26 RX ADMIN — DOXYCYCLINE HYCLATE 100 MG: 100 TABLET, COATED ORAL at 08:53

## 2025-02-26 RX ADMIN — ROFLUMILAST 500 MCG: 500 TABLET ORAL at 08:53

## 2025-02-26 RX ADMIN — ENOXAPARIN SODIUM 40 MG: 100 INJECTION SUBCUTANEOUS at 08:54

## 2025-02-26 RX ADMIN — METHYLPREDNISOLONE SODIUM SUCCINATE 80 MG: 125 INJECTION, POWDER, LYOPHILIZED, FOR SOLUTION INTRAMUSCULAR; INTRAVENOUS at 06:20

## 2025-02-26 RX ADMIN — LORAZEPAM 1 MG: 1 TABLET ORAL at 22:09

## 2025-02-26 RX ADMIN — ARFORMOTEROL TARTRATE 15 MCG: 15 SOLUTION RESPIRATORY (INHALATION) at 21:41

## 2025-02-26 RX ADMIN — ESCITALOPRAM OXALATE 10 MG: 10 TABLET ORAL at 08:53

## 2025-02-26 RX ADMIN — SODIUM CHLORIDE, PRESERVATIVE FREE 10 ML: 5 INJECTION INTRAVENOUS at 08:55

## 2025-02-26 RX ADMIN — BUSPIRONE HYDROCHLORIDE 10 MG: 10 TABLET ORAL at 10:47

## 2025-02-26 RX ADMIN — SODIUM CHLORIDE, PRESERVATIVE FREE 10 ML: 5 INJECTION INTRAVENOUS at 08:54

## 2025-02-26 RX ADMIN — LORAZEPAM 1 MG: 1 TABLET ORAL at 08:54

## 2025-02-26 ASSESSMENT — PAIN SCALES - GENERAL
PAINLEVEL_OUTOF10: 6
PAINLEVEL_OUTOF10: 4
PAINLEVEL_OUTOF10: 4

## 2025-02-26 ASSESSMENT — PAIN DESCRIPTION - ORIENTATION
ORIENTATION: OTHER (COMMENT)

## 2025-02-26 ASSESSMENT — PAIN DESCRIPTION - LOCATION
LOCATION: ABDOMEN

## 2025-02-26 ASSESSMENT — PAIN DESCRIPTION - DESCRIPTORS
DESCRIPTORS: TIGHTNESS

## 2025-02-27 PROCEDURE — 2700000000 HC OXYGEN THERAPY PER DAY

## 2025-02-27 PROCEDURE — 2060000000 HC ICU INTERMEDIATE R&B

## 2025-02-27 PROCEDURE — 94640 AIRWAY INHALATION TREATMENT: CPT

## 2025-02-27 PROCEDURE — 6360000002 HC RX W HCPCS: Performed by: INTERNAL MEDICINE

## 2025-02-27 PROCEDURE — 6370000000 HC RX 637 (ALT 250 FOR IP): Performed by: INTERNAL MEDICINE

## 2025-02-27 PROCEDURE — 6360000002 HC RX W HCPCS: Performed by: STUDENT IN AN ORGANIZED HEALTH CARE EDUCATION/TRAINING PROGRAM

## 2025-02-27 PROCEDURE — 97116 GAIT TRAINING THERAPY: CPT

## 2025-02-27 PROCEDURE — 6370000000 HC RX 637 (ALT 250 FOR IP)

## 2025-02-27 PROCEDURE — 94761 N-INVAS EAR/PLS OXIMETRY MLT: CPT

## 2025-02-27 PROCEDURE — 2500000003 HC RX 250 WO HCPCS: Performed by: HOSPITALIST

## 2025-02-27 PROCEDURE — 6370000000 HC RX 637 (ALT 250 FOR IP): Performed by: STUDENT IN AN ORGANIZED HEALTH CARE EDUCATION/TRAINING PROGRAM

## 2025-02-27 PROCEDURE — 2500000003 HC RX 250 WO HCPCS

## 2025-02-27 PROCEDURE — 6370000000 HC RX 637 (ALT 250 FOR IP): Performed by: HOSPITALIST

## 2025-02-27 PROCEDURE — 94660 CPAP INITIATION&MGMT: CPT

## 2025-02-27 PROCEDURE — 6360000002 HC RX W HCPCS: Performed by: HOSPITALIST

## 2025-02-27 PROCEDURE — 2500000003 HC RX 250 WO HCPCS: Performed by: STUDENT IN AN ORGANIZED HEALTH CARE EDUCATION/TRAINING PROGRAM

## 2025-02-27 RX ADMIN — SODIUM CHLORIDE, PRESERVATIVE FREE 10 ML: 5 INJECTION INTRAVENOUS at 20:50

## 2025-02-27 RX ADMIN — BUSPIRONE HYDROCHLORIDE 10 MG: 10 TABLET ORAL at 20:49

## 2025-02-27 RX ADMIN — ARFORMOTEROL TARTRATE 15 MCG: 15 SOLUTION RESPIRATORY (INHALATION) at 07:59

## 2025-02-27 RX ADMIN — Medication 100 MG: at 09:38

## 2025-02-27 RX ADMIN — ENOXAPARIN SODIUM 40 MG: 100 INJECTION SUBCUTANEOUS at 09:40

## 2025-02-27 RX ADMIN — ROFLUMILAST 500 MCG: 500 TABLET ORAL at 09:39

## 2025-02-27 RX ADMIN — FOLIC ACID 1 MG: 1 TABLET ORAL at 09:38

## 2025-02-27 RX ADMIN — ARFORMOTEROL TARTRATE 15 MCG: 15 SOLUTION RESPIRATORY (INHALATION) at 19:31

## 2025-02-27 RX ADMIN — BUSPIRONE HYDROCHLORIDE 10 MG: 10 TABLET ORAL at 09:38

## 2025-02-27 RX ADMIN — LORAZEPAM 1 MG: 1 TABLET ORAL at 13:14

## 2025-02-27 RX ADMIN — THERA TABS 1 TABLET: TAB at 09:38

## 2025-02-27 RX ADMIN — IPRATROPIUM BROMIDE AND ALBUTEROL SULFATE 1 DOSE: 2.5; .5 SOLUTION RESPIRATORY (INHALATION) at 13:21

## 2025-02-27 RX ADMIN — ESCITALOPRAM OXALATE 10 MG: 10 TABLET ORAL at 09:38

## 2025-02-27 RX ADMIN — BUDESONIDE INHALATION 500 MCG: 0.5 SUSPENSION RESPIRATORY (INHALATION) at 07:59

## 2025-02-27 RX ADMIN — DICYCLOMINE HYDROCHLORIDE 10 MG: 10 CAPSULE ORAL at 09:38

## 2025-02-27 RX ADMIN — DICYCLOMINE HYDROCHLORIDE 10 MG: 10 CAPSULE ORAL at 06:24

## 2025-02-27 RX ADMIN — BUDESONIDE INHALATION 500 MCG: 0.5 SUSPENSION RESPIRATORY (INHALATION) at 19:31

## 2025-02-27 RX ADMIN — IPRATROPIUM BROMIDE AND ALBUTEROL SULFATE 1 DOSE: 2.5; .5 SOLUTION RESPIRATORY (INHALATION) at 19:31

## 2025-02-27 RX ADMIN — DICYCLOMINE HYDROCHLORIDE 10 MG: 10 CAPSULE ORAL at 15:29

## 2025-02-27 RX ADMIN — IPRATROPIUM BROMIDE AND ALBUTEROL SULFATE 1 DOSE: 2.5; .5 SOLUTION RESPIRATORY (INHALATION) at 07:56

## 2025-02-27 RX ADMIN — PANTOPRAZOLE SODIUM 40 MG: 40 TABLET, DELAYED RELEASE ORAL at 09:38

## 2025-02-27 RX ADMIN — BUSPIRONE HYDROCHLORIDE 10 MG: 10 TABLET ORAL at 13:15

## 2025-02-27 RX ADMIN — METHYLPREDNISOLONE SODIUM SUCCINATE 60 MG: 125 INJECTION, POWDER, LYOPHILIZED, FOR SOLUTION INTRAMUSCULAR; INTRAVENOUS at 09:41

## 2025-02-27 RX ADMIN — DOXYCYCLINE HYCLATE 100 MG: 100 TABLET, COATED ORAL at 09:38

## 2025-02-27 RX ADMIN — SODIUM CHLORIDE, PRESERVATIVE FREE 10 ML: 5 INJECTION INTRAVENOUS at 09:42

## 2025-02-27 RX ADMIN — METHYLPREDNISOLONE SODIUM SUCCINATE 60 MG: 125 INJECTION, POWDER, LYOPHILIZED, FOR SOLUTION INTRAMUSCULAR; INTRAVENOUS at 20:49

## 2025-02-27 NOTE — BSMART NOTE
self-harm or harm to others.      Section II - Psychosocial  The patient's overall mood and attitude is anxious .  Feelings of helplessness and hopelessness are not observed.  Generalized anxiety is not observed.  Panic is not observed. Phobias are not observed.  Obsessive compulsive tendencies are not observed.      Section III - Mental Status Exam  The patient's appearance shows no evidence of impairment.  The patient's behavior shows no evidence of impairment. The patient is oriented to time, place, person and situation.  The patient's speech is soft.  The patient's mood is anxious.  The range of affect shows no evidence of impairment.  The patient's thought content demonstrates no evidence of impairment.  The thought process shows no evidence of impairment.  The patient's perception shows no evidence of impairment. The patient's memory shows no evidence of impairment.  The patient's appetite shows no evidence of impairment.  The patient's sleep shows no evidence of impairment. The patient's insight shows no evidence of impairment.  The patient's judgement shows no evidence of impairment.      The patient has demonstrated mental capacity to provide informed consent.    Section IV - Substance Abuse  The patient is  using substances.  The patient is using {alcohol for greater than 10 years with last use on only occasional use . Pt is also using tobacco , he reported he smokes 1/2 pack a day. The patient has experienced the following withdrawal symptoms: N/A. UDS result: NA.  BAL: N/A     Section V - Medical  Past Medical History:   Diagnosis Date    COPD (chronic obstructive pulmonary disease) (HCC)     GERD (gastroesophageal reflux disease)     Hyperlipidemia     Hypertension     Polyp of colon     Sleep apnea     Uses CPAP       Section VI - Living Arrangements  The patient is .  The patient lives with spouse. The patient has 2 adult children.  The patient does plan to return home upon discharge. The

## 2025-02-27 NOTE — CARE COORDINATION
11:46 AM  02/27/25    Care Management Progress Note    Reason for Admission:   Respiratory failure (HCC) [J96.90]  COPD exacerbation (HCC) [J44.1]         Patient Admission Status: Inpatient  Date Admitted to IN:  2/22/25       RUR: Readmission Risk Score: 13.4    Hospitalization in the last 30 days (Readmission):  No        Transition of care plan:  Pt discussed during IDR- pulmonology and therapy following.  Anticipated discharge plan: home with family assistance as needed and home health- CM met face to face with pt and his wife, pt was agreeable to home health and no preference verbalized for HH agency. CM sent referrals via [employer] to see which agency accepts pt's insurance ([place] [employer]); awaiting responses.  Date IM given: [x]NA  Outpatient follow-up.  Discharge transport: wife will transport at NH.      Pt and wife requested portable O2 concentrator- CM sent referral to [facility], they do accept pt's insurance and will process referral.    CM will continue to follow.    CHRISTOPHER Garcia

## 2025-02-27 NOTE — CONSULTS
Chief complaint  \"I'm here because I had trouble breathing due to COPD and a cold.\"    History of present illness  Mr. Hurley reports having trouble breathing, which led to his current hospital visit. He has been diagnosed with COPD and \"a cold\", which are contributing to his breathing difficulties. He does not currently see a psychiatric provider outside the hospital.    Mr. Hurley recalls an incident over ten years ago when he experienced a severe emotional episode. During an argument with his wife, he attempted to harm himself by superficially cutting his throat. This incident resulted in police involvement and [facility] He states that he has not experienced similar episodes since that time.    Regarding his mental health, Mr. Hurley mentions that his anxiety levels increase significantly when he has difficulty breathing. He is aware that Buspar has been added to his medication list to help manage his anxiety. He is currently taking Lexapro, but he and his wife feel it is not effective. He denies experiencing depression and rates his depression as zero on a scale of zero to ten. He also denies having any thoughts of self-harm since the incident over ten years ago.    Mr. Hurley reports that his appetite is improving now that he can eat again, although he had difficulty eating for three or four days. He states that his appetite is generally good when he is at home. His sleep is described as not bad unless he is sick, indicating that his sleep is generally satisfactory under normal circumstances.    Past Psychiatric History  - History of superficial self-inflicted cut over 10 years ago  - Inpatient psychiatric admission to [facility] 10 years ago, for 6 days.  - Anxiety    Mental status exam  General and Appearance: Alert and oriented X 3. Patient appears well groomed and clean.    Behavior: Appropriate eye contact, cooperative, engaged, motivated, open, and pleasant.

## 2025-02-28 LAB
ANION GAP SERPL CALC-SCNC: 4 MMOL/L (ref 2–12)
BACTERIA SPEC CULT: NORMAL
BACTERIA SPEC CULT: NORMAL
BASOPHILS # BLD: 0 K/UL (ref 0–0.1)
BASOPHILS NFR BLD: 0 % (ref 0–1)
BUN SERPL-MCNC: 13 MG/DL (ref 6–20)
BUN/CREAT SERPL: 32 (ref 12–20)
CALCIUM SERPL-MCNC: 9.5 MG/DL (ref 8.5–10.1)
CHLORIDE SERPL-SCNC: 96 MMOL/L (ref 97–108)
CO2 SERPL-SCNC: 40 MMOL/L (ref 21–32)
CREAT SERPL-MCNC: 0.41 MG/DL (ref 0.7–1.3)
DIFFERENTIAL METHOD BLD: ABNORMAL
EOSINOPHIL # BLD: 0 K/UL (ref 0–0.4)
EOSINOPHIL NFR BLD: 0 % (ref 0–7)
ERYTHROCYTE [DISTWIDTH] IN BLOOD BY AUTOMATED COUNT: 12.5 % (ref 11.5–14.5)
GLUCOSE SERPL-MCNC: 168 MG/DL (ref 65–100)
HCT VFR BLD AUTO: 37.7 % (ref 36.6–50.3)
HGB BLD-MCNC: 12.1 G/DL (ref 12.1–17)
IMM GRANULOCYTES # BLD AUTO: 0.02 K/UL (ref 0–0.04)
IMM GRANULOCYTES NFR BLD AUTO: 0.3 % (ref 0–0.5)
LYMPHOCYTES # BLD: 0.26 K/UL (ref 0.8–3.5)
LYMPHOCYTES NFR BLD: 4.5 % (ref 12–49)
MCH RBC QN AUTO: 31.8 PG (ref 26–34)
MCHC RBC AUTO-ENTMCNC: 32.1 G/DL (ref 30–36.5)
MCV RBC AUTO: 99.2 FL (ref 80–99)
MONOCYTES # BLD: 0.19 K/UL (ref 0–1)
MONOCYTES NFR BLD: 3.3 % (ref 5–13)
NEUTS SEG # BLD: 5.24 K/UL (ref 1.8–8)
NEUTS SEG NFR BLD: 91.9 % (ref 32–75)
NRBC # BLD: 0 K/UL (ref 0–0.01)
NRBC BLD-RTO: 0 PER 100 WBC
PLATELET # BLD AUTO: 154 K/UL (ref 150–400)
PMV BLD AUTO: 12.2 FL (ref 8.9–12.9)
POTASSIUM SERPL-SCNC: 4.3 MMOL/L (ref 3.5–5.1)
RBC # BLD AUTO: 3.8 M/UL (ref 4.1–5.7)
RBC MORPH BLD: ABNORMAL
SERVICE CMNT-IMP: NORMAL
SERVICE CMNT-IMP: NORMAL
SODIUM SERPL-SCNC: 140 MMOL/L (ref 136–145)
WBC # BLD AUTO: 5.7 K/UL (ref 4.1–11.1)

## 2025-02-28 PROCEDURE — 80048 BASIC METABOLIC PNL TOTAL CA: CPT

## 2025-02-28 PROCEDURE — 2700000000 HC OXYGEN THERAPY PER DAY

## 2025-02-28 PROCEDURE — 97530 THERAPEUTIC ACTIVITIES: CPT

## 2025-02-28 PROCEDURE — 94761 N-INVAS EAR/PLS OXIMETRY MLT: CPT

## 2025-02-28 PROCEDURE — 2500000003 HC RX 250 WO HCPCS: Performed by: PHYSICIAN ASSISTANT

## 2025-02-28 PROCEDURE — 6370000000 HC RX 637 (ALT 250 FOR IP): Performed by: HOSPITALIST

## 2025-02-28 PROCEDURE — 6360000002 HC RX W HCPCS: Performed by: PHYSICIAN ASSISTANT

## 2025-02-28 PROCEDURE — 85025 COMPLETE CBC W/AUTO DIFF WBC: CPT

## 2025-02-28 PROCEDURE — 6360000002 HC RX W HCPCS: Performed by: HOSPITALIST

## 2025-02-28 PROCEDURE — 94660 CPAP INITIATION&MGMT: CPT

## 2025-02-28 PROCEDURE — 6370000000 HC RX 637 (ALT 250 FOR IP): Performed by: STUDENT IN AN ORGANIZED HEALTH CARE EDUCATION/TRAINING PROGRAM

## 2025-02-28 PROCEDURE — 94640 AIRWAY INHALATION TREATMENT: CPT

## 2025-02-28 PROCEDURE — 6370000000 HC RX 637 (ALT 250 FOR IP)

## 2025-02-28 PROCEDURE — 6360000002 HC RX W HCPCS: Performed by: INTERNAL MEDICINE

## 2025-02-28 PROCEDURE — 97116 GAIT TRAINING THERAPY: CPT

## 2025-02-28 PROCEDURE — 6370000000 HC RX 637 (ALT 250 FOR IP): Performed by: INTERNAL MEDICINE

## 2025-02-28 PROCEDURE — 97535 SELF CARE MNGMENT TRAINING: CPT

## 2025-02-28 PROCEDURE — 2500000003 HC RX 250 WO HCPCS: Performed by: HOSPITALIST

## 2025-02-28 PROCEDURE — 2500000003 HC RX 250 WO HCPCS

## 2025-02-28 PROCEDURE — 2060000000 HC ICU INTERMEDIATE R&B

## 2025-02-28 RX ADMIN — DICYCLOMINE HYDROCHLORIDE 10 MG: 10 CAPSULE ORAL at 06:19

## 2025-02-28 RX ADMIN — THERA TABS 1 TABLET: TAB at 09:02

## 2025-02-28 RX ADMIN — SODIUM CHLORIDE, PRESERVATIVE FREE 10 ML: 5 INJECTION INTRAVENOUS at 09:06

## 2025-02-28 RX ADMIN — PANTOPRAZOLE SODIUM 40 MG: 40 TABLET, DELAYED RELEASE ORAL at 09:02

## 2025-02-28 RX ADMIN — BENZONATATE 100 MG: 100 CAPSULE ORAL at 19:50

## 2025-02-28 RX ADMIN — BUSPIRONE HYDROCHLORIDE 10 MG: 10 TABLET ORAL at 09:02

## 2025-02-28 RX ADMIN — GUAIFENESIN 1200 MG: 600 TABLET ORAL at 19:50

## 2025-02-28 RX ADMIN — WATER 40 MG: 1 INJECTION INTRAMUSCULAR; INTRAVENOUS; SUBCUTANEOUS at 22:11

## 2025-02-28 RX ADMIN — BUSPIRONE HYDROCHLORIDE 10 MG: 10 TABLET ORAL at 16:42

## 2025-02-28 RX ADMIN — DICYCLOMINE HYDROCHLORIDE 10 MG: 10 CAPSULE ORAL at 09:17

## 2025-02-28 RX ADMIN — IPRATROPIUM BROMIDE AND ALBUTEROL SULFATE 1 DOSE: 2.5; .5 SOLUTION RESPIRATORY (INHALATION) at 14:52

## 2025-02-28 RX ADMIN — IPRATROPIUM BROMIDE AND ALBUTEROL SULFATE 1 DOSE: 2.5; .5 SOLUTION RESPIRATORY (INHALATION) at 07:55

## 2025-02-28 RX ADMIN — IPRATROPIUM BROMIDE AND ALBUTEROL SULFATE 1 DOSE: 2.5; .5 SOLUTION RESPIRATORY (INHALATION) at 19:41

## 2025-02-28 RX ADMIN — SODIUM CHLORIDE, PRESERVATIVE FREE 10 ML: 5 INJECTION INTRAVENOUS at 19:50

## 2025-02-28 RX ADMIN — ROFLUMILAST 500 MCG: 500 TABLET ORAL at 09:01

## 2025-02-28 RX ADMIN — ESCITALOPRAM OXALATE 10 MG: 10 TABLET ORAL at 09:02

## 2025-02-28 RX ADMIN — ARFORMOTEROL TARTRATE 15 MCG: 15 SOLUTION RESPIRATORY (INHALATION) at 19:41

## 2025-02-28 RX ADMIN — ARFORMOTEROL TARTRATE 15 MCG: 15 SOLUTION RESPIRATORY (INHALATION) at 07:55

## 2025-02-28 RX ADMIN — ENOXAPARIN SODIUM 40 MG: 100 INJECTION SUBCUTANEOUS at 09:01

## 2025-02-28 RX ADMIN — Medication 100 MG: at 09:02

## 2025-02-28 RX ADMIN — DICYCLOMINE HYDROCHLORIDE 10 MG: 10 CAPSULE ORAL at 16:42

## 2025-02-28 RX ADMIN — BUDESONIDE INHALATION 500 MCG: 0.5 SUSPENSION RESPIRATORY (INHALATION) at 19:41

## 2025-02-28 RX ADMIN — FOLIC ACID 1 MG: 1 TABLET ORAL at 09:02

## 2025-02-28 RX ADMIN — WATER 40 MG: 1 INJECTION INTRAMUSCULAR; INTRAVENOUS; SUBCUTANEOUS at 09:17

## 2025-02-28 RX ADMIN — BUDESONIDE INHALATION 500 MCG: 0.5 SUSPENSION RESPIRATORY (INHALATION) at 07:55

## 2025-02-28 RX ADMIN — BUSPIRONE HYDROCHLORIDE 10 MG: 10 TABLET ORAL at 19:50

## 2025-02-28 ASSESSMENT — PAIN DESCRIPTION - ORIENTATION: ORIENTATION: MID

## 2025-02-28 ASSESSMENT — PAIN SCALES - GENERAL
PAINLEVEL_OUTOF10: 4
PAINLEVEL_OUTOF10: 0
PAINLEVEL_OUTOF10: 0

## 2025-02-28 ASSESSMENT — PAIN DESCRIPTION - DESCRIPTORS: DESCRIPTORS: CRAMPING

## 2025-02-28 ASSESSMENT — PAIN DESCRIPTION - LOCATION: LOCATION: ABDOMEN

## 2025-02-28 NOTE — CARE COORDINATION
4:08 PM    CM consult for rolling walker noted. [employer] offered, and pt preference indicated as [facility]. Referral submitted via [employer], and they accepted.   DME delivered by CM: [] Yes, invoice attached in [employer] [x] DME to be delivered by provider     Charlee to deliver rolling walker and portable O2 to pt's room prior to discharge; pt aware and agreeable.    LF.      2:53 PM  02/28/25    Care Management Progress Note    Reason for Admission:   Respiratory failure (HCC) [J96.90]  COPD exacerbation (HCC) [J44.1]         Patient Admission Status: Inpatient  Date Admitted to INP: 2/22/25   RUR: Readmission Risk Score: 11.9    Hospitalization in the last 30 days (Readmission):  No        Transition of care plan:  Ongoing medical management- pt discussed during IDR; pulmonology and therapy following.  Anticipated discharge plan: home with family assistance and home health- [facility] has accepted in [facility]; no other accepting agency due to staffing or insurance. Pt/wife [facility] ; agency will contact pt at discharge to arrange SOC. CM updated AVS; will fax dc summary when available.  Date IM given: [x]NA  Outpatient follow-up.  Discharge transport: Wife will transport at dc.    Pt and wife requested home oxygen through [facility]- pt had purchased O2 on his own and also used [employer] but he would like to utilize his insurance and [facility]. CM sent referral to [facility] via [facility]; awaiting response.    CM will continue to follow and assist with discharge needs.    CHRISTOPHER Garcia

## 2025-03-01 LAB
ANION GAP SERPL CALC-SCNC: 1 MMOL/L (ref 2–12)
BASOPHILS # BLD: 0.01 K/UL (ref 0–0.1)
BASOPHILS NFR BLD: 0.1 % (ref 0–1)
BUN SERPL-MCNC: 15 MG/DL (ref 6–20)
BUN/CREAT SERPL: 31 (ref 12–20)
CALCIUM SERPL-MCNC: 9.7 MG/DL (ref 8.5–10.1)
CHLORIDE SERPL-SCNC: 98 MMOL/L (ref 97–108)
CO2 SERPL-SCNC: 39 MMOL/L (ref 21–32)
CREAT SERPL-MCNC: 0.49 MG/DL (ref 0.7–1.3)
DIFFERENTIAL METHOD BLD: ABNORMAL
EOSINOPHIL # BLD: 0.02 K/UL (ref 0–0.4)
EOSINOPHIL NFR BLD: 0.3 % (ref 0–7)
ERYTHROCYTE [DISTWIDTH] IN BLOOD BY AUTOMATED COUNT: 12.4 % (ref 11.5–14.5)
GLUCOSE SERPL-MCNC: 144 MG/DL (ref 65–100)
HCT VFR BLD AUTO: 39.9 % (ref 36.6–50.3)
HGB BLD-MCNC: 12.7 G/DL (ref 12.1–17)
IMM GRANULOCYTES # BLD AUTO: 0.05 K/UL (ref 0–0.04)
IMM GRANULOCYTES NFR BLD AUTO: 0.7 % (ref 0–0.5)
LYMPHOCYTES # BLD: 0.49 K/UL (ref 0.8–3.5)
LYMPHOCYTES NFR BLD: 6.9 % (ref 12–49)
MCH RBC QN AUTO: 32.2 PG (ref 26–34)
MCHC RBC AUTO-ENTMCNC: 31.8 G/DL (ref 30–36.5)
MCV RBC AUTO: 101.3 FL (ref 80–99)
MONOCYTES # BLD: 0.5 K/UL (ref 0–1)
MONOCYTES NFR BLD: 7 % (ref 5–13)
NEUTS SEG # BLD: 6.07 K/UL (ref 1.8–8)
NEUTS SEG NFR BLD: 85 % (ref 32–75)
NRBC # BLD: 0 K/UL (ref 0–0.01)
NRBC BLD-RTO: 0 PER 100 WBC
PLATELET # BLD AUTO: 146 K/UL (ref 150–400)
PMV BLD AUTO: 12.9 FL (ref 8.9–12.9)
POTASSIUM SERPL-SCNC: 4 MMOL/L (ref 3.5–5.1)
RBC # BLD AUTO: 3.94 M/UL (ref 4.1–5.7)
SODIUM SERPL-SCNC: 138 MMOL/L (ref 136–145)
WBC # BLD AUTO: 7.1 K/UL (ref 4.1–11.1)

## 2025-03-01 PROCEDURE — 80048 BASIC METABOLIC PNL TOTAL CA: CPT

## 2025-03-01 PROCEDURE — 2500000003 HC RX 250 WO HCPCS: Performed by: PHYSICIAN ASSISTANT

## 2025-03-01 PROCEDURE — 2060000000 HC ICU INTERMEDIATE R&B

## 2025-03-01 PROCEDURE — 2700000000 HC OXYGEN THERAPY PER DAY

## 2025-03-01 PROCEDURE — 85025 COMPLETE CBC W/AUTO DIFF WBC: CPT

## 2025-03-01 PROCEDURE — 6370000000 HC RX 637 (ALT 250 FOR IP)

## 2025-03-01 PROCEDURE — 94660 CPAP INITIATION&MGMT: CPT

## 2025-03-01 PROCEDURE — 6370000000 HC RX 637 (ALT 250 FOR IP): Performed by: INTERNAL MEDICINE

## 2025-03-01 PROCEDURE — 94640 AIRWAY INHALATION TREATMENT: CPT

## 2025-03-01 PROCEDURE — 6360000002 HC RX W HCPCS: Performed by: HOSPITALIST

## 2025-03-01 PROCEDURE — 2500000003 HC RX 250 WO HCPCS

## 2025-03-01 PROCEDURE — 6360000002 HC RX W HCPCS: Performed by: PHYSICIAN ASSISTANT

## 2025-03-01 PROCEDURE — 6370000000 HC RX 637 (ALT 250 FOR IP): Performed by: HOSPITALIST

## 2025-03-01 PROCEDURE — 6370000000 HC RX 637 (ALT 250 FOR IP): Performed by: STUDENT IN AN ORGANIZED HEALTH CARE EDUCATION/TRAINING PROGRAM

## 2025-03-01 PROCEDURE — 94761 N-INVAS EAR/PLS OXIMETRY MLT: CPT

## 2025-03-01 PROCEDURE — 6360000002 HC RX W HCPCS: Performed by: INTERNAL MEDICINE

## 2025-03-01 RX ADMIN — BENZONATATE 100 MG: 100 CAPSULE ORAL at 20:59

## 2025-03-01 RX ADMIN — DICYCLOMINE HYDROCHLORIDE 10 MG: 10 CAPSULE ORAL at 06:55

## 2025-03-01 RX ADMIN — ENOXAPARIN SODIUM 40 MG: 100 INJECTION SUBCUTANEOUS at 09:55

## 2025-03-01 RX ADMIN — WATER 40 MG: 1 INJECTION INTRAMUSCULAR; INTRAVENOUS; SUBCUTANEOUS at 09:55

## 2025-03-01 RX ADMIN — ESCITALOPRAM OXALATE 10 MG: 10 TABLET ORAL at 09:55

## 2025-03-01 RX ADMIN — DICYCLOMINE HYDROCHLORIDE 10 MG: 10 CAPSULE ORAL at 09:55

## 2025-03-01 RX ADMIN — SODIUM CHLORIDE, PRESERVATIVE FREE 10 ML: 5 INJECTION INTRAVENOUS at 09:56

## 2025-03-01 RX ADMIN — SODIUM CHLORIDE, PRESERVATIVE FREE 10 ML: 5 INJECTION INTRAVENOUS at 20:59

## 2025-03-01 RX ADMIN — IPRATROPIUM BROMIDE AND ALBUTEROL SULFATE 1 DOSE: 2.5; .5 SOLUTION RESPIRATORY (INHALATION) at 09:11

## 2025-03-01 RX ADMIN — ARFORMOTEROL TARTRATE 15 MCG: 15 SOLUTION RESPIRATORY (INHALATION) at 09:11

## 2025-03-01 RX ADMIN — ROFLUMILAST 500 MCG: 500 TABLET ORAL at 09:55

## 2025-03-01 RX ADMIN — ARFORMOTEROL TARTRATE 15 MCG: 15 SOLUTION RESPIRATORY (INHALATION) at 20:40

## 2025-03-01 RX ADMIN — BUSPIRONE HYDROCHLORIDE 10 MG: 10 TABLET ORAL at 14:20

## 2025-03-01 RX ADMIN — THERA TABS 1 TABLET: TAB at 09:55

## 2025-03-01 RX ADMIN — IPRATROPIUM BROMIDE AND ALBUTEROL SULFATE 1 DOSE: 2.5; .5 SOLUTION RESPIRATORY (INHALATION) at 20:45

## 2025-03-01 RX ADMIN — DICYCLOMINE HYDROCHLORIDE 10 MG: 10 CAPSULE ORAL at 14:20

## 2025-03-01 RX ADMIN — BUDESONIDE INHALATION 500 MCG: 0.5 SUSPENSION RESPIRATORY (INHALATION) at 09:12

## 2025-03-01 RX ADMIN — BUSPIRONE HYDROCHLORIDE 10 MG: 10 TABLET ORAL at 20:59

## 2025-03-01 RX ADMIN — IPRATROPIUM BROMIDE AND ALBUTEROL SULFATE 1 DOSE: 2.5; .5 SOLUTION RESPIRATORY (INHALATION) at 15:10

## 2025-03-01 RX ADMIN — BUSPIRONE HYDROCHLORIDE 10 MG: 10 TABLET ORAL at 09:55

## 2025-03-01 RX ADMIN — FOLIC ACID 1 MG: 1 TABLET ORAL at 09:55

## 2025-03-01 RX ADMIN — Medication 100 MG: at 09:55

## 2025-03-01 RX ADMIN — PANTOPRAZOLE SODIUM 40 MG: 40 TABLET, DELAYED RELEASE ORAL at 09:55

## 2025-03-01 RX ADMIN — WATER 40 MG: 1 INJECTION INTRAMUSCULAR; INTRAVENOUS; SUBCUTANEOUS at 20:59

## 2025-03-01 RX ADMIN — BUDESONIDE INHALATION 500 MCG: 0.5 SUSPENSION RESPIRATORY (INHALATION) at 20:40

## 2025-03-01 ASSESSMENT — PAIN DESCRIPTION - DESCRIPTORS
DESCRIPTORS: CRAMPING
DESCRIPTORS: CRAMPING

## 2025-03-01 ASSESSMENT — PAIN SCALES - GENERAL
PAINLEVEL_OUTOF10: 0
PAINLEVEL_OUTOF10: 7
PAINLEVEL_OUTOF10: 3
PAINLEVEL_OUTOF10: 0

## 2025-03-01 ASSESSMENT — PAIN DESCRIPTION - ORIENTATION: ORIENTATION: MID

## 2025-03-01 ASSESSMENT — PAIN DESCRIPTION - LOCATION: LOCATION: ABDOMEN

## 2025-03-01 NOTE — CARE COORDINATION
Care Management Progress Note    Reason for Admission:   Respiratory failure (HCC) [J96.90]  COPD exacerbation (HCC) [J44.1]         Patient Admission Status: Inpatient  RUR: 9 %  Hospitalization in the last 30 days (Readmission):  No        Transition of care plan:  Confirmed with [facility]. acceptance for DME. Oxygen and RW to be delivered to bedside today.    Elly Dallas RN, [facility]  [facility] Care Management    Available via [employer]

## 2025-03-02 VITALS
BODY MASS INDEX: 24.23 KG/M2 | DIASTOLIC BLOOD PRESSURE: 54 MMHG | OXYGEN SATURATION: 95 % | WEIGHT: 173.06 LBS | HEIGHT: 71 IN | RESPIRATION RATE: 18 BRPM | TEMPERATURE: 97.8 F | HEART RATE: 96 BPM | SYSTOLIC BLOOD PRESSURE: 110 MMHG

## 2025-03-02 LAB
ANION GAP SERPL CALC-SCNC: 5 MMOL/L (ref 2–12)
BASOPHILS # BLD: 0 K/UL (ref 0–0.1)
BASOPHILS NFR BLD: 0 % (ref 0–1)
BUN SERPL-MCNC: 20 MG/DL (ref 6–20)
BUN/CREAT SERPL: 40 (ref 12–20)
CALCIUM SERPL-MCNC: 9.6 MG/DL (ref 8.5–10.1)
CHLORIDE SERPL-SCNC: 97 MMOL/L (ref 97–108)
CO2 SERPL-SCNC: 36 MMOL/L (ref 21–32)
CREAT SERPL-MCNC: 0.5 MG/DL (ref 0.7–1.3)
DIFFERENTIAL METHOD BLD: ABNORMAL
EOSINOPHIL # BLD: 0 K/UL (ref 0–0.4)
EOSINOPHIL NFR BLD: 0 % (ref 0–7)
ERYTHROCYTE [DISTWIDTH] IN BLOOD BY AUTOMATED COUNT: 12.5 % (ref 11.5–14.5)
GLUCOSE SERPL-MCNC: 186 MG/DL (ref 65–100)
HCT VFR BLD AUTO: 38.4 % (ref 36.6–50.3)
HGB BLD-MCNC: 12.4 G/DL (ref 12.1–17)
IMM GRANULOCYTES # BLD AUTO: 0.03 K/UL (ref 0–0.04)
IMM GRANULOCYTES NFR BLD AUTO: 0.3 % (ref 0–0.5)
LYMPHOCYTES # BLD: 0.35 K/UL (ref 0.8–3.5)
LYMPHOCYTES NFR BLD: 3.8 % (ref 12–49)
MCH RBC QN AUTO: 32 PG (ref 26–34)
MCHC RBC AUTO-ENTMCNC: 32.3 G/DL (ref 30–36.5)
MCV RBC AUTO: 99 FL (ref 80–99)
MONOCYTES # BLD: 0.25 K/UL (ref 0–1)
MONOCYTES NFR BLD: 2.7 % (ref 5–13)
NEUTS SEG # BLD: 8.57 K/UL (ref 1.8–8)
NEUTS SEG NFR BLD: 93.2 % (ref 32–75)
NRBC # BLD: 0 K/UL (ref 0–0.01)
NRBC BLD-RTO: 0 PER 100 WBC
PLATELET # BLD AUTO: 160 K/UL (ref 150–400)
PMV BLD AUTO: 12.7 FL (ref 8.9–12.9)
POTASSIUM SERPL-SCNC: 4.4 MMOL/L (ref 3.5–5.1)
RBC # BLD AUTO: 3.88 M/UL (ref 4.1–5.7)
RBC MORPH BLD: ABNORMAL
SODIUM SERPL-SCNC: 138 MMOL/L (ref 136–145)
WBC # BLD AUTO: 9.2 K/UL (ref 4.1–11.1)

## 2025-03-02 PROCEDURE — 6360000002 HC RX W HCPCS: Performed by: INTERNAL MEDICINE

## 2025-03-02 PROCEDURE — 94761 N-INVAS EAR/PLS OXIMETRY MLT: CPT

## 2025-03-02 PROCEDURE — 80048 BASIC METABOLIC PNL TOTAL CA: CPT

## 2025-03-02 PROCEDURE — 6370000000 HC RX 637 (ALT 250 FOR IP): Performed by: STUDENT IN AN ORGANIZED HEALTH CARE EDUCATION/TRAINING PROGRAM

## 2025-03-02 PROCEDURE — 6370000000 HC RX 637 (ALT 250 FOR IP): Performed by: INTERNAL MEDICINE

## 2025-03-02 PROCEDURE — 2500000003 HC RX 250 WO HCPCS

## 2025-03-02 PROCEDURE — 6370000000 HC RX 637 (ALT 250 FOR IP)

## 2025-03-02 PROCEDURE — 6370000000 HC RX 637 (ALT 250 FOR IP): Performed by: HOSPITALIST

## 2025-03-02 PROCEDURE — 85025 COMPLETE CBC W/AUTO DIFF WBC: CPT

## 2025-03-02 PROCEDURE — 2700000000 HC OXYGEN THERAPY PER DAY

## 2025-03-02 PROCEDURE — 6360000002 HC RX W HCPCS: Performed by: PHYSICIAN ASSISTANT

## 2025-03-02 PROCEDURE — 2500000003 HC RX 250 WO HCPCS: Performed by: PHYSICIAN ASSISTANT

## 2025-03-02 PROCEDURE — 94660 CPAP INITIATION&MGMT: CPT

## 2025-03-02 PROCEDURE — 94618 PULMONARY STRESS TESTING: CPT

## 2025-03-02 PROCEDURE — 6360000002 HC RX W HCPCS: Performed by: HOSPITALIST

## 2025-03-02 PROCEDURE — 94640 AIRWAY INHALATION TREATMENT: CPT

## 2025-03-02 RX ORDER — THIAMINE MONONITRATE (VIT B1) 100 MG
100 TABLET ORAL DAILY
Qty: 30 TABLET | Refills: 0 | Status: SHIPPED | OUTPATIENT
Start: 2025-03-03

## 2025-03-02 RX ORDER — PREDNISONE 20 MG/1
TABLET ORAL
Qty: 26 TABLET | Refills: 0 | Status: SHIPPED | OUTPATIENT
Start: 2025-03-03 | End: 2025-03-19

## 2025-03-02 RX ORDER — BUDESONIDE 0.5 MG/2ML
0.5 INHALANT ORAL
Qty: 120 ML | Refills: 0 | Status: SHIPPED | OUTPATIENT
Start: 2025-03-02 | End: 2025-03-02 | Stop reason: HOSPADM

## 2025-03-02 RX ORDER — DICYCLOMINE HYDROCHLORIDE 10 MG/1
10 CAPSULE ORAL
Qty: 90 CAPSULE | Refills: 0 | Status: SHIPPED | OUTPATIENT
Start: 2025-03-02

## 2025-03-02 RX ORDER — BENZONATATE 100 MG/1
100 CAPSULE ORAL 3 TIMES DAILY PRN
Qty: 21 CAPSULE | Refills: 0 | Status: SHIPPED | OUTPATIENT
Start: 2025-03-02 | End: 2025-03-09

## 2025-03-02 RX ORDER — ALBUTEROL SULFATE 0.83 MG/ML
0.63 SOLUTION RESPIRATORY (INHALATION) EVERY 6 HOURS PRN
Qty: 120 EACH | Refills: 0 | Status: SHIPPED | OUTPATIENT
Start: 2025-03-02 | End: 2025-04-01

## 2025-03-02 RX ORDER — BUSPIRONE HYDROCHLORIDE 10 MG/1
10 TABLET ORAL 3 TIMES DAILY
Qty: 90 TABLET | Refills: 0 | Status: SHIPPED | OUTPATIENT
Start: 2025-03-02

## 2025-03-02 RX ORDER — GUAIFENESIN 600 MG/1
1200 TABLET, EXTENDED RELEASE ORAL 2 TIMES DAILY PRN
Qty: 28 TABLET | Refills: 0 | Status: SHIPPED | OUTPATIENT
Start: 2025-03-02 | End: 2025-03-02

## 2025-03-02 RX ORDER — FOLIC ACID 1 MG/1
1 TABLET ORAL DAILY
Qty: 30 TABLET | Refills: 0 | Status: SHIPPED | OUTPATIENT
Start: 2025-03-03

## 2025-03-02 RX ORDER — MULTIVITAMIN WITH IRON
1 TABLET ORAL DAILY
Qty: 30 TABLET | Refills: 0 | Status: SHIPPED | OUTPATIENT
Start: 2025-03-03

## 2025-03-02 RX ORDER — PREDNISONE 20 MG/1
20 TABLET ORAL DAILY
Status: DISCONTINUED | OUTPATIENT
Start: 2025-03-02 | End: 2025-03-02 | Stop reason: HOSPADM

## 2025-03-02 RX ORDER — GUAIFENESIN 600 MG/1
1200 TABLET, EXTENDED RELEASE ORAL 2 TIMES DAILY
Qty: 28 TABLET | Refills: 0 | Status: SHIPPED | OUTPATIENT
Start: 2025-03-02 | End: 2025-03-09

## 2025-03-02 RX ORDER — ROFLUMILAST 500 UG/1
500 TABLET ORAL DAILY
Qty: 30 TABLET | Refills: 0 | Status: SHIPPED | OUTPATIENT
Start: 2025-03-03

## 2025-03-02 RX ORDER — ARFORMOTEROL TARTRATE 15 UG/2ML
15 SOLUTION RESPIRATORY (INHALATION)
Qty: 120 ML | Refills: 0 | Status: SHIPPED | OUTPATIENT
Start: 2025-03-02 | End: 2025-03-02 | Stop reason: HOSPADM

## 2025-03-02 RX ADMIN — ESCITALOPRAM OXALATE 10 MG: 10 TABLET ORAL at 09:20

## 2025-03-02 RX ADMIN — IPRATROPIUM BROMIDE AND ALBUTEROL SULFATE 1 DOSE: 2.5; .5 SOLUTION RESPIRATORY (INHALATION) at 08:24

## 2025-03-02 RX ADMIN — ENOXAPARIN SODIUM 40 MG: 100 INJECTION SUBCUTANEOUS at 09:21

## 2025-03-02 RX ADMIN — DICYCLOMINE HYDROCHLORIDE 10 MG: 10 CAPSULE ORAL at 09:20

## 2025-03-02 RX ADMIN — ARFORMOTEROL TARTRATE 15 MCG: 15 SOLUTION RESPIRATORY (INHALATION) at 08:29

## 2025-03-02 RX ADMIN — PREDNISONE 20 MG: 20 TABLET ORAL at 14:24

## 2025-03-02 RX ADMIN — BUDESONIDE INHALATION 500 MCG: 0.5 SUSPENSION RESPIRATORY (INHALATION) at 08:29

## 2025-03-02 RX ADMIN — ROFLUMILAST 500 MCG: 500 TABLET ORAL at 09:20

## 2025-03-02 RX ADMIN — BUSPIRONE HYDROCHLORIDE 10 MG: 10 TABLET ORAL at 09:20

## 2025-03-02 RX ADMIN — SODIUM CHLORIDE, PRESERVATIVE FREE 10 ML: 5 INJECTION INTRAVENOUS at 09:23

## 2025-03-02 RX ADMIN — THERA TABS 1 TABLET: TAB at 09:20

## 2025-03-02 RX ADMIN — Medication 100 MG: at 09:20

## 2025-03-02 RX ADMIN — DICYCLOMINE HYDROCHLORIDE 10 MG: 10 CAPSULE ORAL at 06:53

## 2025-03-02 RX ADMIN — BUSPIRONE HYDROCHLORIDE 10 MG: 10 TABLET ORAL at 14:24

## 2025-03-02 RX ADMIN — WATER 40 MG: 1 INJECTION INTRAMUSCULAR; INTRAVENOUS; SUBCUTANEOUS at 09:20

## 2025-03-02 RX ADMIN — FOLIC ACID 1 MG: 1 TABLET ORAL at 09:20

## 2025-03-02 RX ADMIN — IPRATROPIUM BROMIDE AND ALBUTEROL SULFATE 1 DOSE: 2.5; .5 SOLUTION RESPIRATORY (INHALATION) at 13:31

## 2025-03-02 RX ADMIN — PANTOPRAZOLE SODIUM 40 MG: 40 TABLET, DELAYED RELEASE ORAL at 09:20

## 2025-03-02 ASSESSMENT — PAIN SCALES - GENERAL
PAINLEVEL_OUTOF10: 0
PAINLEVEL_OUTOF10: 0

## 2025-03-02 NOTE — DISCHARGE SUMMARY
[facility]  [address] [place] [address] [phone]          Hospitalist Discharge Summary     Patient ID:  Adrian Hurley  [id number]  69 y.o.  1955    Admit date: 2/22/2025    Discharge date and time: 3/2/2025 3:08 PM    Admission Diagnoses: Respiratory failure (HCC) [J96.90]  COPD exacerbation (HCC) [J44.1]    Discharge Diagnoses:  Principal Diagnosis Respiratory failure (HCC)                                            Principal Problem:    Respiratory failure (HCC)  Resolved Problems:    * No resolved hospital problems. *         Hospital Course:     Patient is a 69-year-old male with a history of COPD on 3 L of oxygen at home, anxiety and depression, hyperlipidemia, GERD, hypertension and sleep apnea comes to the hospital with acute on chronic hypoxemic respiratory failure.  Patient is admitted for further management of COPD exacerbation.     Acute on chronic hypoxic and hypercapnic respiratory respiratory failure POA  COPD exacerbation POA  Tobacco Abuse   On 3 L nasal cannula at baseline; needing 4 liters NC with exertion   Unclear reason for exacerbation  Sp Iv steriods; continue taper per pulm on discharge  Status post brovana/pulmicort; discussed with pulm okay to resume home Breztri  Scheduled mucinex   Tessalon as needed  Defer setting of BiPAP to pulm outpatient  Pulm evaluated; follow-up outpatient     SIRS criteria POA  Tachycardia, tachypnea but no infection  CTA neg for PE  F/up on Bcx collected (NGTD)  RVP neg     Abd fullness / abd pain POA  Acute on chronic  Likely due to cirrhosis  CT reviewed and showing cirrhosis  On bentyl   States they have seen GI prior; follow-up outpatient as needed     Etoh abuse with acute withdrawal POA   Daily drinker  Continue folic acid, thiamine and multivitamin  Follow-up outpatient with psychiatry     Hypertension  Blood pressure labile with some blood pressures on the lower end of normal recently following

## 2025-03-02 NOTE — PROGRESS NOTES
Name: Adrian Hurley Hospital: [facility]   : 1955 Admit Date: 2025   Phone: [phone]  Room: [facility]/01   PCP: Palma Christopher MD  MRN: [id number]   Date: 2025  Code: Full Code          Chart and notes reviewed. Data reviewed. I review the patient's current medications in the medical record at each encounter.  I have evaluated and examined the patient.       History of Present Illness:  Mr. Hurley is a 70 yo gentleman with severe COPD (FEV1 0.8L 24% in ), chronic respiratory failure with hypoxia (4L with exertion, 3L with sleep), RADHA (not currently using BiPAP), tobacco use, CAD, HTN, PAD, carotid stenosis, and prior TIA who is admitted with acute on chronic respiratory failure with hypoxia and hypercapnia. He describes about 2 weeks of feeling poorly. It started with cold-like symptoms with nasal/sinus congestion and subsequently went to his lungs. He is short of breath at rest with chest tightness, wheezing and cough. He was noted to be hypoxic to the 80's on 5L. Required BiPAP and now satting well on 4L. CXR without any acute changes.    He is followed by Dr. Zhao of [facility]. Last seen in office . He is managed on Breztri. Daily azithromycin was stopped at that visit as it made him feel worse and dailresp was ordered (just started this as it took some time for insurance to approve and the pharmacy to have available). He has not worn BiPAP in years as it was disruptive to his wife while they are sleeping, but he is interested in resuming this. He is up to date on LDCT, most recently done in September at [facility] with stable 5mm nodule.    Labs: Cr 0.54, WBC 3.7, Hgb 10.8, RVP in lab    Images reviewed:  CXR 2025: no acute process    Interval history  Afebrile  BP stable  Sats 97% on BiPAP FiO2 40%  Sats low to mid 90s on 4L at rest; required 5L when working with therapy yesterday  1050ml UOP  Bicarb 40  WBC 3.7 - decreased  Hgb 10.8 - decreased  D-dimer 0.66   blood 
    Name: Adrian Hurley Hospital: [facility]   : 1955 Admit Date: 2025   Phone: [phone]  Room: [facility]/   PCP: Palma Christopher MD  MRN: [id number]   Date: 3/2/2025  Code: Full Code          Chart and notes reviewed. Data reviewed. I review the patient's current medications in the medical record at each encounter.  I have evaluated and examined the patient.       History of Present Illness:  Mr. Hurley is a 68 yo gentleman with severe COPD (FEV1 0.8L 24% in ), chronic respiratory failure with hypoxia (4L with exertion, 3L with sleep), RADHA (not currently using BiPAP), tobacco use, CAD, HTN, PAD, carotid stenosis, and prior TIA who is admitted with acute on chronic respiratory failure with hypoxia and hypercapnia. He describes about 2 weeks of feeling poorly. It started with cold-like symptoms with nasal/sinus congestion and subsequently went to his lungs. He is short of breath at rest with chest tightness, wheezing and cough. He was noted to be hypoxic to the 80's on 5L. Required BiPAP and now satting well on 4L. CXR without any acute changes.    He is followed by Dr. Zhao of [facility]. Last seen in office . He is managed on Breztri. Daily azithromycin was stopped at that visit as it made him feel worse and dailresp was ordered (just started this as it took some time for insurance to approve and the pharmacy to have available). He has not worn BiPAP in years as it was disruptive to his wife while they are sleeping, but he is interested in resuming this. He is up to date on LDCT, most recently done in September at [facility] with stable 5mm nodule.    Labs: Cr 0.54, WBC 3.7, Hgb 10.8, RVP in lab    Images reviewed:  CXR 2025: no acute process    Interval history  Afebrile  BP stable  Sats 93% on 3L; required 5-6L when ambulating with therapy  1275ml UOP  D-dimer 0.66   blood cx no growth x 6 days  RVP neg   ABG 7.31/74/140/36    Abd/pelvis CT unremarkable   chest CTA: no 
   02/25/25 1045   Spirometry Assessment   FEV1 (%PRED) 24   COPD Assessment (CAT Score)   $RT COPD Assessment Yes   GOLD Staging   Group Group E     Pt adm 2/22 with O2 sat=80's on 5L NC, placed on Bipap.  Pt currently on 4L NC, wears 3-4L at home.  Pt also has CPAP machine at home but has not been regularly wearing. Pt current smoker, has quit several times but recently restarted <1/2 pack/day.  Pt follows with Dr. Zhao of [facility].  Will schedule follow up [facility] appt and [facility]  
   03/02/25 1210   Resting (On O2)   SpO2 94      O2 Device Nasal cannula   O2 Flow Rate (l/min) 3 l/min   Comments home o2 3lpm   During Walk (On O2)   SpO2 86      O2 Flow Rate (l/min) 3 l/min   Rate of Dyspnea 1   Comments increase o2 to 4lpm sat 90 hr 120   After Walk   SpO2 92      O2 Device Nasal cannula   O2 Flow Rate (l/min) 4 l/min   Rate of Dyspnea 1   Does the Patient Qualify for Home O2 Yes   Liter Flow at Rest 3   Liter Flow on Exertion 4   Does the Patient Need Portable Oxygen Tanks Yes       
0700 Bedside and Verbal shift change report given to JAROD Lim (oncoming nurse) by JAROD Swenson (offgoing nurse). Report included the following information Nurse Handoff Report, Recent Results, Med Rec Status, and Cardiac Rhythm NSR .     1553   Discharge paperwork explained to patient, questions gone over & answered by this writer. IV removed. Patient agreeable to discharge.      
0700: Bedside and Verbal shift change report given to Brittany RN (oncoming nurse) by Kat RN (offgoing nurse). Report included the following information Nurse Handoff Report.     
0700:Bedside and Verbal shift change report given to Brittany RN and Kelsi RN (oncoming nurse) by Messi RN (offgoing nurse). Report included the following information Nurse Handoff Report.     1431:Notified by telemetry, this patient had a 15 beat run of vtach. MD notified. Requested tele put strip in this patients chart.    
0800 Pt requested to go on Bipap.  
1152 Pt complaining of feeling bloated and not been able to breath well. Dr Farfan notified. New orders PO medicine.    1240 Pt complaining of SOB. Pt placed on Bipap. HR and work of breathing increased. RRT called.     1245 RRT at bedside. New orders for stat CXR and KUB, EKG.    1300 Pt on Bipap,states that breathing feels better.  
1315: Took patient on walk in adams in front if nurses station. Patient able to ambulate roughly 100ft. 3L NC on patient with O2 getting down to 85. Rn titrated O2 4LNC. Patient paused for a moment to catch breath and was able to recover in roughly 2-3 minutes back to 92%.     1317: Patient back in room, sitting upright in chair, sating 90% on 3L NC  
1900:Bedside and Verbal shift change report given to Messi RN (oncoming nurse) by Brittany OLIVERA and Kelsi RN (offgoing nurse). Report included the following information Nurse Handoff Report.     
1900:Bedside and Verbal shift change report given to Messi RN (oncoming nurse) by Brittany OLIVERA and Kelsi RN (offgoing nurse). Report included the following information Nurse Handoff Report.     
1930 Bedside shift change report given to Juliana RN (oncoming nurse) by Demetria RN  (offgoing nurse). Report included the following information SBAR, Intake/Output, MAR, Recent Results, and Cardiac Rhythm NSR .    0000 Patient is tremorous, diaphoretic, and anxious. CIWA is 12, notified provider, RADHA Rocha to place orders for CIWA protocol.   
2125  Patient requesting to go back on bipap.  PRN ativan given.    0700  Bedside and Verbal shift change report given to Shabnam (oncoming nurse) by Mariela (offgoing nurse). Report included the following information Nurse Handoff Report.     
2131: Report received from JAROD Stephens. Writer assumed care of patient at this time.  
[facility]  [address] [place] [address] [phone]        Hospitalist Progress Note      NAME: Adrian Hurley   :  1955  MRM:  [id number]    Date/Time of service: 2025         Subjective:     Chief Complaint:  Patient was personally seen and examined by me during this time period.  Chart reviewed.  Follow up COPD exacerbation.  Improved dyspnea and wheeze.  Wife at bedside       Objective:       Vitals:       Last 24hrs VS reviewed since prior progress note. Most recent are:    Vitals:    25 1133   BP: (!) 150/69   Pulse: 85   Resp: 18   Temp: 97.9 °F (36.6 °C)   SpO2: 94%     SpO2 Readings from Last 6 Encounters:   25 94%   24 99%          Intake/Output Summary (Last 24 hours) at 2025 1356  Last data filed at 2025 0355  Gross per 24 hour   Intake --   Output 875 ml   Net -875 ml        Exam:     Physical Exam:    Gen:  Well-developed, well-nourished, in no acute distress  HEENT:  Pink conjunctivae, EOMI, hearing intact to voice  Resp:  wheeze bl   Card:  No murmurs, normal S1, S2 without thrills, bruits or peripheral edema  Abd:  Soft, non-tender, non-distended, no palpable organomegaly and no detectable hernias  Musc:  No cyanosis or clubbing  Skin:  No rashes or ulcers, skin turgor is good  Neuro: follows commands appropriately  Psych:  Good insight, oriented to person, place and time, alert      Medications Reviewed: (see below)    Lab Data Reviewed: (see below)    ______________________________________________________________________    Medications:     Current Facility-Administered Medications   Medication Dose Route Frequency    methylPREDNISolone sodium succ (SOLU-MEDROL) 40 mg in sterile water 1 mL injection  40 mg IntraVENous Q12H    busPIRone (BUSPAR) tablet 10 mg  10 mg Oral TID    ipratropium 0.5 mg-albuterol 2.5 mg (DUONEB) nebulizer solution 1 Dose  1 Dose Inhalation Q6H WA RT    LORazepam (ATIVAN) tablet 1 mg 
NATHANIEL CAPONE [facility]  [address] [place] [address] [phone]        Hospitalist Progress Note      NAME: Adrian Hurley   :  1955  MRM:  [id number]    Date/Time of service: 2025  2:39 PM       Subjective:     Chief Complaint:  Patient was personally seen and examined by me during this time period.  Chart reviewed.  F/up COPD exas    Feeling well. Less CP, SOB. Abdominal symptoms improved       Objective:       Vitals:       Last 24hrs VS reviewed since prior progress note. Most recent are:    Vitals:    25 1327   BP:    Pulse: 100   Resp: 18   Temp:    SpO2: 92%     SpO2 Readings from Last 6 Encounters:   25 92%   24 99%          Intake/Output Summary (Last 24 hours) at 2025 1439  Last data filed at 2025 1312  Gross per 24 hour   Intake 1520 ml   Output 400 ml   Net 1120 ml        Exam:     Physical Exam:    Gen:  Well-developed, well-nourished, in no acute distress  HEENT:  Pink conjunctivae, EOMI, hearing intact to voice, moist mucous membranes  Resp:  pronged expiratory phase with wheezing. More air movement than yesterday  Card:  No murmurs, normal S1, S2 without thrills, bruits or peripheral edema  Abd:  Soft, non-tender, non-distended, no palpable organomegaly and no detectable hernias  Musc:  No cyanosis or clubbing  Skin:  No rashes or ulcers, skin turgor is good  Neuro: follows commands appropriately  Psych:  Good insight, oriented to person, place and time, alert      Medications Reviewed: (see below)    Lab Data Reviewed: (see below)    ______________________________________________________________________    Medications:     Current Facility-Administered Medications   Medication Dose Route Frequency    busPIRone (BUSPAR) tablet 10 mg  10 mg Oral TID    methylPREDNISolone sodium succ (SOLU-MEDROL) 60 mg in sterile water 0.96 mL injection  60 mg IntraVENous Q12H    ipratropium 0.5 mg-albuterol 2.5 mg (DUONEB) nebulizer 
NATHANIEL CAPONE [facility]  [address] [place] [address] [phone]        Hospitalist Progress Note      NAME: Adrian Hurley   :  1955  MRM:  [id number]    Date/Time of service: 2025  2:00 PM       Subjective:     Chief Complaint:  Patient was personally seen and examined by me during this time period.  Chart reviewed.  F/up COPD exas    Feeling well.Off bipap now. States that he feels like the congestion is breaking up. He declined CTA yesterday but states he is ready to have it today       Objective:       Vitals:       Last 24hrs VS reviewed since prior progress note. Most recent are:    Vitals:    25 1140   BP: (!) 152/68   Pulse: 94   Resp: 16   Temp: 97.5 °F (36.4 °C)   SpO2: 98%     SpO2 Readings from Last 6 Encounters:   25 98%   24 99%          Intake/Output Summary (Last 24 hours) at 2025 1400  Last data filed at 2025 0948  Gross per 24 hour   Intake 110 ml   Output 600 ml   Net -490 ml        Exam:     Physical Exam:    Gen:  Well-developed, well-nourished, in no acute distress  HEENT:  Pink conjunctivae, EOMI, hearing intact to voice, moist mucous membranes  Resp:  pronged expiratory phase with wheezing. More air movement than yesterday  Card:  No murmurs, normal S1, S2 without thrills, bruits or peripheral edema  Abd:  Soft, non-tender, non-distended, no palpable organomegaly and no detectable hernias  Musc:  No cyanosis or clubbing  Skin:  No rashes or ulcers, skin turgor is good  Neuro: follows commands appropriately  Psych:  Good insight, oriented to person, place and time, alert      Medications Reviewed: (see below)    Lab Data Reviewed: (see below)    ______________________________________________________________________    Medications:     Current Facility-Administered Medications   Medication Dose Route Frequency    methylPREDNISolone sodium succ (SOLU-MEDROL) 80 mg in sterile water 1.28 mL injection  80 mg IntraVENous 
NATHANIEL CAPONE [facility]  [address] [place] [address] [phone]        Hospitalist Progress Note      NAME: Adrian Hurley   :  1955  MRM:  [id number]    Date/Time of service: 2025  1:47 PM       Subjective:     Chief Complaint:  Patient was personally seen and examined by me during this time period.  Chart reviewed.  F/up COPD exas    When I evaluated patient this am he was feeling well. Reported feeling much improved from previous he denies CP but did still have some SOB    Later in the day, rapid response was called but dyspnea and abdominal pain. Patient was placed on bipap and he improved. KUB, CXR and serial ABGs ordered. Initial ABG did show resp acidosis       Objective:       Vitals:       Last 24hrs VS reviewed since prior progress note. Most recent are:    Vitals:    25 1305   BP:    Pulse: (!) 102   Resp: 17   Temp:    SpO2: 99%     SpO2 Readings from Last 6 Encounters:   25 99%   24 99%          Intake/Output Summary (Last 24 hours) at 2025 1347  Last data filed at 2025 0754  Gross per 24 hour   Intake --   Output 600 ml   Net -600 ml        Exam:     Physical Exam:    Gen:  Well-developed, well-nourished, in no acute distress  HEENT:  Pink conjunctivae, EOMI, hearing intact to voice, moist mucous membranes  Resp:  diminished sounds throughout anteriorly  Card:  No murmurs, normal S1, S2 without thrills, bruits or peripheral edema  Abd:  Soft, non-tender, non-distended, no palpable organomegaly and no detectable hernias  Musc:  No cyanosis or clubbing  Skin:  No rashes or ulcers, skin turgor is good  Neuro: follows commands appropriately  Psych:  Good insight, oriented to person, place and time, alert      Medications Reviewed: (see below)    Lab Data Reviewed: (see below)    ______________________________________________________________________    Medications:     Current Facility-Administered Medications   Medication Dose 
NUTRITION  Reason for Assessment: LOS      Recommendations/Interventions/Plan:   Continue with 4 Carb Choice diet      Will rescreen per policy       Past Medical History:   Diagnosis Date    COPD (chronic obstructive pulmonary disease) (HCC)     GERD (gastroesophageal reflux disease)     Hyperlipidemia     Hypertension     Polyp of colon     Sleep apnea     Uses CPAP         Pt screened for LOS.  Chart/labs/meds reviewed.  Admitted with Respiratory failure ([facility]) [J96.90]  COPD exacerbation ([facility]) [J44.1]    Spoke with pt and wife at bedside.  Pt sitting up eating lunch, states he has been eating most of the meals since admission.  Reports good appetite/intakes at home PTA.  Denies any recent weight loss, states weight stays between 195-200 lbs.  Current standing scale weight charted as 173 lbs.  This indicates 11% loss but question accuracy as all previous weights in EMR hx from last 4 years are between 195-210 lbs.  Pt does not show signs of muscle or fat loss.        Nutrition Related Findings:   Edema: None                      Last BM: 02/27/25      Skin: WNL      Current Nutrition Therapies:  Diet: 4 Carb Choice  Supplements: none  Meal Intake:   Patient Vitals for the past 168 hrs:   PO Meals Eaten (%)   02/26/25 0930 76 - 100%   02/25/25 1443 26 - 50%   02/24/25 1815 1 - 25%   02/24/25 1200 1 - 25%   02/24/25 0900 26 - 50%   02/23/25 1245 0%   02/23/25 0900 1 - 25%     Supplement Intake:  No data found.      Weight Hx:  Wt Readings from Last 10 Encounters:   02/25/25 78.5 kg (173 lb 1 oz)   11/19/24 89.4 kg (197 lb)   06/07/22 95.3 kg (210 lb)   03/01/22 95.3 kg (210 lb)   01/18/22 94.3 kg (208 lb)         Estimated Nutrition Needs:   Energy Requirements Based On: Kcal/kg  Weight Used for Energy Requirements: Current  Energy (kcal/day): 6432-0504 (25-30 kcals/kg)  Weight Used for Protein Requirements: Current  Protein (g/day): 94 (1 gm/kg)  Method Used for Fluid Requirements: 1 ml/kcal  Fluid (ml/day): 
[facility] at 2:45 PM with Doctor Zhao at their [facility] location in [place].   
Rapid Called at 1248  Responded to RRT at 1248 for Tachycardia and Respiratory Distress  Provider at bedside: NO  Interventions ordered: STAT Chest XR, EKG, and ABG  Sepsis Suspected: No  Transfer to Higher Level of Care: No    RRT RN responded to RRT called for increased work of breathing with abdominal pain. Patient currently on BIPAP. Patient is tachypneic using accessory muscles, O2 sats <92%. HR in the 130's. EKG completed showing Sinus Tach. Patient complaining of epigastric pain with increase bloating. Simethicone given at 1242. STAT Chest xray & KUB order placed. Patients HR now in the 100-110's and O2 sat >92%. Patient appears more comfortable and states he is feeling better. Dr. Farfan updated on patients status. Order for ABGs now and q4hrs. No other RRT interventions at this time.     1248: , RR 17, O2 89% on BIPAP  1300: , RR 17, /82, O2 99% on BIPAP    Rapid Ended at 1300  RRT RN assisted with transport to accepting unit NA.      
Rapid Called at [id number]    Responded to RRT at 1951 for Tachycardia    Provider at bedside: NO  Interventions ordered: Other (Comment) patient medicated with 5 mg IV valium, received breathing TX at 1923  Sepsis Suspected: No  Transfer to Higher Level of Care: No    Called to patient bedside for elevated heart rate, elevated BP, respiratory difficulty.  Found patient diphyletic, on bipap 10/6, rate 4, O2 30%.  Patient open eyes, and denies any difficulty.  S1, S2, Lung fields all demisted, ANDERSON wheezing.       Patient VS improved post medication with Valium.    Contacted [facility] radiology department to have CXR and KUB resulted performed at 1332.    2039 patient reports improved breathing, denies any distress.  VS HR 95, /64, SaO2 95%      Vitals:    02/23/25 2004   BP: (!) 219/96   Pulse: (!) 138   Resp: 19   Temp: 97.3 °F (36.3 °C)   SpO2: 94%        Rapid Ended at 2040      SERGEY MOSLEY RN   
Review of Systems   Constitutional:  Positive for diaphoresis.   Respiratory:  Positive for shortness of breath.    Endocrine: Positive for heat intolerance.   Neurological:  Positive for tremors.   Psychiatric/Behavioral:  Positive for agitation. The patient is nervous/anxious.        Physical Exam RRT called patient noted with rigid posturing, A&O x4, BiPap applied, CIWA 23, medicated with 10mg IV Diazepam, patient resting @ this time, VSS stable, will cont to monitor.  
Spiritual Care Partner Volunteer visited patient at [facility] in [facility] B3 INTERMEDIATE CARE UNIT on 2/26/2025    Documented by:  Chaplain Whitney Shaw MS, MDiv, [facility]  Spiritual Health Services  Paging service: [phone] (RINA)    
Spiritual Health History and Assessment/Progress Note  [facility]    Initial Encounter,  ,  ,      Name: Adrian Hurley MRN: [id number]    Age: 69 y.o.     Sex: male   Language: English   Catholic: Other   Respiratory failure (HCC)     Date: 3/1/2025            Total Time Calculated: 10 min              Spiritual Assessment began in [facility] B3 INTERMEDIATE CARE UNIT        Referral/Consult From: Rounding   Encounter Overview/Reason: Initial Encounter  Service Provided For: Patient and family together    Autumn, Belief, Meaning:   Patient unable to assess at this time  Family/Friends Other: unable to assess      Importance and Influence:  Patient has spiritual/personal beliefs that influence decisions regarding their health  Family/Friends have spiritual/personal beliefs that influence decisions regarding the patient's health    Community:  Patient feels well-supported. Support system includes: Spouse/Partner  Family/Friends Other: unable to assess    Assessment and Plan of Care:     Patient Interventions include: Facilitated expression of thoughts and feelings  Family/Friends Interventions include: Facilitated expression of thoughts and feelings    Patient Plan of Care: Spiritual Care available upon further referral  Family/Friends Plan of Care: Spiritual Care available upon further referral     Intern Shari Laura Mdiv  Contact [facility] [phone]  on 3/1/2025 at 12:40 PM   
potassium bicarb-citric acid (EFFER-K) effervescent tablet 40 mEq  40 mEq Oral PRN    Or    potassium chloride 10 mEq/100 mL IVPB (Peripheral Line)  10 mEq IntraVENous PRN    magnesium sulfate 2000 mg in 50 mL IVPB premix  2,000 mg IntraVENous PRN    enoxaparin (LOVENOX) injection 40 mg  40 mg SubCUTAneous Daily    ondansetron (ZOFRAN-ODT) disintegrating tablet 4 mg  4 mg Oral Q8H PRN    Or    ondansetron (ZOFRAN) injection 4 mg  4 mg IntraVENous Q6H PRN    polyethylene glycol (GLYCOLAX) packet 17 g  17 g Oral Daily PRN    acetaminophen (TYLENOL) tablet 650 mg  650 mg Oral Q6H PRN    Or    acetaminophen (TYLENOL) suppository 650 mg  650 mg Rectal Q6H PRN    sodium chloride (OCEAN, BABY AYR) 0.65 % nasal spray 1 spray  1 spray Each Nostril PRN          Lab Review:     Recent Labs     02/28/25  0325 03/01/25  0122   WBC 5.7 7.1   HGB 12.1 12.7   HCT 37.7 39.9    146*       Recent Labs     02/28/25  0325 03/01/25  0122    138   K 4.3 4.0   CL 96* 98   CO2 40* 39*   BUN 13 15     No results found for: \"GLUCPOC\"       Assessment / Plan:     Patient is a 69-year-old male with a history of COPD on 3 L of oxygen at home, anxiety and depression, hyperlipidemia, GERD, hypertension and sleep apnea comes to the hospital with acute on chronic hypoxemic respiratory failure.  Patient is admitted for further management of COPD exacerbation.     Acute on chronic hypoxic and hypercapnic respiratory respiratory failure POA  COPD exacerbation POA  Tobacco Abuse   On 3 L nasal cannula at baseline  Unclear reason for exacerbation  IV steroids -wean as able  Scheduled brovana/pulmicort  Scheduled mucinex   tessalon  Bipap QHS and PRN during the day as needed  Pulm following     SIRS criteria POA  Tachycardia, tachypnea but no infection  CTA neg for PE  F/up on Bcx collected (NGTD)  RVP neg    Abd fullness / abd pain POA  Acute on chronic  Likely due to cirrhosis  CT reviewed and showing cirrhosis  On bentyl   Monitor     Etoh 
5-40 mL IntraVENous 2 times per day    sodium chloride flush 0.9 % injection 5-40 mL  5-40 mL IntraVENous PRN    0.9 % sodium chloride infusion   IntraVENous PRN    potassium chloride (KLOR-CON) extended release tablet 40 mEq  40 mEq Oral PRN    Or    potassium bicarb-citric acid (EFFER-K) effervescent tablet 40 mEq  40 mEq Oral PRN    Or    potassium chloride 10 mEq/100 mL IVPB (Peripheral Line)  10 mEq IntraVENous PRN    magnesium sulfate 2000 mg in 50 mL IVPB premix  2,000 mg IntraVENous PRN    enoxaparin (LOVENOX) injection 40 mg  40 mg SubCUTAneous Daily    ondansetron (ZOFRAN-ODT) disintegrating tablet 4 mg  4 mg Oral Q8H PRN    Or    ondansetron (ZOFRAN) injection 4 mg  4 mg IntraVENous Q6H PRN    polyethylene glycol (GLYCOLAX) packet 17 g  17 g Oral Daily PRN    acetaminophen (TYLENOL) tablet 650 mg  650 mg Oral Q6H PRN    Or    acetaminophen (TYLENOL) suppository 650 mg  650 mg Rectal Q6H PRN    doxycycline hyclate (VIBRA-TABS) tablet 100 mg  100 mg Oral 2 times per day    sodium chloride (OCEAN, BABY AYR) 0.65 % nasal spray 1 spray  1 spray Each Nostril PRN         REVIEW OF SYSTEMS   12 point ROS negative except as stated in the HPI.      Physical Exam:   Vitals:    02/24/25 0807   BP:    Pulse: 93   Resp: 15   Temp:    SpO2: 93%       General:  Alert, cooperative, no distress, appears stated age.   Head:  Normocephalic, without obvious abnormality, atraumatic.   Eyes:  Conjunctivae/corneas clear.    Nose: Nares normal. Septum midline. Mucosa normal.    Throat: Lips, mucosa, and tongue normal.    Neck: Supple, symmetrical, trachea midline   Lungs:   Very diminished, scattered rhonchi, no wheezing, nonlabored but does appear short of breath   Chest wall:  No tenderness or deformity.   Heart:  Regular rate and rhythm, S1, S2 normal, no murmur, click, rub or gallop.   Abdomen:   Soft, non-tender. Bowel sounds normal.    Extremities: Extremities normal, atraumatic, no cyanosis or edema.   Pulses: 2+ and 
neg    Abd fullness / abd pain: POA. Acute on chronic. Likely fatty liver vx cirrhosis  - CT ordered  - simethicone and bentyl  - Abd exam is benign    Etoh abuse with acute withdrawal: POA. Daily drinker  - CIWA with ativan    Hypertension: POA. Chronic Patient is taking lisinopril at home.   - hold home meds d/t soft Bps     Sleep apnea  - Patient does not use CPAP at home.  - Outpt sleep referral     Hyperlipidemia  - Patient is taking Lipitor 40 mg nightly.     Anxiety and depression  - Patient is on escitalopram 10 mg     GERD  - Continue Protonix 40 mg daily.    I have provided a total of 39 minutes of critical care time.  During this entire length of time I was immediately available to the patient. The reason for providing this level of medical care was due to a critical illness that impaired one or more vital organ systems, such that there was a high probability of imminent or life threatening deterioration in the patient's condition. This care involved high complexity decision making which includes reviewing the patient's past medical records, current laboratory results, and actual Xray films in order to assess, support vital system function, and to treat this degree of vital organ system failure, and to prevent further life threatening deterioration of the patient’s condition.        POLST: Patient not ready    **Prior records, notes, labs, radiology, and medications reviewed in [facility]**            Care Plan discussed with: Patient, Care Manager, and Nursing Staff    Discussed:  Care Plan and D/C Planning    Prophylaxis:  Lovenox    Disposition:  Home w/Family           ___________________________________________________    Attending Physician: Brent Farfan MD       
withdrawal POA   Daily drinker  Continue folic acid, thiamine and multivitamin  CIWA with IV Valium as needed    Hypertension  Can likely resume home lisinopril in the morning    Sleep apnea  Outpt sleep referral     Hyperlipidemia  Continue statin      Anxiety and depression  Continue escitalopram   buspar started inpatient   Valium prn   Psych evaluated     GERD  Continue Protonix         **Prior records, notes, labs, radiology, and medications reviewed in [facility]**            Care Plan discussed with: Patient, Care Manager, and Nursing Staff    Discussed:  Care Plan and D/C Planning    Prophylaxis:  Lovenox    Disposition:  Home w/Family           ___________________________________________________    Attending Physician: Niurka Riojas DO       
  Heart:  Regular rate and rhythm, S1, S2 normal, no murmur, click, rub or gallop.   Abdomen:   Soft, non-tender. Bowel sounds normal.    Extremities: Extremities normal, atraumatic, no cyanosis or edema.   Pulses: 2+ and symmetric all extremities.   Skin: Skin color, texture, turgor normal. No rashes or lesions   Neurologic: Grossly nonfocal       Lab Results   Component Value Date/Time     2025 01:49 AM    K 4.4 2025 01:49 AM    CL 98 2025 01:49 AM    CO2 40 2025 01:49 AM    BUN 20 2025 01:49 AM    MG 1.6 2025 02:34 PM       Lab Results   Component Value Date/Time    WBC 3.7 2025 02:53 AM    HGB 10.8 2025 02:53 AM     2025 02:53 AM    .0 2025 02:53 AM       Lab Results   Component Value Date/Time    INR 1.0 2021 07:30 AM    GLOB 4.7 2025 02:34 PM       IMPRESSION  Acute on chronic respiratory failure with hypoxia and hypercapnia  Severe COPD with acute exacerbation  RADHA, not currently on home BiPAP  Tobacco use    PLAN  Goal sats 88% or higher, wean as tolerated   BiPAP when sleeping and as needed for increased WOB  Brovana/Pulmicort in place of home duonebs; PRN albuterol (needs refills on nebs sent at discharge, what he has is )  IV steroids - wean to 60mg q12hr yesterday per primary team  Doxy for COPD exacerbation; would not put on azithromycin as he just stopped chronic dosing for this  High dose Mucinex scheduled  Daliresp  Needs to restablish care with sleep division, will arrange at discharge  Benzos for CIWA/anxiety per primary team  Psych consulted for severe anxiety - eval pending  Will also arrange sooner follow-up with Dr. Zhao closer to discharge    Discussed with wife at the bedside.    TWIN [place]  
Extremities normal, atraumatic, no cyanosis or edema.   Pulses: 2+ and symmetric all extremities.   Skin: Skin color, texture, turgor normal. No rashes or lesions   Neurologic: Grossly nonfocal       Lab Results   Component Value Date/Time     2025 03:25 AM    K 4.3 2025 03:25 AM    CL 96 2025 03:25 AM    CO2 40 2025 03:25 AM    BUN 13 2025 03:25 AM    MG 1.6 2025 02:34 PM       Lab Results   Component Value Date/Time    WBC 5.7 2025 03:25 AM    HGB 12.1 2025 03:25 AM     2025 03:25 AM    MCV 99.2 2025 03:25 AM       Lab Results   Component Value Date/Time    INR 1.0 2021 07:30 AM    GLOB 4.7 2025 02:34 PM       IMPRESSION  Acute on chronic respiratory failure with hypoxia and hypercapnia  Severe COPD with acute exacerbation  RADHA, not currently on home BiPAP  Tobacco use    PLAN  Goal sats 88% or higher, wean as tolerated   BiPAP when sleeping and as needed for increased WOB  Brovana/Pulmicort in place of home duonebs; PRN albuterol (needs refills on nebs sent at discharge, what he has is )  IV steroids - wean to 40mg q12hr  Doxy for COPD exacerbation - course completed; would not put on azithromycin as he just stopped chronic dosing for this  High dose Mucinex scheduled  Daliresp  Needs to restablish care with sleep division, will arrange at discharge  Benzos for CIWA/anxiety per primary team  Psych consulted for severe anxiety - eval appreciated  Will also arrange sooner follow-up with Dr. Zhao closer to discharge    Discussed with wife at the bedside.    TWIN [place]  
diminished, mild bilateral wheeze.  Suspect underlying anxiety contributing.   Chest wall:  No tenderness or deformity.   Heart:  Regular rate and rhythm, S1, S2 normal, no murmur, click, rub or gallop.   Abdomen:   Soft, non-tender. Bowel sounds normal.    Extremities: Extremities normal, atraumatic, no cyanosis or edema.   Pulses: 2+ and symmetric all extremities.   Skin: Skin color, texture, turgor normal. No rashes or lesions   Neurologic: Grossly nonfocal       Lab Results   Component Value Date/Time     2025 01:49 AM    K 4.4 2025 01:49 AM    CL 98 2025 01:49 AM    CO2 40 2025 01:49 AM    BUN 20 2025 01:49 AM    MG 1.6 2025 02:34 PM       Lab Results   Component Value Date/Time    WBC 3.7 2025 02:53 AM    HGB 10.8 2025 02:53 AM     2025 02:53 AM    .0 2025 02:53 AM       Lab Results   Component Value Date/Time    INR 1.0 2021 07:30 AM    GLOB 4.7 2025 02:34 PM       IMPRESSION  Acute on chronic respiratory failure with hypoxia and hypercapnia  Severe COPD with acute exacerbation  RADHA, not currently on home BiPAP  Tobacco use    PLAN  Goal sats 88% or higher, wean as tolerated   BiPAP when sleeping and as needed for increased WOB  Brovana/Pulmicort in place of home duonebs; PRN albuterol (needs refills on nebs sent at discharge, what he has is )  IV steroids - wean to 60mg q8hr today  Doxy for COPD exacerbation; would not put on azithromycin as he just stopped chronic dosing for this  High dose Mucinex scheduled  Daliresp  Needs to restablish care with sleep division, will arrange at discharge  Benzos for CIWA/anxiety per primary team  Psych consult for severe anxiety  Will also arrange sooner follow-up with Dr. Zhao closer to discharge    Discussed with wife at the bedside.    TWIN [place]

## 2025-03-02 NOTE — CARE COORDINATION
Care Management Progress Note    Reason for Admission:   Respiratory failure (HCC) [J96.90]  COPD exacerbation (HCC) [J44.1]       Patient Admission Status: Inpatient  RUR: 10  Hospitalization in the last 30 days (Readmission):  No        Transition of care plan:  Patient discharging home today. Confirmed DME delivered to bedside.   Patient accepted to [facility] for home health, will send DC summary when available.    UPDATE- DC summary sent via [facility] to [facility].    Confirmed with patient's wife he has home neb machine that is functional.    Elly Dallas RN, [facility]  Omari Saldaña Care Management    Available via [employer]

## 2025-03-02 NOTE — DISCHARGE INSTRUCTIONS
mentation, falling, bleeding, shortness of breath, chest pain, severe headache, severe abdominal pain.    Follow Up:  Mac Zhao MD  [address] [facility]  [address]  [facility] [id number] [phone]  Go on 3/14/2025  Pulmomary follow up set for Friday March 14th 2025 at 2:45 PM with Doctor Pavel at their [facility] location in [place].    [facility]  [address] [place] [address]  [address]  [facility] [address] [phone]  Follow up  HOME [facility]ALTH AGENCY WILL CONTACT YOU TO SET UP INITIAL VISIT    Palma Christopher MD  [address] [facility] [address] [phone]  Schedule an appointment as soon as possible for a visit in 1 week(s)  Hospital Follow Up    [facility]  [address] [place] [address] [phone]  Schedule an appointment as soon as possible for a visit in 2 week(s)  Hospital Follow Up    Information obtained by :  I understand that if any problems occur once I am at home I am to contact my physician.    I understand and acknowledge receipt of the instructions indicated above.                                                                                                                                           Physician's or R.N.'s Signature                                                                  Date/Time                                                                                                                                              Patient or Representative Signature                                                          Date/Time

## 2025-03-02 NOTE — PLAN OF CARE
Problem: Discharge Planning  Goal: Discharge to home or other facility with appropriate resources  2/26/2025 1218 by Shabnam Rogers RN  Outcome: Progressing  Flowsheets (Taken 2/26/2025 0800)  Discharge to home or other facility with appropriate resources: Identify barriers to discharge with patient and caregiver  2/26/2025 0518 by Nadeem Brown RN  Outcome: Progressing     Problem: Respiratory - Adult  Goal: Achieves optimal ventilation and oxygenation  2/26/2025 1020 by Ana Crabtree RT  Outcome: Progressing  Flowsheets (Taken 2/26/2025 0800 by Shabnam Rogers RN)  Achieves optimal ventilation and oxygenation:   Assess for changes in respiratory status   Assess for changes in mentation and behavior   Position to facilitate oxygenation and minimize respiratory effort   Oxygen supplementation based on oxygen saturation or arterial blood gases  2/26/2025 0518 by Nadeem Brown RN  Outcome: Progressing     Problem: Safety - Adult  Goal: Free from fall injury  2/26/2025 1218 by Shabnam Rogers RN  Outcome: Progressing  2/26/2025 0518 by Nadeem Brown RN  Outcome: Progressing     Problem: Skin/Tissue Integrity  Goal: Skin integrity remains intact  Description: 1.  Monitor for areas of redness and/or skin breakdown  2.  Assess vascular access sites hourly  3.  Every 4-6 hours minimum:  Change oxygen saturation probe site  4.  Every 4-6 hours:  If on nasal continuous positive airway pressure, respiratory therapy assess nares and determine need for appliance change or resting period  2/26/2025 1218 by Shabnam Rogers RN  Outcome: Progressing  Flowsheets (Taken 2/26/2025 0800)  Skin Integrity Remains Intact: Monitor for areas of redness and/or skin breakdown  2/26/2025 0518 by Nadeem Brown RN  Outcome: Progressing  Flowsheets (Taken 2/25/2025 2213 by Kaylie Adkins RN)  Skin Integrity Remains Intact: Monitor for areas of redness and/or skin breakdown     
  Problem: Discharge Planning  Goal: Discharge to home or other facility with appropriate resources  3/2/2025 0739 by Carina Parikh RN  Outcome: Progressing  3/1/2025 2359 by Messi Sheridan RN  Outcome: Progressing  Flowsheets (Taken 3/1/2025 2100)  Discharge to home or other facility with appropriate resources:   Identify barriers to discharge with patient and caregiver   Arrange for needed discharge resources and transportation as appropriate   Identify discharge learning needs (meds, wound care, etc)   Refer to discharge planning if patient needs post-hospital services based on physician order or complex needs related to functional status, cognitive ability or social support system   Arrange for interpreters to assist at discharge as needed     Problem: Respiratory - Adult  Goal: Achieves optimal ventilation and oxygenation  3/2/2025 0739 by Carina Parikh RN  Outcome: Progressing  3/1/2025 2359 by Messi Sheridan RN  Outcome: Progressing  3/1/2025 2113 by Chris Joe  Outcome: Progressing  Flowsheets (Taken 3/1/2025 2100 by Messi Sheridan RN)  Achieves optimal ventilation and oxygenation:   Assess for changes in respiratory status   Assess for changes in mentation and behavior   Position to facilitate oxygenation and minimize respiratory effort   Oxygen supplementation based on oxygen saturation or arterial blood gases   Encourage broncho-pulmonary hygiene including cough, deep breathe, incentive spirometry   Assess the need for suctioning and aspirate as needed   Assess and instruct to report shortness of breath or any respiratory difficulty   Respiratory therapy support as indicated     Problem: Safety - Adult  Goal: Free from fall injury  3/2/2025 0739 by Carina Parikh RN  Outcome: Progressing  3/1/2025 2359 by Messi Sheridan RN  Outcome: Progressing     Problem: Skin/Tissue Integrity  Goal: Skin integrity remains intact  Description: 1.  Monitor for areas of redness and/or skin breakdown  2.  
  Problem: Discharge Planning  Goal: Discharge to home or other facility with appropriate resources  3/2/2025 1532 by Carina Parikh RN  Outcome: Adequate for Discharge  3/2/2025 0739 by Carina Parikh RN  Outcome: Progressing     Problem: Respiratory - Adult  Goal: Achieves optimal ventilation and oxygenation  3/2/2025 1532 by Carina Parikh RN  Outcome: Adequate for Discharge  3/2/2025 0927 by Liseth Mott, RT  Outcome: Progressing  3/2/2025 0739 by Carina Parikh RN  Outcome: Progressing     Problem: Safety - Adult  Goal: Free from fall injury  3/2/2025 1532 by Carina Parikh RN  Outcome: Adequate for Discharge  3/2/2025 0739 by Carina Parikh RN  Outcome: Progressing     Problem: Skin/Tissue Integrity  Goal: Skin integrity remains intact  Description: 1.  Monitor for areas of redness and/or skin breakdown  2.  Assess vascular access sites hourly  3.  Every 4-6 hours minimum:  Change oxygen saturation probe site  4.  Every 4-6 hours:  If on nasal continuous positive airway pressure, respiratory therapy assess nares and determine need for appliance change or resting period  3/2/2025 1532 by Carina Parikh RN  Outcome: Adequate for Discharge  3/2/2025 0739 by Carina Parikh RN  Outcome: Progressing     Problem: Pain  Goal: Verbalizes/displays adequate comfort level or baseline comfort level  3/2/2025 1532 by Carina Parikh RN  Outcome: Adequate for Discharge  3/2/2025 0739 by Carina Parikh RN  Outcome: Progressing     
  Problem: Discharge Planning  Goal: Discharge to home or other facility with appropriate resources  Outcome: Progressing     Problem: Respiratory - Adult  Goal: Achieves optimal ventilation and oxygenation  2/27/2025 1227 by Kian Otoole RN  Outcome: Progressing  2/27/2025 0952 by Ana Crabtree, RT  Outcome: Progressing     Problem: Safety - Adult  Goal: Free from fall injury  Outcome: Progressing     Problem: Skin/Tissue Integrity  Goal: Skin integrity remains intact  Description: 1.  Monitor for areas of redness and/or skin breakdown  2.  Assess vascular access sites hourly  3.  Every 4-6 hours minimum:  Change oxygen saturation probe site  4.  Every 4-6 hours:  If on nasal continuous positive airway pressure, respiratory therapy assess nares and determine need for appliance change or resting period  Outcome: Progressing     
  Problem: Discharge Planning  Goal: Discharge to home or other facility with appropriate resources  Outcome: Progressing     Problem: Respiratory - Adult  Goal: Achieves optimal ventilation and oxygenation  Outcome: Progressing     Problem: Safety - Adult  Goal: Free from fall injury  Outcome: Progressing     
  Problem: Discharge Planning  Goal: Discharge to home or other facility with appropriate resources  Outcome: Progressing     Problem: Respiratory - Adult  Goal: Achieves optimal ventilation and oxygenation  Outcome: Progressing     Problem: Safety - Adult  Goal: Free from fall injury  Outcome: Progressing     Problem: Skin/Tissue Integrity  Goal: Skin integrity remains intact  Description: 1.  Monitor for areas of redness and/or skin breakdown  2.  Assess vascular access sites hourly  3.  Every 4-6 hours minimum:  Change oxygen saturation probe site  4.  Every 4-6 hours:  If on nasal continuous positive airway pressure, respiratory therapy assess nares and determine need for appliance change or resting period  Outcome: Progressing  Flowsheets (Taken 2/25/2025 2213 by Kaylie Adkins RN)  Skin Integrity Remains Intact: Monitor for areas of redness and/or skin breakdown     
  Problem: Occupational Therapy - Adult  Goal: By Discharge: Performs self-care activities at highest level of function for planned discharge setting.  See evaluation for individualized goals.  Description: FUNCTIONAL STATUS PRIOR TO ADMISSION:  Patient is normally mobilizing around the home without AD and occasionally uses electric scooter in the community. Pt fairly sedentary and per wife does not normally leave the house, but can mobilize short distances in the home. Uses 3L nocturnal O2 and 4L during the day. Is normally MOD I for ADLs, but does have supervision for all bathing. Does not wear socks at baseline so requires assistance when he wears them. Does not drive.     HOME SUPPORT: Patient lived with wife.    Occupational Therapy Goals:  Initiated 2/24/2025  1.  Patient will perform grooming, standing at sink, with Supervision within 7 day(s).  2.  Patient will perform lower body dressing, excluding socks, with Set-up and Supervision within 7 day(s).  3.  Patient will perform bathing with Minimal Assist within 7 day(s).  4.  Patient will perform toilet transfers with Supervision  within 7 day(s).  5.  Patient will perform all aspects of toileting with Supervision within 7 day(s).  6.  Patient will participate in upper extremity therapeutic exercise/activities with [place] for 10 minutes within 7 day(s).      Outcome: Progressing   OCCUPATIONAL THERAPY TREATMENT  Patient: Adrian Hurley (69 y.o. male)  Date: 2/28/2025  Primary Diagnosis: Respiratory failure (HCC) [J96.90]  COPD exacerbation (HCC) [J44.1]       Precautions: Fall Risk                Chart, occupational therapy assessment, plan of care, and goals were reviewed.    ASSESSMENT  Patient continues to benefit from skilled OT services and is progressing towards goals. Patient verbalizes good understanding of energy conservation strategies for ADL completion. He owns all recommended DME. Patient has been ambulating to bathroom here without 
  Problem: Occupational Therapy - Adult  Goal: By Discharge: Performs self-care activities at highest level of function for planned discharge setting.  See evaluation for individualized goals.  Description: FUNCTIONAL STATUS PRIOR TO ADMISSION:  Patient is normally mobilizing around the home without AD and occasionally uses electric scooter in the community. Pt fairly sedentary and per wife does not normally leave the house, but can mobilize short distances in the home. Uses 3L nocturnal O2 and 4L during the day. Is normally MOD I for ADLs, but does have supervision for all bathing. Does not wear socks at baseline so requires assistance when he wears them. Does not drive.     HOME SUPPORT: Patient lived with wife.    Occupational Therapy Goals:  Initiated 2/24/2025  1.  Patient will perform grooming, standing at sink, with Supervision within 7 day(s).  2.  Patient will perform lower body dressing, excluding socks, with Set-up and Supervision within 7 day(s).  3.  Patient will perform bathing with Minimal Assist within 7 day(s).  4.  Patient will perform toilet transfers with Supervision  within 7 day(s).  5.  Patient will perform all aspects of toileting with Supervision within 7 day(s).  6.  Patient will participate in upper extremity therapeutic exercise/activities with [place] for 10 minutes within 7 day(s).      Outcome: Progressing     OCCUPATIONAL THERAPY EVALUATION    Patient: Adrian Hurley (69 y.o. male)  Date: 2/24/2025  Primary Diagnosis: Respiratory failure (HCC) [J96.90]  COPD exacerbation ([facility]) [J44.1]         Precautions:     ASSESSMENT :  The patient is limited by decreased functional mobility, independence in ADLs, high-level IADLs, ROM, strength, activity tolerance, endurance, balance, increased pain levels on day 2 of hospital admission for COPD exacerbation. PMHx significant for COPD, GERD, HTN, HLD, ETOH use, and RADHA. Pt uses 4L home O2 during the day and 3L home O2 at night. Cleared 
  Problem: Physical Therapy - Adult  Goal: By Discharge: Performs mobility at highest level of function for planned discharge setting.  See evaluation for individualized goals.  Description: FUNCTIONAL STATUS PRIOR TO ADMISSION: Patient was independent without use of DME, ambulate short distances in home and occasionally uses electric scooter in the community. Per wife, pt with sedentary lifestyle, does not normally leave the house, uses 3L nocturnal O2 and 4L during the day.     HOME SUPPORT PRIOR TO ADMISSION: The patient lived with spouse and was supervision for ADL's.    Physical Therapy Goals  Initiated 2/24/2025  1.  Patient will move from supine to sit and sit to supine in bed with modified independence within 7 day(s).    2.  Patient will perform sit to stand with modified independence within 7 day(s).  3.  Patient will transfer from bed to chair and chair to bed with modified independence using the least restrictive device within 7 day(s).  4.  Patient will ambulate with modified independence for  feet with the least restrictive device within 7 day(s).     Outcome: Progressing   PHYSICAL THERAPY TREATMENT    Patient: Adrian Hurley (69 y.o. male)  Date: 2/26/2025  Diagnosis: Respiratory failure ([facility]) [J96.90]  COPD exacerbation ([facility]) [J44.1] Respiratory failure ([facility])      Precautions: Fall Risk                      ASSESSMENT:  Patient continues to benefit from skilled PT services and is progressing towards goals. Communicated with nurse cleared for therapy. Patient supine on bed when received family at bedside agreed with all goals set for the patient. Patient on 3 liter oxygen and saturation 95%, mobilized patient today with rolling walker min Assist in the room and around the bed min assist, slow and steady gait. OOB to chair as tolerated performed some active range of motion exercise on both LE all planes. Educated and instructed patient to continue to do active range of motion exercise on 
FUNCTIONAL STATUS PRIOR TO ADMISSION: Patient was independent without use of DME, ambulate short distances in home and occasionally uses electric scooter in the community. Per wife, pt with sedentary lifestyle, does not normally leave the house, uses 3L nocturnal O2 and 4L during the day.     HOME SUPPORT PRIOR TO ADMISSION: The patient lived with spouse and was supervision for ADL's.    Physical Therapy Goals  Initiated 2/24/2025  1.  Patient will move from supine to sit and sit to supine in bed with modified independence within 7 day(s).    2.  Patient will perform sit to stand with modified independence within 7 day(s).  3.  Patient will transfer from bed to chair and chair to bed with modified independence using the least restrictive device within 7 day(s).  4.  Patient will ambulate with modified independence for  feet with the least restrictive device within 7 day(s).     2/25/2025 1434 by Sheila Mcintosh, PT  Outcome: Progressing     
or resting period  Outcome: Progressing  Flowsheets (Taken 3/1/2025 2100)  Skin Integrity Remains Intact:   Monitor for areas of redness and/or skin breakdown   Assess vascular access sites hourly   Every 4-6 hours minimum: Change oxygen saturation probe site     Problem: Pain  Goal: Verbalizes/displays adequate comfort level or baseline comfort level  Outcome: Progressing     
to participate but is ultimately agreeable with encouragement. He continues to endorse dizziness at EOB but BP noted to remain elevated. He is able to stand with overall CGA to min A to RW for linen change and side step for repositioning. He is received on 4L and desaturates to 86% with activity, HR in 120s. With EOB sitting, pt requesting toileting, and becoming increasingly anxious with urinal use (Wife providing increased assistance to manage clothing and urinal). Saturations suddenly decreasing to 83% and HR elevating to 138-142 bpm. Increased O2 to 6L to aid in recovery and pt is assisted back to supine; RN notified and arrived at bedside. With time, he is able to recover to 91% and HR back to 110 bpm. Repositioned for comfort at end of session.          PLAN :  Patient continues to benefit from skilled intervention to address the above impairments.  Continue treatment per established plan of care to address goals.    Recommend with staff: OOB to chair for all meals; SPT to BSC for toileting; ADLs as needed    Recommend next OT session: continue with EOB activity, transfers    Recommendation for discharge: (in order for the patient to meet his/her long term goals):   Intermittent occupational therapy up to 2-3x/week in previous living setting with additional support needed for all activity, transfers, and ADLs    Other factors to consider for discharge: not safe to be alone and concern for safely navigating or managing the home environment    IF patient discharges home will need the following DME: continuing to assess with progress       SUBJECTIVE:   Patient stated “I feel like I don't have enough air.”    OBJECTIVE DATA SUMMARY:   Cognitive/Behavioral Status:  Orientation  Orientation Level: Oriented to place;Oriented to person  Cognition  Overall Cognitive Status: Exceptions  Following Commands: Follows one step commands consistently  Attention Span: Attends with cues to redirect  Problem Solving: Decreased 
Caregiver / family present, and Side rails x3      COMMUNICATION/EDUCATION:   The patient's plan of care was discussed with: occupational therapist and registered nurse    Patient Education  Education Given To: Patient  Education Provided: Role of Therapy;Plan of Care  Education Method: Verbal  Barriers to Learning: None  Education Outcome: Verbalized understanding;Continued education needed      Sheila Mcintosh, PT, DPT  Minutes: 25    
oxygen    After treatment:   Patient left in no apparent distress sitting up in chair, Call bell within reach, Bed/ chair alarm activated, and Caregiver / family present      COMMUNICATION/EDUCATION:   The patient's plan of care was discussed with: registered nurse           Reina Jung, PT  Minutes: 12    
restrictive device within 7 day(s).     2/28/2025 1554 by Reina Jung, PT  Outcome: Progressing     Problem: Skin/Tissue Integrity  Goal: Skin integrity remains intact  Description: 1.  Monitor for areas of redness and/or skin breakdown  2.  Assess vascular access sites hourly  3.  Every 4-6 hours minimum:  Change oxygen saturation probe site  4.  Every 4-6 hours:  If on nasal continuous positive airway pressure, respiratory therapy assess nares and determine need for appliance change or resting period  Outcome: Progressing  Flowsheets (Taken 2/28/2025 2000)  Skin Integrity Remains Intact:   Monitor for areas of redness and/or skin breakdown   Assess vascular access sites hourly   Every 4-6 hours minimum: Change oxygen saturation probe site     
standing rest break between)  Assistive Device: Walker, rolling;Gait belt  Interventions: Verbal cues  Base of Support: Narrowed  Speed/Ludy: Pace decreased (< 100 feet/min)  Step Length: Right shortened;Left shortened  Gait Abnormalities: Decreased step clearance        Neuro Re-Education:                    Pain Rating:  none     Activity Tolerance:   Fair , requires frequent rest breaks, and desaturates with activity and requires oxygen    After treatment:   Patient left in no apparent distress sitting up in chair, Call bell within reach, and Caregiver / family present      COMMUNICATION/EDUCATION:   The patient's plan of care was discussed with: occupational therapist and registered nurse           Reina Jung, PT  Minutes: 25    
                                                                          Pain Ratin/10   Activity Tolerance:   Poor, requires rest breaks, and observed shortness of breath on exertion    After treatment:   Patient left in no apparent distress sitting up in chair, Call bell within reach, Bed/ chair alarm activated, and Caregiver / family present    COMMUNICATION/EDUCATION:   The patient's plan of care was discussed with: occupational therapist and registered nurse    Patient Education  Education Given To: Patient  Education Provided: Role of Therapy;Plan of Care;Transfer Training;Mobility Training;Energy Conservation;Equipment;Fall Prevention Strategies  Education Method: Verbal;Teach Back;Demonstration  Barriers to Learning: None  Education Outcome: Verbalized understanding;Continued education needed    Thank you for this referral.  Terrence Simon, PT  Minutes: 27      Physical Therapy Evaluation Charge Determination   History Examination Presentation Decision-Making   MEDIUM  Complexity : 1-2 comorbidities / personal factors will impact the outcome/ POC  MEDIUM Complexity : 3 Standardized tests and measures addressin body structure, function, activity limitation and / or participation in recreation  LOW Complexity : Stable, uncomplicated  AM-PAC  MEDIUM   Based on the above components, the patient evaluation is determined to be of the following complexity level: Low       
independent

## 2025-06-22 ENCOUNTER — APPOINTMENT (OUTPATIENT)
Facility: HOSPITAL | Age: 70
End: 2025-06-22
Payer: COMMERCIAL

## 2025-06-22 ENCOUNTER — HOSPITAL ENCOUNTER (INPATIENT)
Facility: HOSPITAL | Age: 70
LOS: 19 days | Discharge: NURSING, REHAB OR LONG-TERM CARE | End: 2025-07-11
Attending: EMERGENCY MEDICINE | Admitting: INTERNAL MEDICINE
Payer: COMMERCIAL

## 2025-06-22 DIAGNOSIS — J44.1 COPD WITH ACUTE EXACERBATION (HCC): ICD-10-CM

## 2025-06-22 DIAGNOSIS — J96.02 ACUTE RESPIRATORY FAILURE WITH HYPOXIA AND HYPERCARBIA (HCC): Primary | ICD-10-CM

## 2025-06-22 DIAGNOSIS — J96.01 ACUTE RESPIRATORY FAILURE WITH HYPOXIA AND HYPERCARBIA (HCC): Primary | ICD-10-CM

## 2025-06-22 LAB
ALBUMIN SERPL-MCNC: 3.8 G/DL (ref 3.5–5)
ALBUMIN/GLOB SERPL: 1.1 (ref 1.1–2.2)
ALP SERPL-CCNC: 99 U/L (ref 45–117)
ALT SERPL-CCNC: 38 U/L (ref 12–78)
ANION GAP BLD CALC-SCNC: ABNORMAL (ref 10–20)
ANION GAP SERPL CALC-SCNC: 7 MMOL/L (ref 2–12)
APPEARANCE UR: ABNORMAL
APTT PPP: 24.5 SEC (ref 22.1–31)
ARTERIAL PATENCY WRIST A: ABNORMAL
ARTERIAL PATENCY WRIST A: ABNORMAL
ARTERIAL PATENCY WRIST A: POSITIVE
AST SERPL-CCNC: 26 U/L (ref 15–37)
BACTERIA URNS QL MICRO: ABNORMAL /HPF
BASE EXCESS BLD CALC-SCNC: 10 MMOL/L
BASE EXCESS BLD CALC-SCNC: 16.7 MMOL/L
BASE EXCESS BLD CALC-SCNC: 18 MMOL/L
BASOPHILS # BLD: 0.03 K/UL (ref 0–0.1)
BASOPHILS NFR BLD: 0.3 % (ref 0–1)
BDY SITE: ABNORMAL
BILIRUB SERPL-MCNC: 0.3 MG/DL (ref 0.2–1)
BILIRUB UR QL: NEGATIVE
BUN SERPL-MCNC: 10 MG/DL (ref 6–20)
BUN/CREAT SERPL: 21 (ref 12–20)
CA-I BLD-MCNC: 1.32 MMOL/L (ref 1.15–1.33)
CALCIUM SERPL-MCNC: 9.6 MG/DL (ref 8.5–10.1)
CHLORIDE BLD-SCNC: 92 MMOL/L (ref 100–111)
CHLORIDE SERPL-SCNC: 90 MMOL/L (ref 97–108)
CK SERPL-CCNC: 99 U/L (ref 39–308)
CO2 SERPL-SCNC: 43 MMOL/L (ref 21–32)
COLOR UR: ABNORMAL
COMMENT:: NORMAL
CREAT SERPL-MCNC: 0.48 MG/DL (ref 0.7–1.3)
CREAT UR-MCNC: 0.6 MG/DL (ref 0.6–1.3)
CRP SERPL-MCNC: 0.97 MG/DL (ref 0–0.3)
D DIMER PPP FEU-MCNC: 0.32 MG/L FEU (ref 0–0.65)
DIFFERENTIAL METHOD BLD: ABNORMAL
EOSINOPHIL # BLD: 0.07 K/UL (ref 0–0.4)
EOSINOPHIL NFR BLD: 0.7 % (ref 0–7)
EPITH CASTS URNS QL MICRO: ABNORMAL /LPF
ERYTHROCYTE [DISTWIDTH] IN BLOOD BY AUTOMATED COUNT: 15 % (ref 11.5–14.5)
FLUAV RNA SPEC QL NAA+PROBE: NOT DETECTED
FLUBV RNA SPEC QL NAA+PROBE: NOT DETECTED
GAS FLOW.O2 O2 DELIVERY SYS: ABNORMAL
GAS FLOW.O2 SETTING OXYMISER: 12 BPM
GAS FLOW.O2 SETTING OXYMISER: 20 BPM
GAS FLOW.O2 SETTING OXYMISER: 20 BPM
GLOBULIN SER CALC-MCNC: 3.4 G/DL (ref 2–4)
GLUCOSE SERPL-MCNC: 147 MG/DL (ref 65–100)
GLUCOSE UR STRIP.AUTO-MCNC: NEGATIVE MG/DL
GRAN CASTS URNS QL MICRO: ABNORMAL /LPF
HCO3 BLD-SCNC: 37.9 MMOL/L (ref 21–28)
HCO3 BLD-SCNC: 43.3 MMOL/L (ref 21–28)
HCO3 BLD-SCNC: 45.2 MMOL/L (ref 21–28)
HCT VFR BLD AUTO: 47.1 % (ref 36.6–50.3)
HGB BLD-MCNC: 14.5 G/DL (ref 12.1–17)
HGB UR QL STRIP: NEGATIVE
IMM GRANULOCYTES # BLD AUTO: 0.04 K/UL (ref 0–0.04)
IMM GRANULOCYTES NFR BLD AUTO: 0.4 % (ref 0–0.5)
INR PPP: 1 (ref 0.9–1.1)
KETONES UR QL STRIP.AUTO: 15 MG/DL
LACTATE BLD-SCNC: 1.21 MMOL/L (ref 0.4–2)
LACTATE SERPL-SCNC: 1.4 MMOL/L (ref 0.4–2)
LACTATE SERPL-SCNC: 3.1 MMOL/L (ref 0.4–2)
LEUKOCYTE ESTERASE UR QL STRIP.AUTO: NEGATIVE
LYMPHOCYTES # BLD: 1.46 K/UL (ref 0.8–3.5)
LYMPHOCYTES NFR BLD: 14.5 % (ref 12–49)
MCH RBC QN AUTO: 30.5 PG (ref 26–34)
MCHC RBC AUTO-ENTMCNC: 30.8 G/DL (ref 30–36.5)
MCV RBC AUTO: 99.2 FL (ref 80–99)
MONOCYTES # BLD: 0.62 K/UL (ref 0–1)
MONOCYTES NFR BLD: 6.1 % (ref 5–13)
NEUTS SEG # BLD: 7.88 K/UL (ref 1.8–8)
NEUTS SEG NFR BLD: 78 % (ref 32–75)
NITRITE UR QL STRIP.AUTO: NEGATIVE
NRBC # BLD: 0 K/UL (ref 0–0.01)
NRBC BLD-RTO: 0 PER 100 WBC
NT PRO BNP: 364 PG/ML
O2/TOTAL GAS SETTING VFR VENT: 100 %
O2/TOTAL GAS SETTING VFR VENT: 30 %
O2/TOTAL GAS SETTING VFR VENT: 40 %
PCO2 BLD: 58 MMHG (ref 35–48)
PCO2 BLD: 60.6 MMHG (ref 35–48)
PCO2 BLD: 65.1 MMHG (ref 35–48)
PCO2 BLDV: >110 MMHG (ref 41–51)
PEEP RESPIRATORY: 5 CMH2O
PEEP RESPIRATORY: 6 CMH2O
PEEP RESPIRATORY: 6 CMH2O
PH BLD: 7.37 (ref 7.35–7.45)
PH BLD: 7.48 (ref 7.35–7.45)
PH BLD: 7.48 (ref 7.35–7.45)
PH BLDV: 7.17 (ref 7.32–7.42)
PH UR STRIP: 5.5 (ref 5–8)
PLATELET # BLD AUTO: 169 K/UL (ref 150–400)
PMV BLD AUTO: 11.9 FL (ref 8.9–12.9)
PO2 BLD: 179 MMHG (ref 83–108)
PO2 BLD: 72 MMHG (ref 83–108)
PO2 BLD: >515 MMHG (ref 83–108)
PO2 BLDV: 164 MMHG (ref 25–40)
POTASSIUM BLD-SCNC: 4.2 MMOL/L (ref 3.5–5.5)
POTASSIUM SERPL-SCNC: 4.3 MMOL/L (ref 3.5–5.1)
PROCALCITONIN SERPL-MCNC: <0.05 NG/ML
PROT SERPL-MCNC: 7.2 G/DL (ref 6.4–8.2)
PROT UR STRIP-MCNC: 100 MG/DL
PROTHROMBIN TIME: 10.4 SEC (ref 9.2–11.2)
RBC # BLD AUTO: 4.75 M/UL (ref 4.1–5.7)
RBC #/AREA URNS HPF: ABNORMAL /HPF (ref 0–5)
SAO2 % BLD: 94.8 % (ref 92–97)
SAO2 % BLD: 99.5 % (ref 92–97)
SARS-COV-2 RNA RESP QL NAA+PROBE: NOT DETECTED
SERVICE CMNT-IMP: ABNORMAL
SODIUM BLD-SCNC: 144 MMOL/L (ref 136–145)
SODIUM SERPL-SCNC: 140 MMOL/L (ref 136–145)
SOURCE: NORMAL
SP GR UR REFRACTOMETRY: 1.01 (ref 1–1.03)
SPECIMEN HOLD: NORMAL
SPECIMEN SITE: ABNORMAL
SPECIMEN TYPE: ABNORMAL
THERAPEUTIC RANGE: NORMAL SECS (ref 58–77)
TROPONIN I SERPL HS-MCNC: 28 NG/L (ref 0–76)
URINE CULTURE IF INDICATED: ABNORMAL
UROBILINOGEN UR QL STRIP.AUTO: 1 EU/DL (ref 0.2–1)
VENTILATION MODE VENT: ABNORMAL
VT SETTING VENT: 460 ML
VT SETTING VENT: 470 ML
VT SETTING VENT: 480 ML
WBC # BLD AUTO: 10.1 K/UL (ref 4.1–11.1)
WBC URNS QL MICRO: ABNORMAL /HPF (ref 0–4)

## 2025-06-22 PROCEDURE — 6360000002 HC RX W HCPCS: Performed by: EMERGENCY MEDICINE

## 2025-06-22 PROCEDURE — 36600 WITHDRAWAL OF ARTERIAL BLOOD: CPT

## 2025-06-22 PROCEDURE — 2500000003 HC RX 250 WO HCPCS: Performed by: INTERNAL MEDICINE

## 2025-06-22 PROCEDURE — 6360000002 HC RX W HCPCS: Performed by: INTERNAL MEDICINE

## 2025-06-22 PROCEDURE — 31500 INSERT EMERGENCY AIRWAY: CPT

## 2025-06-22 PROCEDURE — 82330 ASSAY OF CALCIUM: CPT

## 2025-06-22 PROCEDURE — 99285 EMERGENCY DEPT VISIT HI MDM: CPT

## 2025-06-22 PROCEDURE — 94002 VENT MGMT INPAT INIT DAY: CPT

## 2025-06-22 PROCEDURE — 93005 ELECTROCARDIOGRAM TRACING: CPT | Performed by: EMERGENCY MEDICINE

## 2025-06-22 PROCEDURE — 83880 ASSAY OF NATRIURETIC PEPTIDE: CPT

## 2025-06-22 PROCEDURE — 94761 N-INVAS EAR/PLS OXIMETRY MLT: CPT

## 2025-06-22 PROCEDURE — 36415 COLL VENOUS BLD VENIPUNCTURE: CPT

## 2025-06-22 PROCEDURE — 2700000000 HC OXYGEN THERAPY PER DAY

## 2025-06-22 PROCEDURE — 5A1955Z RESPIRATORY VENTILATION, GREATER THAN 96 CONSECUTIVE HOURS: ICD-10-PCS | Performed by: EMERGENCY MEDICINE

## 2025-06-22 PROCEDURE — 0BH17EZ INSERTION OF ENDOTRACHEAL AIRWAY INTO TRACHEA, VIA NATURAL OR ARTIFICIAL OPENING: ICD-10-PCS | Performed by: EMERGENCY MEDICINE

## 2025-06-22 PROCEDURE — 87040 BLOOD CULTURE FOR BACTERIA: CPT

## 2025-06-22 PROCEDURE — 85379 FIBRIN DEGRADATION QUANT: CPT

## 2025-06-22 PROCEDURE — 70450 CT HEAD/BRAIN W/O DYE: CPT

## 2025-06-22 PROCEDURE — 84132 ASSAY OF SERUM POTASSIUM: CPT

## 2025-06-22 PROCEDURE — 2500000003 HC RX 250 WO HCPCS: Performed by: NURSE PRACTITIONER

## 2025-06-22 PROCEDURE — 71275 CT ANGIOGRAPHY CHEST: CPT

## 2025-06-22 PROCEDURE — 82550 ASSAY OF CK (CPK): CPT

## 2025-06-22 PROCEDURE — 2500000003 HC RX 250 WO HCPCS: Performed by: EMERGENCY MEDICINE

## 2025-06-22 PROCEDURE — 87636 SARSCOV2 & INF A&B AMP PRB: CPT

## 2025-06-22 PROCEDURE — 6360000004 HC RX CONTRAST MEDICATION: Performed by: EMERGENCY MEDICINE

## 2025-06-22 PROCEDURE — 6360000002 HC RX W HCPCS

## 2025-06-22 PROCEDURE — 94640 AIRWAY INHALATION TREATMENT: CPT

## 2025-06-22 PROCEDURE — 85730 THROMBOPLASTIN TIME PARTIAL: CPT

## 2025-06-22 PROCEDURE — 2000000000 HC ICU R&B

## 2025-06-22 PROCEDURE — 81001 URINALYSIS AUTO W/SCOPE: CPT

## 2025-06-22 PROCEDURE — 82947 ASSAY GLUCOSE BLOOD QUANT: CPT

## 2025-06-22 PROCEDURE — 86140 C-REACTIVE PROTEIN: CPT

## 2025-06-22 PROCEDURE — 6370000000 HC RX 637 (ALT 250 FOR IP): Performed by: INTERNAL MEDICINE

## 2025-06-22 PROCEDURE — 51701 INSERT BLADDER CATHETER: CPT

## 2025-06-22 PROCEDURE — 82803 BLOOD GASES ANY COMBINATION: CPT

## 2025-06-22 PROCEDURE — 74018 RADEX ABDOMEN 1 VIEW: CPT

## 2025-06-22 PROCEDURE — 84145 PROCALCITONIN (PCT): CPT

## 2025-06-22 PROCEDURE — 2580000003 HC RX 258: Performed by: NURSE PRACTITIONER

## 2025-06-22 PROCEDURE — 6360000002 HC RX W HCPCS: Performed by: NURSE PRACTITIONER

## 2025-06-22 PROCEDURE — 71045 X-RAY EXAM CHEST 1 VIEW: CPT

## 2025-06-22 PROCEDURE — 2580000003 HC RX 258: Performed by: EMERGENCY MEDICINE

## 2025-06-22 PROCEDURE — 80053 COMPREHEN METABOLIC PANEL: CPT

## 2025-06-22 PROCEDURE — 51798 US URINE CAPACITY MEASURE: CPT

## 2025-06-22 PROCEDURE — 85610 PROTHROMBIN TIME: CPT

## 2025-06-22 PROCEDURE — 84484 ASSAY OF TROPONIN QUANT: CPT

## 2025-06-22 PROCEDURE — 85025 COMPLETE CBC W/AUTO DIFF WBC: CPT

## 2025-06-22 PROCEDURE — 83605 ASSAY OF LACTIC ACID: CPT

## 2025-06-22 PROCEDURE — 84295 ASSAY OF SERUM SODIUM: CPT

## 2025-06-22 RX ORDER — ACETAMINOPHEN 650 MG/1
650 SUPPOSITORY RECTAL EVERY 6 HOURS PRN
Status: DISCONTINUED | OUTPATIENT
Start: 2025-06-22 | End: 2025-07-11 | Stop reason: HOSPADM

## 2025-06-22 RX ORDER — PROPOFOL 10 MG/ML
5-50 INJECTION, EMULSION INTRAVENOUS CONTINUOUS
Status: DISCONTINUED | OUTPATIENT
Start: 2025-06-22 | End: 2025-06-22

## 2025-06-22 RX ORDER — ARFORMOTEROL TARTRATE 15 UG/2ML
15 SOLUTION RESPIRATORY (INHALATION)
Status: DISCONTINUED | OUTPATIENT
Start: 2025-06-22 | End: 2025-06-22

## 2025-06-22 RX ORDER — SODIUM CHLORIDE 0.9 % (FLUSH) 0.9 %
5-40 SYRINGE (ML) INJECTION EVERY 12 HOURS SCHEDULED
Status: DISCONTINUED | OUTPATIENT
Start: 2025-06-22 | End: 2025-07-11 | Stop reason: HOSPADM

## 2025-06-22 RX ORDER — BUDESONIDE 0.5 MG/2ML
0.5 INHALANT ORAL
Status: DISCONTINUED | OUTPATIENT
Start: 2025-06-22 | End: 2025-06-22

## 2025-06-22 RX ORDER — FENTANYL CITRATE-0.9 % NACL/PF 10 MCG/ML
25-200 PLASTIC BAG, INJECTION (ML) INTRAVENOUS CONTINUOUS
Refills: 0 | Status: DISCONTINUED | OUTPATIENT
Start: 2025-06-22 | End: 2025-07-02

## 2025-06-22 RX ORDER — ACETAMINOPHEN 325 MG/1
650 TABLET ORAL EVERY 6 HOURS PRN
Status: DISCONTINUED | OUTPATIENT
Start: 2025-06-22 | End: 2025-07-11 | Stop reason: HOSPADM

## 2025-06-22 RX ORDER — MIDAZOLAM HYDROCHLORIDE 1 MG/ML
1-10 INJECTION, SOLUTION INTRAVENOUS CONTINUOUS
Status: DISCONTINUED | OUTPATIENT
Start: 2025-06-22 | End: 2025-06-24

## 2025-06-22 RX ORDER — SODIUM CHLORIDE 9 MG/ML
INJECTION, SOLUTION INTRAVENOUS PRN
Status: DISCONTINUED | OUTPATIENT
Start: 2025-06-22 | End: 2025-07-11 | Stop reason: HOSPADM

## 2025-06-22 RX ORDER — POTASSIUM CHLORIDE 29.8 MG/ML
20 INJECTION INTRAVENOUS PRN
Status: DISCONTINUED | OUTPATIENT
Start: 2025-06-22 | End: 2025-07-11 | Stop reason: HOSPADM

## 2025-06-22 RX ORDER — IPRATROPIUM BROMIDE AND ALBUTEROL SULFATE 2.5; .5 MG/3ML; MG/3ML
1 SOLUTION RESPIRATORY (INHALATION) EVERY 4 HOURS PRN
Status: DISCONTINUED | OUTPATIENT
Start: 2025-06-22 | End: 2025-07-11 | Stop reason: HOSPADM

## 2025-06-22 RX ORDER — 0.9 % SODIUM CHLORIDE 0.9 %
1000 INTRAVENOUS SOLUTION INTRAVENOUS ONCE
Status: DISCONTINUED | OUTPATIENT
Start: 2025-06-22 | End: 2025-07-02

## 2025-06-22 RX ORDER — FENTANYL CITRATE 50 UG/ML
200 INJECTION, SOLUTION INTRAMUSCULAR; INTRAVENOUS ONCE
Refills: 0 | Status: COMPLETED | OUTPATIENT
Start: 2025-06-22 | End: 2025-06-22

## 2025-06-22 RX ORDER — MAGNESIUM SULFATE IN WATER 40 MG/ML
2000 INJECTION, SOLUTION INTRAVENOUS PRN
Status: DISCONTINUED | OUTPATIENT
Start: 2025-06-22 | End: 2025-07-11 | Stop reason: HOSPADM

## 2025-06-22 RX ORDER — IOPAMIDOL 755 MG/ML
100 INJECTION, SOLUTION INTRAVASCULAR
Status: COMPLETED | OUTPATIENT
Start: 2025-06-22 | End: 2025-06-22

## 2025-06-22 RX ORDER — MAGNESIUM SULFATE IN WATER 40 MG/ML
2000 INJECTION, SOLUTION INTRAVENOUS
Status: COMPLETED | OUTPATIENT
Start: 2025-06-22 | End: 2025-06-22

## 2025-06-22 RX ORDER — ONDANSETRON 4 MG/1
4 TABLET, ORALLY DISINTEGRATING ORAL EVERY 8 HOURS PRN
Status: DISCONTINUED | OUTPATIENT
Start: 2025-06-22 | End: 2025-07-11 | Stop reason: HOSPADM

## 2025-06-22 RX ORDER — SODIUM CHLORIDE, SODIUM LACTATE, POTASSIUM CHLORIDE, CALCIUM CHLORIDE 600; 310; 30; 20 MG/100ML; MG/100ML; MG/100ML; MG/100ML
INJECTION, SOLUTION INTRAVENOUS CONTINUOUS
Status: DISCONTINUED | OUTPATIENT
Start: 2025-06-22 | End: 2025-06-25

## 2025-06-22 RX ORDER — POLYETHYLENE GLYCOL 3350 17 G/17G
17 POWDER, FOR SOLUTION ORAL DAILY PRN
Status: DISCONTINUED | OUTPATIENT
Start: 2025-06-22 | End: 2025-07-01

## 2025-06-22 RX ORDER — ONDANSETRON 2 MG/ML
4 INJECTION INTRAMUSCULAR; INTRAVENOUS EVERY 6 HOURS PRN
Status: DISCONTINUED | OUTPATIENT
Start: 2025-06-22 | End: 2025-07-11 | Stop reason: HOSPADM

## 2025-06-22 RX ORDER — POTASSIUM CHLORIDE 7.45 MG/ML
10 INJECTION INTRAVENOUS PRN
Status: DISCONTINUED | OUTPATIENT
Start: 2025-06-22 | End: 2025-07-11 | Stop reason: HOSPADM

## 2025-06-22 RX ORDER — BUDESONIDE 0.5 MG/2ML
0.5 INHALANT ORAL
Status: DISCONTINUED | OUTPATIENT
Start: 2025-06-22 | End: 2025-07-11 | Stop reason: HOSPADM

## 2025-06-22 RX ORDER — PROPOFOL 10 MG/ML
INJECTION, EMULSION INTRAVENOUS
Status: COMPLETED
Start: 2025-06-22 | End: 2025-06-22

## 2025-06-22 RX ORDER — NOREPINEPHRINE BITARTRATE 0.06 MG/ML
1-100 INJECTION, SOLUTION INTRAVENOUS CONTINUOUS
Status: DISCONTINUED | OUTPATIENT
Start: 2025-06-22 | End: 2025-06-24

## 2025-06-22 RX ORDER — IPRATROPIUM BROMIDE AND ALBUTEROL SULFATE 2.5; .5 MG/3ML; MG/3ML
1 SOLUTION RESPIRATORY (INHALATION)
Status: DISCONTINUED | OUTPATIENT
Start: 2025-06-22 | End: 2025-06-25

## 2025-06-22 RX ORDER — ARFORMOTEROL TARTRATE 15 UG/2ML
15 SOLUTION RESPIRATORY (INHALATION)
Status: DISCONTINUED | OUTPATIENT
Start: 2025-06-22 | End: 2025-07-11 | Stop reason: HOSPADM

## 2025-06-22 RX ORDER — PROPOFOL 10 MG/ML
5-50 INJECTION, EMULSION INTRAVENOUS CONTINUOUS
Status: DISCONTINUED | OUTPATIENT
Start: 2025-06-22 | End: 2025-06-26

## 2025-06-22 RX ORDER — ENOXAPARIN SODIUM 100 MG/ML
40 INJECTION SUBCUTANEOUS DAILY
Status: DISCONTINUED | OUTPATIENT
Start: 2025-06-22 | End: 2025-07-11 | Stop reason: HOSPADM

## 2025-06-22 RX ORDER — SODIUM CHLORIDE 0.9 % (FLUSH) 0.9 %
5-40 SYRINGE (ML) INJECTION PRN
Status: DISCONTINUED | OUTPATIENT
Start: 2025-06-22 | End: 2025-07-11 | Stop reason: HOSPADM

## 2025-06-22 RX ADMIN — PROPOFOL 45 MCG/KG/MIN: 10 INJECTION, EMULSION INTRAVENOUS at 10:40

## 2025-06-22 RX ADMIN — Medication 50 MCG/HR: at 19:17

## 2025-06-22 RX ADMIN — MAGNESIUM SULFATE HEPTAHYDRATE 2000 MG: 40 INJECTION, SOLUTION INTRAVENOUS at 01:22

## 2025-06-22 RX ADMIN — PROPOFOL 45 MCG/KG/MIN: 10 INJECTION, EMULSION INTRAVENOUS at 05:02

## 2025-06-22 RX ADMIN — IOPAMIDOL 80 ML: 755 INJECTION, SOLUTION INTRAVENOUS at 02:18

## 2025-06-22 RX ADMIN — PROPOFOL 50 MCG/KG/MIN: 10 INJECTION, EMULSION INTRAVENOUS at 00:55

## 2025-06-22 RX ADMIN — ENOXAPARIN SODIUM 40 MG: 100 INJECTION SUBCUTANEOUS at 08:14

## 2025-06-22 RX ADMIN — IPRATROPIUM BROMIDE AND ALBUTEROL SULFATE 1 DOSE: .5; 3 SOLUTION RESPIRATORY (INHALATION) at 11:17

## 2025-06-22 RX ADMIN — SODIUM CHLORIDE, PRESERVATIVE FREE 10 ML: 5 INJECTION INTRAVENOUS at 08:15

## 2025-06-22 RX ADMIN — PROPOFOL 45 MCG/KG/MIN: 10 INJECTION, EMULSION INTRAVENOUS at 19:15

## 2025-06-22 RX ADMIN — SODIUM CHLORIDE, SODIUM LACTATE, POTASSIUM CHLORIDE, AND CALCIUM CHLORIDE: .6; .31; .03; .02 INJECTION, SOLUTION INTRAVENOUS at 16:38

## 2025-06-22 RX ADMIN — WATER 80 MG: 1 INJECTION INTRAMUSCULAR; INTRAVENOUS; SUBCUTANEOUS at 19:24

## 2025-06-22 RX ADMIN — IPRATROPIUM BROMIDE AND ALBUTEROL SULFATE 1 DOSE: .5; 3 SOLUTION RESPIRATORY (INHALATION) at 15:10

## 2025-06-22 RX ADMIN — WATER 2000 MG: 1 INJECTION INTRAMUSCULAR; INTRAVENOUS; SUBCUTANEOUS at 04:30

## 2025-06-22 RX ADMIN — NOREPINEPHRINE BITARTRATE 10 MCG/MIN: 64 SOLUTION INTRAVENOUS at 03:33

## 2025-06-22 RX ADMIN — Medication 50 MCG/HR: at 01:08

## 2025-06-22 RX ADMIN — SODIUM CHLORIDE, PRESERVATIVE FREE 40 MG: 5 INJECTION INTRAVENOUS at 08:15

## 2025-06-22 RX ADMIN — WATER 80 MG: 1 INJECTION INTRAMUSCULAR; INTRAVENOUS; SUBCUTANEOUS at 09:03

## 2025-06-22 RX ADMIN — MIDAZOLAM IN SODIUM CHLORIDE 2 MG/HR: 1 INJECTION INTRAVENOUS at 01:55

## 2025-06-22 RX ADMIN — BUDESONIDE 500 MCG: 0.5 INHALANT RESPIRATORY (INHALATION) at 07:53

## 2025-06-22 RX ADMIN — SODIUM CHLORIDE, SODIUM LACTATE, POTASSIUM CHLORIDE, AND CALCIUM CHLORIDE: .6; .31; .03; .02 INJECTION, SOLUTION INTRAVENOUS at 05:51

## 2025-06-22 RX ADMIN — ARFORMOTEROL TARTRATE 15 MCG: 15 SOLUTION RESPIRATORY (INHALATION) at 07:53

## 2025-06-22 RX ADMIN — VANCOMYCIN HYDROCHLORIDE 2000 MG: 10 INJECTION, POWDER, LYOPHILIZED, FOR SOLUTION INTRAVENOUS at 03:38

## 2025-06-22 RX ADMIN — PROPOFOL 45 MCG/KG/MIN: 10 INJECTION, EMULSION INTRAVENOUS at 14:31

## 2025-06-22 RX ADMIN — FENTANYL CITRATE 200 MCG: 0.05 INJECTION, SOLUTION INTRAMUSCULAR; INTRAVENOUS at 01:05

## 2025-06-22 RX ADMIN — AZITHROMYCIN MONOHYDRATE 500 MG: 500 INJECTION, POWDER, LYOPHILIZED, FOR SOLUTION INTRAVENOUS at 03:08

## 2025-06-22 RX ADMIN — IPRATROPIUM BROMIDE AND ALBUTEROL SULFATE 1 DOSE: .5; 3 SOLUTION RESPIRATORY (INHALATION) at 19:17

## 2025-06-22 RX ADMIN — WATER 80 MG: 1 INJECTION INTRAMUSCULAR; INTRAVENOUS; SUBCUTANEOUS at 14:10

## 2025-06-22 RX ADMIN — BUDESONIDE 500 MCG: 0.5 INHALANT RESPIRATORY (INHALATION) at 19:17

## 2025-06-22 RX ADMIN — SODIUM CHLORIDE, PRESERVATIVE FREE 10 ML: 5 INJECTION INTRAVENOUS at 19:25

## 2025-06-22 RX ADMIN — IPRATROPIUM BROMIDE 0.5 MG: 0.5 SOLUTION RESPIRATORY (INHALATION) at 07:48

## 2025-06-22 RX ADMIN — ARFORMOTEROL TARTRATE 15 MCG: 15 SOLUTION RESPIRATORY (INHALATION) at 19:17

## 2025-06-22 ASSESSMENT — PULMONARY FUNCTION TESTS
PIF_VALUE: 34
PIF_VALUE: 48
PIF_VALUE: 31
PIF_VALUE: 38
PIF_VALUE: 40
PIF_VALUE: 37
PIF_VALUE: 26
PIF_VALUE: 34

## 2025-06-22 NOTE — ED NOTES
50mg of Mark given. Dr. Euceda at bedside to reintubate   This patient has upcoming appointment, no urgent lab results, will review with patient at that time

## 2025-06-22 NOTE — H&P
CRITICAL CARE NOTE      Name: Adrian Hurley   : 1955   MRN: [id number]   Date: 2025      REASON FOR ICU ADMISSION: acute hypoxic/hypercarbic resp failure     PRINCIPAL ICU DIAGNOSIS     Acute hypoxic/hypercarbic resp failure  COPD exacerbation    Patient Active Problem List   Diagnosis    Lumbar stenosis with neurogenic claudication    Lumbar stenosis    Respiratory failure (HCC)    COPD exacerbation (HCC)         BRIEF PATIENT SUMMARY     68 yo M presents with acute c/o SOB at home. Called EMS. SpO2 0.7 on RA. EMS palced on BiPAP. No improvement on BiPAP. Intubated in ED by ED staff. ICU c/s for admit.    Evaluated patient and will admit to ICU for cont w/u and Mx.    HOSPITAL COURSE/DAILY EVENT LOG     24 HOUR EVENTS: as above    COMPREHENSIVE ASSESSMENT & PLAN:SYSTEM BASED     NEUROLOGICAL:     Aggressive Pain Control.  Spontaneous Awakening Trial: Pending  Pain Medications: fent  Target RASS: 0  Sedation Medications: prop    CT Head  IMPRESSION:  No acute findings.    Daily SAT    PULMONOLOGY:     Lung protective ventilation with goal plateau pressure < 30, driving pressure < 15.  Vent Goals: optimize PEEP, LTVV  Pulmonary toilet: HOB and pulm nebs  SpO2 Goal: > 92%    Xray Result (most recent):  XR CHEST PORTABLE 2025    Narrative  EXAM:  XR CHEST PORTABLE    INDICATION: SOB    COMPARISON: none    TECHNIQUE: Single portable chest AP view    FINDINGS: Cardiac monitoring leads are noted. The endotracheal tube projects  over the tracheal lucency with the tip approximately 7 cm above the niru.  Atherosclerotic calcifications affect the aortic arch. The cardiac silhouette is  within normal limits. The pulmonary vasculature is within normal limits.    The lungs and pleural spaces are clear. The visualized bones and upper abdomen  are age-appropriate.    Impression  Endotracheal tube in position. No acute findings.    Electronically signed by Francisco Denny    CTA Chest  IMPRESSION:  No  Refill: Capillary refill takes less than 2 seconds.          Labs and Data: Reviewed 06/22/25  Medications: Reviewed 06/22/25  Imaging: Reviewed 06/22/25      BP (!) 196/167   Pulse (!) 124   Resp 25   Wt 78.5 kg (173 lb 1 oz)   SpO2 97%   BMI 24.14 kg/m²      No data recorded.           Intake/Output:   No intake or output data in the 24 hours ending 06/22/25 0107    Imaging    No valid procedures specified.       CRITICAL CARE DOCUMENTATION  I had a face to face encounter with the patient, reviewed and interpreted patient data including clinical events, labs, images, vital signs, I/O's, and examined patient.  I have discussed the case and the plan and management of the patient's care with the consulting services, the bedside nurses and the respiratory therapist.      NOTE OF PERSONAL INVOLVEMENT IN CARE   This patient has a high probability of imminent, clinically significant deterioration, which requires the highest level of preparedness to intervene urgently. I participated in the decision-making and personally managed or directed the management of the following life and organ supporting interventions that required my frequent assessment to treat or prevent imminent deterioration.    I personally spent 95 minutes of critical care time.  This is time spent at this critically ill patient's bedside actively involved in patient care as well as the coordination of care.  This does not include any procedural time which has been billed separately.    AJ Childress - NP   Critical Care Medicine  [facility]

## 2025-06-22 NOTE — PROGRESS NOTES
Spiritual Health History and Assessment/Progress Note  [facility]    Family Care, Initial Encounter, Code Blue,  ,      Name: Adrian Hurley MRN: [id number]    Age: 69 y.o.     Sex: male   Language: English   Confucianism: Other   COPD exacerbation (HCC)     Date: 6/22/2025            Total Time Calculated: 70 min              Spiritual Assessment began in [facility] INTENSIVE CARE UNIT        Referral/Consult From: Multi-disciplinary team   Encounter Overview/Reason: Family Care, Initial Encounter  Service Provided For: Significant other    Autumn, Belief, Meaning:   Patient Other: spiritual beliefs are unknown  Family/Friends Other: wife was receptive to prayer      Importance and Influence:  Patient unable to assess at this time  Family/Friends Other: unable to assess at this time    Community:  Patient Other: unable to assess at this time  Family/Friends feel well-supported. Support system includes: Spouse/Partner    Assessment and Plan of Care:     Patient Interventions include: Other: pt unresponsive  Family/Friends Interventions include: Facilitated expression of thoughts and feelings and Other: pt's wife was receptive to  but after a long and stressful night expressed desire to rest    Patient Plan of Care: Spiritual Care available upon further referral  Family/Friends Plan of Care: Spiritual Care available upon further referral    Electronically signed by Markus Salazar  Intern on 6/22/2025 at 5:21 AM

## 2025-06-22 NOTE — ED TRIAGE NOTES
Called EMS for SOB. 02 sats 70% on RA. Dexamethasone 10 mg, nebs and bipap started by EMS. Patient arrived on bipap in acute distress. Patient in tripod position

## 2025-06-22 NOTE — ED PROVIDER NOTES
[facility] INTENSIVE CARE UNIT  EMERGENCY DEPARTMENT ENCOUNTER      Pt Name: Adrian Hurley  MRN: [id number]  Birthdate 1955  Date of evaluation: 6/22/2025  Provider: Adrian Euceda MD    CHIEF COMPLAINT       Chief Complaint   Patient presents with    Shortness of Breath         HISTORY OF PRESENT ILLNESS   (Location/Symptom, Timing/Onset, Context/Setting, Quality, Duration, Modifying Factors, Severity)  Note limiting factors.   68-year-old male with a past medical history significant for COPD, GERD, hyperlipidemia, hypertension, sleep apnea, status post lumbar laminectomy, lumbar fusion who presents to the ER by EMS for evaluation for shortness of breath.  EMS stated that the patient had a room air oxygenation at 70%.  He was placed on CPAP and transported to the ER for further evaluation.  He was also given a DuoNeb and 10 mg of dexamethasone IV.  The patient reevaluated by me and stated that he is not feeling any better and struggling to breathe.  He denies any fever and chill, cough or congestion, headache, neck and back pain, nausea and vomiting, abdominal pain, dizziness, extremity weakness or numbness.            Review of External Medical Records:     Nursing Notes were reviewed.    REVIEW OF SYSTEMS    (2-9 systems for level 4, 10 or more for level 5)     Review of Systems   All other systems reviewed and are negative.      Except as noted above the remainder of the review of systems was reviewed and negative.       PAST MEDICAL HISTORY     Past Medical History:   Diagnosis Date    COPD (chronic obstructive pulmonary disease) (HCC)     GERD (gastroesophageal reflux disease)     Hyperlipidemia     Hypertension     Polyp of colon     Sleep apnea     Uses CPAP         SURGICAL HISTORY       Past Surgical History:   Procedure Laterality Date    COLONOSCOPY      COLONOSCOPY N/A 11/19/2024    COLONOSCOPY DIAGNOSTIC performed by Yunier Jeffery MD at [facility] ENDOSCOPY    COLONOSCOPY N/A 11/19/2024     with Steven of the intensivist team. Discussed patient's presentation, history, physical assessment, and available diagnostic results. He will evaluate, write orders and admit the patient to the hospital. 00:15 AM     Perfect Serve Consult for Admission  5:26 AM    ED Room Number: A482/01  Patient Name and age:  Adrian Hurley 69 y.o.  male  Working Diagnosis:   1. Acute respiratory failure with hypoxia and hypercarbia (HCC)    2. COPD with acute exacerbation (HCC)        COVID-19 Suspicion: No  Sepsis present: yes Reassessment needed: No  Code Status:  Full Code  Readmission: No  Isolation Requirements: no  Recommended Level of Care: ICU  Department: [place] [facility] - [phone]  Consulting Provider: Satish    Total critical care time spent exclusive of procedures:  55 minutes.      PROGRESS NOTE:  I was called to bedside by respiratory therapist because the patient allegedly bite down on the ET tube and the balloon was deflated.he is unable to ventilate well at this time.  He was if reevaluated by me and I was unable to inflate the cuff of the ET tube because there was a significant leak.  The patient was already sedated so he was given 50 mg of rocuronium and reintubated by me with an 8 Bengali ET tube.  He tolerated pressure very well.  No complication.  Written by Adrian Euceda MD,  02:00 AM      CONSULTS:  IP CONSULT TO INTENSIVIST  IP CONSULT TO PALLIATIVE CARE    PROCEDURES:  Unless otherwise noted below, none     Intubation    Date/Time: 6/22/2025 12:05 AM    Performed by: Adrian Euceda MD  Authorized by: Annita Covarrubias MD    Consent:     Consent obtained:  Verbal    Consent given by:  Patient    Risks, benefits, and alternatives were discussed: yes      Risks discussed:  Aspiration and death    Alternatives discussed:  No treatment  Universal protocol:     Procedure explained and questions answered to patient or proxy's satisfaction: yes      Relevant documents present and verified:

## 2025-06-22 NOTE — PROGRESS NOTES
RRT note:    Called code blue for patient with difficult airway.  Patient intubated, ET cuff balloon deflated with leak.    Code blue team arrived.  Patient intubated with new tube, stabilized on vent transported to ICU with ICU charge Kanchan Mccann RN

## 2025-06-22 NOTE — PROGRESS NOTES
Name: Adrian Hurley MRN: [id number]   : 1955 Hospital: [facility]   Date: 2025        Impression Plan   Acute on chronic respiratory failure  Hypoxia  Hypercapnea  COPD exacerbation secondary to URI  Lactic acidosis  Tobacco abuse               Continue ventilator support. RR decreased to 12 and volumes to 460 with improvement in peak pressures to 36 from 44  Schedule duonebs every 4 hrs   Increase methylpred to 80 mg q6h  Flu negative   Continue fent/prop/midazolam for goal sedation of RASS -4 until pt is less bronchospastic. Wean midazolam first  Discussed plan with nurse and wife at bedside       Pt is critically ill. Critical care time spent with pt exclusive of procedures was 53 minutes    Radiology  ( personally reviewed) CT chest reviewed: no infiltrates. Emphysema   ABG Invalid input(s): \"PHI\", \"PO2I\", \"PCO2I\"       Subjective     70 yo M presents with acute c/o SOB at home. Called EMS. SpO2 0.7 on RA. EMS palced on BiPAP. No improvement on BiPAP. Intubated in ED by ED staff. ICU c/s for admit.     : Pt seen and admitted by intensivist overnight. Peak pressures 44 this AM with intrinsic peep of 14. On fent 50/prop45/midazolam. RASS -4.   Afebrile  I/O: 1050/?       Review of Systems:  Review of systems not obtained due to patient factors.    Past Medical History:   Diagnosis Date    COPD (chronic obstructive pulmonary disease) (HCC)     GERD (gastroesophageal reflux disease)     Hyperlipidemia     Hypertension     Polyp of colon     Sleep apnea     Uses CPAP      Past Surgical History:   Procedure Laterality Date    COLONOSCOPY      COLONOSCOPY N/A 2024    COLONOSCOPY DIAGNOSTIC performed by Yunier Jeffery MD at [facility] ENDOSCOPY    COLONOSCOPY N/A 2024    COLONOSCOPY POLYPECTOMY SNARE/BIOPSY performed by Yunier Jeffery MD at [facility] ENDOSCOPY    IR GUIDED INJ LUMB/SAC TRANSFORAMINAL EPI SINGLE LEVEL  10/30/2019    IR GUIDED INJ LUMB/SAC TRANSFORAMINAL EPI

## 2025-06-22 NOTE — ED NOTES
TRANSFER - OUT REPORT:    Verbal report given to JAROD Valadez on Adrian Hurley  being transferred to [facility] for routine progression of patient care       Report consisted of patient's Situation, Background, Assessment and   Recommendations(SBAR).     Information from the following report(s) Nurse Handoff Report, ED Encounter Summary, ED SBAR, Adult Overview, Intake/Output, MAR, Recent Results, Quality Measures, and Neuro Assessment was reviewed with the receiving nurse.    [place] Fall Assessment:                           Lines:   Peripheral IV 06/22/25 Distal;Right;Anterior Cephalic (Active)       Peripheral IV 06/22/25 Right Hand (Active)       Peripheral IV 06/22/25 Left;Proximal Forearm (Active)        Opportunity for questions and clarification was provided.      Patient transported with:  Monitor and Registered Nurse, RRT, Vent

## 2025-06-22 NOTE — PLAN OF CARE
Problem: Safety - Medical Restraint  Goal: Remains free of injury from restraints (Restraint for Interference with Medical Device)  Description: INTERVENTIONS:  1. Determine that other, less restrictive measures have been tried or would not be effective before applying the restraint  2. Evaluate the patient's condition at the time of restraint application  3. Inform patient/family regarding the reason for restraint  4. Q2H: Monitor safety, psychosocial status, comfort, nutrition and hydration  6/22/2025 1131 by Renetta Helm, RN  Outcome: Progressing  Flowsheets (Taken 6/22/2025 0800)  Remains free of injury from restraints (restraint for interference with medical device):   Evaluate the patient's condition at the time of restraint application   Inform patient/family regarding the reason for restraint   Determine that other, less restrictive measures have been tried or would not be effective before applying the restraint   Every 2 hours: Monitor safety, psychosocial status, comfort, nutrition and hydration  6/22/2025 0638 by Rory Green RN  Outcome: Progressing  Flowsheets (Taken 6/22/2025 0300)  Remains free of injury from restraints (restraint for interference with medical device):   Every 2 hours: Monitor safety, psychosocial status, comfort, nutrition and hydration   Inform patient/family regarding the reason for restraint   Evaluate the patient's condition at the time of restraint application   Determine that other, less restrictive measures have been tried or would not be effective before applying the restraint     Problem: Discharge Planning  Goal: Discharge to home or other facility with appropriate resources  Outcome: Progressing  Flowsheets (Taken 6/22/2025 0730)  Discharge to home or other facility with appropriate resources:   Identify barriers to discharge with patient and caregiver   Arrange for needed discharge resources and transportation as appropriate   Identify discharge learning needs

## 2025-06-22 NOTE — PLAN OF CARE
Problem: Respiratory - Adult  Goal: Achieves optimal ventilation and oxygenation  6/22/2025 1932 by Odalis Edwards RCP  Outcome: Not Progressing  Flowsheets (Taken 6/22/2025 1200 by Gary Bernardo, RN)  Achieves optimal ventilation and oxygenation:   Assess for changes in respiratory status   Assess for changes in mentation and behavior   Position to facilitate oxygenation and minimize respiratory effort   Oxygen supplementation based on oxygen saturation or arterial blood gases   Initiate smoking cessation protocol as indicated  6/22/2025 1131 by Renetta Helm RN  Outcome: Progressing  6/22/2025 0841 by Liseth Mott, RT  Outcome: Progressing  Flowsheets (Taken 6/22/2025 0730 by Renetta Helm RN)  Achieves optimal ventilation and oxygenation:   Assess for changes in respiratory status   Assess for changes in mentation and behavior   Position to facilitate oxygenation and minimize respiratory effort   Oxygen supplementation based on oxygen saturation or arterial blood gases   Assess the need for suctioning and aspirate as needed   Encourage broncho-pulmonary hygiene including cough, deep breathe, incentive spirometry   Initiate smoking cessation protocol as indicated   Assess and instruct to report shortness of breath or any respiratory difficulty   Respiratory therapy support as indicated

## 2025-06-22 NOTE — ED NOTES
Patient's wife updated on patient status, patient in CT at this time     Patient's wife declined pastoral care at this time    Escorted patient's wife to ICU waiting room, ICU staff made aware

## 2025-06-23 ENCOUNTER — APPOINTMENT (OUTPATIENT)
Facility: HOSPITAL | Age: 70
End: 2025-06-23
Payer: COMMERCIAL

## 2025-06-23 LAB
ANION GAP SERPL CALC-SCNC: 6 MMOL/L (ref 2–12)
ARTERIAL PATENCY WRIST A: POSITIVE
BASE EXCESS BLD CALC-SCNC: 15.8 MMOL/L
BASE EXCESS BLD CALC-SCNC: 18 MMOL/L
BASOPHILS # BLD: 0.01 K/UL (ref 0–0.1)
BASOPHILS NFR BLD: 0.1 % (ref 0–1)
BDY SITE: ABNORMAL
BUN SERPL-MCNC: 23 MG/DL (ref 6–20)
BUN/CREAT SERPL: 37 (ref 12–20)
CALCIUM SERPL-MCNC: 8.5 MG/DL (ref 8.5–10.1)
CHLORIDE SERPL-SCNC: 98 MMOL/L (ref 97–108)
CO2 SERPL-SCNC: 37 MMOL/L (ref 21–32)
CREAT SERPL-MCNC: 0.62 MG/DL (ref 0.7–1.3)
DIFFERENTIAL METHOD BLD: ABNORMAL
EKG ATRIAL RATE: 81 BPM
EKG DIAGNOSIS: NORMAL
EKG P AXIS: 85 DEGREES
EKG P-R INTERVAL: 190 MS
EKG Q-T INTERVAL: 414 MS
EKG QRS DURATION: 106 MS
EKG QTC CALCULATION (BAZETT): 480 MS
EKG R AXIS: -81 DEGREES
EKG T AXIS: 80 DEGREES
EKG VENTRICULAR RATE: 81 BPM
EOSINOPHIL # BLD: 0 K/UL (ref 0–0.4)
EOSINOPHIL NFR BLD: 0 % (ref 0–7)
ERYTHROCYTE [DISTWIDTH] IN BLOOD BY AUTOMATED COUNT: 16.5 % (ref 11.5–14.5)
GAS FLOW.O2 O2 DELIVERY SYS: ABNORMAL
GAS FLOW.O2 O2 DELIVERY SYS: ABNORMAL
GAS FLOW.O2 SETTING OXYMISER: 10 BPM
GLUCOSE SERPL-MCNC: 145 MG/DL (ref 65–100)
HCO3 BLD-SCNC: 43 MMOL/L (ref 21–28)
HCO3 BLD-SCNC: 48.4 MMOL/L (ref 21–28)
HCT VFR BLD AUTO: 34.2 % (ref 36.6–50.3)
HGB BLD-MCNC: 11 G/DL (ref 12.1–17)
IMM GRANULOCYTES # BLD AUTO: 0.04 K/UL (ref 0–0.04)
IMM GRANULOCYTES NFR BLD AUTO: 0.4 % (ref 0–0.5)
LYMPHOCYTES # BLD: 0.62 K/UL (ref 0.8–3.5)
LYMPHOCYTES NFR BLD: 5.5 % (ref 12–49)
MAGNESIUM SERPL-MCNC: 2 MG/DL (ref 1.6–2.4)
MCH RBC QN AUTO: 30.7 PG (ref 26–34)
MCHC RBC AUTO-ENTMCNC: 32.2 G/DL (ref 30–36.5)
MCV RBC AUTO: 95.5 FL (ref 80–99)
MONOCYTES # BLD: 1.09 K/UL (ref 0–1)
MONOCYTES NFR BLD: 9.7 % (ref 5–13)
NEUTS SEG # BLD: 9.44 K/UL (ref 1.8–8)
NEUTS SEG NFR BLD: 84.3 % (ref 32–75)
NRBC # BLD: 0 K/UL (ref 0–0.01)
NRBC BLD-RTO: 0 PER 100 WBC
O2/TOTAL GAS SETTING VFR VENT: 30 %
O2/TOTAL GAS SETTING VFR VENT: 40 %
PCO2 BLD: 61.9 MMHG (ref 35–48)
PCO2 BLD: 91.3 MMHG (ref 35–48)
PEEP RESPIRATORY: 5 CMH2O
PEEP RESPIRATORY: 6 CMH2O
PH BLD: 7.33 (ref 7.35–7.45)
PH BLD: 7.45 (ref 7.35–7.45)
PHOSPHATE SERPL-MCNC: 3.1 MG/DL (ref 2.6–4.7)
PLATELET # BLD AUTO: 154 K/UL (ref 150–400)
PMV BLD AUTO: 12.9 FL (ref 8.9–12.9)
PO2 BLD: 118 MMHG (ref 83–108)
PO2 BLD: 76 MMHG (ref 83–108)
POTASSIUM SERPL-SCNC: 3.7 MMOL/L (ref 3.5–5.1)
PRESSURE SUPPORT SETTING VENT: 5 CMH2O
RBC # BLD AUTO: 3.58 M/UL (ref 4.1–5.7)
RBC MORPH BLD: ABNORMAL
SAO2 % BLD: 95 % (ref 92–97)
SAO2 % BLD: 97.9 % (ref 92–97)
SODIUM SERPL-SCNC: 141 MMOL/L (ref 136–145)
SPECIMEN TYPE: ABNORMAL
SPECIMEN TYPE: ABNORMAL
VENTILATION MODE VENT: ABNORMAL
VENTILATION MODE VENT: ABNORMAL
VT SETTING VENT: 460 ML
WBC # BLD AUTO: 11.2 K/UL (ref 4.1–11.1)

## 2025-06-23 PROCEDURE — 85025 COMPLETE CBC W/AUTO DIFF WBC: CPT

## 2025-06-23 PROCEDURE — 2500000003 HC RX 250 WO HCPCS: Performed by: INTERNAL MEDICINE

## 2025-06-23 PROCEDURE — 6360000002 HC RX W HCPCS: Performed by: NURSE PRACTITIONER

## 2025-06-23 PROCEDURE — 99222 1ST HOSP IP/OBS MODERATE 55: CPT | Performed by: NURSE PRACTITIONER

## 2025-06-23 PROCEDURE — 71045 X-RAY EXAM CHEST 1 VIEW: CPT

## 2025-06-23 PROCEDURE — 94640 AIRWAY INHALATION TREATMENT: CPT

## 2025-06-23 PROCEDURE — 2500000003 HC RX 250 WO HCPCS: Performed by: EMERGENCY MEDICINE

## 2025-06-23 PROCEDURE — 84100 ASSAY OF PHOSPHORUS: CPT

## 2025-06-23 PROCEDURE — 83735 ASSAY OF MAGNESIUM: CPT

## 2025-06-23 PROCEDURE — 2580000003 HC RX 258: Performed by: NURSE PRACTITIONER

## 2025-06-23 PROCEDURE — 2000000000 HC ICU R&B

## 2025-06-23 PROCEDURE — 6360000002 HC RX W HCPCS: Performed by: INTERNAL MEDICINE

## 2025-06-23 PROCEDURE — 94003 VENT MGMT INPAT SUBQ DAY: CPT

## 2025-06-23 PROCEDURE — 36600 WITHDRAWAL OF ARTERIAL BLOOD: CPT

## 2025-06-23 PROCEDURE — 94761 N-INVAS EAR/PLS OXIMETRY MLT: CPT

## 2025-06-23 PROCEDURE — 82803 BLOOD GASES ANY COMBINATION: CPT

## 2025-06-23 PROCEDURE — 51798 US URINE CAPACITY MEASURE: CPT

## 2025-06-23 PROCEDURE — 51701 INSERT BLADDER CATHETER: CPT

## 2025-06-23 PROCEDURE — 36415 COLL VENOUS BLD VENIPUNCTURE: CPT

## 2025-06-23 PROCEDURE — 2500000003 HC RX 250 WO HCPCS: Performed by: NURSE PRACTITIONER

## 2025-06-23 PROCEDURE — 2700000000 HC OXYGEN THERAPY PER DAY

## 2025-06-23 PROCEDURE — 93010 ELECTROCARDIOGRAM REPORT: CPT | Performed by: INTERNAL MEDICINE

## 2025-06-23 PROCEDURE — 6370000000 HC RX 637 (ALT 250 FOR IP): Performed by: INTERNAL MEDICINE

## 2025-06-23 PROCEDURE — 80048 BASIC METABOLIC PNL TOTAL CA: CPT

## 2025-06-23 RX ORDER — ASPIRIN 81 MG/1
81 TABLET ORAL
COMMUNITY

## 2025-06-23 RX ORDER — FLUTICASONE PROPIONATE 50 MCG
1 SPRAY, SUSPENSION (ML) NASAL DAILY
COMMUNITY

## 2025-06-23 RX ORDER — MIDAZOLAM HYDROCHLORIDE 2 MG/2ML
1 INJECTION, SOLUTION INTRAMUSCULAR; INTRAVENOUS
Status: DISCONTINUED | OUTPATIENT
Start: 2025-06-23 | End: 2025-07-02

## 2025-06-23 RX ORDER — PREDNISONE 10 MG/1
10 TABLET ORAL DAILY
Status: ON HOLD | COMMUNITY
Start: 2025-06-04 | End: 2025-07-11 | Stop reason: HOSPADM

## 2025-06-23 RX ORDER — ALBUTEROL SULFATE AND BUDESONIDE 90; 80 UG/1; UG/1
2 AEROSOL, METERED RESPIRATORY (INHALATION) EVERY 4 HOURS PRN
COMMUNITY
Start: 2025-05-27

## 2025-06-23 RX ORDER — ALBUTEROL SULFATE 90 UG/1
2 INHALANT RESPIRATORY (INHALATION) EVERY 4 HOURS PRN
COMMUNITY
Start: 2025-04-28

## 2025-06-23 RX ORDER — LISINOPRIL 30 MG/1
30 TABLET ORAL DAILY
Status: ON HOLD | COMMUNITY
End: 2025-07-11

## 2025-06-23 RX ORDER — LIDOCAINE HYDROCHLORIDE 20 MG/ML
JELLY TOPICAL
Status: DISCONTINUED | OUTPATIENT
Start: 2025-06-23 | End: 2025-07-11 | Stop reason: HOSPADM

## 2025-06-23 RX ORDER — ATORVASTATIN CALCIUM 40 MG
40 TABLET ORAL NIGHTLY
COMMUNITY

## 2025-06-23 RX ORDER — OMEPRAZOLE 20 MG/1
20 CAPSULE, DELAYED RELEASE ORAL DAILY
COMMUNITY
Start: 2025-06-22

## 2025-06-23 RX ORDER — FERROUS SULFATE 324(65)MG
324 TABLET, DELAYED RELEASE (ENTERIC COATED) ORAL DAILY
COMMUNITY
Start: 2025-05-02

## 2025-06-23 RX ORDER — DIPHENHYDRAMINE HYDROCHLORIDE 50 MG/ML
50 INJECTION, SOLUTION INTRAMUSCULAR; INTRAVENOUS ONCE
Status: COMPLETED | OUTPATIENT
Start: 2025-06-23 | End: 2025-06-23

## 2025-06-23 RX ADMIN — IPRATROPIUM BROMIDE AND ALBUTEROL SULFATE 1 DOSE: .5; 3 SOLUTION RESPIRATORY (INHALATION) at 11:00

## 2025-06-23 RX ADMIN — SODIUM CHLORIDE, SODIUM LACTATE, POTASSIUM CHLORIDE, AND CALCIUM CHLORIDE: .6; .31; .03; .02 INJECTION, SOLUTION INTRAVENOUS at 06:54

## 2025-06-23 RX ADMIN — ENOXAPARIN SODIUM 40 MG: 100 INJECTION SUBCUTANEOUS at 09:52

## 2025-06-23 RX ADMIN — BUDESONIDE 500 MCG: 0.5 INHALANT RESPIRATORY (INHALATION) at 21:12

## 2025-06-23 RX ADMIN — PROPOFOL 45 MCG/KG/MIN: 10 INJECTION, EMULSION INTRAVENOUS at 00:01

## 2025-06-23 RX ADMIN — PROPOFOL 45 MCG/KG/MIN: 10 INJECTION, EMULSION INTRAVENOUS at 07:51

## 2025-06-23 RX ADMIN — SODIUM CHLORIDE, PRESERVATIVE FREE 10 ML: 5 INJECTION INTRAVENOUS at 19:52

## 2025-06-23 RX ADMIN — WATER 60 MG: 1 INJECTION INTRAMUSCULAR; INTRAVENOUS; SUBCUTANEOUS at 19:50

## 2025-06-23 RX ADMIN — IPRATROPIUM BROMIDE AND ALBUTEROL SULFATE 1 DOSE: .5; 3 SOLUTION RESPIRATORY (INHALATION) at 21:01

## 2025-06-23 RX ADMIN — Medication 75 MCG/HR: at 23:24

## 2025-06-23 RX ADMIN — MIDAZOLAM 1 MG: 1 INJECTION INTRAMUSCULAR; INTRAVENOUS at 01:37

## 2025-06-23 RX ADMIN — NOREPINEPHRINE BITARTRATE 5 MCG/MIN: 64 SOLUTION INTRAVENOUS at 03:26

## 2025-06-23 RX ADMIN — WATER 60 MG: 1 INJECTION INTRAMUSCULAR; INTRAVENOUS; SUBCUTANEOUS at 15:44

## 2025-06-23 RX ADMIN — Medication 100 MCG/HR: at 05:29

## 2025-06-23 RX ADMIN — BUDESONIDE 500 MCG: 0.5 INHALANT RESPIRATORY (INHALATION) at 08:02

## 2025-06-23 RX ADMIN — PROPOFOL 25 MCG/KG/MIN: 10 INJECTION, EMULSION INTRAVENOUS at 19:42

## 2025-06-23 RX ADMIN — WATER 60 MG: 1 INJECTION INTRAMUSCULAR; INTRAVENOUS; SUBCUTANEOUS at 09:52

## 2025-06-23 RX ADMIN — IPRATROPIUM BROMIDE AND ALBUTEROL SULFATE 1 DOSE: .5; 3 SOLUTION RESPIRATORY (INHALATION) at 01:02

## 2025-06-23 RX ADMIN — Medication 75 MCG/HR: at 06:43

## 2025-06-23 RX ADMIN — IPRATROPIUM BROMIDE AND ALBUTEROL SULFATE 1 DOSE: .5; 3 SOLUTION RESPIRATORY (INHALATION) at 15:19

## 2025-06-23 RX ADMIN — DIPHENHYDRAMINE HYDROCHLORIDE 50 MG: 50 INJECTION INTRAMUSCULAR; INTRAVENOUS at 19:43

## 2025-06-23 RX ADMIN — PROPOFOL 50 MCG/KG/MIN: 10 INJECTION, EMULSION INTRAVENOUS at 03:27

## 2025-06-23 RX ADMIN — SODIUM CHLORIDE, PRESERVATIVE FREE 40 MG: 5 INJECTION INTRAVENOUS at 09:52

## 2025-06-23 RX ADMIN — IPRATROPIUM BROMIDE AND ALBUTEROL SULFATE 1 DOSE: .5; 3 SOLUTION RESPIRATORY (INHALATION) at 04:20

## 2025-06-23 RX ADMIN — IPRATROPIUM BROMIDE AND ALBUTEROL SULFATE 1 DOSE: .5; 3 SOLUTION RESPIRATORY (INHALATION) at 07:54

## 2025-06-23 RX ADMIN — SODIUM CHLORIDE, PRESERVATIVE FREE 10 ML: 5 INJECTION INTRAVENOUS at 09:52

## 2025-06-23 RX ADMIN — ARFORMOTEROL TARTRATE 15 MCG: 15 SOLUTION RESPIRATORY (INHALATION) at 08:02

## 2025-06-23 RX ADMIN — WATER 80 MG: 1 INJECTION INTRAMUSCULAR; INTRAVENOUS; SUBCUTANEOUS at 03:28

## 2025-06-23 RX ADMIN — AZITHROMYCIN MONOHYDRATE 500 MG: 500 INJECTION, POWDER, LYOPHILIZED, FOR SOLUTION INTRAVENOUS at 00:04

## 2025-06-23 RX ADMIN — ARFORMOTEROL TARTRATE 15 MCG: 15 SOLUTION RESPIRATORY (INHALATION) at 21:12

## 2025-06-23 ASSESSMENT — PULMONARY FUNCTION TESTS
PIF_VALUE: 39
PIF_VALUE: 34
PIF_VALUE: 31
PIF_VALUE: 28
PIF_VALUE: 38
PIF_VALUE: 37

## 2025-06-23 NOTE — PROGRESS NOTES
Comprehensive Nutrition Assessment    Type and Reason for Visit:  Initial    Nutrition Recommendations/Plan:   Begin trophic feeds:  Trickle Feeds Vital AF 1.2 kcal @ 20 mL/hour  FWF 90 mL q 3 hours  Provide 3 Prosource/day, flush with 15 mL H2O before and after     Malnutrition Assessment:  Malnutrition Status:  No malnutrition (06/23/25 1420)    Context:  Acute Illness     Findings of the 6 clinical characteristics of malnutrition:  Energy Intake:  Unable to assess  Weight Loss:  No weight loss     Body Fat Loss:  No body fat loss     Muscle Mass Loss:  No muscle mass loss    Fluid Accumulation:  Mild Extremities   Strength:  Not Performed    Nutrition Assessment:     Patient is a 69 year old male admitted with COPD exacerbation ([facility]) [J44.1]  COPD with acute exacerbation ([facility]) [J44.1]  Acute respiratory failure with hypoxia and hypercarbia ([facility]) [J96.01, J96.02]. He  has a past medical history of COPD (chronic obstructive pulmonary disease) ([facility]), GERD (gastroesophageal reflux disease), Hyperlipidemia, Hypertension, Polyp of colon, and Sleep apnea.  Intubated, NGT in place. Did not pass SBT this am, plan to begin trophic feeds. Wife not at bedside during 3 attempted RD encounters; chart review. Lack of recent weight history available, but standing scale weight from earlier this year matches bedscale weight today (question accuracy). No muscle/fat wasting noted on physical exam. Patient currently requiring fent @ 75 mcg/hr, propofol @ 25 mcg/kg/min - providing 311 kcal/day.    Trickle feeds at goal 20 mL/hour provide 528 kcal (+ Prosource, + Propofol, 1079 kcal, 68% needs), 53 g carbs, 36 g protein (+ 3 Prosource, 96 g, 100% needs). TF + FWF provides 1076 mL H2O/day.       Wt Readings from Last 10 Encounters:   06/22/25 78.5 kg (173 lb 1 oz)   02/25/25 78.5 kg (173 lb 1 oz)   11/19/24 89.4 kg (197 lb)   06/07/22 95.3 kg (210 lb)   03/01/22 95.3 kg (210 lb)   01/18/22 94.3 kg (208 lb)       Nutrition Related  Findings:      Wound Type: None     Last BM:    Edema: Generalized, Left lower extremity, Right lower extremity                    Nutr. Labs:    Lab Results   Component Value Date    CREATININE 0.62 (L) 06/23/2025    BUN 23 (H) 06/23/2025     06/23/2025    K 3.7 06/23/2025    CL 98 06/23/2025    CO2 37 (H) 06/23/2025       Lab Results   Component Value Date/Time    POCGLU 156 02/22/2025 02:36 PM    POCGLU 112 12/02/2021 07:43 AM        Hemoglobin A1C   Date Value Ref Range Status   11/04/2021 5.8 (H) 4.0 - 5.6 % Final     Comment:     NEW METHOD  PLEASE NOTE NEW REFERENCE RANGE  (NOTE)  HbA1C Interpretive Ranges  <5.7              Normal  5.7 - 6.4         Consider Prediabetes  >6.5              Consider Diabetes         Lab Results   Component Value Date/Time    MG 2.0 06/23/2025 03:43 AM       Lab Results   Component Value Date    CALCIUM 8.5 06/23/2025    PHOS 3.1 06/23/2025       No results found for: \"TRIG\"      Nutr. Meds:  Scheduled Meds:   methylPREDNISolone  60 mg IntraVENous Q6H    sodium chloride  1,000 mL IntraVENous Once    methylPREDNISolone  125 mg IntraVENous Once    sodium chloride flush  5-40 mL IntraVENous 2 times per day    enoxaparin  40 mg SubCUTAneous Daily    pantoprazole (PROTONIX) 40 mg in sodium chloride (PF) 0.9 % 10 mL injection  40 mg IntraVENous Daily    azithromycin  500 mg IntraVENous Q24H    budesonide  0.5 mg Nebulization BID RT    And    arformoterol tartrate  15 mcg Nebulization BID RT    ipratropium 0.5 mg-albuterol 2.5 mg  1 Dose Inhalation Q4H RT     Continuous Infusions:   fentaNYL Citrate-NaCl 75 mcg/hr (06/23/25 1116)    sodium chloride      midazolam Stopped (06/22/25 2129)    norepinephrine Stopped (06/23/25 0751)    lactated ringers 50 mL/hr at 06/23/25 0654    propofol 25 mcg/kg/min (06/23/25 1324)     PRN Meds: midazolam, sodium chloride flush, sodium chloride, potassium chloride **OR** potassium chloride, magnesium sulfate, ondansetron **OR** ondansetron,

## 2025-06-23 NOTE — PROGRESS NOTES
Patient with ABG after 2 hrs of SBT. ABG showed compensated values, but worsening CO2 retention since AM ABG. Pt's baseline CO2 likely in the mid 80s. Spoke to the wife and discussed the fact that patient baseline is at high risk for reintubation. One more day on the ventilator may improve residual bronchospasm some, but passed this point pt likely at more risk for debilitation and VAP. Wife reports that she would like to hold on extubation one more day and that pt would definitely want to be reintubated should that need to happen.

## 2025-06-23 NOTE — CONSULTS
Palliative Medicine  Patient Name: Adrian Hurley  YOB: 1955  MRN: [id number]  Age: 69 y.o.  Gender: male    Date of Initial Consult: June 23, 2025  Date of Service: 6/23/2025  Time: 1:12 PM  Provider: Alyson Guillen NP  Hospital Day: 2  Admit Date: 6/22/2025  Referring Provider: Abner Randhawa NP       Reasons for Consultation:  Goals of Care    HISTORY OF PRESENT ILLNESS (HPI):   Adrian Hurley is a 69 y.o. male  who was admitted on 6/22/2025 from home due to shortness of breath with a diagnosis of acute hypoxic respiratory failure supported with BiPAP initially but required intubation, hypercapnia, lactic acidosis.     We have been asked to support with care goals.  Discussions have occurred with the primary team wife electing reintubation if needed as per his wishes.    PMH: COPD/chronic respiratory failure, GERD, HLD, HTN, sleep apnea, lumbar laminectomy, lumbar fusion, colon polyp, tobacco abuse    Psychosocial: Unable to obtain due to pt is currently intubated.      PALLIATIVE DIAGNOSES:    Palliative care encounter  Shortness of breath  Hypoxia  Feeding difficulties due to intubation    Pertinent Hospital Diagnoses:  Principal Problem:    COPD exacerbation (HCC)  Resolved Problems:    * No resolved hospital problems. *      ASSESSMENT AND PLAN:   Pt seen without family present. Wife just left the hospital per nurse. Services introduced.  Pt is alert on the vent and responsive but unable to participate in care goals discussion. He is fine with me speaking to his wife.      Plan:  Missed wife twice today. Will follow up tomorrow. Discussions with the primary team noted~ full code.   Will follow along with you  Please call with any palliative questions or concerns.  Palliative Care Team is available via perfect serve or via phone.  Initial consult note routed to primary continuity provider and/or primary health care team members    Plan upon discharge   [] Outpatient Palliative  Clinic  [] Home Based Palliative Care  [] Primary Care at Home  [] Hospice at home  [] Hospice at the facility  [] Inpatient hospice ([facility] vs Hospital)  [] Rehab (Skilled vs IPR)  [] Facility Long Term Care  [] Home with Home Health  [x] Undetermined at this time      ADVANCE CARE PLANNING:   [x] [facility] Interdisciplinary Team has updated the ACP Navigator with Health Care Decision Maker and Patient Capacity      Confirm Advance Directive: None  Patient Would Like to Complete Advance Directive: Unable (intubated/sedated)    Current Code Status: Full Code     .     Goals of Care and Interventions  Patient/Health Care Proxy Stated Goals: Recovery from acute illness  Medical Interventions: Full interventions  Artificially Administered Nutrition:  (not discussed)     Please refer to Palliative Medicine ACP notes or assessment and plan above for further details.    PALLIATIVE ASSESSMENT:      Palliative Performance Scale (PPS):  PPS: 40    ECOG:   ECOG Status : Restricted [1]    Modified ESAS:  Modified-[place] Symptom Assessment Scale (ESAS)  Tiredness Score: Not tired  Drowsiness Score: Not drowsy  Depression Score: Not depressed  Pain Score: No pain  Anxiety Score: Not anxious  Nausea Score: Not nauseated  Appetite Score:  (NPO)  Dyspnea Score: 1  Wellbeing Score: 1  Other Problem Score: Best possible response    Clinical Pain Assessment (nonverbal scale for severity on nonverbal patients):   Clinical Pain Assessment  Severity: 0         Vital Signs: Blood pressure (!) 107/55, pulse 84, temperature 99.1 °F (37.3 °C), temperature source Esophageal, resp. rate 13, height 1.803 m (5' 10.98\"), weight 78.5 kg (173 lb 1 oz), SpO2 95%.    Alert, responsive     PHYSICAL ASSESSMENT:   General: [] Oriented x3  [] Well appearing  [] Intubated  [x]Ill appearing  []Other:  Mental Status: [] Normal mental status exam  [] Drowsy  [] Confused  [x] Alert and NAD []Other:  Cardiovascular: [] Regular rate/rhythm  [] Arrhythmia  []

## 2025-06-23 NOTE — PLAN OF CARE
Problem: Respiratory - Adult  Goal: Achieves optimal ventilation and oxygenation  6/22/2025 1932 by Odalis Edwards RCP  Outcome: Not Progressing  Flowsheets (Taken 6/22/2025 1200 by Gary Bernardo, RN)  Achieves optimal ventilation and oxygenation:   Assess for changes in respiratory status   Assess for changes in mentation and behavior   Position to facilitate oxygenation and minimize respiratory effort   Oxygen supplementation based on oxygen saturation or arterial blood gases   Initiate smoking cessation protocol as indicated  6/22/2025 1131 by Renetta Helm RN  Outcome: Progressing     Problem: Respiratory - Adult  Goal: Achieves optimal ventilation and oxygenation  6/22/2025 1932 by Odalis Edwards RCP  Outcome: Not Progressing  Flowsheets (Taken 6/22/2025 1200 by Gary Bernardo, RN)  Achieves optimal ventilation and oxygenation:   Assess for changes in respiratory status   Assess for changes in mentation and behavior   Position to facilitate oxygenation and minimize respiratory effort   Oxygen supplementation based on oxygen saturation or arterial blood gases   Initiate smoking cessation protocol as indicated  6/22/2025 1131 by Renetta Helm RN  Outcome: Progressing     Problem: Respiratory - Adult  Goal: Achieves optimal ventilation and oxygenation  6/22/2025 1932 by Odalis Edwards RCP  Outcome: Not Progressing  Flowsheets (Taken 6/22/2025 1200 by Gary Bernardo, RN)  Achieves optimal ventilation and oxygenation:   Assess for changes in respiratory status   Assess for changes in mentation and behavior   Position to facilitate oxygenation and minimize respiratory effort   Oxygen supplementation based on oxygen saturation or arterial blood gases   Initiate smoking cessation protocol as indicated  6/22/2025 1131 by Renetta Helm RN  Outcome: Progressing

## 2025-06-23 NOTE — PLAN OF CARE
Problem: Respiratory - Adult  Goal: Achieves optimal ventilation and oxygenation  6/22/2025 1932 by Odalis Edwards RCP  Outcome: Not Progressing  Flowsheets (Taken 6/22/2025 1200 by Gary Bernardo RN)  Achieves optimal ventilation and oxygenation:   Assess for changes in respiratory status   Assess for changes in mentation and behavior   Position to facilitate oxygenation and minimize respiratory effort   Oxygen supplementation based on oxygen saturation or arterial blood gases   Initiate smoking cessation protocol as indicated     Problem: Safety - Medical Restraint  Goal: Remains free of injury from restraints (Restraint for Interference with Medical Device)  Description: INTERVENTIONS:  1. Determine that other, less restrictive measures have been tried or would not be effective before applying the restraint  2. Evaluate the patient's condition at the time of restraint application  3. Inform patient/family regarding the reason for restraint  4. Q2H: Monitor safety, psychosocial status, comfort, nutrition and hydration  6/23/2025 0516 by Charisse Rouse RN  Outcome: Progressing  Flowsheets (Taken 6/23/2025 0200)  Remains free of injury from restraints (restraint for interference with medical device):   Determine that other, less restrictive measures have been tried or would not be effective before applying the restraint   Evaluate the patient's condition at the time of restraint application  6/23/2025 0033 by Charisse Rouse RN  Outcome: Progressing  6/23/2025 0032 by Charisse Rouse RN  Outcome: Progressing  Flowsheets  Taken 6/22/2025 2000 by Charisse Rouse RN  Remains free of injury from restraints (restraint for interference with medical device):   Determine that other, less restrictive measures have been tried or would not be effective before applying the restraint   Evaluate the patient's condition at the time of restraint application  Taken 6/22/2025 1200 by Gary Bernardo RN  Remains free of injury from  restraints (restraint for interference with medical device):   Determine that other, less restrictive measures have been tried or would not be effective before applying the restraint   Evaluate the patient's condition at the time of restraint application     Problem: Discharge Planning  Goal: Discharge to home or other facility with appropriate resources  6/23/2025 0516 by Charisse Rouse RN  Outcome: Progressing  6/23/2025 0033 by Charisse Rouse RN  Outcome: Progressing  6/23/2025 0032 by Charisse Rouse RN  Outcome: Progressing  Flowsheets (Taken 6/22/2025 1200 by Gary Bernardo RN)  Discharge to home or other facility with appropriate resources:   Identify barriers to discharge with patient and caregiver   Arrange for needed discharge resources and transportation as appropriate   Identify discharge learning needs (meds, wound care, etc)     Problem: Safety - Adult  Goal: Free from fall injury  6/23/2025 0516 by Charisse Rouse RN  Outcome: Progressing  6/23/2025 0033 by Charisse Rouse RN  Outcome: Progressing  6/23/2025 0032 by Charisse Rouse RN  Outcome: Progressing     Problem: Pain  Goal: Verbalizes/displays adequate comfort level or baseline comfort level  6/23/2025 0033 by Charisse Rouse RN  Outcome: Progressing  6/23/2025 0032 by Charisse Rouse RN  Outcome: Progressing  Flowsheets (Taken 6/22/2025 1200 by Gary Bernardo RN)  Verbalizes/displays adequate comfort level or baseline comfort level:   Encourage patient to monitor pain and request assistance   Administer analgesics based on type and severity of pain and evaluate response   Assess pain using appropriate pain scale   Implement non-pharmacological measures as appropriate and evaluate response     Problem: Skin/Tissue Integrity  Goal: Skin integrity remains intact  Description: 1.  Monitor for areas of redness and/or skin breakdown  2.  Assess vascular access sites hourly  3.  Every 4-6 hours minimum:  Change oxygen saturation probe site  4.  Every 4-6 hours:

## 2025-06-23 NOTE — CARE COORDINATION
Care Management Initial Assessment  6/23/2025 3:14 PM  If patient is discharged prior to next notation, then this note serves as note for discharge by case management.    Reason for Admission:   COPD exacerbation (HCC) [J44.1]  COPD with acute exacerbation (HCC) [J44.1]  Acute respiratory failure with hypoxia and hypercarbia (HCC) [J96.01, J96.02]         Patient Admission Status: Inpatient  Date Admitted to INP: 14%  RUR: Readmission Risk Score: 13.9    Hospitalization in the last 30 days (Readmission):  No        Advance Care Planning:  Code Status: Full Code  Primary Healthcare Decision Maker: (P) Named in Scanned ACP Document   Advance Directive: has an advanced directive - a copy has been provided     __________________________________________________________________________  Assessment:      06/23/25 1511   Service Assessment   Patient Orientation Alert and Oriented   Cognition Alert   History Provided By Patient;Spouse   Primary Caregiver Self   Support Systems Spouse/Significant Other;Children;Family Members   Patient's Healthcare Decision Maker is: Named in Scanned ACP Document   PCP Verified by CM Yes   Last Visit to PCP Within last 3 months   Prior Functional Level Assistance with the following:;Bathing;Cooking;Housework;Shopping   Can patient return to prior living arrangement Yes   Ability to make needs known: Good   Family able to assist with home care needs: Yes   Would you like for me to discuss the discharge plan with any other family members/significant others, and if so, who? Yes   Financial Resources Other (Comment)  (commercial insurance)   Social/Functional History   Lives With Spouse   Type of Home House   Home Layout One level   Prior Level of Assist for ADLs Needs assistance   Active  No   Patient's  Info spouse   Mode of Transportation Car   Occupation Retired   Discharge Planning   Type of Residence House   Living Arrangements Spouse/Significant Other   Current Services  Home with family assistance as needed, and outpatient follow-up.    [] Home with Outpatient PT and outpatient follow-up   Pt aware of OP appt? []Yes, Provider:   []Not scheduled   Transport provider:     [] Home with outpatient services.    Specify:    [] Home with Home Health   - Art of Choice offered? [] Yes, Preference:   [] NA    []SNF/IPR   -[]Freedom of Choice offered, and preferences given:   []Listing provided and preferences requested   -Status: []Pending []Accepted:    -Auth required: []Yes []No    -Auth initiated date:   -3 midnight stay required: []Yes []No  Date satisfied:     [] LTC:     [] Home with Hospice   - Art of Choice offered? [] Yes, Preference:   [] NA    [] [facility] information provided.     [] Other:       Sarah Ahmadi  Case Management Department  For questions or concerns, please PerfectServe

## 2025-06-23 NOTE — PLAN OF CARE
Problem: Safety - Medical Restraint  Goal: Remains free of injury from restraints (Restraint for Interference with Medical Device)  Description: INTERVENTIONS:  1. Determine that other, less restrictive measures have been tried or would not be effective before applying the restraint  2. Evaluate the patient's condition at the time of restraint application  3. Inform patient/family regarding the reason for restraint  4. Q2H: Monitor safety, psychosocial status, comfort, nutrition and hydration  6/23/2025 1032 by Prema Elizabeth RN  Outcome: Progressing  Flowsheets (Taken 6/23/2025 0800)  Remains free of injury from restraints (restraint for interference with medical device):   Determine that other, less restrictive measures have been tried or would not be effective before applying the restraint   Evaluate the patient's condition at the time of restraint application   Inform patient/family regarding the reason for restraint   Every 2 hours: Monitor safety, psychosocial status, comfort, nutrition and hydration  6/23/2025 0516 by Charisse Rouse RN  Outcome: Progressing  Flowsheets (Taken 6/23/2025 0200)  Remains free of injury from restraints (restraint for interference with medical device):   Determine that other, less restrictive measures have been tried or would not be effective before applying the restraint   Evaluate the patient's condition at the time of restraint application  6/23/2025 0033 by Charisse Rouse RN  Outcome: Progressing  6/23/2025 0032 by Charisse Rouse RN  Outcome: Progressing  Flowsheets  Taken 6/22/2025 2000 by Charisse Rouse RN  Remains free of injury from restraints (restraint for interference with medical device):   Determine that other, less restrictive measures have been tried or would not be effective before applying the restraint   Evaluate the patient's condition at the time of restraint application  Taken 6/22/2025 1200 by Gary Bernardo RN  Remains free of injury from restraints

## 2025-06-23 NOTE — PROGRESS NOTES
attended Interdisciplinary Team (IDT) rounds in ICU with medical/care team members where this patient's ongoing care was discussed.  Outcome:  updated on Pt., and Interdisciplinary Team aware of  availability for patient, family and staff.  Thank you.    Staff Chaplain Joshua Rodriguez M.Div., [facility].   [facility]  [phone])

## 2025-06-23 NOTE — PROGRESS NOTES
Name: Adrian Hurley MRN: [id number]   : 1955 Hospital: [facility] [place]   Date: 2025        Impression Plan   Acute on chronic respiratory failure  Hypoxia  Hypercapnea  COPD exacerbation secondary to URI  Lactic acidosis  Tobacco abuse               Continue ventilator support.   Wean sedation with plan for SBT later in the morning  Scheduled duonebs every 4 hrs  Pulmicort/Brovana  Decrease methylpred to 60 mg q6h  Flu negative   Bowel regimen  PPxLovenox 40 mg   NPO. Will start tubefeeds if unable to extubate today  PPI  Discussed plan with nurse        Pt is critically ill. Critical care time spent with pt exclusive of procedures was 53 minutes    Radiology  ( personally reviewed) CT chest reviewed: no infiltrates. Emphysema   ABG Invalid input(s): \"PHI\", \"PO2I\", \"PCO2I\"       Subjective     70 yo M presents with acute c/o SOB at home. Called EMS. SpO2 0.7 on RA. EMS palced on BiPAP. No improvement on BiPAP. Intubated in ED by ED staff. ICU c/s for admit.     : Pt seen and admitted by intensivist overnight. Peak pressures 44 this AM with intrinsic peep of 14. On fent 50/prop45/midazolam. RASS -4.     :   Remains on mechanical ventilation. RR 10, Vt 480, FiO2 30% (91% sats). No further airtrapping  Prop 45 mcg/min/fent 100 mcg/min. Pt following commands  I/O: 2109/950 + 1159cc      Review of Systems:  Review of systems not obtained due to patient factors.    Past Medical History:   Diagnosis Date    COPD (chronic obstructive pulmonary disease) (HCC)     GERD (gastroesophageal reflux disease)     Hyperlipidemia     Hypertension     Polyp of colon     Sleep apnea     Uses CPAP      Past Surgical History:   Procedure Laterality Date    COLONOSCOPY      COLONOSCOPY N/A 2024    COLONOSCOPY DIAGNOSTIC performed by Yunier Jeffery MD at [facility] ENDOSCOPY    COLONOSCOPY N/A 2024    COLONOSCOPY POLYPECTOMY SNARE/BIOPSY performed by Yunier Jeffery MD at [facility] ENDOSCOPY    IR  GUIDED INJ LUMB/SAC TRANSFORAMINAL EPI SINGLE LEVEL  10/30/2019    IR GUIDED INJ LUMB/SAC TRANSFORAMINAL EPI SINGLE LEVEL  10/1/2021    LUMBAR FUSION      x 3    LUMBAR LAMINECTOMY      x 3    IN UNLISTED PROCEDURE CARDIAC SURGERY  2019    negative Cath Lab @ [facility]    IN UNLISTED PROCEDURE CARDIAC SURGERY      negative cardiac stress @ [facility]    UPPER GASTROINTESTINAL ENDOSCOPY N/A 11/19/2024    ESOPHAGOGASTRODUODENOSCOPY performed by Yunier Jeffery MD at [facility] ENDOSCOPY    UPPER GASTROINTESTINAL ENDOSCOPY N/A 11/19/2024    ESOPHAGOGASTRODUODENOSCOPY BIOPSY performed by Yunier Jeffery MD at [facility] ENDOSCOPY    VEIN SURGERY        Prior to Admission medications    Medication Sig Start Date End Date Taking? Authorizing Provider   thiamine mononitrate (THIAMINE) 100 MG tablet Take 1 tablet by mouth daily 3/3/25   Niurka Riojas,    dicyclomine (BENTYL) 10 MG capsule Take 1 capsule by mouth 3 times daily (before meals) 3/2/25   Niurka Riojas,    Multiple Vitamin (MULTIVITAMIN) TABS tablet Take 1 tablet by mouth daily 3/3/25   Niurka Riojas,    folic acid (FOLVITE) 1 MG tablet Take 1 tablet by mouth daily 3/3/25   Niurka Riojas,    Roflumilast (DALIRESP) 500 MCG tablet Take 1 tablet by mouth daily 3/3/25   Niurka Riojas,    albuterol (PROVENTIL) (2.5 MG/3ML) 0.083% nebulizer solution Take 0.76 mLs by nebulization every 6 hours as needed for Wheezing 3/2/25 4/1/25  Niurka Riojas DO   busPIRone (BUSPAR) 10 MG tablet Take 1 tablet by mouth 3 times daily 3/2/25   Niurka Riojas,    Budeson-Glycopyrrol-Formoterol 160-9-4.8 MCG/ACT AERO Inhale 2 puffs into the lungs in the morning and at bedtime 3/2/25   Niurka Riojas DO   escitalopram (LEXAPRO) 10 MG tablet ceived the following from [facility]: Outside name: escitalopram oxalate (LEXAPRO) 10 mg tablet 11/10/21   Automatic Reconciliation, Ar   pantoprazole (PROTONIX) 40 MG tablet Take 1 tablet by mouth every morning    Automatic Reconciliation, Ar   pregabalin (LYRICA) 75

## 2025-06-23 NOTE — PROGRESS NOTES
Patient on SBT X 2 hours.  ABG drawn and results do not indicate extubation at this time per Dr. Stubbs.  Per MD order patient back on full support.  Patient and wife aware and understanding.

## 2025-06-24 ENCOUNTER — APPOINTMENT (OUTPATIENT)
Facility: HOSPITAL | Age: 70
End: 2025-06-24
Payer: COMMERCIAL

## 2025-06-24 LAB
ALBUMIN SERPL-MCNC: 2.6 G/DL (ref 3.5–5)
ALBUMIN/GLOB SERPL: 0.9 (ref 1.1–2.2)
ALP SERPL-CCNC: 55 U/L (ref 45–117)
ALT SERPL-CCNC: 21 U/L (ref 12–78)
ANION GAP SERPL CALC-SCNC: 5 MMOL/L (ref 2–12)
ARTERIAL PATENCY WRIST A: ABNORMAL
ARTERIAL PATENCY WRIST A: ABNORMAL
ARTERIAL PATENCY WRIST A: POSITIVE
AST SERPL-CCNC: 11 U/L (ref 15–37)
BASE EXCESS BLD CALC-SCNC: 11.6 MMOL/L
BASE EXCESS BLD CALC-SCNC: 7.8 MMOL/L
BASE EXCESS BLD CALC-SCNC: 7.8 MMOL/L
BASOPHILS # BLD: 0.01 K/UL (ref 0–0.1)
BASOPHILS NFR BLD: 0.1 % (ref 0–1)
BDY SITE: ABNORMAL
BILIRUB SERPL-MCNC: 0.2 MG/DL (ref 0.2–1)
BUN SERPL-MCNC: 25 MG/DL (ref 6–20)
BUN/CREAT SERPL: 60 (ref 12–20)
CALCIUM SERPL-MCNC: 8.8 MG/DL (ref 8.5–10.1)
CHLORIDE SERPL-SCNC: 100 MMOL/L (ref 97–108)
CO2 SERPL-SCNC: 36 MMOL/L (ref 21–32)
CREAT SERPL-MCNC: 0.42 MG/DL (ref 0.7–1.3)
DIFFERENTIAL METHOD BLD: ABNORMAL
EOSINOPHIL # BLD: 0 K/UL (ref 0–0.4)
EOSINOPHIL NFR BLD: 0 % (ref 0–7)
ERYTHROCYTE [DISTWIDTH] IN BLOOD BY AUTOMATED COUNT: 16.4 % (ref 11.5–14.5)
GAS FLOW.O2 O2 DELIVERY SYS: ABNORMAL
GAS FLOW.O2 SETTING OXYMISER: 10 BPM
GAS FLOW.O2 SETTING OXYMISER: 10 BPM
GLOBULIN SER CALC-MCNC: 3 G/DL (ref 2–4)
GLUCOSE SERPL-MCNC: 158 MG/DL (ref 65–100)
HCO3 BLD-SCNC: 35.8 MMOL/L (ref 21–28)
HCO3 BLD-SCNC: 35.8 MMOL/L (ref 21–28)
HCO3 BLD-SCNC: 41.1 MMOL/L (ref 21–28)
HCT VFR BLD AUTO: 32.9 % (ref 36.6–50.3)
HGB BLD-MCNC: 10.2 G/DL (ref 12.1–17)
IMM GRANULOCYTES # BLD AUTO: 0.04 K/UL (ref 0–0.04)
IMM GRANULOCYTES NFR BLD AUTO: 0.5 % (ref 0–0.5)
LYMPHOCYTES # BLD: 0.17 K/UL (ref 0.8–3.5)
LYMPHOCYTES NFR BLD: 2.1 % (ref 12–49)
MAGNESIUM SERPL-MCNC: 2.2 MG/DL (ref 1.6–2.4)
MCH RBC QN AUTO: 31.4 PG (ref 26–34)
MCHC RBC AUTO-ENTMCNC: 31 G/DL (ref 30–36.5)
MCV RBC AUTO: 101.2 FL (ref 80–99)
MONOCYTES # BLD: 0.39 K/UL (ref 0–1)
MONOCYTES NFR BLD: 4.9 % (ref 5–13)
NEUTS SEG # BLD: 7.39 K/UL (ref 1.8–8)
NEUTS SEG NFR BLD: 92.4 % (ref 32–75)
NRBC # BLD: 0 K/UL (ref 0–0.01)
NRBC BLD-RTO: 0 PER 100 WBC
O2/TOTAL GAS SETTING VFR VENT: 40 %
PCO2 BLD: 64.1 MMHG (ref 35–48)
PCO2 BLD: 64.1 MMHG (ref 35–48)
PCO2 BLD: 82.7 MMHG (ref 35–48)
PEEP RESPIRATORY: 5 CMH2O
PEEP RESPIRATORY: 6 CMH2O
PEEP RESPIRATORY: 6 CMH2O
PH BLD: 7.3 (ref 7.35–7.45)
PH BLD: 7.36 (ref 7.35–7.45)
PH BLD: 7.36 (ref 7.35–7.45)
PHOSPHATE SERPL-MCNC: 3 MG/DL (ref 2.6–4.7)
PLATELET # BLD AUTO: 122 K/UL (ref 150–400)
PMV BLD AUTO: 12.3 FL (ref 8.9–12.9)
PO2 BLD: 107 MMHG (ref 83–108)
PO2 BLD: 130 MMHG (ref 83–108)
PO2 BLD: 130 MMHG (ref 83–108)
POTASSIUM SERPL-SCNC: 3.9 MMOL/L (ref 3.5–5.1)
PRESSURE SUPPORT SETTING VENT: 5 CMH2O
PROT SERPL-MCNC: 5.6 G/DL (ref 6.4–8.2)
RBC # BLD AUTO: 3.25 M/UL (ref 4.1–5.7)
RBC MORPH BLD: ABNORMAL
SAO2 % BLD: 97.1 % (ref 92–97)
SAO2 % BLD: 98.7 % (ref 92–97)
SAO2 % BLD: 98.7 % (ref 92–97)
SODIUM SERPL-SCNC: 141 MMOL/L (ref 136–145)
SPECIMEN TYPE: ABNORMAL
VENTILATION MODE VENT: ABNORMAL
VT SETTING VENT: 460 ML
VT SETTING VENT: 460 ML
WBC # BLD AUTO: 8 K/UL (ref 4.1–11.1)

## 2025-06-24 PROCEDURE — 36415 COLL VENOUS BLD VENIPUNCTURE: CPT

## 2025-06-24 PROCEDURE — 71045 X-RAY EXAM CHEST 1 VIEW: CPT

## 2025-06-24 PROCEDURE — 2500000003 HC RX 250 WO HCPCS: Performed by: NURSE PRACTITIONER

## 2025-06-24 PROCEDURE — 2500000003 HC RX 250 WO HCPCS: Performed by: INTERNAL MEDICINE

## 2025-06-24 PROCEDURE — 84100 ASSAY OF PHOSPHORUS: CPT

## 2025-06-24 PROCEDURE — 6370000000 HC RX 637 (ALT 250 FOR IP): Performed by: INTERNAL MEDICINE

## 2025-06-24 PROCEDURE — 94640 AIRWAY INHALATION TREATMENT: CPT

## 2025-06-24 PROCEDURE — 85025 COMPLETE CBC W/AUTO DIFF WBC: CPT

## 2025-06-24 PROCEDURE — 82803 BLOOD GASES ANY COMBINATION: CPT

## 2025-06-24 PROCEDURE — 83735 ASSAY OF MAGNESIUM: CPT

## 2025-06-24 PROCEDURE — 2580000003 HC RX 258: Performed by: NURSE PRACTITIONER

## 2025-06-24 PROCEDURE — 99497 ADVNCD CARE PLAN 30 MIN: CPT | Performed by: NURSE PRACTITIONER

## 2025-06-24 PROCEDURE — 2000000000 HC ICU R&B

## 2025-06-24 PROCEDURE — 6360000002 HC RX W HCPCS: Performed by: NURSE PRACTITIONER

## 2025-06-24 PROCEDURE — 6360000002 HC RX W HCPCS: Performed by: INTERNAL MEDICINE

## 2025-06-24 PROCEDURE — 80053 COMPREHEN METABOLIC PANEL: CPT

## 2025-06-24 PROCEDURE — 94761 N-INVAS EAR/PLS OXIMETRY MLT: CPT

## 2025-06-24 PROCEDURE — 2700000000 HC OXYGEN THERAPY PER DAY

## 2025-06-24 PROCEDURE — 6360000002 HC RX W HCPCS

## 2025-06-24 PROCEDURE — 82962 GLUCOSE BLOOD TEST: CPT

## 2025-06-24 PROCEDURE — 94003 VENT MGMT INPAT SUBQ DAY: CPT

## 2025-06-24 PROCEDURE — 36600 WITHDRAWAL OF ARTERIAL BLOOD: CPT

## 2025-06-24 RX ORDER — BUSPIRONE HYDROCHLORIDE 10 MG/1
10 TABLET ORAL 3 TIMES DAILY
Status: DISCONTINUED | OUTPATIENT
Start: 2025-06-24 | End: 2025-06-24

## 2025-06-24 RX ORDER — DIPHENHYDRAMINE HYDROCHLORIDE 50 MG/ML
50 INJECTION, SOLUTION INTRAMUSCULAR; INTRAVENOUS ONCE
Status: COMPLETED | OUTPATIENT
Start: 2025-06-24 | End: 2025-06-24

## 2025-06-24 RX ORDER — GUAIFENESIN 200 MG/10ML
400 LIQUID ORAL EVERY 12 HOURS
Status: DISCONTINUED | OUTPATIENT
Start: 2025-06-24 | End: 2025-06-26

## 2025-06-24 RX ORDER — MIDAZOLAM HYDROCHLORIDE 2 MG/2ML
0.5 INJECTION, SOLUTION INTRAMUSCULAR; INTRAVENOUS ONCE
Status: COMPLETED | OUTPATIENT
Start: 2025-06-24 | End: 2025-06-24

## 2025-06-24 RX ORDER — BUSPIRONE HYDROCHLORIDE 10 MG/1
10 TABLET ORAL 3 TIMES DAILY
Status: DISCONTINUED | OUTPATIENT
Start: 2025-06-24 | End: 2025-06-26

## 2025-06-24 RX ORDER — DIPHENHYDRAMINE HYDROCHLORIDE 50 MG/ML
INJECTION, SOLUTION INTRAMUSCULAR; INTRAVENOUS
Status: COMPLETED
Start: 2025-06-24 | End: 2025-06-24

## 2025-06-24 RX ORDER — DIPHENHYDRAMINE HYDROCHLORIDE 50 MG/ML
25 INJECTION, SOLUTION INTRAMUSCULAR; INTRAVENOUS ONCE
Status: COMPLETED | OUTPATIENT
Start: 2025-06-24 | End: 2025-06-24

## 2025-06-24 RX ORDER — HYDROXYZINE HYDROCHLORIDE 25 MG/1
25 TABLET, FILM COATED ORAL EVERY 4 HOURS PRN
Status: DISCONTINUED | OUTPATIENT
Start: 2025-06-24 | End: 2025-06-26

## 2025-06-24 RX ADMIN — DIPHENHYDRAMINE HYDROCHLORIDE 25 MG: 50 INJECTION, SOLUTION INTRAMUSCULAR; INTRAVENOUS at 05:32

## 2025-06-24 RX ADMIN — WATER 60 MG: 1 INJECTION INTRAMUSCULAR; INTRAVENOUS; SUBCUTANEOUS at 15:47

## 2025-06-24 RX ADMIN — IPRATROPIUM BROMIDE AND ALBUTEROL SULFATE 1 DOSE: .5; 3 SOLUTION RESPIRATORY (INHALATION) at 03:28

## 2025-06-24 RX ADMIN — DIPHENHYDRAMINE HYDROCHLORIDE 50 MG: 50 INJECTION INTRAMUSCULAR; INTRAVENOUS at 17:50

## 2025-06-24 RX ADMIN — IPRATROPIUM BROMIDE AND ALBUTEROL SULFATE 1 DOSE: .5; 3 SOLUTION RESPIRATORY (INHALATION) at 00:43

## 2025-06-24 RX ADMIN — Medication 75 MCG/HR: at 20:44

## 2025-06-24 RX ADMIN — BUDESONIDE 500 MCG: 0.5 INHALANT RESPIRATORY (INHALATION) at 08:06

## 2025-06-24 RX ADMIN — PROPOFOL 30 MCG/KG/MIN: 10 INJECTION, EMULSION INTRAVENOUS at 02:55

## 2025-06-24 RX ADMIN — ARFORMOTEROL TARTRATE 15 MCG: 15 SOLUTION RESPIRATORY (INHALATION) at 08:06

## 2025-06-24 RX ADMIN — IPRATROPIUM BROMIDE AND ALBUTEROL SULFATE 1 DOSE: .5; 3 SOLUTION RESPIRATORY (INHALATION) at 08:00

## 2025-06-24 RX ADMIN — SODIUM CHLORIDE, PRESERVATIVE FREE 10 ML: 5 INJECTION INTRAVENOUS at 08:29

## 2025-06-24 RX ADMIN — IPRATROPIUM BROMIDE AND ALBUTEROL SULFATE 1 DOSE: .5; 3 SOLUTION RESPIRATORY (INHALATION) at 23:02

## 2025-06-24 RX ADMIN — Medication 75 MCG/HR: at 14:37

## 2025-06-24 RX ADMIN — ENOXAPARIN SODIUM 40 MG: 100 INJECTION SUBCUTANEOUS at 08:34

## 2025-06-24 RX ADMIN — WATER 60 MG: 1 INJECTION INTRAMUSCULAR; INTRAVENOUS; SUBCUTANEOUS at 03:03

## 2025-06-24 RX ADMIN — BUSPIRONE HYDROCHLORIDE 10 MG: 10 TABLET ORAL at 20:39

## 2025-06-24 RX ADMIN — SODIUM CHLORIDE, SODIUM LACTATE, POTASSIUM CHLORIDE, AND CALCIUM CHLORIDE: .6; .31; .03; .02 INJECTION, SOLUTION INTRAVENOUS at 23:49

## 2025-06-24 RX ADMIN — WATER 60 MG: 1 INJECTION INTRAMUSCULAR; INTRAVENOUS; SUBCUTANEOUS at 20:42

## 2025-06-24 RX ADMIN — SODIUM CHLORIDE, SODIUM LACTATE, POTASSIUM CHLORIDE, AND CALCIUM CHLORIDE: .6; .31; .03; .02 INJECTION, SOLUTION INTRAVENOUS at 03:06

## 2025-06-24 RX ADMIN — PROPOFOL 40 MCG/KG/MIN: 10 INJECTION, EMULSION INTRAVENOUS at 19:15

## 2025-06-24 RX ADMIN — WATER 60 MG: 1 INJECTION INTRAMUSCULAR; INTRAVENOUS; SUBCUTANEOUS at 08:28

## 2025-06-24 RX ADMIN — GUAIFENESIN 400 MG: 200 SOLUTION ORAL at 22:25

## 2025-06-24 RX ADMIN — IPRATROPIUM BROMIDE AND ALBUTEROL SULFATE 1 DOSE: .5; 3 SOLUTION RESPIRATORY (INHALATION) at 11:35

## 2025-06-24 RX ADMIN — SODIUM CHLORIDE, PRESERVATIVE FREE 40 MG: 5 INJECTION INTRAVENOUS at 08:28

## 2025-06-24 RX ADMIN — IPRATROPIUM BROMIDE AND ALBUTEROL SULFATE 1 DOSE: .5; 3 SOLUTION RESPIRATORY (INHALATION) at 15:06

## 2025-06-24 RX ADMIN — SODIUM CHLORIDE, PRESERVATIVE FREE 10 ML: 5 INJECTION INTRAVENOUS at 19:32

## 2025-06-24 RX ADMIN — DIPHENHYDRAMINE HYDROCHLORIDE 25 MG: 50 INJECTION INTRAMUSCULAR; INTRAVENOUS at 05:32

## 2025-06-24 RX ADMIN — BUSPIRONE HYDROCHLORIDE 10 MG: 10 TABLET ORAL at 12:27

## 2025-06-24 RX ADMIN — AZITHROMYCIN MONOHYDRATE 500 MG: 500 INJECTION, POWDER, LYOPHILIZED, FOR SOLUTION INTRAVENOUS at 03:01

## 2025-06-24 RX ADMIN — BUDESONIDE 500 MCG: 0.5 INHALANT RESPIRATORY (INHALATION) at 20:01

## 2025-06-24 RX ADMIN — MIDAZOLAM HYDROCHLORIDE 0.5 MG: 1 INJECTION, SOLUTION INTRAMUSCULAR; INTRAVENOUS at 12:04

## 2025-06-24 RX ADMIN — PROPOFOL 30 MCG/KG/MIN: 10 INJECTION, EMULSION INTRAVENOUS at 14:01

## 2025-06-24 RX ADMIN — IPRATROPIUM BROMIDE AND ALBUTEROL SULFATE 1 DOSE: .5; 3 SOLUTION RESPIRATORY (INHALATION) at 20:01

## 2025-06-24 ASSESSMENT — PULMONARY FUNCTION TESTS
PIF_VALUE: 25
PIF_VALUE: 21
PIF_VALUE: 16
PIF_VALUE: 25
PIF_VALUE: 28
PIF_VALUE: 35
PIF_VALUE: 11

## 2025-06-24 NOTE — PROGRESS NOTES
Brief ICU Nutrition Assessment    Type and Reason for Visit: Reassess    Nutrition Recommendations/Plan:   Brief follow up. Tolerated trophic feeds overnight. Currently off fent and propofol this AM. If remaining intubated today, could advance feeds per below. Will monitor throughout day and place order for ~5 pm for feed advancement if needed.    Vital AF @ end goal 65 mL/hr  Advance by 10 mL q 4 hours until goal reached  FWF 70 mL q 3 hours     Vital AF @ end goal 65 mL/hr provides 1430 mL/day; 1716 kcal (100% needs), 157 g carbs w/ 7.1 g fiber, 107 g protein (100% needs). TF + FWF provides 1720 mL H2O/day.    Last BM:    Edema: Right upper extremity, Left upper extremity      RUE Edema: +2  LUE Edema: +2          Nutr. Labs:    Lab Results   Component Value Date    CREATININE 0.42 (L) 06/24/2025    BUN 25 (H) 06/24/2025     06/24/2025    K 3.9 06/24/2025     06/24/2025    CO2 36 (H) 06/24/2025       Lab Results   Component Value Date/Time    POCGLU 156 02/22/2025 02:36 PM    POCGLU 112 12/02/2021 07:43 AM        Hemoglobin A1C   Date Value Ref Range Status   11/04/2021 5.8 (H) 4.0 - 5.6 % Final     Comment:     NEW METHOD  PLEASE NOTE NEW REFERENCE RANGE  (NOTE)  HbA1C Interpretive Ranges  <5.7              Normal  5.7 - 6.4         Consider Prediabetes  >6.5              Consider Diabetes         Lab Results   Component Value Date/Time    MG 2.2 06/24/2025 05:38 AM       Lab Results   Component Value Date    CALCIUM 8.8 06/24/2025    PHOS 3.0 06/24/2025       No results found for: \"TRIG\"    Nutr. Meds:  Scheduled Meds:   methylPREDNISolone  60 mg IntraVENous Q6H    sodium chloride  1,000 mL IntraVENous Once    methylPREDNISolone  125 mg IntraVENous Once    sodium chloride flush  5-40 mL IntraVENous 2 times per day    enoxaparin  40 mg SubCUTAneous Daily    pantoprazole (PROTONIX) 40 mg in sodium chloride (PF) 0.9 % 10 mL injection  40 mg IntraVENous Daily    azithromycin  500 mg IntraVENous Q24H     budesonide  0.5 mg Nebulization BID RT    And    arformoterol tartrate  15 mcg Nebulization BID RT    ipratropium 0.5 mg-albuterol 2.5 mg  1 Dose Inhalation Q4H RT     Continuous Infusions:   fentaNYL Citrate-NaCl Stopped (06/24/25 0718)    sodium chloride      norepinephrine Stopped (06/23/25 0751)    lactated ringers 50 mL/hr at 06/24/25 0729    propofol Stopped (06/24/25 0718)     PRN Meds: midazolam, lidocaine, sodium chloride flush, sodium chloride, potassium chloride **OR** potassium chloride, magnesium sulfate, ondansetron **OR** ondansetron, polyethylene glycol, acetaminophen **OR** acetaminophen, ipratropium 0.5 mg-albuterol 2.5 mg      Estimated Nutrition Needs:   Energy Requirements Based On: Kcal/kg  Weight Used for Energy Requirements: Current  Energy (kcal/day): 8708-8061 (20-25 kcal/kg)  Weight Used for Protein Requirements: Current  Protein (g/day):  (1.2-1.5 g/kg)  Method Used for Fluid Requirements: 1 ml/kcal  Fluid (ml/day): 8239-5900    Marlen Burt MS, RD, [facility]  Ext: [phone], or via [employer]

## 2025-06-24 NOTE — ACP (ADVANCE CARE PLANNING)
Advance Care Planning      Palliative Medicine Provider (MD/NP)  Advance Care Planning (ACP) Conversation      Date of Conversation: 06/24/25  The patient and/or authorized decision maker consented to a voluntary Advance Care Planning conversation.   Individuals present for the conversation:   Spouse Dalia    Legal Healthcare Agent(s):    Primary Decision Maker: DALIA BERNAL - Spouse - [phone]    ACP documents available in EMR prior to discussion:  -None    Primary Palliative Diagnosis(es):  Palliative care encounter  Shortness of breath  Hypoxia  Feeding difficulties due to intubation    Conversation Summary:  We discussed the patient's condition in detail, inquired about patient wishes, care needs at home, rehab, questions, and concerns.    Pt is  with 2 adult children that live locally. Pt has not worked since 2010 when he hurt his back while working and underwent 4 surgeries in a close period of time. He has been out on disability since that time. He was working in the maintenance department.    Wife shared that the pt lives a non active lifestyle due to physical debility and pulmonary status. He has not been receptive to OT and PT services in the home and they have not had a good experience with other home health services at home.  She is able to assist the pt as needed with getting dressed and bathing if needed. She works 2 days a week.  They are able to get out of the house at times using the patient's electric rollator.    Wife says the pt wants to do everything to keep him alive. I asked if conversations regarding a tracheostomy and peg had occurred due to his advance disease state and she said yes, they have discussed and he wants everything.    She does not feel the pt would benefit from rehab services but willing to see how he progresses this admission before making any of those decisions. Pt has not been receptive to rehab in the past.   Pt able to hear our discussion at the bedside and  nodded that he agreed with wife's decisions.    Our team will sign off. Willing to meet with wife again if the pt should worsen. Goals clear for life prolonging efforts including tracheostomy and peg if needed.    Resuscitation Status:    Code Status: Full Code    Outcomes / Completed Documentation:  An explanation of advance directives and their importance was provided and the following forms completed:    -No new documents completed.    If new document completed, original was provided to patient and/or family member.    Copy was placed for scanning into the [facility] EMR.      I spent 40 minutes providing separately identifiable ACP services with the patient and/or surrogate decision maker in a voluntary, in-person conversation discussing the patient's wishes and goals as detailed in the above note.       AJ Bruno - NP

## 2025-06-24 NOTE — PLAN OF CARE
Problem: Safety - Medical Restraint  Goal: Remains free of injury from restraints (Restraint for Interference with Medical Device)  Description: INTERVENTIONS:  1. Determine that other, less restrictive measures have been tried or would not be effective before applying the restraint  2. Evaluate the patient's condition at the time of restraint application  3. Inform patient/family regarding the reason for restraint  4. Q2H: Monitor safety, psychosocial status, comfort, nutrition and hydration  Outcome: Progressing     Problem: Discharge Planning  Goal: Discharge to home or other facility with appropriate resources  Outcome: Progressing     Problem: Safety - Adult  Goal: Free from fall injury  Outcome: Progressing     Problem: Pain  Goal: Verbalizes/displays adequate comfort level or baseline comfort level  Outcome: Progressing     Problem: Respiratory - Adult  Goal: Achieves optimal ventilation and oxygenation  6/24/2025 0935 by Demetria Lyles RN  Outcome: Progressing  6/24/2025 0134 by Paulina Saunders, RT  Outcome: Progressing     Problem: Skin/Tissue Integrity  Goal: Skin integrity remains intact  Description: 1.  Monitor for areas of redness and/or skin breakdown  2.  Assess vascular access sites hourly  3.  Every 4-6 hours minimum:  Change oxygen saturation probe site  4.  Every 4-6 hours:  If on nasal continuous positive airway pressure, respiratory therapy assess nares and determine need for appliance change or resting period  Outcome: Progressing     Problem: Nutrition Deficit:  Goal: Optimize nutritional status  Outcome: Progressing

## 2025-06-24 NOTE — PROGRESS NOTES
Per chest X-ray report, NGT position was suboptimal with recommendation to advance tube by 3-4 cm. NGT successfully advanced by 4 cm per protocol. Tube position verified by aspiration of gastric contents and patient tolerance. Patient tolerated the procedure well with no signs of respiratory distress or discomfort noted. Will continue to monitor for tolerance and await further confirmation if ordered. Repeat Chest x-ray ordered.

## 2025-06-24 NOTE — CARE COORDINATION
Care Management Progress Note    Reason for Admission:   COPD exacerbation (HCC) [J44.1]  COPD with acute exacerbation (HCC) [J44.1]  Acute respiratory failure with hypoxia and hypercarbia (HCC) [J96.01, J96.02]         Patient Admission Status: Inpatient  RUR: 16%  Hospitalization in the last 30 days (Readmission):  no        Transition of care plan:  Ongoing medical management of acute on chronic respiratory failure with hypoxia and hypercapnia,COPD exacerbation  Pt is on 5 liters oxygen @ home through [employer]  Pt also has a home BiPaP.  [DC plan]  I am following pt for future discharge plans.  Per Palliative Medicine's note,if pt needs trach/peg,pt /family would want to go forward with trach and peg.  Prior to hospitalization,wife would assist pt with his ADLs (bathing in particular)  Pt has a scooter he uses outside their home.  Discharge plan communicated with patient and/or discharge caregiver: yes     Date 1st IMM letter given: has commercial insurance   Outpatient follow-up.TWIN Araujo  Transport at discharge:  family      Wendy Royal RN

## 2025-06-24 NOTE — PROGRESS NOTES
Name: Adrian Hurley MRN: [id number]   : 1955 Hospital: [facility]   Date: 2025        Impression Plan   Acute on chronic respiratory failure  Hypoxia  Hypercapnea  COPD exacerbation secondary to URI  Lactic acidosis  Tobacco abuse               Continue ventilator support.   Wean sedation with plan for SBT later in the morning. Pt seems to be slightly more bronchospastic today compared to yesterday. Will see how pt does throughout the morning.   Scheduled duonebs every 4 hrs  Pulmicort/Brovana  Continue methylpred to 60 mg q6h  Flu negative   Bowel regimen  PPxLovenox 40 mg   NPO. Will start tubefeeds if unable to extubate today  PPI  Discussed plan with nurse and wife. Pt has baseline FEV1 of 24% and on 5 liters at home. Using bipap at night and sometimes during the day. Pt will never be an optimal extubation candidate due to very poor baseline. Wife states she would like pt reintubated should he fail extubation.        Pt is critically ill. Critical care time spent with pt exclusive of procedures was 46 minutes    Radiology  ( personally reviewed) CT chest reviewed: no infiltrates. Emphysema   ABG Invalid input(s): \"PHI\", \"PO2I\", \"PCO2I\"       Subjective     68 yo M presents with acute c/o SOB at home. Called EMS. SpO2 0.7 on RA. EMS palced on BiPAP. No improvement on BiPAP. Intubated in ED by ED staff. ICU c/s for admit.     : Pt seen and admitted by intensivist overnight. Peak pressures 44 this AM with intrinsic peep of 14. On fent 50/prop45/midazolam. RASS -4.     :   Remains on mechanical ventilation. RR 10, Vt 480, FiO2 30% (91% sats). No further airtrapping  Prop 45 mcg/min/fent 100 mcg/min. Pt following commands  I/O: 2109/950 + 1159cc    :  Pt RASS -1 and follows commands. Peak pressures 30 this AM with intrinsic peep of 5-6 cmH20  No wheezing on exam    Review of Systems:  Review of systems not obtained due to patient factors.    Past Medical History:  Provider, Historical, MD   predniSONE (DELTASONE) 10 MG tablet Take 1 tablet by mouth daily 6/4/25  Yes Provider, Historical, MD   albuterol sulfate HFA (PROVENTIL;VENTOLIN;PROAIR) 108 (90 Base) MCG/ACT inhaler Inhale 2 puffs into the lungs every 4 hours as needed 4/28/25  Yes Provider, Historical, MD   dicyclomine (BENTYL) 10 MG capsule Take 1 capsule by mouth 3 times daily (before meals) 3/2/25  Yes Niurka Riojas DO   Multiple Vitamin (MULTIVITAMIN) TABS tablet Take 1 tablet by mouth daily 3/3/25  Yes Niurka Riojas DO   Roflumilast (DALIRESP) 500 MCG tablet Take 1 tablet by mouth daily 3/3/25  Yes Niurka Riojas DO   busPIRone (BUSPAR) 10 MG tablet Take 1 tablet by mouth 3 times daily 3/2/25  Yes Niurka Roijas DO   Budeson-Glycopyrrol-Formoterol 160-9-4.8 MCG/ACT AERO Inhale 2 puffs into the lungs in the morning and at bedtime 3/2/25  Yes Niurka Riojas DO   escitalopram (LEXAPRO) 10 MG tablet ceived the following from [facility]: Outside name: escitalopram oxalate (LEXAPRO) 10 mg tablet 11/10/21  Yes Automatic Reconciliation, Ar   albuterol (PROVENTIL) (2.5 MG/3ML) 0.083% nebulizer solution Take 0.76 mLs by nebulization every 6 hours as needed for Wheezing 3/2/25 4/1/25  Niurka Riojas DO     [unfilled]  Allergies   Allergen Reactions    Penicillins Rash     Has taken amoxicillin without a reaction;       Social History     Tobacco Use    Smoking status: Former     Current packs/day: 0.50     Average packs/day: 0.5 packs/day for 50.5 years (25.2 ttl pk-yrs)     Types: Cigarettes     Start date: 1975    Smokeless tobacco: Never   Substance Use Topics    Alcohol use: Yes     Alcohol/week: 3.0 standard drinks of alcohol     Types: 3 Cans of beer per week      Family History   Problem Relation Age of Onset    No Known Problems Sister     Alcohol Abuse Father     Stroke Father     COPD Mother     Hypertension Mother     High Cholesterol Mother           Laboratory: I have personally reviewed the critical care

## 2025-06-25 ENCOUNTER — APPOINTMENT (OUTPATIENT)
Facility: HOSPITAL | Age: 70
End: 2025-06-25
Payer: COMMERCIAL

## 2025-06-25 LAB
ALBUMIN SERPL-MCNC: 2.6 G/DL (ref 3.5–5)
ALBUMIN/GLOB SERPL: 0.8 (ref 1.1–2.2)
ALP SERPL-CCNC: 53 U/L (ref 45–117)
ALT SERPL-CCNC: 21 U/L (ref 12–78)
ANION GAP SERPL CALC-SCNC: 4 MMOL/L (ref 2–12)
ARTERIAL PATENCY WRIST A: POSITIVE
ARTERIAL PATENCY WRIST A: POSITIVE
AST SERPL-CCNC: 12 U/L (ref 15–37)
BASE EXCESS BLD CALC-SCNC: 12.1 MMOL/L
BASE EXCESS BLD CALC-SCNC: 13.9 MMOL/L
BASOPHILS # BLD: 0 K/UL (ref 0–0.1)
BASOPHILS NFR BLD: 0 % (ref 0–1)
BDY SITE: ABNORMAL
BDY SITE: ABNORMAL
BILIRUB SERPL-MCNC: 0.2 MG/DL (ref 0.2–1)
BUN SERPL-MCNC: 29 MG/DL (ref 6–20)
BUN/CREAT SERPL: 73 (ref 12–20)
CALCIUM SERPL-MCNC: 9.2 MG/DL (ref 8.5–10.1)
CHLORIDE SERPL-SCNC: 99 MMOL/L (ref 97–108)
CO2 SERPL-SCNC: 37 MMOL/L (ref 21–32)
CREAT SERPL-MCNC: 0.4 MG/DL (ref 0.7–1.3)
DIFFERENTIAL METHOD BLD: ABNORMAL
EOSINOPHIL # BLD: 0 K/UL (ref 0–0.4)
EOSINOPHIL NFR BLD: 0 % (ref 0–7)
ERYTHROCYTE [DISTWIDTH] IN BLOOD BY AUTOMATED COUNT: 16.5 % (ref 11.5–14.5)
GAS FLOW.O2 O2 DELIVERY SYS: ABNORMAL
GAS FLOW.O2 O2 DELIVERY SYS: ABNORMAL
GAS FLOW.O2 SETTING OXYMISER: 10 BPM
GLOBULIN SER CALC-MCNC: 3.1 G/DL (ref 2–4)
GLUCOSE BLD STRIP.AUTO-MCNC: 162 MG/DL (ref 65–117)
GLUCOSE BLD STRIP.AUTO-MCNC: 179 MG/DL (ref 65–117)
GLUCOSE BLD STRIP.AUTO-MCNC: 184 MG/DL (ref 65–117)
GLUCOSE BLD STRIP.AUTO-MCNC: 203 MG/DL (ref 65–117)
GLUCOSE SERPL-MCNC: 199 MG/DL (ref 65–100)
HCO3 BLD-SCNC: 39.6 MMOL/L (ref 21–28)
HCO3 BLD-SCNC: 42.2 MMOL/L (ref 21–28)
HCT VFR BLD AUTO: 34.3 % (ref 36.6–50.3)
HGB BLD-MCNC: 10.5 G/DL (ref 12.1–17)
IMM GRANULOCYTES # BLD AUTO: 0.03 K/UL (ref 0–0.04)
IMM GRANULOCYTES NFR BLD AUTO: 0.4 % (ref 0–0.5)
LYMPHOCYTES # BLD: 0.15 K/UL (ref 0.8–3.5)
LYMPHOCYTES NFR BLD: 2.1 % (ref 12–49)
MAGNESIUM SERPL-MCNC: 2.4 MG/DL (ref 1.6–2.4)
MCH RBC QN AUTO: 30.9 PG (ref 26–34)
MCHC RBC AUTO-ENTMCNC: 30.6 G/DL (ref 30–36.5)
MCV RBC AUTO: 100.9 FL (ref 80–99)
MONOCYTES # BLD: 0.27 K/UL (ref 0–1)
MONOCYTES NFR BLD: 3.9 % (ref 5–13)
NEUTS SEG # BLD: 6.55 K/UL (ref 1.8–8)
NEUTS SEG NFR BLD: 93.6 % (ref 32–75)
NRBC # BLD: 0 K/UL (ref 0–0.01)
NRBC BLD-RTO: 0 PER 100 WBC
O2/TOTAL GAS SETTING VFR VENT: 40 %
O2/TOTAL GAS SETTING VFR VENT: 40 %
PCO2 BLD: 65.9 MMHG (ref 35–48)
PCO2 BLD: 73.6 MMHG (ref 35–48)
PEEP RESPIRATORY: 5 CMH2O
PEEP RESPIRATORY: 5 CMH2O
PH BLD: 7.37 (ref 7.35–7.45)
PH BLD: 7.39 (ref 7.35–7.45)
PHOSPHATE SERPL-MCNC: 2.8 MG/DL (ref 2.6–4.7)
PLATELET # BLD AUTO: 136 K/UL (ref 150–400)
PMV BLD AUTO: 12.9 FL (ref 8.9–12.9)
PO2 BLD: 111 MMHG (ref 83–108)
PO2 BLD: 60 MMHG (ref 83–108)
POTASSIUM SERPL-SCNC: 4.4 MMOL/L (ref 3.5–5.1)
PRESSURE SUPPORT SETTING VENT: 555 CMH2O
PROT SERPL-MCNC: 5.7 G/DL (ref 6.4–8.2)
RBC # BLD AUTO: 3.4 M/UL (ref 4.1–5.7)
RBC MORPH BLD: ABNORMAL
SAO2 % BLD: 89.1 % (ref 92–97)
SAO2 % BLD: 97.9 % (ref 92–97)
SERVICE CMNT-IMP: ABNORMAL
SODIUM SERPL-SCNC: 140 MMOL/L (ref 136–145)
SPECIMEN TYPE: ABNORMAL
SPECIMEN TYPE: ABNORMAL
VENTILATION MODE VENT: ABNORMAL
VENTILATION MODE VENT: ABNORMAL
VT SETTING VENT: 460 ML
WBC # BLD AUTO: 7 K/UL (ref 4.1–11.1)

## 2025-06-25 PROCEDURE — 85025 COMPLETE CBC W/AUTO DIFF WBC: CPT

## 2025-06-25 PROCEDURE — 2700000000 HC OXYGEN THERAPY PER DAY

## 2025-06-25 PROCEDURE — 82803 BLOOD GASES ANY COMBINATION: CPT

## 2025-06-25 PROCEDURE — 6360000002 HC RX W HCPCS: Performed by: NURSE PRACTITIONER

## 2025-06-25 PROCEDURE — 6370000000 HC RX 637 (ALT 250 FOR IP): Performed by: NURSE PRACTITIONER

## 2025-06-25 PROCEDURE — 94003 VENT MGMT INPAT SUBQ DAY: CPT

## 2025-06-25 PROCEDURE — 94660 CPAP INITIATION&MGMT: CPT

## 2025-06-25 PROCEDURE — 2500000003 HC RX 250 WO HCPCS: Performed by: NURSE PRACTITIONER

## 2025-06-25 PROCEDURE — 36415 COLL VENOUS BLD VENIPUNCTURE: CPT

## 2025-06-25 PROCEDURE — 83735 ASSAY OF MAGNESIUM: CPT

## 2025-06-25 PROCEDURE — 71045 X-RAY EXAM CHEST 1 VIEW: CPT

## 2025-06-25 PROCEDURE — 6360000002 HC RX W HCPCS: Performed by: INTERNAL MEDICINE

## 2025-06-25 PROCEDURE — 94640 AIRWAY INHALATION TREATMENT: CPT

## 2025-06-25 PROCEDURE — 80053 COMPREHEN METABOLIC PANEL: CPT

## 2025-06-25 PROCEDURE — 2500000003 HC RX 250 WO HCPCS: Performed by: INTERNAL MEDICINE

## 2025-06-25 PROCEDURE — 2000000000 HC ICU R&B

## 2025-06-25 PROCEDURE — 36600 WITHDRAWAL OF ARTERIAL BLOOD: CPT

## 2025-06-25 PROCEDURE — 2580000003 HC RX 258: Performed by: NURSE PRACTITIONER

## 2025-06-25 PROCEDURE — 94761 N-INVAS EAR/PLS OXIMETRY MLT: CPT

## 2025-06-25 PROCEDURE — 84100 ASSAY OF PHOSPHORUS: CPT

## 2025-06-25 PROCEDURE — 6370000000 HC RX 637 (ALT 250 FOR IP): Performed by: INTERNAL MEDICINE

## 2025-06-25 RX ORDER — DEXMEDETOMIDINE HYDROCHLORIDE 4 UG/ML
.1-1.5 INJECTION, SOLUTION INTRAVENOUS CONTINUOUS
Status: DISCONTINUED | OUTPATIENT
Start: 2025-06-25 | End: 2025-06-28 | Stop reason: SDUPTHER

## 2025-06-25 RX ORDER — FUROSEMIDE 10 MG/ML
20 INJECTION INTRAMUSCULAR; INTRAVENOUS ONCE
Status: COMPLETED | OUTPATIENT
Start: 2025-06-25 | End: 2025-06-25

## 2025-06-25 RX ORDER — ESCITALOPRAM OXALATE 10 MG/1
10 TABLET ORAL DAILY
Status: DISCONTINUED | OUTPATIENT
Start: 2025-06-25 | End: 2025-06-26

## 2025-06-25 RX ORDER — IPRATROPIUM BROMIDE AND ALBUTEROL SULFATE 2.5; .5 MG/3ML; MG/3ML
1 SOLUTION RESPIRATORY (INHALATION)
Status: DISCONTINUED | OUTPATIENT
Start: 2025-06-26 | End: 2025-07-11 | Stop reason: HOSPADM

## 2025-06-25 RX ORDER — INSULIN LISPRO 100 [IU]/ML
0-4 INJECTION, SOLUTION INTRAVENOUS; SUBCUTANEOUS EVERY 6 HOURS SCHEDULED
Status: DISCONTINUED | OUTPATIENT
Start: 2025-06-25 | End: 2025-07-01

## 2025-06-25 RX ADMIN — WATER 60 MG: 1 INJECTION INTRAMUSCULAR; INTRAVENOUS; SUBCUTANEOUS at 07:59

## 2025-06-25 RX ADMIN — DEXMEDETOMIDINE HYDROCHLORIDE 0.2 MCG/KG/HR: 400 INJECTION INTRAVENOUS at 21:12

## 2025-06-25 RX ADMIN — IPRATROPIUM BROMIDE AND ALBUTEROL SULFATE 1 DOSE: .5; 3 SOLUTION RESPIRATORY (INHALATION) at 11:23

## 2025-06-25 RX ADMIN — WATER 60 MG: 1 INJECTION INTRAMUSCULAR; INTRAVENOUS; SUBCUTANEOUS at 21:14

## 2025-06-25 RX ADMIN — IPRATROPIUM BROMIDE AND ALBUTEROL SULFATE 1 DOSE: .5; 3 SOLUTION RESPIRATORY (INHALATION) at 04:31

## 2025-06-25 RX ADMIN — PROPOFOL 30 MCG/KG/MIN: 10 INJECTION, EMULSION INTRAVENOUS at 02:11

## 2025-06-25 RX ADMIN — INSULIN LISPRO 2 UNITS: 100 INJECTION, SOLUTION INTRAVENOUS; SUBCUTANEOUS at 11:38

## 2025-06-25 RX ADMIN — Medication 75 MCG/HR: at 08:05

## 2025-06-25 RX ADMIN — SODIUM CHLORIDE, PRESERVATIVE FREE 10 ML: 5 INJECTION INTRAVENOUS at 08:15

## 2025-06-25 RX ADMIN — IPRATROPIUM BROMIDE AND ALBUTEROL SULFATE 1 DOSE: .5; 3 SOLUTION RESPIRATORY (INHALATION) at 14:26

## 2025-06-25 RX ADMIN — BUDESONIDE 500 MCG: 0.5 INHALANT RESPIRATORY (INHALATION) at 08:27

## 2025-06-25 RX ADMIN — ARFORMOTEROL TARTRATE 15 MCG: 15 SOLUTION RESPIRATORY (INHALATION) at 08:27

## 2025-06-25 RX ADMIN — SODIUM CHLORIDE: 0.9 INJECTION, SOLUTION INTRAVENOUS at 02:02

## 2025-06-25 RX ADMIN — DEXMEDETOMIDINE HYDROCHLORIDE 0.2 MCG/KG/HR: 400 INJECTION INTRAVENOUS at 09:10

## 2025-06-25 RX ADMIN — IPRATROPIUM BROMIDE AND ALBUTEROL SULFATE 1 DOSE: .5; 3 SOLUTION RESPIRATORY (INHALATION) at 08:20

## 2025-06-25 RX ADMIN — BUSPIRONE HYDROCHLORIDE 10 MG: 10 TABLET ORAL at 07:59

## 2025-06-25 RX ADMIN — AZITHROMYCIN MONOHYDRATE 500 MG: 500 INJECTION, POWDER, LYOPHILIZED, FOR SOLUTION INTRAVENOUS at 02:19

## 2025-06-25 RX ADMIN — WATER 60 MG: 1 INJECTION INTRAMUSCULAR; INTRAVENOUS; SUBCUTANEOUS at 14:27

## 2025-06-25 RX ADMIN — SODIUM CHLORIDE, PRESERVATIVE FREE 40 MG: 5 INJECTION INTRAVENOUS at 08:02

## 2025-06-25 RX ADMIN — WATER 60 MG: 1 INJECTION INTRAMUSCULAR; INTRAVENOUS; SUBCUTANEOUS at 02:12

## 2025-06-25 RX ADMIN — IPRATROPIUM BROMIDE AND ALBUTEROL SULFATE 1 DOSE: .5; 3 SOLUTION RESPIRATORY (INHALATION) at 19:50

## 2025-06-25 RX ADMIN — ENOXAPARIN SODIUM 40 MG: 100 INJECTION SUBCUTANEOUS at 08:09

## 2025-06-25 RX ADMIN — BUDESONIDE 500 MCG: 0.5 INHALANT RESPIRATORY (INHALATION) at 19:55

## 2025-06-25 RX ADMIN — GUAIFENESIN 400 MG: 200 SOLUTION ORAL at 10:09

## 2025-06-25 RX ADMIN — SODIUM CHLORIDE, PRESERVATIVE FREE 10 ML: 5 INJECTION INTRAVENOUS at 19:51

## 2025-06-25 RX ADMIN — HYDROXYZINE HYDROCHLORIDE 25 MG: 25 TABLET ORAL at 01:44

## 2025-06-25 RX ADMIN — ESCITALOPRAM OXALATE 10 MG: 10 TABLET ORAL at 11:38

## 2025-06-25 RX ADMIN — FUROSEMIDE 20 MG: 10 INJECTION, SOLUTION INTRAMUSCULAR; INTRAVENOUS at 10:09

## 2025-06-25 RX ADMIN — BUSPIRONE HYDROCHLORIDE 10 MG: 10 TABLET ORAL at 14:27

## 2025-06-25 RX ADMIN — ARFORMOTEROL TARTRATE 15 MCG: 15 SOLUTION RESPIRATORY (INHALATION) at 19:55

## 2025-06-25 ASSESSMENT — PULMONARY FUNCTION TESTS
PIF_VALUE: 20
PIF_VALUE: 11
PIF_VALUE: 27
PIF_VALUE: 29

## 2025-06-25 NOTE — CARE COORDINATION
Care Management Progress Note    Reason for Admission:   COPD exacerbation (HCC) [J44.1]  COPD with acute exacerbation (HCC) [J44.1]  Acute respiratory failure with hypoxia and hypercarbia (HCC) [J96.01, J96.02]         Patient Admission Status: Inpatient  RUR: 16%  Hospitalization in the last 30 days (Readmission):  no        Transition of care plan:  Ongoing medical management  Restarting buspar and lexapro  Failed SBT earlier due to anxiety,tachycardia and elevations in BP  Off pressors  On precedex gtt  Plans are to try SBT again today  [DC plan]  If SNF or IPR:  Date FOC offered:   Accepting facility:   Date authorization started with reference number:   Date authorization received and expires:   Discharge plan communicated with patient and/or discharge caregiver: has not yet been determined     Date 1st IMM letter given: [place] [employer]  Outpatient follow-up.TWIN Araujo  Transport at discharge: contingent upon pt.'s clinical needs @ time of discharge    Wendy Royal RN

## 2025-06-25 NOTE — PROGRESS NOTES
Physical therapy:    Orders received and chart reviewed. Spoke with RN. Pt is currently on SBT and RN reported she was awaiting MD to determine if pt is ready for extubation. Advised to defer therapy today and attempt tomorrow.   Sheila Mcintosh, PT, DPT

## 2025-06-25 NOTE — PROGRESS NOTES
** Patient placed on SBT for 2 hours obtained ABG. Per MD patient not ready for extubation at this time. Patient is struggling with anxiety and high HR. Switched back to full support.       06/24/25 1355   Adult IBW   Height 1.8 m (5' 10.87\")   IBW/kg (Calculated) 74.99   Patient Observation   Pulse 83   Respirations 10   SpO2 96 %   Breath Sounds   Respiratory Pattern Regular   Upper Airway Sounds Other (comment)  (wnl)   Vent Information   Ventilator ID [id number]   Ventilator Safety Check Performed Pre-Use Yes   Vent Mode AC/VC   Ventilator Settings   Vt (Set, mL) 460 mL   Resp Rate (Set) 10 bpm   PEEP/CPAP (cmH2O) 5   FiO2  40 %   Vent Patient Data (Readings)   Vt Mandatory Exp (mL) 398 mL   Vt (Measured) 398 mL   Peak Inspiratory Pressure (cmH2O) 21 cmH2O   Rate Measured 10 br/min   Minute Volume (L/min) 4.9 Liters   Mean Airway Pressure (cmH2O) 8.2 cmH20   I:E Ratio 1:4.7   B: Both Spontaneous Awakening and Breathing Trials   Safety Screening Spontaneous Breathing Trial (SBT) Proceed with SBT - no exclusion criteria met   RT Notified Ready for SBT Yes   Weaning Parameters   Spontaneous Breathing Trial Complete No (comment)

## 2025-06-25 NOTE — PLAN OF CARE
Problem: Safety - Medical Restraint  Goal: Remains free of injury from restraints (Restraint for Interference with Medical Device)  Description: INTERVENTIONS:  1. Determine that other, less restrictive measures have been tried or would not be effective before applying the restraint  2. Evaluate the patient's condition at the time of restraint application  3. Inform patient/family regarding the reason for restraint  4. Q2H: Monitor safety, psychosocial status, comfort, nutrition and hydration  Outcome: Progressing     Problem: Discharge Planning  Goal: Discharge to home or other facility with appropriate resources  Outcome: Progressing  Flowsheets (Taken 6/25/2025 0730)  Discharge to home or other facility with appropriate resources:   Arrange for needed discharge resources and transportation as appropriate   Identify barriers to discharge with patient and caregiver   Identify discharge learning needs (meds, wound care, etc)   Refer to discharge planning if patient needs post-hospital services based on physician order or complex needs related to functional status, cognitive ability or social support system   Arrange for interpreters to assist at discharge as needed     Problem: Safety - Adult  Goal: Free from fall injury  Outcome: Progressing     Problem: Pain  Goal: Verbalizes/displays adequate comfort level or baseline comfort level  Outcome: Progressing  Flowsheets (Taken 6/25/2025 0730)  Verbalizes/displays adequate comfort level or baseline comfort level:   Encourage patient to monitor pain and request assistance   Assess pain using appropriate pain scale   Administer analgesics based on type and severity of pain and evaluate response   Implement non-pharmacological measures as appropriate and evaluate response   Consider cultural and social influences on pain and pain management   Notify Licensed Independent Practitioner if interventions unsuccessful or patient reports new pain     Problem: Respiratory -

## 2025-06-25 NOTE — PROGRESS NOTES
Spiritual Health History and Assessment/Progress Note  [facility]    Palliative Care, Code Blue, Adjustment to illness,      Name: Adrian Hurley MRN: [id number]    Age: 69 y.o.     Sex: male   Language: English   Hindu: Other   COPD exacerbation (HCC)     Date: 6/25/2025            Total Time Calculated: 45 min              Spiritual Assessment began in [facility] A4 INTENSIVE CARE UNIT        Referral/Consult From: Palliative Care   Encounter Overview/Reason: Palliative Care  Service Provided For: Family    Autumn, Belief, Meaning:   Patient has beliefs or practices that help with coping during difficult times  Family/Friends have beliefs or practices that help with coping during difficult times      Importance and Influence:  Patient has spiritual/personal beliefs that influence decisions regarding their health  Family/Friends have spiritual/personal beliefs that influence decisions regarding the patient's health    Community:  Patient feels well-supported. Support system includes: Spouse/Partner  Family/Friends feel well-supported. Support system includes: Spouse/Partner, Parent/s, Children, Friends, and Extended family    Assessment and Plan of Care:     Patient Interventions include: Other: see note  Family/Friends Interventions include: Facilitated expression of thoughts and feelings, Explored spiritual coping/struggle/distress, and Affirmed coping skills/support systems     follow up visit. Pt appeared to be resting comfortably. Pt's wife was present. Wife kirti through the support of family and friends as well as humor. Wife finds milton in small things, and is hopeful for improvement and discharge home. Encouraged self care. No needs expressed. Shared  availability.    Patient Plan of Care: Spiritual Care available upon further referral  Family/Friends Plan of Care: Spiritual Care available upon further referral    Electronically signed by ALEXIS Sidhu on 6/25/2025 at

## 2025-06-25 NOTE — PROGRESS NOTES
Name: Adrian Hurley MRN: [id number]   : 1955 Hospital: [facility]   Date: 2025        Impression Plan   Acute on chronic respiratory failure  Hypoxia  Hypercapnea  COPD exacerbation secondary to URI  Lactic acidosis  Tobacco abuse               Continue ventilator support.   SBT trial again this afternoon. Will add precedex to helps with anxiety/ agitation  Will need to extubate to bipap  Scheduled duonebs every 4 hrs  Pulmicort/Brovana  Continue methylpred to 60 mg q6h  Flu negative   Bowel regimen  PPxLovenox 40 mg   Hold tube feeds for extubation  PPI  Discussed plan with nurse and wife. Pt has baseline FEV1 of 24% and on 5 liters at home. Using bipap at night and sometimes during the day. Pt will never be an optimal extubation candidate due to very poor baseline. Wife states she would like pt reintubated should he fail extubation.        Pt is critically ill. Critical care time spent with pt exclusive of procedures was 51 minutes    Radiology  ( personally reviewed) CT chest reviewed: no infiltrates. Emphysema   ABG Invalid input(s): \"PHI\", \"PO2I\", \"PCO2I\"       Subjective     70 yo M presents with acute c/o SOB at home. Called EMS. SpO2 0.7 on RA. EMS palced on BiPAP. No improvement on BiPAP. Intubated in ED by ED staff. ICU c/s for admit.     : Pt seen and admitted by intensivist overnight. Peak pressures 44 this AM with intrinsic peep of 14. On fent 50/prop45/midazolam. RASS -4.     :   Remains on mechanical ventilation. RR 10, Vt 480, FiO2 30% (91% sats). No further airtrapping  Prop 45 mcg/min/fent 100 mcg/min. Pt following commands  I/O: 2109/950 + 1159cc    :  Pt RASS -1 and follows commands. Peak pressures 30 this AM with intrinsic peep of 5-6 cmH20  No wheezing on exam    : No events overnight. Intrinsic peep 5. Pt denied SOB in the morning. Became anxious  and short of breath 30 min into SBT    Review of Systems:  Review of systems not obtained

## 2025-06-25 NOTE — PROGRESS NOTES
Pt did well on SBT in afternoon with compensated ABG s/p SBT and no wheezing. Extubated to bipap. Doing well

## 2025-06-25 NOTE — PROGRESS NOTES
** Patient was on SBT 5/5 this AM, patient became agitated/HR elevated/anxiety. Placed back on full support by MD.     1210/1530- Patient placed on SBT 5/5. Patient successfully extubated to BIPAP 16/5.   06/25/25 1123   Adult IBW   Height 1.8 m (5' 10.87\")   IBW/kg (Calculated) 74.99   Patient Observation   Pulse 78   Respirations 13   SpO2 96 %   ETCO2 (mmHg) 18 mmHg   Breath Sounds   Respiratory Pattern Regular   Upper Airway Sounds Other (comment)  (wnl)   Breath Sounds Bilateral Diminished   Right Upper Lobe Diminished   Right Middle Lobe Diminished   Right Lower Lobe Diminished   Left Upper Lobe Diminished   Left Lower Lobe Diminished   Vent Information   Ventilator Day(s) 5   Ventilator ID [id number]   Ventilator Safety Check Performed Pre-Use Yes   Vent Mode AC/VC   Ventilator Settings   Vt (Set, mL) 460 mL   Resp Rate (Set) 10 bpm   PEEP/CPAP (cmH2O) 5   FiO2  40 %   Vent Patient Data (Readings)   Vt Mandatory Exp (mL) 488 mL   Vt (Measured) 484 mL   Peak Inspiratory Pressure (cmH2O) 20 cmH2O   Rate Measured 10 br/min   Minute Volume (L/min) 4.86 Liters   Peak Inspiratory Flow (lpm) 47 L/sec   Mean Airway Pressure (cmH2O) 5.14 cmH20   Plateau Pressure (cm H2O) 15 cm H2O   Driving Pressure 10   I:E Ratio 1:4.7   Flow Sensitivity 3 L/min   PEEP Intrinsic (cm H2O) 0.2 cm H2O   Static Compliance (L/cm H2O) 48   Dynamic Compliance (L/cm H2O) 158 L/cm H2O   Airway Resistance 30   Backup Apnea On   Backup Rate 10 Breaths Per Minute   Backup Vt 460   Vent Alarm Settings   High Pressure (cmH2O) 60 cmH2O   Low Minute Volume (lpm) 2 L/min   High Minute Volume (lpm) 20 L/min   Low Exhaled Vt (ml) 200 mL   High Exhaled Vt (ml) 900 mL   RR Low (bpm) 10   RR High (bpm) 50 br/min   Apnea (secs) 20 secs   Additional Respiratoray Assessments   Humidification Source Heated wire   Humidification Temp 37.1   Circuit Condensation Drained   Ambu Bag With Mask At Bedside Yes   Non-Surgical Airway 06/22/25 Endotracheal tube

## 2025-06-26 ENCOUNTER — APPOINTMENT (OUTPATIENT)
Facility: HOSPITAL | Age: 70
End: 2025-06-26
Payer: COMMERCIAL

## 2025-06-26 LAB
ALBUMIN SERPL-MCNC: 2.8 G/DL (ref 3.5–5)
ALBUMIN/GLOB SERPL: 0.9 (ref 1.1–2.2)
ALP SERPL-CCNC: 57 U/L (ref 45–117)
ALT SERPL-CCNC: 36 U/L (ref 12–78)
ANION GAP SERPL CALC-SCNC: 1 MMOL/L (ref 2–12)
ARTERIAL PATENCY WRIST A: POSITIVE
ARTERIAL PATENCY WRIST A: POSITIVE
AST SERPL-CCNC: 23 U/L (ref 15–37)
BASE EXCESS BLD CALC-SCNC: 12 MMOL/L
BASE EXCESS BLD CALC-SCNC: 17.9 MMOL/L
BASOPHILS # BLD: 0 K/UL (ref 0–0.1)
BASOPHILS NFR BLD: 0 % (ref 0–1)
BDY SITE: ABNORMAL
BDY SITE: ABNORMAL
BILIRUB SERPL-MCNC: 0.3 MG/DL (ref 0.2–1)
BUN SERPL-MCNC: 24 MG/DL (ref 6–20)
BUN/CREAT SERPL: 59 (ref 12–20)
CALCIUM SERPL-MCNC: 9.4 MG/DL (ref 8.5–10.1)
CHLORIDE SERPL-SCNC: 99 MMOL/L (ref 97–108)
CO2 SERPL-SCNC: 41 MMOL/L (ref 21–32)
CREAT SERPL-MCNC: 0.41 MG/DL (ref 0.7–1.3)
DIFFERENTIAL METHOD BLD: ABNORMAL
EOSINOPHIL # BLD: 0 K/UL (ref 0–0.4)
EOSINOPHIL NFR BLD: 0 % (ref 0–7)
ERYTHROCYTE [DISTWIDTH] IN BLOOD BY AUTOMATED COUNT: 15.9 % (ref 11.5–14.5)
GAS FLOW.O2 O2 DELIVERY SYS: ABNORMAL
GAS FLOW.O2 O2 DELIVERY SYS: ABNORMAL
GAS FLOW.O2 SETTING OXYMISER: 10 BPM
GAS FLOW.O2 SETTING OXYMISER: 12 BPM
GLOBULIN SER CALC-MCNC: 3.2 G/DL (ref 2–4)
GLUCOSE BLD STRIP.AUTO-MCNC: 137 MG/DL (ref 65–117)
GLUCOSE BLD STRIP.AUTO-MCNC: 152 MG/DL (ref 65–117)
GLUCOSE BLD STRIP.AUTO-MCNC: 185 MG/DL (ref 65–117)
GLUCOSE BLD STRIP.AUTO-MCNC: 186 MG/DL (ref 65–117)
GLUCOSE SERPL-MCNC: 164 MG/DL (ref 65–100)
HCO3 BLD-SCNC: 40.7 MMOL/L (ref 21–28)
HCO3 BLD-SCNC: 45.7 MMOL/L (ref 21–28)
HCT VFR BLD AUTO: 37.2 % (ref 36.6–50.3)
HGB BLD-MCNC: 11.2 G/DL (ref 12.1–17)
IMM GRANULOCYTES # BLD AUTO: 0.03 K/UL (ref 0–0.04)
IMM GRANULOCYTES NFR BLD AUTO: 0.5 % (ref 0–0.5)
LYMPHOCYTES # BLD: 0.13 K/UL (ref 0.8–3.5)
LYMPHOCYTES NFR BLD: 2.1 % (ref 12–49)
MAGNESIUM SERPL-MCNC: 2.4 MG/DL (ref 1.6–2.4)
MCH RBC QN AUTO: 30.7 PG (ref 26–34)
MCHC RBC AUTO-ENTMCNC: 30.1 G/DL (ref 30–36.5)
MCV RBC AUTO: 101.9 FL (ref 80–99)
MONOCYTES # BLD: 0.3 K/UL (ref 0–1)
MONOCYTES NFR BLD: 4.8 % (ref 5–13)
NEUTS SEG # BLD: 5.84 K/UL (ref 1.8–8)
NEUTS SEG NFR BLD: 92.6 % (ref 32–75)
NRBC # BLD: 0 K/UL (ref 0–0.01)
NRBC BLD-RTO: 0 PER 100 WBC
O2/TOTAL GAS SETTING VFR VENT: 40 %
O2/TOTAL GAS SETTING VFR VENT: 50 %
PCO2 BLD: 71.2 MMHG (ref 35–48)
PCO2 BLD: 74.9 MMHG (ref 35–48)
PEEP RESPIRATORY: 5 CMH2O
PEEP RESPIRATORY: 5 CMH2O
PH BLD: 7.34 (ref 7.35–7.45)
PH BLD: 7.42 (ref 7.35–7.45)
PHOSPHATE SERPL-MCNC: 3.5 MG/DL (ref 2.6–4.7)
PLATELET # BLD AUTO: 127 K/UL (ref 150–400)
PMV BLD AUTO: 12.7 FL (ref 8.9–12.9)
PO2 BLD: 64 MMHG (ref 83–108)
PO2 BLD: 72 MMHG (ref 83–108)
POTASSIUM SERPL-SCNC: 4.9 MMOL/L (ref 3.5–5.1)
PROT SERPL-MCNC: 6 G/DL (ref 6.4–8.2)
RBC # BLD AUTO: 3.65 M/UL (ref 4.1–5.7)
RBC MORPH BLD: ABNORMAL
SAO2 % BLD: 89.2 % (ref 92–97)
SAO2 % BLD: 93.6 % (ref 92–97)
SERVICE CMNT-IMP: ABNORMAL
SODIUM SERPL-SCNC: 141 MMOL/L (ref 136–145)
SPECIMEN TYPE: ABNORMAL
SPECIMEN TYPE: ABNORMAL
VENTILATION MODE VENT: ABNORMAL
VENTILATION MODE VENT: ABNORMAL
VT SETTING VENT: 450 ML
WBC # BLD AUTO: 6.3 K/UL (ref 4.1–11.1)

## 2025-06-26 PROCEDURE — 2500000003 HC RX 250 WO HCPCS: Performed by: ANESTHESIOLOGY

## 2025-06-26 PROCEDURE — 0CJS8ZZ INSPECTION OF LARYNX, VIA NATURAL OR ARTIFICIAL OPENING ENDOSCOPIC: ICD-10-PCS | Performed by: ANESTHESIOLOGY

## 2025-06-26 PROCEDURE — 6360000002 HC RX W HCPCS: Performed by: NURSE PRACTITIONER

## 2025-06-26 PROCEDURE — 2700000000 HC OXYGEN THERAPY PER DAY

## 2025-06-26 PROCEDURE — 2500000003 HC RX 250 WO HCPCS: Performed by: INTERNAL MEDICINE

## 2025-06-26 PROCEDURE — 94002 VENT MGMT INPAT INIT DAY: CPT

## 2025-06-26 PROCEDURE — 82962 GLUCOSE BLOOD TEST: CPT

## 2025-06-26 PROCEDURE — 36415 COLL VENOUS BLD VENIPUNCTURE: CPT

## 2025-06-26 PROCEDURE — 74018 RADEX ABDOMEN 1 VIEW: CPT

## 2025-06-26 PROCEDURE — 94761 N-INVAS EAR/PLS OXIMETRY MLT: CPT

## 2025-06-26 PROCEDURE — 82803 BLOOD GASES ANY COMBINATION: CPT

## 2025-06-26 PROCEDURE — 2500000003 HC RX 250 WO HCPCS: Performed by: NURSE PRACTITIONER

## 2025-06-26 PROCEDURE — 6360000002 HC RX W HCPCS: Performed by: INTERNAL MEDICINE

## 2025-06-26 PROCEDURE — 71045 X-RAY EXAM CHEST 1 VIEW: CPT

## 2025-06-26 PROCEDURE — 84100 ASSAY OF PHOSPHORUS: CPT

## 2025-06-26 PROCEDURE — 6370000000 HC RX 637 (ALT 250 FOR IP): Performed by: ANESTHESIOLOGY

## 2025-06-26 PROCEDURE — 6360000002 HC RX W HCPCS: Performed by: ANESTHESIOLOGY

## 2025-06-26 PROCEDURE — 31500 INSERT EMERGENCY AIRWAY: CPT

## 2025-06-26 PROCEDURE — 2580000003 HC RX 258: Performed by: ANESTHESIOLOGY

## 2025-06-26 PROCEDURE — 6370000000 HC RX 637 (ALT 250 FOR IP): Performed by: INTERNAL MEDICINE

## 2025-06-26 PROCEDURE — 2580000003 HC RX 258: Performed by: NURSE PRACTITIONER

## 2025-06-26 PROCEDURE — 85025 COMPLETE CBC W/AUTO DIFF WBC: CPT

## 2025-06-26 PROCEDURE — 6370000000 HC RX 637 (ALT 250 FOR IP): Performed by: NURSE PRACTITIONER

## 2025-06-26 PROCEDURE — 80053 COMPREHEN METABOLIC PANEL: CPT

## 2025-06-26 PROCEDURE — 94660 CPAP INITIATION&MGMT: CPT

## 2025-06-26 PROCEDURE — 36600 WITHDRAWAL OF ARTERIAL BLOOD: CPT

## 2025-06-26 PROCEDURE — 83735 ASSAY OF MAGNESIUM: CPT

## 2025-06-26 PROCEDURE — 94640 AIRWAY INHALATION TREATMENT: CPT

## 2025-06-26 PROCEDURE — 2000000000 HC ICU R&B

## 2025-06-26 RX ORDER — ESCITALOPRAM OXALATE 10 MG/1
10 TABLET ORAL DAILY
Status: DISCONTINUED | OUTPATIENT
Start: 2025-06-27 | End: 2025-07-02

## 2025-06-26 RX ORDER — PROPOFOL 10 MG/ML
5-50 INJECTION, EMULSION INTRAVENOUS CONTINUOUS
Status: DISCONTINUED | OUTPATIENT
Start: 2025-06-26 | End: 2025-06-30

## 2025-06-26 RX ORDER — BUSPIRONE HYDROCHLORIDE 10 MG/1
10 TABLET ORAL 3 TIMES DAILY
Status: DISCONTINUED | OUTPATIENT
Start: 2025-06-26 | End: 2025-07-02

## 2025-06-26 RX ORDER — ROCURONIUM BROMIDE 10 MG/ML
100 INJECTION, SOLUTION INTRAVENOUS ONCE
Status: COMPLETED | OUTPATIENT
Start: 2025-06-26 | End: 2025-06-26

## 2025-06-26 RX ORDER — BUSPIRONE HYDROCHLORIDE 10 MG/1
10 TABLET ORAL 3 TIMES DAILY
Status: DISCONTINUED | OUTPATIENT
Start: 2025-06-26 | End: 2025-06-26

## 2025-06-26 RX ORDER — ESCITALOPRAM OXALATE 10 MG/1
10 TABLET ORAL DAILY
Status: DISCONTINUED | OUTPATIENT
Start: 2025-06-26 | End: 2025-06-26

## 2025-06-26 RX ORDER — GUAIFENESIN 200 MG/10ML
400 LIQUID ORAL EVERY 12 HOURS
Status: DISCONTINUED | OUTPATIENT
Start: 2025-06-26 | End: 2025-07-02

## 2025-06-26 RX ORDER — SODIUM CHLORIDE, SODIUM LACTATE, POTASSIUM CHLORIDE, AND CALCIUM CHLORIDE .6; .31; .03; .02 G/100ML; G/100ML; G/100ML; G/100ML
500 INJECTION, SOLUTION INTRAVENOUS ONCE
Status: COMPLETED | OUTPATIENT
Start: 2025-06-26 | End: 2025-06-26

## 2025-06-26 RX ORDER — HYDROXYZINE HYDROCHLORIDE 25 MG/1
25 TABLET, FILM COATED ORAL EVERY 4 HOURS PRN
Status: DISCONTINUED | OUTPATIENT
Start: 2025-06-26 | End: 2025-07-02

## 2025-06-26 RX ORDER — ETOMIDATE 2 MG/ML
20 INJECTION INTRAVENOUS ONCE
Status: COMPLETED | OUTPATIENT
Start: 2025-06-26 | End: 2025-06-26

## 2025-06-26 RX ADMIN — BUDESONIDE 500 MCG: 0.5 INHALANT RESPIRATORY (INHALATION) at 19:56

## 2025-06-26 RX ADMIN — INSULIN LISPRO 1 UNITS: 100 INJECTION, SOLUTION INTRAVENOUS; SUBCUTANEOUS at 12:59

## 2025-06-26 RX ADMIN — GUAIFENESIN 400 MG: 200 SOLUTION ORAL at 22:19

## 2025-06-26 RX ADMIN — ESCITALOPRAM OXALATE 10 MG: 10 TABLET ORAL at 07:56

## 2025-06-26 RX ADMIN — WATER 60 MG: 1 INJECTION INTRAMUSCULAR; INTRAVENOUS; SUBCUTANEOUS at 21:25

## 2025-06-26 RX ADMIN — PROPOFOL 20 MCG/KG/MIN: 10 INJECTION, EMULSION INTRAVENOUS at 09:12

## 2025-06-26 RX ADMIN — Medication 50 MCG/HR: at 10:24

## 2025-06-26 RX ADMIN — WATER 60 MG: 1 INJECTION INTRAMUSCULAR; INTRAVENOUS; SUBCUTANEOUS at 07:57

## 2025-06-26 RX ADMIN — ENOXAPARIN SODIUM 40 MG: 100 INJECTION SUBCUTANEOUS at 07:56

## 2025-06-26 RX ADMIN — IPRATROPIUM BROMIDE AND ALBUTEROL SULFATE 1 DOSE: .5; 3 SOLUTION RESPIRATORY (INHALATION) at 08:15

## 2025-06-26 RX ADMIN — ROCURONIUM BROMIDE 100 MG: 10 INJECTION, SOLUTION INTRAVENOUS at 08:53

## 2025-06-26 RX ADMIN — ARFORMOTEROL TARTRATE 15 MCG: 15 SOLUTION RESPIRATORY (INHALATION) at 08:10

## 2025-06-26 RX ADMIN — BUSPIRONE HYDROCHLORIDE 10 MG: 10 TABLET ORAL at 21:28

## 2025-06-26 RX ADMIN — SODIUM CHLORIDE, PRESERVATIVE FREE 10 ML: 5 INJECTION INTRAVENOUS at 21:28

## 2025-06-26 RX ADMIN — WATER 60 MG: 1 INJECTION INTRAMUSCULAR; INTRAVENOUS; SUBCUTANEOUS at 15:52

## 2025-06-26 RX ADMIN — AZITHROMYCIN MONOHYDRATE 500 MG: 500 INJECTION, POWDER, LYOPHILIZED, FOR SOLUTION INTRAVENOUS at 01:59

## 2025-06-26 RX ADMIN — WATER 60 MG: 1 INJECTION INTRAMUSCULAR; INTRAVENOUS; SUBCUTANEOUS at 02:00

## 2025-06-26 RX ADMIN — IPRATROPIUM BROMIDE AND ALBUTEROL SULFATE 1 DOSE: .5; 3 SOLUTION RESPIRATORY (INHALATION) at 02:17

## 2025-06-26 RX ADMIN — BUDESONIDE 500 MCG: 0.5 INHALANT RESPIRATORY (INHALATION) at 08:10

## 2025-06-26 RX ADMIN — ARFORMOTEROL TARTRATE 15 MCG: 15 SOLUTION RESPIRATORY (INHALATION) at 19:56

## 2025-06-26 RX ADMIN — SODIUM CHLORIDE, PRESERVATIVE FREE 10 ML: 5 INJECTION INTRAVENOUS at 08:00

## 2025-06-26 RX ADMIN — DEXMEDETOMIDINE HYDROCHLORIDE 0.8 MCG/KG/HR: 400 INJECTION INTRAVENOUS at 11:02

## 2025-06-26 RX ADMIN — PROPOFOL 35 MCG/KG/MIN: 10 INJECTION, EMULSION INTRAVENOUS at 20:11

## 2025-06-26 RX ADMIN — BUSPIRONE HYDROCHLORIDE 10 MG: 10 TABLET ORAL at 07:56

## 2025-06-26 RX ADMIN — SODIUM CHLORIDE, SODIUM LACTATE, POTASSIUM CHLORIDE, AND CALCIUM CHLORIDE 500 ML: .6; .31; .03; .02 INJECTION, SOLUTION INTRAVENOUS at 11:10

## 2025-06-26 RX ADMIN — SODIUM CHLORIDE, SODIUM LACTATE, POTASSIUM CHLORIDE, AND CALCIUM CHLORIDE 500 ML: .6; .31; .03; .02 INJECTION, SOLUTION INTRAVENOUS at 15:12

## 2025-06-26 RX ADMIN — IPRATROPIUM BROMIDE AND ALBUTEROL SULFATE 1 DOSE: .5; 3 SOLUTION RESPIRATORY (INHALATION) at 19:51

## 2025-06-26 RX ADMIN — IPRATROPIUM BROMIDE AND ALBUTEROL SULFATE 1 DOSE: .5; 3 SOLUTION RESPIRATORY (INHALATION) at 14:12

## 2025-06-26 RX ADMIN — SODIUM CHLORIDE, PRESERVATIVE FREE 40 MG: 5 INJECTION INTRAVENOUS at 07:58

## 2025-06-26 RX ADMIN — PROPOFOL 40 MCG/KG/MIN: 10 INJECTION, EMULSION INTRAVENOUS at 13:55

## 2025-06-26 RX ADMIN — HYDROXYZINE HYDROCHLORIDE 25 MG: 25 TABLET ORAL at 01:57

## 2025-06-26 RX ADMIN — ETOMIDATE 20 MG: 40 INJECTION, SOLUTION INTRAVENOUS at 08:53

## 2025-06-26 ASSESSMENT — PULMONARY FUNCTION TESTS
PIF_VALUE: 32
PIF_VALUE: 32
PIF_VALUE: 34
PIF_VALUE: 44
PIF_VALUE: 32

## 2025-06-26 NOTE — PROGRESS NOTES
Physical/Occupational therapy:    Chart reviewed. Noted events this morning. Pt unresponsive, tachy, and hypoxic. Pt re-intubated. Pt not appropriate for skilled therapy intervention. Will hold.    Sheila Mcintosh, PT, DPT

## 2025-06-26 NOTE — PROGRESS NOTES
Called to room by wife requesting anxiety medications. Pt unresponsive, tachycardic, hypoxic. Pt placed on Bipap, MD at bedside.   0853 - RSI meds given per MAR  0855 - Pt intubated, 7.5 24@ teeth.

## 2025-06-26 NOTE — PROCEDURES
[]Hide copied text    Reason for intubation:  Hypoxia     Patient preoxygenated first with BVM     Suction confirmed.  IVF fluid in line.   IV access working.  VS being monitored.     Sedation given: etomidate and rocoronium     Video laryngoscopy used: 4 Mac glidescope     EtCo2 calorimeter changed color.   Pt had good bilateral BS and   Saturation post intubation 100     CXR reviewed.     Pt tolerated procedure well.      PROCEDURE NOTE  Date: 6/26/2025   Name: Adrian Hurley  YOB: 1955    Procedures

## 2025-06-26 NOTE — PROGRESS NOTES
Name: Adrian Hurley   : 1955   MRN: [id number]   Date: 2025        Chief Complaint   Patient presents with    Shortness of Breath         HPI:     70 yo M presents with acute c/o SOB at home. Called EMS. SpO2 0.7 on RA. EMS palced on BiPAP. No improvement on BiPAP. Intubated in ED by ED staff. ICU c/s for admit.     Active Problems Being Managed:   Acute on chronic respiratory failure  Hypoxia  Hypercapnea  COPD exacerbation secondary to URI  Lactic acidosis  Tobacco abuse      Assessment/Plan:     PT with severe COPD who was extubated on  and placed on bipap was clinically doing well until he actuely decompensated this morning. He became unresponsive, tachy with afib in 130-140's and hypoxic to the 70's. He was oxygenated with BVM and then emergently intubated.    - continue sedation until the morning for SAT/SBT  - MAP goal > 65  - had to give pt 2 fluid boluses for soft BP; suspect this is secondary to sedation. Will contnue to use IVF and if needed will add norepi  - discussed vent and abg findings with RT.  - Plan is to rest overnight and start SBT in the morning  - continue nebulizers  - on steroid taper  - completed abx today  - H/H stable  - monitor platelets, currently 127      ICU DAILY CHECKLIST   Code Status:Full  DVT Prophylaxis:enoxaparin  T/L/D: Tubes: ETT  Lines: Peripheral IV  Drains: Mock Catheter  SUP: protonix  Diet: NPO  Activity Level:bed rest  ABCDEF Bundle/Checklist Completed:Yes  Disposition: Stay in ICU  Multidisciplinary Rounds Completed:  Yes  Patient/Family Updated: Yes  Goals of Care Discussion/Palliative: Yes    I personally spent 50 minutes of critical care time.  This is time spent at this critically ill patient's bedside actively involved in patient care as well as the coordination of care and discussions with the patient's family.  This does not include any procedural time which has been billed separately.        Review of systems:     Review of      Alcohol Abuse Father     Stroke Father     COPD Mother     Hypertension Mother     High Cholesterol Mother          LABS AND  DATA:   Reviewed      Peak airway pressure:      Minute ventilation:        CRITICAL CARE CONSULTANT NOTE  I had a face to face encounter with the patient, reviewed and interpreted patient data including clinical events, labs, images, vital signs, I/O's, and examined patient.  I have discussed the case and the plan and management of the patient's care with the consulting services, the bedside nurses and the respiratory therapist.      NOTE OF PERSONAL INVOLVEMENT IN CARE   This patient has a high probability of imminent, clinically significant deterioration, which requires the highest level of preparedness to intervene urgently. I participated in the decision-making and personally managed or directed the management of the following life and organ supporting interventions that required my frequent assessment to treat or prevent imminent deterioration.        Jose Alejandro Castro MD   Critical Care Medicine  [facility]  [phone]  6/26/2025

## 2025-06-26 NOTE — PROGRESS NOTES
Brief ICU Nutrition Assessment    Type and Reason for Visit: Reassess    Nutrition Recommendations/Plan:   Addendum 1105: Patient re-intubated, plan to resume feeds. Currently on precedex 0.8 mcg/kg/hr, propofol @ 20 mcg/kg/min and fent @ 50 mcg/hr. Propofol providing 248 kcal/day.    Vital AF @ end goal 65 mL/hr  Begin @ 35 mL/hr  Advance by 10 mL q 4 hours until goal reached  FWF 70 mL q 3 hours     Vital AF @ end goal 65 mL/hr provides 1430 mL/day; 1716 kcal (+ Propofol, 1964 kcal, 100% needs), 157 g carbs w/ 7.1 g fiber, 107 g protein (100% needs). TF + FWF provides 1720 mL H2O/day.     Last BM:    Edema: None      RUE Edema: Non-pitting  LUE Edema: Non-pitting          Nutr. Labs:    Lab Results   Component Value Date    CREATININE 0.41 (L) 06/26/2025    BUN 24 (H) 06/26/2025     06/26/2025    K 4.9 06/26/2025    CL 99 06/26/2025    CO2 41 (HH) 06/26/2025       Lab Results   Component Value Date/Time    POCGLU 152 06/26/2025 12:25 AM    POCGLU 162 06/25/2025 06:03 PM    POCGLU 203 06/25/2025 11:20 AM    POCGLU 179 06/25/2025 06:07 AM    POCGLU 184 06/24/2025 11:53 PM    POCGLU 156 02/22/2025 02:36 PM        Hemoglobin A1C   Date Value Ref Range Status   11/04/2021 5.8 (H) 4.0 - 5.6 % Final     Comment:     NEW METHOD  PLEASE NOTE NEW REFERENCE RANGE  (NOTE)  HbA1C Interpretive Ranges  <5.7              Normal  5.7 - 6.4         Consider Prediabetes  >6.5              Consider Diabetes         Lab Results   Component Value Date/Time    MG 2.4 06/26/2025 05:07 AM       Lab Results   Component Value Date    CALCIUM 9.4 06/26/2025    PHOS 3.5 06/26/2025       No results found for: \"TRIG\"    Nutr. Meds:  Scheduled Meds:   busPIRone  10 mg Oral TID    escitalopram  10 mg Oral Daily    lactated ringers  500 mL IntraVENous Once    insulin lispro  0-4 Units SubCUTAneous 4 times per day    ipratropium 0.5 mg-albuterol 2.5 mg  1 Dose Inhalation Q6H WA RT    guaiFENesin  400 mg Oral Q12H    methylPREDNISolone  60 mg  IntraVENous Q6H    sodium chloride  1,000 mL IntraVENous Once    methylPREDNISolone  125 mg IntraVENous Once    sodium chloride flush  5-40 mL IntraVENous 2 times per day    enoxaparin  40 mg SubCUTAneous Daily    pantoprazole (PROTONIX) 40 mg in sodium chloride (PF) 0.9 % 10 mL injection  40 mg IntraVENous Daily    budesonide  0.5 mg Nebulization BID RT    And    arformoterol tartrate  15 mcg Nebulization BID RT     Continuous Infusions:   propofol      dexmedeTOMIDine 0.8 mcg/kg/hr (06/26/25 1102)    fentaNYL Citrate-NaCl 50 mcg/hr (06/26/25 1024)    sodium chloride 5 mL/hr at 06/25/25 0202    propofol 20 mcg/kg/min (06/26/25 0912)     PRN Meds: hydrOXYzine HCl, midazolam, lidocaine, sodium chloride flush, sodium chloride, potassium chloride **OR** potassium chloride, magnesium sulfate, ondansetron **OR** ondansetron, polyethylene glycol, acetaminophen **OR** acetaminophen, ipratropium 0.5 mg-albuterol 2.5 mg      Estimated Nutrition Needs:   Energy Requirements Based On: Kcal/kg  Weight Used for Energy Requirements: Current  Energy (kcal/day): 7788-4477 (20-25 kcal/kg)  Weight Used for Protein Requirements: Current  Protein (g/day):  (1.2-1.5 g/kg)  Method Used for Fluid Requirements: 1 ml/kcal  Fluid (ml/day): 9593-8684    Marlen Burt MS, RD, [facility]  Ext: [phone], or via [employer]

## 2025-06-27 LAB
ANION GAP SERPL CALC-SCNC: 2 MMOL/L (ref 2–12)
ARTERIAL PATENCY WRIST A: POSITIVE
BASE EXCESS BLD CALC-SCNC: 17.9 MMOL/L
BASOPHILS # BLD: 0 K/UL (ref 0–0.1)
BASOPHILS NFR BLD: 0 % (ref 0–1)
BDY SITE: ABNORMAL
BUN SERPL-MCNC: 34 MG/DL (ref 6–20)
BUN/CREAT SERPL: 63 (ref 12–20)
CALCIUM SERPL-MCNC: 9.1 MG/DL (ref 8.5–10.1)
CHLORIDE SERPL-SCNC: 99 MMOL/L (ref 97–108)
CO2 SERPL-SCNC: 40 MMOL/L (ref 21–32)
CREAT SERPL-MCNC: 0.54 MG/DL (ref 0.7–1.3)
DIFFERENTIAL METHOD BLD: ABNORMAL
EOSINOPHIL # BLD: 0 K/UL (ref 0–0.4)
EOSINOPHIL NFR BLD: 0 % (ref 0–7)
ERYTHROCYTE [DISTWIDTH] IN BLOOD BY AUTOMATED COUNT: 16 % (ref 11.5–14.5)
GAS FLOW.O2 O2 DELIVERY SYS: ABNORMAL
GAS FLOW.O2 SETTING OXYMISER: 12 BPM
GLUCOSE BLD STRIP.AUTO-MCNC: 169 MG/DL (ref 65–117)
GLUCOSE BLD STRIP.AUTO-MCNC: 192 MG/DL (ref 65–117)
GLUCOSE BLD STRIP.AUTO-MCNC: 193 MG/DL (ref 65–117)
GLUCOSE SERPL-MCNC: 173 MG/DL (ref 65–100)
HCO3 BLD-SCNC: 45.2 MMOL/L (ref 21–28)
HCT VFR BLD AUTO: 32.9 % (ref 36.6–50.3)
HGB BLD-MCNC: 10.2 G/DL (ref 12.1–17)
IMM GRANULOCYTES # BLD AUTO: 0.05 K/UL (ref 0–0.04)
IMM GRANULOCYTES NFR BLD AUTO: 0.5 % (ref 0–0.5)
LYMPHOCYTES # BLD: 0.18 K/UL (ref 0.8–3.5)
LYMPHOCYTES NFR BLD: 1.7 % (ref 12–49)
MAGNESIUM SERPL-MCNC: 2.3 MG/DL (ref 1.6–2.4)
MCH RBC QN AUTO: 30.5 PG (ref 26–34)
MCHC RBC AUTO-ENTMCNC: 31 G/DL (ref 30–36.5)
MCV RBC AUTO: 98.5 FL (ref 80–99)
MONOCYTES # BLD: 0.53 K/UL (ref 0–1)
MONOCYTES NFR BLD: 5.1 % (ref 5–13)
NEUTS SEG # BLD: 9.55 K/UL (ref 1.8–8)
NEUTS SEG NFR BLD: 92.7 % (ref 32–75)
NRBC # BLD: 0 K/UL (ref 0–0.01)
NRBC BLD-RTO: 0 PER 100 WBC
O2/TOTAL GAS SETTING VFR VENT: 50 %
PCO2 BLD: 67.1 MMHG (ref 35–48)
PH BLD: 7.44 (ref 7.35–7.45)
PHOSPHATE SERPL-MCNC: 3.1 MG/DL (ref 2.6–4.7)
PLATELET # BLD AUTO: 124 K/UL (ref 150–400)
PMV BLD AUTO: 13 FL (ref 8.9–12.9)
PO2 BLD: 83 MMHG (ref 83–108)
POTASSIUM SERPL-SCNC: 4.6 MMOL/L (ref 3.5–5.1)
RBC # BLD AUTO: 3.34 M/UL (ref 4.1–5.7)
RBC MORPH BLD: ABNORMAL
RBC MORPH BLD: ABNORMAL
SAO2 % BLD: 95.9 % (ref 92–97)
SERVICE CMNT-IMP: ABNORMAL
SODIUM SERPL-SCNC: 141 MMOL/L (ref 136–145)
SPECIMEN TYPE: ABNORMAL
VENTILATION MODE VENT: ABNORMAL
WBC # BLD AUTO: 10.3 K/UL (ref 4.1–11.1)

## 2025-06-27 PROCEDURE — 97161 PT EVAL LOW COMPLEX 20 MIN: CPT

## 2025-06-27 PROCEDURE — 83735 ASSAY OF MAGNESIUM: CPT

## 2025-06-27 PROCEDURE — 94003 VENT MGMT INPAT SUBQ DAY: CPT

## 2025-06-27 PROCEDURE — 82803 BLOOD GASES ANY COMBINATION: CPT

## 2025-06-27 PROCEDURE — 82962 GLUCOSE BLOOD TEST: CPT

## 2025-06-27 PROCEDURE — 36415 COLL VENOUS BLD VENIPUNCTURE: CPT

## 2025-06-27 PROCEDURE — 6370000000 HC RX 637 (ALT 250 FOR IP): Performed by: INTERNAL MEDICINE

## 2025-06-27 PROCEDURE — 97110 THERAPEUTIC EXERCISES: CPT

## 2025-06-27 PROCEDURE — 2000000000 HC ICU R&B

## 2025-06-27 PROCEDURE — 6360000002 HC RX W HCPCS: Performed by: NURSE PRACTITIONER

## 2025-06-27 PROCEDURE — 2500000003 HC RX 250 WO HCPCS: Performed by: INTERNAL MEDICINE

## 2025-06-27 PROCEDURE — 6370000000 HC RX 637 (ALT 250 FOR IP): Performed by: ANESTHESIOLOGY

## 2025-06-27 PROCEDURE — 6360000002 HC RX W HCPCS: Performed by: INTERNAL MEDICINE

## 2025-06-27 PROCEDURE — 2500000003 HC RX 250 WO HCPCS: Performed by: ANESTHESIOLOGY

## 2025-06-27 PROCEDURE — 85025 COMPLETE CBC W/AUTO DIFF WBC: CPT

## 2025-06-27 PROCEDURE — 36600 WITHDRAWAL OF ARTERIAL BLOOD: CPT

## 2025-06-27 PROCEDURE — 94640 AIRWAY INHALATION TREATMENT: CPT

## 2025-06-27 PROCEDURE — 6360000002 HC RX W HCPCS: Performed by: ANESTHESIOLOGY

## 2025-06-27 PROCEDURE — 2500000003 HC RX 250 WO HCPCS: Performed by: NURSE PRACTITIONER

## 2025-06-27 PROCEDURE — 80048 BASIC METABOLIC PNL TOTAL CA: CPT

## 2025-06-27 PROCEDURE — 84100 ASSAY OF PHOSPHORUS: CPT

## 2025-06-27 PROCEDURE — 94761 N-INVAS EAR/PLS OXIMETRY MLT: CPT

## 2025-06-27 PROCEDURE — 2700000000 HC OXYGEN THERAPY PER DAY

## 2025-06-27 RX ADMIN — SODIUM CHLORIDE, PRESERVATIVE FREE 10 ML: 5 INJECTION INTRAVENOUS at 22:34

## 2025-06-27 RX ADMIN — ARFORMOTEROL TARTRATE 15 MCG: 15 SOLUTION RESPIRATORY (INHALATION) at 08:28

## 2025-06-27 RX ADMIN — ESCITALOPRAM OXALATE 10 MG: 10 TABLET ORAL at 09:09

## 2025-06-27 RX ADMIN — BUDESONIDE 500 MCG: 0.5 INHALANT RESPIRATORY (INHALATION) at 08:28

## 2025-06-27 RX ADMIN — INSULIN LISPRO 1 UNITS: 100 INJECTION, SOLUTION INTRAVENOUS; SUBCUTANEOUS at 00:00

## 2025-06-27 RX ADMIN — ENOXAPARIN SODIUM 40 MG: 100 INJECTION SUBCUTANEOUS at 09:18

## 2025-06-27 RX ADMIN — INSULIN LISPRO 1 UNITS: 100 INJECTION, SOLUTION INTRAVENOUS; SUBCUTANEOUS at 05:44

## 2025-06-27 RX ADMIN — BUDESONIDE 500 MCG: 0.5 INHALANT RESPIRATORY (INHALATION) at 22:57

## 2025-06-27 RX ADMIN — BUSPIRONE HYDROCHLORIDE 10 MG: 10 TABLET ORAL at 14:13

## 2025-06-27 RX ADMIN — GUAIFENESIN 400 MG: 200 SOLUTION ORAL at 09:13

## 2025-06-27 RX ADMIN — IPRATROPIUM BROMIDE AND ALBUTEROL SULFATE 1 DOSE: .5; 3 SOLUTION RESPIRATORY (INHALATION) at 22:50

## 2025-06-27 RX ADMIN — BUSPIRONE HYDROCHLORIDE 10 MG: 10 TABLET ORAL at 09:09

## 2025-06-27 RX ADMIN — GUAIFENESIN 400 MG: 200 SOLUTION ORAL at 22:31

## 2025-06-27 RX ADMIN — PROPOFOL 30 MCG/KG/MIN: 10 INJECTION, EMULSION INTRAVENOUS at 23:00

## 2025-06-27 RX ADMIN — IPRATROPIUM BROMIDE AND ALBUTEROL SULFATE 1 DOSE: .5; 3 SOLUTION RESPIRATORY (INHALATION) at 15:00

## 2025-06-27 RX ADMIN — WATER 60 MG: 1 INJECTION INTRAMUSCULAR; INTRAVENOUS; SUBCUTANEOUS at 09:09

## 2025-06-27 RX ADMIN — PROPOFOL 35 MCG/KG/MIN: 10 INJECTION, EMULSION INTRAVENOUS at 00:42

## 2025-06-27 RX ADMIN — PROPOFOL 20 MCG/KG/MIN: 10 INJECTION, EMULSION INTRAVENOUS at 17:49

## 2025-06-27 RX ADMIN — Medication 100 MCG/HR: at 22:58

## 2025-06-27 RX ADMIN — INSULIN LISPRO 1 UNITS: 100 INJECTION, SOLUTION INTRAVENOUS; SUBCUTANEOUS at 17:54

## 2025-06-27 RX ADMIN — WATER 60 MG: 1 INJECTION INTRAMUSCULAR; INTRAVENOUS; SUBCUTANEOUS at 14:13

## 2025-06-27 RX ADMIN — WATER 60 MG: 1 INJECTION INTRAMUSCULAR; INTRAVENOUS; SUBCUTANEOUS at 02:15

## 2025-06-27 RX ADMIN — ARFORMOTEROL TARTRATE 15 MCG: 15 SOLUTION RESPIRATORY (INHALATION) at 22:57

## 2025-06-27 RX ADMIN — Medication 100 MCG/HR: at 12:04

## 2025-06-27 RX ADMIN — SODIUM CHLORIDE, PRESERVATIVE FREE 10 ML: 5 INJECTION INTRAVENOUS at 07:47

## 2025-06-27 RX ADMIN — SODIUM CHLORIDE, PRESERVATIVE FREE 40 MG: 5 INJECTION INTRAVENOUS at 09:11

## 2025-06-27 RX ADMIN — PROPOFOL 35 MCG/KG/MIN: 10 INJECTION, EMULSION INTRAVENOUS at 06:30

## 2025-06-27 RX ADMIN — BUSPIRONE HYDROCHLORIDE 10 MG: 10 TABLET ORAL at 22:31

## 2025-06-27 RX ADMIN — Medication 75 MCG/HR: at 00:47

## 2025-06-27 RX ADMIN — IPRATROPIUM BROMIDE AND ALBUTEROL SULFATE 1 DOSE: .5; 3 SOLUTION RESPIRATORY (INHALATION) at 08:17

## 2025-06-27 RX ADMIN — WATER 60 MG: 1 INJECTION INTRAMUSCULAR; INTRAVENOUS; SUBCUTANEOUS at 22:30

## 2025-06-27 ASSESSMENT — PULMONARY FUNCTION TESTS
PIF_VALUE: 11
PIF_VALUE: 11
PIF_VALUE: 28
PIF_VALUE: 28
PIF_VALUE: 24

## 2025-06-27 NOTE — PROGRESS NOTES
Occupational Therapy: defer    Chart reviewed in preparation for OT evaluation.  Consulted with PT, who evaluated patient on ventilator/ partial sedation and performed bed-in-chair exercises.  Per PT, patient's alertness was intermittent.  Further activity is not indicated at this time.  Will follow-up as appropriate.    Eduin Marshall, OTR/L

## 2025-06-27 NOTE — PROGRESS NOTES
0745- This RN spoke with wife, notified about trial pt without restraints. Assessment completed, see flowsheets. Restraints removed, pt following commands and education given to both pt and wife.

## 2025-06-27 NOTE — PLAN OF CARE
Problem: Physical Therapy - Adult  Goal: By Discharge: Performs mobility at highest level of function for planned discharge setting.  See evaluation for individualized goals.  Description: FUNCTIONAL STATUS PRIOR TO ADMISSION: Patient was independent and active without use of DME. and At baseline the patient was on 3 liters/min of supplemental O2 continuously.    HOME SUPPORT PRIOR TO ADMISSION: The patient lived with spouse.    Physical Therapy Goals  Initiated 6/27/2025  1.  Patient will move from supine to sit and sit to supine and roll side to side in bed with maximal assistance within 7 day(s).    2.  Patient will perform sit to stand with maximal assistance within 7 day(s).  3.  Patient will transfer from bed to chair and chair to bed with maximal assistance using the least restrictive device within 7 day(s).  4.  Patient will ambulate with moderate assistance for 5 feet with the least restrictive device within 7 day(s).   5. Patient will tolerate bed in chair position x 1 hour with VSS within 7 days  6. Patient will tolerate seated EOB with mod A x 10 minutes within 7 days    Outcome: Progressing   PHYSICAL THERAPY EVALUATION    Patient: Adrian Hurley (69 y.o. male)  Date: 6/27/2025  Primary Diagnosis: COPD exacerbation (HCC) [J44.1]  COPD with acute exacerbation (HCC) [J44.1]  Acute respiratory failure with hypoxia and hypercarbia (HCC) [J96.01, J96.02]       Precautions: Restrictions/Precautions  Restrictions/Precautions: Fall Risk            ASSESSMENT :   DEFICITS/IMPAIRMENTS:   The patient is limited by decreased functional mobility, ROM, strength, activity tolerance, attention/concentration, s/p admission on 6/22 with SOB. Pt with COPD exacerbation and acute respiratory failure. Pt required intubation on 6/22, extubated on 6/25, and then re-intubated on 6/26. Pt remains on vent support (A/C) and parameters within range to work with therapy. Pt is following commands, but delayed due to being on

## 2025-06-27 NOTE — PLAN OF CARE
Problem: Safety - Medical Restraint  Goal: Remains free of injury from restraints (Restraint for Interference with Medical Device)  Description: INTERVENTIONS:  1. Determine that other, less restrictive measures have been tried or would not be effective before applying the restraint  2. Evaluate the patient's condition at the time of restraint application  3. Inform patient/family regarding the reason for restraint  4. Q2H: Monitor safety, psychosocial status, comfort, nutrition and hydration  6/27/2025 0833 by Michel Guevara RN  Outcome: Completed  Flowsheets (Taken 6/27/2025 0800)  Remains free of injury from restraints (restraint for interference with medical device):   Determine that other, less restrictive measures have been tried or would not be effective before applying the restraint   Evaluate the patient's condition at the time of restraint application   Inform patient/family regarding the reason for restraint   Every 2 hours: Monitor safety, psychosocial status, comfort, nutrition and hydration  6/26/2025 2031 by Avtar Brown RN  Outcome: Progressing  Flowsheets  Taken 6/26/2025 2000 by Avtar Brown RN  Remains free of injury from restraints (restraint for interference with medical device):   Determine that other, less restrictive measures have been tried or would not be effective before applying the restraint   Evaluate the patient's condition at the time of restraint application   Inform patient/family regarding the reason for restraint   Every 2 hours: Monitor safety, psychosocial status, comfort, nutrition and hydration  Taken 6/26/2025 1011 by Renetta Helm RN  Remains free of injury from restraints (restraint for interference with medical device):   Determine that other, less restrictive measures have been tried or would not be effective before applying the restraint   Evaluate the patient's condition at the time of restraint application   Inform patient/family regarding the reason for

## 2025-06-27 NOTE — PLAN OF CARE
Problem: Safety - Medical Restraint  Goal: Remains free of injury from restraints (Restraint for Interference with Medical Device)  Description: INTERVENTIONS:  1. Determine that other, less restrictive measures have been tried or would not be effective before applying the restraint  2. Evaluate the patient's condition at the time of restraint application  3. Inform patient/family regarding the reason for restraint  4. Q2H: Monitor safety, psychosocial status, comfort, nutrition and hydration  Outcome: Progressing  Flowsheets  Taken 6/26/2025 2000 by Avtar Brown RN  Remains free of injury from restraints (restraint for interference with medical device):   Determine that other, less restrictive measures have been tried or would not be effective before applying the restraint   Evaluate the patient's condition at the time of restraint application   Inform patient/family regarding the reason for restraint   Every 2 hours: Monitor safety, psychosocial status, comfort, nutrition and hydration  Taken 6/26/2025 1011 by Renetta Helm RN  Remains free of injury from restraints (restraint for interference with medical device):   Determine that other, less restrictive measures have been tried or would not be effective before applying the restraint   Evaluate the patient's condition at the time of restraint application   Inform patient/family regarding the reason for restraint   Every 2 hours: Monitor safety, psychosocial status, comfort, nutrition and hydration     Problem: Discharge Planning  Goal: Discharge to home or other facility with appropriate resources  Outcome: Progressing  Flowsheets  Taken 6/26/2025 2000 by Avtar Brown RN  Discharge to home or other facility with appropriate resources:   Identify barriers to discharge with patient and caregiver   Arrange for needed discharge resources and transportation as appropriate   Identify discharge learning needs (meds, wound care, etc)   Refer to discharge planning  Progressing  Flowsheets  Taken 6/26/2025 2000 by Avtra Brown RN  Skin Integrity Remains Intact:   Monitor for areas of redness and/or skin breakdown   Assess vascular access sites hourly  Taken 6/26/2025 0745 by Renetta Helm RN  Skin Integrity Remains Intact:   Monitor for areas of redness and/or skin breakdown   Assess vascular access sites hourly   Every 4-6 hours minimum:  Change oxygen saturation probe site   Every 4-6 hours:  If on nasal continuous positive airway pressure, assess nares and determine need for appliance change or resting period   Turn and reposition as indicated   Assess need for specialty bed   Positioning devices   Pressure redistribution bed/mattress (bed type)   Monitor skin under medical devices   Check visual cues for pain     Problem: Nutrition Deficit:  Goal: Optimize nutritional status  Outcome: Progressing     Problem: Neurosensory - Adult  Goal: Achieves stable or improved neurological status  Outcome: Progressing  Flowsheets  Taken 6/26/2025 2000 by Avtar Brown RN  Achieves stable or improved neurological status:   Assess for and report changes in neurological status   Maintain blood pressure and fluid volume within ordered parameters to optimize cerebral perfusion and minimize risk of hemorrhage   Monitor temperature, glucose, and sodium. Initiate appropriate interventions as ordered  Taken 6/26/2025 0745 by Renetta Helm RN  Achieves stable or improved neurological status:   Assess for and report changes in neurological status   Initiate measures to prevent increased intracranial pressure   Maintain blood pressure and fluid volume within ordered parameters to optimize cerebral perfusion and minimize risk of hemorrhage   Monitor temperature, glucose, and sodium. Initiate appropriate interventions as ordered     Problem: Skin/Tissue Integrity - Adult  Goal: Skin integrity remains intact  Description: 1.  Monitor for areas of redness and/or skin breakdown  2.  Assess

## 2025-06-27 NOTE — PROGRESS NOTES
Spiritual Health History and Assessment/Progress Note  [facility]    Follow-up, Code Blue, Adjustment to illness,      Name: Adrian Hurley MRN: [id number]    Age: 69 y.o.     Sex: male   Language: English   Synagogue: Other   COPD exacerbation (HCC)     Date: 6/27/2025            Total Time Calculated: 30 min              Spiritual Assessment began in [facility] INTENSIVE CARE UNIT        Referral/Consult From: Family   Encounter Overview/Reason: Follow-up  Service Provided For: Family    Autumn, Belief, Meaning:   Patient has beliefs or practices that help with coping during difficult times  Family/Friends have beliefs or practices that help with coping during difficult times      Importance and Influence:  Patient has spiritual/personal beliefs that influence decisions regarding their health  Family/Friends have spiritual/personal beliefs that influence decisions regarding the patient's health    Community:  Patient feels well-supported. Support system includes: Spouse/Partner  Family/Friends feel well-supported. Support system includes: Spouse/Partner, Parent/s, Children, Friends, and Extended family    Assessment and Plan of Care:     Patient Interventions include: Other: see note  Family/Friends Interventions include: Facilitated expression of thoughts and feelings, Explored spiritual coping/struggle/distress, and Affirmed coping skills/support systems    Patient Plan of Care: Spiritual Care available upon further referral  Family/Friends Plan of Care: Spiritual Care available upon further referral     follow up visit for pt's family. Wife shared recent medical events. Wife kirti through family support, her daughter, mother, sister and brother. Listened attentively as she processed. Encouraged self care. Wife hopeful for improvement. Shared  availability.     Electronically signed by ALEXIS Sidhu on 6/27/2025 at 10:16 AM

## 2025-06-27 NOTE — PROGRESS NOTES
Name: Adrian Hurley   : 1955   MRN: [id number]   Date: 2025        Chief Complaint   Patient presents with    Shortness of Breath         HPI:     68 yo M presents with acute c/o SOB at home. Called EMS. SpO2 0.7 on RA. EMS palced on BiPAP. No improvement on BiPAP. Intubated in ED by ED staff. ICU c/s for admit.     Active Problems Being Managed:   Acute on chronic respiratory failure  Hypoxia  Hypercapnea  COPD exacerbation secondary to URI  Lactic acidosis  Tobacco abuse      Assessment/Plan:     Pt with severe COPD who was extubated on  and placed on bipap was clinically doing well until he actuely decompensated this morning. He became unresponsive, tachy with afib in 130-140's and hypoxic to the 70's. He was oxygenated with BVM and then emergently intubated.    - continue sedation until the morning for SAT/SBT  - contineu Buspar and Lexapro  - MAP goal > 65  - discussed vent and abg findings with RT.  - Plan is for 2h SBT in the morning and 2h in the afternoon  - continue nebulizers  - on steroid taper  - completed abx ; wbc wnl  -  blood cx: NGTD  - H/H stable  - monitor platelets, currently 124 from 127      ICU DAILY CHECKLIST   Code Status:Full  DVT Prophylaxis:enoxaparin  T/L/D: Tubes: ETT  Lines: Peripheral IV  Drains: Mock Catheter  SUP: protonix  Diet: NPO  Activity Level:bed rest  ABCDEF Bundle/Checklist Completed:Yes  Disposition: Stay in ICU  Multidisciplinary Rounds Completed:  Yes  Patient/Family Updated: Yes  Goals of Care Discussion/Palliative: Yes    I personally spent 45 minutes of critical care time.  This is time spent at this critically ill patient's bedside actively involved in patient care as well as the coordination of care and discussions with the patient's family.  This does not include any procedural time which has been billed separately.        Review of systems:     Review of Systems         Objective:     Vital Signs:  BP (!) 136/59   Pulse 74   Hypertension Mother     High Cholesterol Mother          LABS AND  DATA:   Reviewed      Peak airway pressure:      Minute ventilation:        CRITICAL CARE CONSULTANT NOTE  I had a face to face encounter with the patient, reviewed and interpreted patient data including clinical events, labs, images, vital signs, I/O's, and examined patient.  I have discussed the case and the plan and management of the patient's care with the consulting services, the bedside nurses and the respiratory therapist.      NOTE OF PERSONAL INVOLVEMENT IN CARE   This patient has a high probability of imminent, clinically significant deterioration, which requires the highest level of preparedness to intervene urgently. I participated in the decision-making and personally managed or directed the management of the following life and organ supporting interventions that required my frequent assessment to treat or prevent imminent deterioration.        Jose Alejandro Castro MD   Critical Care Medicine  [facility]  [phone]  6/27/2025

## 2025-06-28 ENCOUNTER — APPOINTMENT (OUTPATIENT)
Facility: HOSPITAL | Age: 70
End: 2025-06-28
Payer: COMMERCIAL

## 2025-06-28 LAB
ANION GAP SERPL CALC-SCNC: 3 MMOL/L (ref 2–12)
BACTERIA SPEC CULT: NORMAL
BACTERIA SPEC CULT: NORMAL
BASOPHILS # BLD: 0.01 K/UL (ref 0–0.1)
BASOPHILS NFR BLD: 0.1 % (ref 0–1)
BUN SERPL-MCNC: 27 MG/DL (ref 6–20)
BUN/CREAT SERPL: 55 (ref 12–20)
CALCIUM SERPL-MCNC: 8.9 MG/DL (ref 8.5–10.1)
CHLORIDE SERPL-SCNC: 99 MMOL/L (ref 97–108)
CO2 SERPL-SCNC: 39 MMOL/L (ref 21–32)
CREAT SERPL-MCNC: 0.49 MG/DL (ref 0.7–1.3)
DIFFERENTIAL METHOD BLD: ABNORMAL
EOSINOPHIL # BLD: 0 K/UL (ref 0–0.4)
EOSINOPHIL NFR BLD: 0 % (ref 0–7)
ERYTHROCYTE [DISTWIDTH] IN BLOOD BY AUTOMATED COUNT: 15.8 % (ref 11.5–14.5)
GLUCOSE BLD STRIP.AUTO-MCNC: 153 MG/DL (ref 65–117)
GLUCOSE BLD STRIP.AUTO-MCNC: 153 MG/DL (ref 65–117)
GLUCOSE BLD STRIP.AUTO-MCNC: 169 MG/DL (ref 65–117)
GLUCOSE BLD STRIP.AUTO-MCNC: 199 MG/DL (ref 65–117)
GLUCOSE BLD STRIP.AUTO-MCNC: 212 MG/DL (ref 65–117)
GLUCOSE SERPL-MCNC: 185 MG/DL (ref 65–100)
HCT VFR BLD AUTO: 34 % (ref 36.6–50.3)
HGB BLD-MCNC: 10.6 G/DL (ref 12.1–17)
IMM GRANULOCYTES # BLD AUTO: 0.05 K/UL (ref 0–0.04)
IMM GRANULOCYTES NFR BLD AUTO: 0.5 % (ref 0–0.5)
LYMPHOCYTES # BLD: 0.18 K/UL (ref 0.8–3.5)
LYMPHOCYTES NFR BLD: 1.9 % (ref 12–49)
MAGNESIUM SERPL-MCNC: 2.2 MG/DL (ref 1.6–2.4)
MCH RBC QN AUTO: 30.6 PG (ref 26–34)
MCHC RBC AUTO-ENTMCNC: 31.2 G/DL (ref 30–36.5)
MCV RBC AUTO: 98.3 FL (ref 80–99)
MONOCYTES # BLD: 0.5 K/UL (ref 0–1)
MONOCYTES NFR BLD: 5.4 % (ref 5–13)
NEUTS SEG # BLD: 8.56 K/UL (ref 1.8–8)
NEUTS SEG NFR BLD: 92.1 % (ref 32–75)
NRBC # BLD: 0 K/UL (ref 0–0.01)
NRBC BLD-RTO: 0 PER 100 WBC
PHOSPHATE SERPL-MCNC: 3.3 MG/DL (ref 2.6–4.7)
PLATELET # BLD AUTO: 113 K/UL (ref 150–400)
POTASSIUM SERPL-SCNC: 4.8 MMOL/L (ref 3.5–5.1)
RBC # BLD AUTO: 3.46 M/UL (ref 4.1–5.7)
RBC MORPH BLD: ABNORMAL
RBC MORPH BLD: ABNORMAL
SERVICE CMNT-IMP: ABNORMAL
SERVICE CMNT-IMP: NORMAL
SERVICE CMNT-IMP: NORMAL
SODIUM SERPL-SCNC: 141 MMOL/L (ref 136–145)
WBC # BLD AUTO: 9.3 K/UL (ref 4.1–11.1)

## 2025-06-28 PROCEDURE — 94761 N-INVAS EAR/PLS OXIMETRY MLT: CPT

## 2025-06-28 PROCEDURE — 6370000000 HC RX 637 (ALT 250 FOR IP): Performed by: ANESTHESIOLOGY

## 2025-06-28 PROCEDURE — 6360000002 HC RX W HCPCS: Performed by: ANESTHESIOLOGY

## 2025-06-28 PROCEDURE — 82962 GLUCOSE BLOOD TEST: CPT

## 2025-06-28 PROCEDURE — 94003 VENT MGMT INPAT SUBQ DAY: CPT

## 2025-06-28 PROCEDURE — 2500000003 HC RX 250 WO HCPCS: Performed by: ANESTHESIOLOGY

## 2025-06-28 PROCEDURE — 71045 X-RAY EXAM CHEST 1 VIEW: CPT

## 2025-06-28 PROCEDURE — 6360000002 HC RX W HCPCS: Performed by: INTERNAL MEDICINE

## 2025-06-28 PROCEDURE — 85025 COMPLETE CBC W/AUTO DIFF WBC: CPT

## 2025-06-28 PROCEDURE — 36415 COLL VENOUS BLD VENIPUNCTURE: CPT

## 2025-06-28 PROCEDURE — 84100 ASSAY OF PHOSPHORUS: CPT

## 2025-06-28 PROCEDURE — 94640 AIRWAY INHALATION TREATMENT: CPT

## 2025-06-28 PROCEDURE — 2500000003 HC RX 250 WO HCPCS: Performed by: NURSE PRACTITIONER

## 2025-06-28 PROCEDURE — 6360000002 HC RX W HCPCS: Performed by: HOSPITALIST

## 2025-06-28 PROCEDURE — 2000000000 HC ICU R&B

## 2025-06-28 PROCEDURE — 6370000000 HC RX 637 (ALT 250 FOR IP): Performed by: INTERNAL MEDICINE

## 2025-06-28 PROCEDURE — 2500000003 HC RX 250 WO HCPCS: Performed by: HOSPITALIST

## 2025-06-28 PROCEDURE — 6360000002 HC RX W HCPCS: Performed by: NURSE PRACTITIONER

## 2025-06-28 PROCEDURE — 2700000000 HC OXYGEN THERAPY PER DAY

## 2025-06-28 PROCEDURE — 2500000003 HC RX 250 WO HCPCS: Performed by: INTERNAL MEDICINE

## 2025-06-28 PROCEDURE — 80048 BASIC METABOLIC PNL TOTAL CA: CPT

## 2025-06-28 PROCEDURE — 83735 ASSAY OF MAGNESIUM: CPT

## 2025-06-28 RX ORDER — DEXMEDETOMIDINE HYDROCHLORIDE 4 UG/ML
.1-1.5 INJECTION, SOLUTION INTRAVENOUS CONTINUOUS
Status: DISCONTINUED | OUTPATIENT
Start: 2025-06-28 | End: 2025-07-04

## 2025-06-28 RX ADMIN — WATER 60 MG: 1 INJECTION INTRAMUSCULAR; INTRAVENOUS; SUBCUTANEOUS at 02:53

## 2025-06-28 RX ADMIN — IPRATROPIUM BROMIDE AND ALBUTEROL SULFATE 1 DOSE: .5; 3 SOLUTION RESPIRATORY (INHALATION) at 19:52

## 2025-06-28 RX ADMIN — BUSPIRONE HYDROCHLORIDE 10 MG: 10 TABLET ORAL at 21:07

## 2025-06-28 RX ADMIN — BUDESONIDE 500 MCG: 0.5 INHALANT RESPIRATORY (INHALATION) at 08:42

## 2025-06-28 RX ADMIN — BUDESONIDE 500 MCG: 0.5 INHALANT RESPIRATORY (INHALATION) at 19:58

## 2025-06-28 RX ADMIN — Medication 50 MCG/HR: at 21:07

## 2025-06-28 RX ADMIN — SODIUM CHLORIDE, PRESERVATIVE FREE 10 ML: 5 INJECTION INTRAVENOUS at 09:17

## 2025-06-28 RX ADMIN — INSULIN LISPRO 1 UNITS: 100 INJECTION, SOLUTION INTRAVENOUS; SUBCUTANEOUS at 06:44

## 2025-06-28 RX ADMIN — MIDAZOLAM 1 MG: 1 INJECTION INTRAMUSCULAR; INTRAVENOUS at 20:37

## 2025-06-28 RX ADMIN — ARFORMOTEROL TARTRATE 15 MCG: 15 SOLUTION RESPIRATORY (INHALATION) at 19:58

## 2025-06-28 RX ADMIN — SODIUM CHLORIDE, PRESERVATIVE FREE 40 MG: 5 INJECTION INTRAVENOUS at 09:17

## 2025-06-28 RX ADMIN — BUSPIRONE HYDROCHLORIDE 10 MG: 10 TABLET ORAL at 14:31

## 2025-06-28 RX ADMIN — PROPOFOL 40 MCG/KG/MIN: 10 INJECTION, EMULSION INTRAVENOUS at 05:08

## 2025-06-28 RX ADMIN — ENOXAPARIN SODIUM 40 MG: 100 INJECTION SUBCUTANEOUS at 09:18

## 2025-06-28 RX ADMIN — INSULIN LISPRO 1 UNITS: 100 INJECTION, SOLUTION INTRAVENOUS; SUBCUTANEOUS at 23:59

## 2025-06-28 RX ADMIN — IPRATROPIUM BROMIDE AND ALBUTEROL SULFATE 1 DOSE: .5; 3 SOLUTION RESPIRATORY (INHALATION) at 08:36

## 2025-06-28 RX ADMIN — SODIUM CHLORIDE, PRESERVATIVE FREE 10 ML: 5 INJECTION INTRAVENOUS at 22:04

## 2025-06-28 RX ADMIN — ESCITALOPRAM OXALATE 10 MG: 10 TABLET ORAL at 10:12

## 2025-06-28 RX ADMIN — BUSPIRONE HYDROCHLORIDE 10 MG: 10 TABLET ORAL at 10:12

## 2025-06-28 RX ADMIN — ACETAZOLAMIDE SODIUM 500 MG: 500 INJECTION, POWDER, LYOPHILIZED, FOR SOLUTION INTRAVENOUS at 09:17

## 2025-06-28 RX ADMIN — WATER 60 MG: 1 INJECTION INTRAMUSCULAR; INTRAVENOUS; SUBCUTANEOUS at 11:46

## 2025-06-28 RX ADMIN — ARFORMOTEROL TARTRATE 15 MCG: 15 SOLUTION RESPIRATORY (INHALATION) at 08:42

## 2025-06-28 RX ADMIN — DEXMEDETOMIDINE HYDROCHLORIDE 0.2 MCG/KG/HR: 400 INJECTION INTRAVENOUS at 16:15

## 2025-06-28 RX ADMIN — ACETAZOLAMIDE SODIUM 500 MG: 500 INJECTION, POWDER, LYOPHILIZED, FOR SOLUTION INTRAVENOUS at 16:48

## 2025-06-28 RX ADMIN — GUAIFENESIN 400 MG: 200 SOLUTION ORAL at 10:12

## 2025-06-28 RX ADMIN — Medication 100 MCG/HR: at 09:03

## 2025-06-28 RX ADMIN — WATER 60 MG: 1 INJECTION INTRAMUSCULAR; INTRAVENOUS; SUBCUTANEOUS at 18:06

## 2025-06-28 RX ADMIN — IPRATROPIUM BROMIDE AND ALBUTEROL SULFATE 1 DOSE: .5; 3 SOLUTION RESPIRATORY (INHALATION) at 13:51

## 2025-06-28 ASSESSMENT — PULMONARY FUNCTION TESTS
PIF_VALUE: 22
PIF_VALUE: 24
PIF_VALUE: 14
PIF_VALUE: 23
PIF_VALUE: 24

## 2025-06-28 NOTE — PLAN OF CARE
Problem: Discharge Planning  Goal: Discharge to home or other facility with appropriate resources  Outcome: Progressing  Flowsheets (Taken 6/27/2025 2000)  Discharge to home or other facility with appropriate resources:   Identify barriers to discharge with patient and caregiver   Arrange for needed discharge resources and transportation as appropriate   Identify discharge learning needs (meds, wound care, etc)   Refer to discharge planning if patient needs post-hospital services based on physician order or complex needs related to functional status, cognitive ability or social support system     Problem: Safety - Adult  Goal: Free from fall injury  Outcome: Progressing     Problem: Pain  Goal: Verbalizes/displays adequate comfort level or baseline comfort level  Outcome: Progressing  Flowsheets (Taken 6/27/2025 2000)  Verbalizes/displays adequate comfort level or baseline comfort level:   Assess pain using appropriate pain scale   Administer analgesics based on type and severity of pain and evaluate response   Implement non-pharmacological measures as appropriate and evaluate response   Consider cultural and social influences on pain and pain management   Notify Licensed Independent Practitioner if interventions unsuccessful or patient reports new pain     Problem: Respiratory - Adult  Goal: Achieves optimal ventilation and oxygenation  Outcome: Progressing  Flowsheets (Taken 6/27/2025 2000)  Achieves optimal ventilation and oxygenation:   Assess for changes in respiratory status   Assess for changes in mentation and behavior   Position to facilitate oxygenation and minimize respiratory effort   Oxygen supplementation based on oxygen saturation or arterial blood gases   Encourage broncho-pulmonary hygiene including cough, deep breathe, incentive spirometry   Assess the need for suctioning and aspirate as needed   Assess and instruct to report shortness of breath or any respiratory difficulty   Respiratory therapy  support as indicated     Problem: Skin/Tissue Integrity  Goal: Skin integrity remains intact  Description: 1.  Monitor for areas of redness and/or skin breakdown  2.  Assess vascular access sites hourly  3.  Every 4-6 hours minimum:  Change oxygen saturation probe site  4.  Every 4-6 hours:  If on nasal continuous positive airway pressure, respiratory therapy assess nares and determine need for appliance change or resting period  Outcome: Progressing  Flowsheets (Taken 6/27/2025 2000)  Skin Integrity Remains Intact:   Monitor for areas of redness and/or skin breakdown   Assess vascular access sites hourly     Problem: Nutrition Deficit:  Goal: Optimize nutritional status  Outcome: Progressing     Problem: Neurosensory - Adult  Goal: Achieves stable or improved neurological status  Outcome: Progressing  Flowsheets (Taken 6/27/2025 2000)  Achieves stable or improved neurological status:   Assess for and report changes in neurological status   Maintain blood pressure and fluid volume within ordered parameters to optimize cerebral perfusion and minimize risk of hemorrhage   Monitor temperature, glucose, and sodium. Initiate appropriate interventions as ordered     Problem: Skin/Tissue Integrity - Adult  Goal: Skin integrity remains intact  Description: 1.  Monitor for areas of redness and/or skin breakdown  2.  Assess vascular access sites hourly  3.  Every 4-6 hours minimum:  Change oxygen saturation probe site  4.  Every 4-6 hours:  If on nasal continuous positive airway pressure, respiratory therapy assess nares and determine need for appliance change or resting period  Outcome: Progressing  Flowsheets (Taken 6/27/2025 2000)  Skin Integrity Remains Intact:   Monitor for areas of redness and/or skin breakdown   Assess vascular access sites hourly  Goal: Oral mucous membranes remain intact  Outcome: Progressing  Flowsheets (Taken 6/27/2025 2000)  Oral Mucous Membranes Remain Intact:   Assess oral mucosa and hygiene

## 2025-06-28 NOTE — PROGRESS NOTES
Name: Adrian Hurley   : 1955   MRN: [id number]   Date: 2025        Chief Complaint   Patient presents with    Shortness of Breath         HPI:     70 yo M presents with acute c/o SOB at home. Called EMS. SpO2 0.7 on RA. EMS palced on BiPAP. No improvement on BiPAP. Intubated in ED by ED staff. ICU c/s for admit. Extubated on  but reintubated on  due to resp failure.    : stable overnight. Tolerating SBT but still delayed exhalation. On fentanyl - 100. Off prop.    Active Problems Being Managed:   Acute on chronic respiratory failure  Hypoxia  Hypercapnea  COPD exacerbation secondary to URI  Lactic acidosis  Tobacco abuse      Assessment/Plan:     Pt with severe COPD who was extubated on  and placed on bipap was clinically doing well until he actuely decompensated this morning. He became unresponsive, tachy with afib in 130-140's and hypoxic to the 70's. He was oxygenated with BVM and then emergently intubated.    - continue low dose precedex and wean fentnayl.  - Did well with SAT/SBT but delaying extubation to allow more time for steroids and resp mechanics to improve. Anticipate extubation tomorrow.  - contineu Buspar and Lexapro  - MAP goal > 65  - continue nebulizers  - continue solumedrol  - completed abx ; wbc wnl  -  blood cx: NGTD  - H/H stable  - monitor platelets, currently 124 from 127      ICU DAILY CHECKLIST   Code Status:Full  DVT Prophylaxis:enoxaparin  T/L/D: Tubes: ETT  Lines: Peripheral IV  Drains: Mock Catheter  SUP: protonix  Diet: NPO  Activity Level:bed rest  ABCDEF Bundle/Checklist Completed:Yes  Disposition: Stay in ICU  Multidisciplinary Rounds Completed:  Yes  Patient/Family Updated: Yes  Goals of Care Discussion/Palliative: Yes    I personally spent 45 minutes of critical care time.  This is time spent at this critically ill patient's bedside actively involved in patient care as well as the coordination of care and discussions with the  MCG/ACT nasal spray 1 spray by Nasal route daily   Yes Provider, MD Ruben   lisinopril (PRINIVIL;ZESTRIL) 30 MG tablet Take 1 tablet by mouth daily   Yes Provider, MD Ruben   omeprazole (PRILOSEC) 20 MG delayed release capsule Take 1 capsule by mouth Daily 6/22/25  Yes Provider, MD Ruben   predniSONE (DELTASONE) 10 MG tablet Take 1 tablet by mouth daily 6/4/25  Yes Provider, MD Ruben   albuterol sulfate HFA (PROVENTIL;VENTOLIN;PROAIR) 108 (90 Base) MCG/ACT inhaler Inhale 2 puffs into the lungs every 4 hours as needed 4/28/25  Yes Provider, MD Ruben   dicyclomine (BENTYL) 10 MG capsule Take 1 capsule by mouth 3 times daily (before meals) 3/2/25  Yes Niurka Riojas,    Multiple Vitamin (MULTIVITAMIN) TABS tablet Take 1 tablet by mouth daily 3/3/25  Yes Niurka Riojas,    Roflumilast (DALIRESP) 500 MCG tablet Take 1 tablet by mouth daily 3/3/25  Yes Niurka Riojas DO   busPIRone (BUSPAR) 10 MG tablet Take 1 tablet by mouth 3 times daily 3/2/25  Yes Niurka Riojas DO   Budeson-Glycopyrrol-Formoterol 160-9-4.8 MCG/ACT AERO Inhale 2 puffs into the lungs in the morning and at bedtime 3/2/25  Yes Niurka Riojas DO   escitalopram (LEXAPRO) 10 MG tablet ceived the following from [facility]: Outside name: escitalopram oxalate (LEXAPRO) 10 mg tablet 11/10/21  Yes Automatic Reconciliation, Ar   albuterol (PROVENTIL) (2.5 MG/3ML) 0.083% nebulizer solution Take 0.76 mLs by nebulization every 6 hours as needed for Wheezing 3/2/25 4/1/25  Niurka Riojas DO         Allergies/Social/Family History:     Allergies   Allergen Reactions    Penicillins Rash     Has taken amoxicillin without a reaction;       Social History     Tobacco Use    Smoking status: Former     Current packs/day: 0.50     Average packs/day: 0.5 packs/day for 50.5 years (25.2 ttl pk-yrs)     Types: Cigarettes     Start date: 1975    Smokeless tobacco: Never   Substance Use Topics    Alcohol use: Yes     Alcohol/week: 3.0 standard

## 2025-06-29 LAB
ANION GAP SERPL CALC-SCNC: 2 MMOL/L (ref 2–12)
BASOPHILS # BLD: 0.01 K/UL (ref 0–0.1)
BASOPHILS NFR BLD: 0.1 % (ref 0–1)
BUN SERPL-MCNC: 28 MG/DL (ref 6–20)
BUN/CREAT SERPL: 65 (ref 12–20)
CALCIUM SERPL-MCNC: 8.9 MG/DL (ref 8.5–10.1)
CHLORIDE SERPL-SCNC: 101 MMOL/L (ref 97–108)
CO2 SERPL-SCNC: 34 MMOL/L (ref 21–32)
CREAT SERPL-MCNC: 0.43 MG/DL (ref 0.7–1.3)
DIFFERENTIAL METHOD BLD: ABNORMAL
EOSINOPHIL # BLD: 0 K/UL (ref 0–0.4)
EOSINOPHIL NFR BLD: 0 % (ref 0–7)
ERYTHROCYTE [DISTWIDTH] IN BLOOD BY AUTOMATED COUNT: 15.3 % (ref 11.5–14.5)
GLUCOSE BLD STRIP.AUTO-MCNC: 188 MG/DL (ref 65–117)
GLUCOSE BLD STRIP.AUTO-MCNC: 238 MG/DL (ref 65–117)
GLUCOSE BLD STRIP.AUTO-MCNC: 260 MG/DL (ref 65–117)
GLUCOSE SERPL-MCNC: 157 MG/DL (ref 65–100)
HCT VFR BLD AUTO: 34.7 % (ref 36.6–50.3)
HGB BLD-MCNC: 10.7 G/DL (ref 12.1–17)
IMM GRANULOCYTES # BLD AUTO: 0.05 K/UL (ref 0–0.04)
IMM GRANULOCYTES NFR BLD AUTO: 0.5 % (ref 0–0.5)
LYMPHOCYTES # BLD: 0.25 K/UL (ref 0.8–3.5)
LYMPHOCYTES NFR BLD: 2.4 % (ref 12–49)
MAGNESIUM SERPL-MCNC: 1.9 MG/DL (ref 1.6–2.4)
MCH RBC QN AUTO: 30.9 PG (ref 26–34)
MCHC RBC AUTO-ENTMCNC: 30.8 G/DL (ref 30–36.5)
MCV RBC AUTO: 100.3 FL (ref 80–99)
MONOCYTES # BLD: 0.72 K/UL (ref 0–1)
MONOCYTES NFR BLD: 6.9 % (ref 5–13)
NEUTS SEG # BLD: 9.37 K/UL (ref 1.8–8)
NEUTS SEG NFR BLD: 90.1 % (ref 32–75)
NRBC # BLD: 0 K/UL (ref 0–0.01)
NRBC BLD-RTO: 0 PER 100 WBC
PHOSPHATE SERPL-MCNC: 3.6 MG/DL (ref 2.6–4.7)
PLATELET # BLD AUTO: 115 K/UL (ref 150–400)
POTASSIUM SERPL-SCNC: 5 MMOL/L (ref 3.5–5.1)
RBC # BLD AUTO: 3.46 M/UL (ref 4.1–5.7)
RBC MORPH BLD: ABNORMAL
RBC MORPH BLD: ABNORMAL
SERVICE CMNT-IMP: ABNORMAL
SODIUM SERPL-SCNC: 137 MMOL/L (ref 136–145)
WBC # BLD AUTO: 10.4 K/UL (ref 4.1–11.1)

## 2025-06-29 PROCEDURE — 94640 AIRWAY INHALATION TREATMENT: CPT

## 2025-06-29 PROCEDURE — 2000000000 HC ICU R&B

## 2025-06-29 PROCEDURE — 2500000003 HC RX 250 WO HCPCS: Performed by: HOSPITALIST

## 2025-06-29 PROCEDURE — 6370000000 HC RX 637 (ALT 250 FOR IP): Performed by: NURSE PRACTITIONER

## 2025-06-29 PROCEDURE — 2500000003 HC RX 250 WO HCPCS: Performed by: NURSE PRACTITIONER

## 2025-06-29 PROCEDURE — 2700000000 HC OXYGEN THERAPY PER DAY

## 2025-06-29 PROCEDURE — 6360000002 HC RX W HCPCS: Performed by: NURSE PRACTITIONER

## 2025-06-29 PROCEDURE — 2500000003 HC RX 250 WO HCPCS: Performed by: ANESTHESIOLOGY

## 2025-06-29 PROCEDURE — 6360000002 HC RX W HCPCS: Performed by: INTERNAL MEDICINE

## 2025-06-29 PROCEDURE — 82962 GLUCOSE BLOOD TEST: CPT

## 2025-06-29 PROCEDURE — 83735 ASSAY OF MAGNESIUM: CPT

## 2025-06-29 PROCEDURE — 6370000000 HC RX 637 (ALT 250 FOR IP): Performed by: INTERNAL MEDICINE

## 2025-06-29 PROCEDURE — 85025 COMPLETE CBC W/AUTO DIFF WBC: CPT

## 2025-06-29 PROCEDURE — 6360000002 HC RX W HCPCS: Performed by: HOSPITALIST

## 2025-06-29 PROCEDURE — 94003 VENT MGMT INPAT SUBQ DAY: CPT

## 2025-06-29 PROCEDURE — 6370000000 HC RX 637 (ALT 250 FOR IP): Performed by: ANESTHESIOLOGY

## 2025-06-29 PROCEDURE — 36415 COLL VENOUS BLD VENIPUNCTURE: CPT

## 2025-06-29 PROCEDURE — 94761 N-INVAS EAR/PLS OXIMETRY MLT: CPT

## 2025-06-29 PROCEDURE — 80048 BASIC METABOLIC PNL TOTAL CA: CPT

## 2025-06-29 PROCEDURE — 84100 ASSAY OF PHOSPHORUS: CPT

## 2025-06-29 RX ORDER — FENTANYL CITRATE-0.9 % NACL/PF 20 MCG/2ML
25 SYRINGE (ML) INTRAVENOUS
Refills: 0 | Status: COMPLETED | OUTPATIENT
Start: 2025-06-29 | End: 2025-06-29

## 2025-06-29 RX ADMIN — INSULIN LISPRO 1 UNITS: 100 INJECTION, SOLUTION INTRAVENOUS; SUBCUTANEOUS at 06:18

## 2025-06-29 RX ADMIN — IPRATROPIUM BROMIDE AND ALBUTEROL SULFATE 1 DOSE: .5; 3 SOLUTION RESPIRATORY (INHALATION) at 08:06

## 2025-06-29 RX ADMIN — DEXMEDETOMIDINE HYDROCHLORIDE 0.2 MCG/KG/HR: 400 INJECTION INTRAVENOUS at 07:48

## 2025-06-29 RX ADMIN — BUDESONIDE 500 MCG: 0.5 INHALANT RESPIRATORY (INHALATION) at 19:50

## 2025-06-29 RX ADMIN — Medication 25 MCG/HR: at 08:39

## 2025-06-29 RX ADMIN — MIDAZOLAM 1 MG: 1 INJECTION INTRAMUSCULAR; INTRAVENOUS at 08:30

## 2025-06-29 RX ADMIN — ENOXAPARIN SODIUM 40 MG: 100 INJECTION SUBCUTANEOUS at 08:51

## 2025-06-29 RX ADMIN — DEXMEDETOMIDINE HYDROCHLORIDE 0.6 MCG/KG/HR: 400 INJECTION INTRAVENOUS at 23:02

## 2025-06-29 RX ADMIN — HYDROXYZINE HYDROCHLORIDE 25 MG: 25 TABLET ORAL at 04:29

## 2025-06-29 RX ADMIN — GUAIFENESIN 400 MG: 200 SOLUTION ORAL at 21:12

## 2025-06-29 RX ADMIN — POLYETHYLENE GLYCOL 3350 17 G: 17 POWDER, FOR SOLUTION ORAL at 08:50

## 2025-06-29 RX ADMIN — GUAIFENESIN 400 MG: 200 SOLUTION ORAL at 08:50

## 2025-06-29 RX ADMIN — ESCITALOPRAM OXALATE 10 MG: 10 TABLET ORAL at 08:49

## 2025-06-29 RX ADMIN — DEXMEDETOMIDINE HYDROCHLORIDE 1 MCG/KG/HR: 400 INJECTION INTRAVENOUS at 12:17

## 2025-06-29 RX ADMIN — INSULIN LISPRO 2 UNITS: 100 INJECTION, SOLUTION INTRAVENOUS; SUBCUTANEOUS at 17:52

## 2025-06-29 RX ADMIN — BUSPIRONE HYDROCHLORIDE 10 MG: 10 TABLET ORAL at 21:12

## 2025-06-29 RX ADMIN — BUDESONIDE 500 MCG: 0.5 INHALANT RESPIRATORY (INHALATION) at 08:06

## 2025-06-29 RX ADMIN — ARFORMOTEROL TARTRATE 15 MCG: 15 SOLUTION RESPIRATORY (INHALATION) at 19:50

## 2025-06-29 RX ADMIN — IPRATROPIUM BROMIDE AND ALBUTEROL SULFATE 1 DOSE: .5; 3 SOLUTION RESPIRATORY (INHALATION) at 19:50

## 2025-06-29 RX ADMIN — INSULIN LISPRO 1 UNITS: 100 INJECTION, SOLUTION INTRAVENOUS; SUBCUTANEOUS at 12:13

## 2025-06-29 RX ADMIN — WATER 60 MG: 1 INJECTION INTRAMUSCULAR; INTRAVENOUS; SUBCUTANEOUS at 02:55

## 2025-06-29 RX ADMIN — DEXMEDETOMIDINE HYDROCHLORIDE 1 MCG/KG/HR: 400 INJECTION INTRAVENOUS at 16:53

## 2025-06-29 RX ADMIN — SODIUM CHLORIDE, PRESERVATIVE FREE 10 ML: 5 INJECTION INTRAVENOUS at 08:54

## 2025-06-29 RX ADMIN — BUSPIRONE HYDROCHLORIDE 10 MG: 10 TABLET ORAL at 08:49

## 2025-06-29 RX ADMIN — ACETAZOLAMIDE SODIUM 500 MG: 500 INJECTION, POWDER, LYOPHILIZED, FOR SOLUTION INTRAVENOUS at 16:53

## 2025-06-29 RX ADMIN — Medication 25 MCG: at 08:42

## 2025-06-29 RX ADMIN — SODIUM CHLORIDE, PRESERVATIVE FREE 10 ML: 5 INJECTION INTRAVENOUS at 21:16

## 2025-06-29 RX ADMIN — IPRATROPIUM BROMIDE AND ALBUTEROL SULFATE 1 DOSE: .5; 3 SOLUTION RESPIRATORY (INHALATION) at 13:58

## 2025-06-29 RX ADMIN — ACETAZOLAMIDE SODIUM 500 MG: 500 INJECTION, POWDER, LYOPHILIZED, FOR SOLUTION INTRAVENOUS at 08:52

## 2025-06-29 RX ADMIN — BUSPIRONE HYDROCHLORIDE 10 MG: 10 TABLET ORAL at 14:01

## 2025-06-29 RX ADMIN — IPRATROPIUM BROMIDE AND ALBUTEROL SULFATE 1 DOSE: .5; 3 SOLUTION RESPIRATORY (INHALATION) at 01:07

## 2025-06-29 RX ADMIN — ARFORMOTEROL TARTRATE 15 MCG: 15 SOLUTION RESPIRATORY (INHALATION) at 08:06

## 2025-06-29 RX ADMIN — WATER 60 MG: 1 INJECTION INTRAMUSCULAR; INTRAVENOUS; SUBCUTANEOUS at 12:13

## 2025-06-29 RX ADMIN — SODIUM CHLORIDE, PRESERVATIVE FREE 40 MG: 5 INJECTION INTRAVENOUS at 08:52

## 2025-06-29 RX ADMIN — WATER 60 MG: 1 INJECTION INTRAMUSCULAR; INTRAVENOUS; SUBCUTANEOUS at 17:51

## 2025-06-29 RX ADMIN — ACETAZOLAMIDE SODIUM 500 MG: 500 INJECTION, POWDER, LYOPHILIZED, FOR SOLUTION INTRAVENOUS at 01:13

## 2025-06-29 ASSESSMENT — PULMONARY FUNCTION TESTS
PIF_VALUE: 22
PIF_VALUE: 31
PIF_VALUE: 22
PIF_VALUE: 26
PIF_VALUE: 35
PIF_VALUE: 31

## 2025-06-29 ASSESSMENT — PAIN SCALES - GENERAL
PAINLEVEL_OUTOF10: 0
PAINLEVEL_OUTOF10: 0

## 2025-06-29 NOTE — PROGRESS NOTES
Name: Adrian Hurley   : 1955   MRN: [id number]   Date: 2025        Chief Complaint   Patient presents with    Shortness of Breath         HPI:     70 yo M presents with acute c/o SOB at home. Called EMS. SpO2 0.7 on RA. EMS palced on BiPAP. No improvement on BiPAP. Intubated in ED by ED staff. ICU c/s for admit. Extubated on  but reintubated on  due to resp failure.    : stable overnight. Tolerating SBT but still delayed exhalation. On fentanyl - 100. Off prop.    : awake, alert and following commands. Tolerated SBT for a couple of hours but needed to go back on rate due to significant anxiety. On precedex and fent.    Active Problems Being Managed:   Acute on chronic respiratory failure  Hypoxia  Hypercapnea  COPD exacerbation secondary to URI  Lactic acidosis  Tobacco abuse      Assessment/Plan:     Pt with severe COPD who was extubated on  and placed on bipap was clinically doing well until he actuely decompensated this morning. He became unresponsive, tachy with afib in 130-140's and hypoxic to the 70's. He was oxygenated with BVM and then emergently intubated.    - continue low dose precedex and wean fentnayl.  - SAT daily  - Did not tolerate SBT this morning  - continue Buspar and Lexapro  - MAP goal > 65  - continue nebulizers  - continue solumedrol 60 q8h  - completed abx ; wbc wnl  -  blood cx: NGTD  - H/H stable  - monitor platelets      ICU DAILY CHECKLIST   Code Status:Full  DVT Prophylaxis:enoxaparin  T/L/D: Tubes: ETT  Lines: Peripheral IV  Drains: Mock Catheter  SUP: protonix  Diet: NPO  Activity Level:bed rest  ABCDEF Bundle/Checklist Completed:Yes  Disposition: Stay in ICU  Multidisciplinary Rounds Completed:  Yes  Patient/Family Updated: Yes  Goals of Care Discussion/Palliative: Yes    I personally spent 45 minutes of critical care time.  This is time spent at this critically ill patient's bedside actively involved in patient care as well as the

## 2025-06-29 NOTE — PLAN OF CARE
Problem: Discharge Planning  Goal: Discharge to home or other facility with appropriate resources  Outcome: Progressing  Flowsheets (Taken 6/28/2025 2000)  Discharge to home or other facility with appropriate resources:   Identify barriers to discharge with patient and caregiver   Arrange for needed discharge resources and transportation as appropriate   Identify discharge learning needs (meds, wound care, etc)   Refer to discharge planning if patient needs post-hospital services based on physician order or complex needs related to functional status, cognitive ability or social support system     Problem: Safety - Adult  Goal: Free from fall injury  Outcome: Progressing     Problem: Pain  Goal: Verbalizes/displays adequate comfort level or baseline comfort level  Outcome: Progressing  Flowsheets (Taken 6/28/2025 2000)  Verbalizes/displays adequate comfort level or baseline comfort level:   Assess pain using appropriate pain scale   Administer analgesics based on type and severity of pain and evaluate response   Implement non-pharmacological measures as appropriate and evaluate response   Consider cultural and social influences on pain and pain management   Notify Licensed Independent Practitioner if interventions unsuccessful or patient reports new pain     Problem: Respiratory - Adult  Goal: Achieves optimal ventilation and oxygenation  Outcome: Progressing  Flowsheets (Taken 6/28/2025 2000)  Achieves optimal ventilation and oxygenation:   Assess for changes in respiratory status   Assess for changes in mentation and behavior   Position to facilitate oxygenation and minimize respiratory effort   Oxygen supplementation based on oxygen saturation or arterial blood gases   Encourage broncho-pulmonary hygiene including cough, deep breathe, incentive spirometry   Assess the need for suctioning and aspirate as needed   Assess and instruct to report shortness of breath or any respiratory difficulty   Respiratory therapy

## 2025-06-30 ENCOUNTER — APPOINTMENT (OUTPATIENT)
Facility: HOSPITAL | Age: 70
End: 2025-06-30
Payer: COMMERCIAL

## 2025-06-30 LAB
ANION GAP SERPL CALC-SCNC: 4 MMOL/L (ref 2–12)
ARTERIAL PATENCY WRIST A: POSITIVE
BASE EXCESS BLD CALC-SCNC: 6.6 MMOL/L
BASE EXCESS BLDV CALC-SCNC: 1.9 MMOL/L
BASOPHILS # BLD: 0 K/UL (ref 0–0.1)
BASOPHILS NFR BLD: 0 % (ref 0–1)
BDY SITE: ABNORMAL
BUN SERPL-MCNC: 33 MG/DL (ref 6–20)
BUN/CREAT SERPL: 62 (ref 12–20)
CALCIUM SERPL-MCNC: 9.1 MG/DL (ref 8.5–10.1)
CHLORIDE SERPL-SCNC: 102 MMOL/L (ref 97–108)
CO2 SERPL-SCNC: 30 MMOL/L (ref 21–32)
CREAT SERPL-MCNC: 0.53 MG/DL (ref 0.7–1.3)
DIFFERENTIAL METHOD BLD: ABNORMAL
EOSINOPHIL # BLD: 0 K/UL (ref 0–0.4)
EOSINOPHIL NFR BLD: 0 % (ref 0–7)
ERYTHROCYTE [DISTWIDTH] IN BLOOD BY AUTOMATED COUNT: 15.2 % (ref 11.5–14.5)
GAS FLOW.O2 O2 DELIVERY SYS: ABNORMAL
GAS FLOW.O2 SETTING OXYMISER: 15 BPM
GLUCOSE BLD STRIP.AUTO-MCNC: 201 MG/DL (ref 65–117)
GLUCOSE BLD STRIP.AUTO-MCNC: 212 MG/DL (ref 65–117)
GLUCOSE BLD STRIP.AUTO-MCNC: 237 MG/DL (ref 65–117)
GLUCOSE BLD STRIP.AUTO-MCNC: 245 MG/DL (ref 65–117)
GLUCOSE SERPL-MCNC: 186 MG/DL (ref 65–100)
HCO3 BLD-SCNC: 33.2 MMOL/L (ref 21–28)
HCO3 BLDV-SCNC: 30.9 MMOL/L (ref 23–28)
HCT VFR BLD AUTO: 34.6 % (ref 36.6–50.3)
HGB BLD-MCNC: 10.7 G/DL (ref 12.1–17)
IMM GRANULOCYTES # BLD AUTO: 0 K/UL
IMM GRANULOCYTES NFR BLD AUTO: 0 %
LYMPHOCYTES # BLD: 0 K/UL (ref 0.8–3.5)
LYMPHOCYTES NFR BLD: 0 % (ref 12–49)
MAGNESIUM SERPL-MCNC: 2 MG/DL (ref 1.6–2.4)
MCH RBC QN AUTO: 30.8 PG (ref 26–34)
MCHC RBC AUTO-ENTMCNC: 30.9 G/DL (ref 30–36.5)
MCV RBC AUTO: 99.7 FL (ref 80–99)
MONOCYTES # BLD: 0.42 K/UL (ref 0–1)
MONOCYTES NFR BLD: 4 % (ref 5–13)
NEUTS SEG # BLD: 10.08 K/UL (ref 1.8–8)
NEUTS SEG NFR BLD: 96 % (ref 32–75)
NRBC # BLD: 0 K/UL (ref 0–0.01)
NRBC BLD-RTO: 0 PER 100 WBC
O2/TOTAL GAS SETTING VFR VENT: 35 %
PCO2 BLD: 57.9 MMHG (ref 35–48)
PCO2 BLDV: 70.9 MMHG (ref 41–51)
PEEP RESPIRATORY: 8 CMH2O
PH BLD: 7.37 (ref 7.35–7.45)
PH BLDV: 7.25 (ref 7.32–7.42)
PHOSPHATE SERPL-MCNC: 3.1 MG/DL (ref 2.6–4.7)
PLATELET # BLD AUTO: 112 K/UL (ref 150–400)
PLATELET COMMENT: ABNORMAL
PO2 BLD: 59 MMHG (ref 83–108)
PO2 BLDV: 35 MMHG (ref 25–40)
POTASSIUM SERPL-SCNC: 4.9 MMOL/L (ref 3.5–5.1)
RBC # BLD AUTO: 3.47 M/UL (ref 4.1–5.7)
RBC MORPH BLD: ABNORMAL
SAO2 % BLD: 88.2 % (ref 92–97)
SAO2 % BLDV: 55.5 % (ref 65–88)
SERVICE CMNT-IMP: ABNORMAL
SODIUM SERPL-SCNC: 136 MMOL/L (ref 136–145)
SPECIMEN TYPE: ABNORMAL
SPECIMEN TYPE: ABNORMAL
VENTILATION MODE VENT: ABNORMAL
VT SETTING VENT: 500 ML
WBC # BLD AUTO: 10.5 K/UL (ref 4.1–11.1)

## 2025-06-30 PROCEDURE — 85025 COMPLETE CBC W/AUTO DIFF WBC: CPT

## 2025-06-30 PROCEDURE — 6370000000 HC RX 637 (ALT 250 FOR IP): Performed by: INTERNAL MEDICINE

## 2025-06-30 PROCEDURE — 94761 N-INVAS EAR/PLS OXIMETRY MLT: CPT

## 2025-06-30 PROCEDURE — 2700000000 HC OXYGEN THERAPY PER DAY

## 2025-06-30 PROCEDURE — 6370000000 HC RX 637 (ALT 250 FOR IP): Performed by: ANESTHESIOLOGY

## 2025-06-30 PROCEDURE — 83735 ASSAY OF MAGNESIUM: CPT

## 2025-06-30 PROCEDURE — 94640 AIRWAY INHALATION TREATMENT: CPT

## 2025-06-30 PROCEDURE — 84100 ASSAY OF PHOSPHORUS: CPT

## 2025-06-30 PROCEDURE — 6360000002 HC RX W HCPCS: Performed by: NURSE PRACTITIONER

## 2025-06-30 PROCEDURE — 6360000002 HC RX W HCPCS: Performed by: HOSPITALIST

## 2025-06-30 PROCEDURE — 2500000003 HC RX 250 WO HCPCS: Performed by: ANESTHESIOLOGY

## 2025-06-30 PROCEDURE — 2500000003 HC RX 250 WO HCPCS: Performed by: NURSE PRACTITIONER

## 2025-06-30 PROCEDURE — 2500000003 HC RX 250 WO HCPCS: Performed by: HOSPITALIST

## 2025-06-30 PROCEDURE — 6370000000 HC RX 637 (ALT 250 FOR IP): Performed by: NURSE PRACTITIONER

## 2025-06-30 PROCEDURE — 82962 GLUCOSE BLOOD TEST: CPT

## 2025-06-30 PROCEDURE — 6360000002 HC RX W HCPCS: Performed by: INTERNAL MEDICINE

## 2025-06-30 PROCEDURE — 36415 COLL VENOUS BLD VENIPUNCTURE: CPT

## 2025-06-30 PROCEDURE — 36600 WITHDRAWAL OF ARTERIAL BLOOD: CPT

## 2025-06-30 PROCEDURE — 2000000000 HC ICU R&B

## 2025-06-30 PROCEDURE — 71045 X-RAY EXAM CHEST 1 VIEW: CPT

## 2025-06-30 PROCEDURE — 82803 BLOOD GASES ANY COMBINATION: CPT

## 2025-06-30 PROCEDURE — 80048 BASIC METABOLIC PNL TOTAL CA: CPT

## 2025-06-30 PROCEDURE — 94003 VENT MGMT INPAT SUBQ DAY: CPT

## 2025-06-30 RX ORDER — FENTANYL CITRATE-0.9 % NACL/PF 20 MCG/2ML
50 SYRINGE (ML) INTRAVENOUS ONCE
Refills: 0 | Status: COMPLETED | OUTPATIENT
Start: 2025-06-30 | End: 2025-06-30

## 2025-06-30 RX ORDER — PROPOFOL 10 MG/ML
5-50 INJECTION, EMULSION INTRAVENOUS CONTINUOUS
Status: DISCONTINUED | OUTPATIENT
Start: 2025-06-30 | End: 2025-07-02

## 2025-06-30 RX ADMIN — WATER 60 MG: 1 INJECTION INTRAMUSCULAR; INTRAVENOUS; SUBCUTANEOUS at 09:58

## 2025-06-30 RX ADMIN — Medication 25 MCG/HR: at 14:01

## 2025-06-30 RX ADMIN — DEXMEDETOMIDINE HYDROCHLORIDE 0.8 MCG/KG/HR: 400 INJECTION INTRAVENOUS at 06:49

## 2025-06-30 RX ADMIN — BUSPIRONE HYDROCHLORIDE 10 MG: 10 TABLET ORAL at 20:59

## 2025-06-30 RX ADMIN — MIDAZOLAM 1 MG: 1 INJECTION INTRAMUSCULAR; INTRAVENOUS at 06:46

## 2025-06-30 RX ADMIN — ARFORMOTEROL TARTRATE 15 MCG: 15 SOLUTION RESPIRATORY (INHALATION) at 08:44

## 2025-06-30 RX ADMIN — GUAIFENESIN 400 MG: 200 SOLUTION ORAL at 09:59

## 2025-06-30 RX ADMIN — ESCITALOPRAM OXALATE 10 MG: 10 TABLET ORAL at 09:59

## 2025-06-30 RX ADMIN — IPRATROPIUM BROMIDE AND ALBUTEROL SULFATE 1 DOSE: .5; 3 SOLUTION RESPIRATORY (INHALATION) at 08:38

## 2025-06-30 RX ADMIN — PROPOFOL 45 MCG/KG/MIN: 10 INJECTION, EMULSION INTRAVENOUS at 20:14

## 2025-06-30 RX ADMIN — IPRATROPIUM BROMIDE AND ALBUTEROL SULFATE 1 DOSE: .5; 3 SOLUTION RESPIRATORY (INHALATION) at 14:50

## 2025-06-30 RX ADMIN — BUDESONIDE 500 MCG: 0.5 INHALANT RESPIRATORY (INHALATION) at 08:44

## 2025-06-30 RX ADMIN — WATER 60 MG: 1 INJECTION INTRAMUSCULAR; INTRAVENOUS; SUBCUTANEOUS at 02:20

## 2025-06-30 RX ADMIN — GUAIFENESIN 400 MG: 200 SOLUTION ORAL at 20:59

## 2025-06-30 RX ADMIN — ARFORMOTEROL TARTRATE 15 MCG: 15 SOLUTION RESPIRATORY (INHALATION) at 21:02

## 2025-06-30 RX ADMIN — PROPOFOL 20 MCG/KG/MIN: 10 INJECTION, EMULSION INTRAVENOUS at 10:43

## 2025-06-30 RX ADMIN — Medication 50 MCG: at 06:56

## 2025-06-30 RX ADMIN — BUSPIRONE HYDROCHLORIDE 10 MG: 10 TABLET ORAL at 09:59

## 2025-06-30 RX ADMIN — ACETAZOLAMIDE SODIUM 500 MG: 500 INJECTION, POWDER, LYOPHILIZED, FOR SOLUTION INTRAVENOUS at 00:05

## 2025-06-30 RX ADMIN — WATER 60 MG: 1 INJECTION INTRAMUSCULAR; INTRAVENOUS; SUBCUTANEOUS at 18:25

## 2025-06-30 RX ADMIN — INSULIN LISPRO 1 UNITS: 100 INJECTION, SOLUTION INTRAVENOUS; SUBCUTANEOUS at 00:04

## 2025-06-30 RX ADMIN — INSULIN LISPRO 1 UNITS: 100 INJECTION, SOLUTION INTRAVENOUS; SUBCUTANEOUS at 06:13

## 2025-06-30 RX ADMIN — BUSPIRONE HYDROCHLORIDE 10 MG: 10 TABLET ORAL at 14:25

## 2025-06-30 RX ADMIN — ENOXAPARIN SODIUM 40 MG: 100 INJECTION SUBCUTANEOUS at 09:59

## 2025-06-30 RX ADMIN — PROPOFOL 40 MCG/KG/MIN: 10 INJECTION, EMULSION INTRAVENOUS at 15:11

## 2025-06-30 RX ADMIN — SODIUM CHLORIDE, PRESERVATIVE FREE 40 MG: 5 INJECTION INTRAVENOUS at 09:58

## 2025-06-30 RX ADMIN — IPRATROPIUM BROMIDE AND ALBUTEROL SULFATE 1 DOSE: .5; 3 SOLUTION RESPIRATORY (INHALATION) at 21:07

## 2025-06-30 RX ADMIN — INSULIN LISPRO 1 UNITS: 100 INJECTION, SOLUTION INTRAVENOUS; SUBCUTANEOUS at 11:50

## 2025-06-30 RX ADMIN — INSULIN LISPRO 1 UNITS: 100 INJECTION, SOLUTION INTRAVENOUS; SUBCUTANEOUS at 18:25

## 2025-06-30 RX ADMIN — BUDESONIDE 500 MCG: 0.5 INHALANT RESPIRATORY (INHALATION) at 21:02

## 2025-06-30 RX ADMIN — SODIUM CHLORIDE, PRESERVATIVE FREE 10 ML: 5 INJECTION INTRAVENOUS at 10:09

## 2025-06-30 RX ADMIN — IPRATROPIUM BROMIDE AND ALBUTEROL SULFATE 1 DOSE: .5; 3 SOLUTION RESPIRATORY (INHALATION) at 05:45

## 2025-06-30 RX ADMIN — SODIUM CHLORIDE, PRESERVATIVE FREE 10 ML: 5 INJECTION INTRAVENOUS at 21:16

## 2025-06-30 RX ADMIN — PROPOFOL 45 MCG/KG/MIN: 10 INJECTION, EMULSION INTRAVENOUS at 23:54

## 2025-06-30 ASSESSMENT — PULMONARY FUNCTION TESTS
PIF_VALUE: 26
PIF_VALUE: 32
PIF_VALUE: 23
PIF_VALUE: 21
PIF_VALUE: 32

## 2025-06-30 ASSESSMENT — PAIN SCALES - GENERAL
PAINLEVEL_OUTOF10: 0

## 2025-06-30 NOTE — PROGRESS NOTES
Name: Adrian Hurley   : 1955   MRN: [id number]   Date: 2025        Chief Complaint   Patient presents with    Shortness of Breath         HPI:     68 yo M presents with acute c/o SOB at home. Called EMS. SpO2 0.7 on RA. EMS palced on BiPAP. No improvement on BiPAP. Intubated in ED by ED staff. ICU c/s for admit. Extubated on  but reintubated on  due to resp failure.    : stable overnight. Tolerating SBT but still delayed exhalation. On fentanyl - 100. Off prop.    : awake, alert and following commands. Tolerated SBT for a couple of hours but needed to go back on rate due to significant anxiety. On precedex and fent.    : anxious this morning. Unable to tolerate SBT. On 50 of fent and precedex - 1.0.      N; start prop. Fent gtt as needed.    Resp: did not do well with sbt this morning.    CV: stable.    Diet: diet at goal. Low dose sliding scale    Renal:     Active Problems Being Managed:   Acute on chronic respiratory failure  Hypoxia  Hypercapnea  COPD exacerbation secondary to URI  Lactic acidosis  Tobacco abuse      Assessment/Plan:     Pt with severe COPD who was extubated on  and placed on bipap was clinically doing well until he actuely decompensated this morning. He became unresponsive, tachy with afib in 130-140's and hypoxic to the 70's. He was oxygenated with BVM and then emergently intubated.    - switch precedex to prop for RASS -1 to -2  - Did not do well with SBT this morning  - SAT daily  - continue Buspar and Lexapro  - MAP goal > 65  - continue nebulizers  - continue solumedrol 60 q8h  - completed abx ; wbc wnl  -  blood cx: NGTD  - H/H stable  - monitor platelets  - Overall, patient is having difficulty weaning off from ventilator and has reached day 6 of intubation. Family is aware of potential for tracheostomy discussion if no sigmificant.      ICU DAILY CHECKLIST   Code Status:Full  DVT Prophylaxis:enoxaparin  T/L/D: Tubes: ETT  Lines:

## 2025-06-30 NOTE — PROGRESS NOTES
Occupational/ Physical Therapy: defer    Consulted with intensivist, who advised to limit activity/ keep patient on bedrest at this time.  Note patient still needs OT evaluation.  Will follow-up tomorrow.    Eduin Marshall OTR/L

## 2025-06-30 NOTE — PROGRESS NOTES
Comprehensive Nutrition Assessment    Type and Reason for Visit:  Reassess    Nutrition Recommendations/Plan:   Modifying tube feed:  Vital AF @ end goal 55 mL/hr  Provide 1 Prosource/day, flush with 15 mL before and after  FWF 65 mL H2O q 3 hours    Schedule bowel regimen, no BM x 7 days     Malnutrition Assessment:  Malnutrition Status:  No malnutrition (06/30/25 1149)    Context:  Acute Illness     Findings of the 6 clinical characteristics of malnutrition:  Energy Intake:  No decrease in energy intake  Weight Loss:  No weight loss     Body Fat Loss:  No body fat loss     Muscle Mass Loss:  No muscle mass loss    Fluid Accumulation:  Moderate to Severe Extremities   Strength:  Not Performed    Nutrition Assessment:     6/30: Follow up.Remains intubated, has been tolerating feeds at goal volume 65 mL/hr. Switching sedation today to propofol - currently @ 20 mcg/kg/min, providing 277 kcal/day.  BG in low 200s with low dose sliding scale and steroids. Precedex @ 1 mcg/kg/hr, fent @ 50 mcg/hr. No BM since 6/21 - RD asked for scheduled bowel regimen. Weight is up ~20# from admission due to worsening edema.    Tube feeds at new goal rate provide 1210 mL; 1452 kcal (+1 Prosource, + Propofol, 1809 kcal, 100% needs); 132 g carbs, 90 g protein (+ Prosource, 110 g, 100% needs). TF + FWF provides 1501 mL H2O/day.    Last Weight Metrics:      6/29/2025     6:00 AM 6/28/2025     6:30 AM 6/27/2025     2:00 AM 6/26/2025     9:14 AM 6/25/2025    11:23 AM 6/25/2025     8:20 AM 6/24/2025     3:06 PM   Weight Loss Metrics   Height    5' 10.866\" 5' 10.866\" 5' 10.866\" 5' 10.866\"   Weight - Scale 193 lbs 9 oz 194 lbs 11 oz 193 lbs 9 oz       BMI (Calculated) 27.2 kg/m2 27.3 kg/m2 27.2 kg/m2           Nutrition Related Findings:      Wound Type: None     Last BM: 06/21/25  Edema: Right upper extremity, Left upper extremity, Right lower extremity, Left lower extremity   Edema Generalized: +1  RUE Edema: +2  LUE Edema: +1  RLE Edema:  chloride **OR** potassium chloride, magnesium sulfate, ondansetron **OR** ondansetron, polyethylene glycol, acetaminophen **OR** acetaminophen, ipratropium 0.5 mg-albuterol 2.5 mg      Current Nutrition Intake & Therapies:    Average Meal Intake: NPO  Average Supplements Intake: NPO  Diet NPO  ADULT TUBE FEEDING; Nasogastric; Peptide Based; Continuous; 55; No; 65; Q 3 hours; Protein; 1 Dose; Daily    Anthropometric Measures:  Height: 180 cm (5' 10.87\")  Ideal Body Weight (IBW): 171 lbs (78 kg)       Current Body Weight: 87.8 kg (193 lb 9 oz), 100.6 % IBW. Weight Source: Bed scale  Current BMI (kg/m2): 27.1           Weight Adjustment For: No Adjustment                 BMI Categories: Overweight (BMI 25.0-29.9)    Estimated Daily Nutrient Needs:  Energy Requirements Based On: Kcal/kg  Weight Used for Energy Requirements: Admission  Energy (kcal/day): 4602-6849 (20-25 kcal/kg)  Weight Used for Protein Requirements: Admission  Protein (g/day):  (1.2-1.5 g/kg)  Method Used for Fluid Requirements: 1 ml/kcal  Fluid (ml/day): 6498-7321    Nutrition Diagnosis:   Inadequate oral intake related to impaired respiratory function as evidenced by NPO or clear liquid status due to medical condition, intubation    Nutrition Interventions:   Food and/or Nutrient Delivery: Modify Tube Feeding  Nutrition Education/Counseling: No recommendation at this time  Coordination of Nutrition Care: Continue to monitor while inpatient, Interdisciplinary Rounds  Plan of Care discussed with: IDR team    Goals:  Goals: Tolerate nutrition support at goal rate, by next RD assessment  Type of Goal: New goal       Nutrition Monitoring and Evaluation:   Behavioral-Environmental Outcomes: None Identified  Food/Nutrient Intake Outcomes: Diet Advancement/Tolerance, Food and Nutrient Intake  Physical Signs/Symptoms Outcomes: GI Status, Weight, Biochemical Data    Discharge Planning:    Too soon to determine     Marlen Burt MS, RD, [facility]  Ext: [phone], or

## 2025-06-30 NOTE — PLAN OF CARE
Problem: Discharge Planning  Goal: Discharge to home or other facility with appropriate resources  Outcome: Progressing     Problem: Safety - Adult  Goal: Free from fall injury  Outcome: Progressing     Problem: Pain  Goal: Verbalizes/displays adequate comfort level or baseline comfort level  Outcome: Progressing     Problem: Respiratory - Adult  Goal: Achieves optimal ventilation and oxygenation  Outcome: Progressing     Problem: Skin/Tissue Integrity  Goal: Skin integrity remains intact  Description: 1.  Monitor for areas of redness and/or skin breakdown  2.  Assess vascular access sites hourly  3.  Every 4-6 hours minimum:  Change oxygen saturation probe site  4.  Every 4-6 hours:  If on nasal continuous positive airway pressure, respiratory therapy assess nares and determine need for appliance change or resting period  Outcome: Progressing     Problem: Nutrition Deficit:  Goal: Optimize nutritional status  Outcome: Progressing     Problem: Neurosensory - Adult  Goal: Achieves stable or improved neurological status  Outcome: Progressing     Problem: Skin/Tissue Integrity - Adult  Goal: Skin integrity remains intact  Description: 1.  Monitor for areas of redness and/or skin breakdown  2.  Assess vascular access sites hourly  3.  Every 4-6 hours minimum:  Change oxygen saturation probe site  4.  Every 4-6 hours:  If on nasal continuous positive airway pressure, respiratory therapy assess nares and determine need for appliance change or resting period  Outcome: Progressing  Goal: Oral mucous membranes remain intact  Outcome: Progressing     Problem: Musculoskeletal - Adult  Goal: Return mobility to safest level of function  Outcome: Progressing

## 2025-06-30 NOTE — CARE COORDINATION
Care Management Progress Note    Reason for Admission:   COPD exacerbation (HCC) [J44.1]  COPD with acute exacerbation (HCC) [J44.1]  Acute respiratory failure with hypoxia and hypercarbia (HCC) [J96.01, J96.02]         Patient Admission Status: Inpatient  RUR: 15%  Hospitalization in the last 30 days (Readmission):  no        Transition of care plan:  [Medical]  Plan as discussed in IDR is for pt to rest/slep for next 24 hours   After resting,hopes are for a successful SBT  [DC plan]  No definitive plan yet but pt will likely need rehab  Propofol started to promote rest  Discharge plan communicated with patient and/or discharge caregiver: no    Date 1st IMM letter given: [place] [employer]  Outpatient follow-up.TWIN Araujo   Transport at discharge:  depends upon needs when discharged    Wendy Royal RN

## 2025-06-30 NOTE — PROGRESS NOTES
Physical Therapy    Chart reviewed, PT/OT spoke to MD who advised to keep pt on bedrest at this time. Will follow up once appropriate.     Markus Brumfield, PT, DPT

## 2025-07-01 LAB
ANION GAP SERPL CALC-SCNC: 4 MMOL/L (ref 2–12)
BASOPHILS # BLD: 0 K/UL (ref 0–0.1)
BASOPHILS NFR BLD: 0 % (ref 0–1)
BUN SERPL-MCNC: 29 MG/DL (ref 6–20)
BUN/CREAT SERPL: 63 (ref 12–20)
CALCIUM SERPL-MCNC: 9.1 MG/DL (ref 8.5–10.1)
CHLORIDE SERPL-SCNC: 102 MMOL/L (ref 97–108)
CO2 SERPL-SCNC: 30 MMOL/L (ref 21–32)
CREAT SERPL-MCNC: 0.46 MG/DL (ref 0.7–1.3)
DIFFERENTIAL METHOD BLD: ABNORMAL
EOSINOPHIL # BLD: 0 K/UL (ref 0–0.4)
EOSINOPHIL NFR BLD: 0 % (ref 0–7)
ERYTHROCYTE [DISTWIDTH] IN BLOOD BY AUTOMATED COUNT: 15 % (ref 11.5–14.5)
GLUCOSE BLD STRIP.AUTO-MCNC: 150 MG/DL (ref 65–117)
GLUCOSE BLD STRIP.AUTO-MCNC: 209 MG/DL (ref 65–117)
GLUCOSE BLD STRIP.AUTO-MCNC: 224 MG/DL (ref 65–117)
GLUCOSE BLD STRIP.AUTO-MCNC: 230 MG/DL (ref 65–117)
GLUCOSE SERPL-MCNC: 172 MG/DL (ref 65–100)
HCT VFR BLD AUTO: 30.3 % (ref 36.6–50.3)
HGB BLD-MCNC: 9.8 G/DL (ref 12.1–17)
IMM GRANULOCYTES # BLD AUTO: 0 K/UL
IMM GRANULOCYTES NFR BLD AUTO: 0 %
LYMPHOCYTES # BLD: 0.19 K/UL (ref 0.8–3.5)
LYMPHOCYTES NFR BLD: 2 % (ref 12–49)
MAGNESIUM SERPL-MCNC: 1.9 MG/DL (ref 1.6–2.4)
MCH RBC QN AUTO: 30.9 PG (ref 26–34)
MCHC RBC AUTO-ENTMCNC: 32.3 G/DL (ref 30–36.5)
MCV RBC AUTO: 95.6 FL (ref 80–99)
MONOCYTES # BLD: 0.48 K/UL (ref 0–1)
MONOCYTES NFR BLD: 5 % (ref 5–13)
NEUTS SEG # BLD: 8.83 K/UL (ref 1.8–8)
NEUTS SEG NFR BLD: 93 % (ref 32–75)
NRBC # BLD: 0 K/UL (ref 0–0.01)
NRBC BLD-RTO: 0 PER 100 WBC
PHOSPHATE SERPL-MCNC: 2.5 MG/DL (ref 2.6–4.7)
PLATELET # BLD AUTO: 108 K/UL (ref 150–400)
POTASSIUM SERPL-SCNC: 4.4 MMOL/L (ref 3.5–5.1)
RBC # BLD AUTO: 3.17 M/UL (ref 4.1–5.7)
RBC MORPH BLD: ABNORMAL
SERVICE CMNT-IMP: ABNORMAL
SODIUM SERPL-SCNC: 136 MMOL/L (ref 136–145)
WBC # BLD AUTO: 9.5 K/UL (ref 4.1–11.1)

## 2025-07-01 PROCEDURE — 94640 AIRWAY INHALATION TREATMENT: CPT

## 2025-07-01 PROCEDURE — 6360000002 HC RX W HCPCS: Performed by: INTERNAL MEDICINE

## 2025-07-01 PROCEDURE — 2000000000 HC ICU R&B

## 2025-07-01 PROCEDURE — 84100 ASSAY OF PHOSPHORUS: CPT

## 2025-07-01 PROCEDURE — 6370000000 HC RX 637 (ALT 250 FOR IP): Performed by: INTERNAL MEDICINE

## 2025-07-01 PROCEDURE — 94761 N-INVAS EAR/PLS OXIMETRY MLT: CPT

## 2025-07-01 PROCEDURE — 2500000003 HC RX 250 WO HCPCS: Performed by: HOSPITALIST

## 2025-07-01 PROCEDURE — 6370000000 HC RX 637 (ALT 250 FOR IP): Performed by: HOSPITALIST

## 2025-07-01 PROCEDURE — 2580000003 HC RX 258: Performed by: HOSPITALIST

## 2025-07-01 PROCEDURE — 82962 GLUCOSE BLOOD TEST: CPT

## 2025-07-01 PROCEDURE — 83735 ASSAY OF MAGNESIUM: CPT

## 2025-07-01 PROCEDURE — 6370000000 HC RX 637 (ALT 250 FOR IP): Performed by: ANESTHESIOLOGY

## 2025-07-01 PROCEDURE — 6360000002 HC RX W HCPCS: Performed by: HOSPITALIST

## 2025-07-01 PROCEDURE — 2500000003 HC RX 250 WO HCPCS: Performed by: ANESTHESIOLOGY

## 2025-07-01 PROCEDURE — 6370000000 HC RX 637 (ALT 250 FOR IP): Performed by: PHYSICIAN ASSISTANT

## 2025-07-01 PROCEDURE — 94003 VENT MGMT INPAT SUBQ DAY: CPT

## 2025-07-01 PROCEDURE — 6360000002 HC RX W HCPCS: Performed by: NURSE PRACTITIONER

## 2025-07-01 PROCEDURE — 2700000000 HC OXYGEN THERAPY PER DAY

## 2025-07-01 PROCEDURE — 85025 COMPLETE CBC W/AUTO DIFF WBC: CPT

## 2025-07-01 PROCEDURE — 2500000003 HC RX 250 WO HCPCS: Performed by: NURSE PRACTITIONER

## 2025-07-01 PROCEDURE — 36415 COLL VENOUS BLD VENIPUNCTURE: CPT

## 2025-07-01 PROCEDURE — 80048 BASIC METABOLIC PNL TOTAL CA: CPT

## 2025-07-01 RX ORDER — INSULIN LISPRO 100 [IU]/ML
0-8 INJECTION, SOLUTION INTRAVENOUS; SUBCUTANEOUS EVERY 6 HOURS
Status: DISCONTINUED | OUTPATIENT
Start: 2025-07-01 | End: 2025-07-05

## 2025-07-01 RX ORDER — INSULIN LISPRO 100 [IU]/ML
0-8 INJECTION, SOLUTION INTRAVENOUS; SUBCUTANEOUS EVERY 6 HOURS
Status: DISCONTINUED | OUTPATIENT
Start: 2025-07-01 | End: 2025-07-01

## 2025-07-01 RX ORDER — POLYETHYLENE GLYCOL 3350 17 G/17G
17 POWDER, FOR SOLUTION ORAL 2 TIMES DAILY
Status: DISCONTINUED | OUTPATIENT
Start: 2025-07-01 | End: 2025-07-02

## 2025-07-01 RX ORDER — POLYETHYLENE GLYCOL 3350 17 G/17G
17 POWDER, FOR SOLUTION ORAL DAILY
Status: DISCONTINUED | OUTPATIENT
Start: 2025-07-01 | End: 2025-07-01

## 2025-07-01 RX ORDER — FUROSEMIDE 10 MG/ML
20 INJECTION INTRAMUSCULAR; INTRAVENOUS ONCE
Status: COMPLETED | OUTPATIENT
Start: 2025-07-01 | End: 2025-07-01

## 2025-07-01 RX ORDER — SENNA AND DOCUSATE SODIUM 50; 8.6 MG/1; MG/1
2 TABLET, FILM COATED ORAL 2 TIMES DAILY
Status: DISCONTINUED | OUTPATIENT
Start: 2025-07-01 | End: 2025-07-02

## 2025-07-01 RX ADMIN — WATER 60 MG: 1 INJECTION INTRAMUSCULAR; INTRAVENOUS; SUBCUTANEOUS at 20:52

## 2025-07-01 RX ADMIN — SODIUM CHLORIDE, PRESERVATIVE FREE 10 ML: 5 INJECTION INTRAVENOUS at 08:57

## 2025-07-01 RX ADMIN — PROPOFOL 45 MCG/KG/MIN: 10 INJECTION, EMULSION INTRAVENOUS at 03:34

## 2025-07-01 RX ADMIN — BUDESONIDE 500 MCG: 0.5 INHALANT RESPIRATORY (INHALATION) at 08:23

## 2025-07-01 RX ADMIN — DEXMEDETOMIDINE HYDROCHLORIDE 0.2 MCG/KG/HR: 400 INJECTION INTRAVENOUS at 14:50

## 2025-07-01 RX ADMIN — SODIUM CHLORIDE, PRESERVATIVE FREE 40 MG: 5 INJECTION INTRAVENOUS at 08:56

## 2025-07-01 RX ADMIN — WATER 60 MG: 1 INJECTION INTRAMUSCULAR; INTRAVENOUS; SUBCUTANEOUS at 03:27

## 2025-07-01 RX ADMIN — INSULIN LISPRO 2 UNITS: 100 INJECTION, SOLUTION INTRAVENOUS; SUBCUTANEOUS at 23:17

## 2025-07-01 RX ADMIN — ARFORMOTEROL TARTRATE 15 MCG: 15 SOLUTION RESPIRATORY (INHALATION) at 08:23

## 2025-07-01 RX ADMIN — DEXMEDETOMIDINE HYDROCHLORIDE 0.4 MCG/KG/HR: 400 INJECTION INTRAVENOUS at 20:55

## 2025-07-01 RX ADMIN — FUROSEMIDE 20 MG: 10 INJECTION, SOLUTION INTRAMUSCULAR; INTRAVENOUS at 10:46

## 2025-07-01 RX ADMIN — GUAIFENESIN 400 MG: 200 SOLUTION ORAL at 08:54

## 2025-07-01 RX ADMIN — POLYETHYLENE GLYCOL 3350 17 G: 17 POWDER, FOR SOLUTION ORAL at 08:55

## 2025-07-01 RX ADMIN — BUSPIRONE HYDROCHLORIDE 10 MG: 10 TABLET ORAL at 14:26

## 2025-07-01 RX ADMIN — SODIUM CHLORIDE, PRESERVATIVE FREE 10 ML: 5 INJECTION INTRAVENOUS at 20:52

## 2025-07-01 RX ADMIN — POLYETHYLENE GLYCOL 3350 17 G: 17 POWDER, FOR SOLUTION ORAL at 20:52

## 2025-07-01 RX ADMIN — DOCUSATE SODIUM 50MG AND SENNOSIDES 8.6MG 2 TABLET: 8.6; 5 TABLET, FILM COATED ORAL at 06:17

## 2025-07-01 RX ADMIN — ARFORMOTEROL TARTRATE 15 MCG: 15 SOLUTION RESPIRATORY (INHALATION) at 20:43

## 2025-07-01 RX ADMIN — DOCUSATE SODIUM 50MG AND SENNOSIDES 8.6MG 2 TABLET: 8.6; 5 TABLET, FILM COATED ORAL at 20:54

## 2025-07-01 RX ADMIN — GUAIFENESIN 400 MG: 200 SOLUTION ORAL at 20:53

## 2025-07-01 RX ADMIN — PROPOFOL 45 MCG/KG/MIN: 10 INJECTION, EMULSION INTRAVENOUS at 18:13

## 2025-07-01 RX ADMIN — INSULIN LISPRO 1 UNITS: 100 INJECTION, SOLUTION INTRAVENOUS; SUBCUTANEOUS at 01:07

## 2025-07-01 RX ADMIN — INSULIN LISPRO 2 UNITS: 100 INJECTION, SOLUTION INTRAVENOUS; SUBCUTANEOUS at 12:44

## 2025-07-01 RX ADMIN — WATER 60 MG: 1 INJECTION INTRAMUSCULAR; INTRAVENOUS; SUBCUTANEOUS at 10:47

## 2025-07-01 RX ADMIN — PROPOFOL 45 MCG/KG/MIN: 10 INJECTION, EMULSION INTRAVENOUS at 23:06

## 2025-07-01 RX ADMIN — BUDESONIDE 500 MCG: 0.5 INHALANT RESPIRATORY (INHALATION) at 20:43

## 2025-07-01 RX ADMIN — BUSPIRONE HYDROCHLORIDE 10 MG: 10 TABLET ORAL at 08:56

## 2025-07-01 RX ADMIN — IPRATROPIUM BROMIDE AND ALBUTEROL SULFATE 1 DOSE: .5; 3 SOLUTION RESPIRATORY (INHALATION) at 20:35

## 2025-07-01 RX ADMIN — IPRATROPIUM BROMIDE AND ALBUTEROL SULFATE 1 DOSE: .5; 3 SOLUTION RESPIRATORY (INHALATION) at 12:55

## 2025-07-01 RX ADMIN — SODIUM PHOSPHATE, MONOBASIC, MONOHYDRATE AND SODIUM PHOSPHATE, DIBASIC, ANHYDROUS 15 MMOL: 276; 142 INJECTION, SOLUTION INTRAVENOUS at 12:02

## 2025-07-01 RX ADMIN — IPRATROPIUM BROMIDE AND ALBUTEROL SULFATE 1 DOSE: .5; 3 SOLUTION RESPIRATORY (INHALATION) at 08:18

## 2025-07-01 RX ADMIN — PROPOFOL 10 MCG/KG/MIN: 10 INJECTION, EMULSION INTRAVENOUS at 10:11

## 2025-07-01 RX ADMIN — ENOXAPARIN SODIUM 40 MG: 100 INJECTION SUBCUTANEOUS at 08:56

## 2025-07-01 RX ADMIN — ESCITALOPRAM OXALATE 10 MG: 10 TABLET ORAL at 08:54

## 2025-07-01 RX ADMIN — BUSPIRONE HYDROCHLORIDE 10 MG: 10 TABLET ORAL at 20:53

## 2025-07-01 ASSESSMENT — PULMONARY FUNCTION TESTS
PIF_VALUE: 29
PIF_VALUE: 28
PIF_VALUE: 35
PIF_VALUE: 29
PIF_VALUE: 12

## 2025-07-01 NOTE — PROGRESS NOTES
**SBT 5/5 started.     07/01/25 1136   Adult IBW   Height 1.8 m (5' 10.87\")   IBW/kg (Calculated) 74.99   Patient Observation   Pulse 68   Respirations 14   SpO2 96 %   ETCO2 (mmHg) 12 mmHg   Breath Sounds   Respiratory Pattern Regular   Upper Airway Sounds Other (comment)  (wnl)   Breath Sounds Bilateral Diminished   Right Upper Lobe Diminished   Right Middle Lobe Diminished   Right Lower Lobe Diminished   Left Upper Lobe Diminished   Left Lower Lobe Diminished   Vent Information   Ventilator ID [id number]   Ventilator Safety Check Performed Pre-Use Yes   Vent Mode (S)  CPAP/PS   Ventilator Settings   PEEP/CPAP (cmH2O) 5   FiO2  35 %   Pressure Support (cm H2O) 5 cm H2O   Vent Patient Data (Readings)   Vt Spont (mL) 649 mL   Vt (Measured) 649 mL   Peak Inspiratory Pressure (cmH2O) 12 cmH2O   Rate Measured 12 br/min   Minute Volume (L/min) 7.8 Liters   Mean Airway Pressure (cmH2O) 6.9 cmH20   I:E Ratio 1:2.2   Flow Sensitivity 3 L/min   Expiratory Sensitivity (%) 25 %   PEEP Intrinsic (cm H2O) 0 cm H2O   Backup Apnea On   Backup Rate 15 Breaths Per Minute   Backup Vt 500   Vent Alarm Settings   High Pressure (cmH2O) 40 cmH2O   Low Minute Volume (lpm) 2 L/min   High Minute Volume (lpm) 20 L/min   Low Exhaled Vt (ml) 200 mL   High Exhaled Vt (ml) 800 mL   RR Low (bpm) 15   RR High (bpm) 40 br/min   Apnea (secs) 20 secs   Additional Respiratoray Assessments   Humidification Source Heated wire   Humidification Temp 37   Circuit Condensation Drained   Ambu Bag With Mask At Bedside Yes   ETT    Placement Date/Time: 06/26/25 0845   Present on Admission/Arrival: No  Placed By: Licensed provider  Placement Verified By: Auscultation;Colorimetric ETCO2 device;Direct visualization  Preoxygenation: Yes  Mask Ventilation: Ventilated by mask (1)  Nciko...   Secured At 25 cm   Measured From Lips   ETT Placement Left   Secured By Commercial tube kilgore   Site Assessment Cool;Dry   Cuff Pressure   (wnl)   Tie/Kilgore Changed No

## 2025-07-01 NOTE — PROGRESS NOTES
Pharmacy Dosing Services: 7/1/2025    The pharmacist has determined that this patient meets P [employer] approved criteria for conversion from IV to oral therapy for the following medication: Pantoprazole      The pharmacist has written the following order for the patient: Lansoprazole 30 mg daily per OGT    The pharmacist will continue to monitor the patient's status and advise the physician if conversion back to IV therapy is recommended.    Thanks,   Jose Edwards, RondaD

## 2025-07-01 NOTE — PROGRESS NOTES
1714hrs-Dr. Aguero made aware of patient's 6 beat run of V-tach, pt was asymptomatic. No new orders received.

## 2025-07-01 NOTE — PLAN OF CARE
Problem: Discharge Planning  Goal: Discharge to home or other facility with appropriate resources  Outcome: Progressing     Problem: Safety - Adult  Goal: Free from fall injury  Outcome: Progressing     Problem: Pain  Goal: Verbalizes/displays adequate comfort level or baseline comfort level  Outcome: Progressing     Problem: Respiratory - Adult  Goal: Achieves optimal ventilation and oxygenation  7/1/2025 0744 by Demetria Lyles RN  Outcome: Progressing  6/30/2025 2120 by Maricarmen Butcher RCP  Outcome: Progressing     Problem: Neurosensory - Adult  Goal: Achieves stable or improved neurological status  Outcome: Progressing     Problem: Skin/Tissue Integrity - Adult  Goal: Skin integrity remains intact  Description: 1.  Monitor for areas of redness and/or skin breakdown  2.  Assess vascular access sites hourly  3.  Every 4-6 hours minimum:  Change oxygen saturation probe site  4.  Every 4-6 hours:  If on nasal continuous positive airway pressure, respiratory therapy assess nares and determine need for appliance change or resting period  Outcome: Progressing  Goal: Oral mucous membranes remain intact  Outcome: Progressing     Problem: Musculoskeletal - Adult  Goal: Return mobility to safest level of function  Outcome: Progressing     Problem: Skin/Tissue Integrity  Goal: Skin integrity remains intact  Description: 1.  Monitor for areas of redness and/or skin breakdown  2.  Assess vascular access sites hourly  3.  Every 4-6 hours minimum:  Change oxygen saturation probe site  4.  Every 4-6 hours:  If on nasal continuous positive airway pressure, respiratory therapy assess nares and determine need for appliance change or resting period  Outcome: Progressing     Problem: Nutrition Deficit:  Goal: Optimize nutritional status  Outcome: Progressing

## 2025-07-01 NOTE — PROGRESS NOTES
Physical/occupational therapy:    Chart reviewed and noted pt remains intubated with daily SBT trials with the hopes to extubate. Will defer therapy to avoid additional stress to the patient while he undergoes vent weaning. Will defer.    Sheila Mcintosh, PT,DPT

## 2025-07-01 NOTE — PLAN OF CARE
Problem: Discharge Planning  Goal: Discharge to home or other facility with appropriate resources  7/1/2025 0744 by Demetria Lyles RN  Outcome: Progressing     Problem: Safety - Adult  Goal: Free from fall injury  7/1/2025 0744 by Demetria Lyles RN  Outcome: Progressing     Problem: Pain  Goal: Verbalizes/displays adequate comfort level or baseline comfort level  7/1/2025 0744 by Demetria Lyles RN  Outcome: Progressing     Problem: Respiratory - Adult  Goal: Achieves optimal ventilation and oxygenation  7/1/2025 0825 by Wendy Longo RT  Outcome: Progressing  7/1/2025 0744 by Demetria Lyles RN  Outcome: Progressing     Problem: Skin/Tissue Integrity  Goal: Skin integrity remains intact  Description: 1.  Monitor for areas of redness and/or skin breakdown  2.  Assess vascular access sites hourly  3.  Every 4-6 hours minimum:  Change oxygen saturation probe site  4.  Every 4-6 hours:  If on nasal continuous positive airway pressure, respiratory therapy assess nares and determine need for appliance change or resting period  7/1/2025 0744 by Demetria Lyles RN  Outcome: Progressing     Problem: Nutrition Deficit:  Goal: Optimize nutritional status  7/1/2025 0744 by Demetria Lyles RN  Outcome: Progressing     Problem: Neurosensory - Adult  Goal: Achieves stable or improved neurological status  7/1/2025 0744 by Demetria Lyles RN  Outcome: Progressing     Problem: Skin/Tissue Integrity - Adult  Goal: Skin integrity remains intact  Description: 1.  Monitor for areas of redness and/or skin breakdown  2.  Assess vascular access sites hourly  3.  Every 4-6 hours minimum:  Change oxygen saturation probe site  4.  Every 4-6 hours:  If on nasal continuous positive airway pressure, respiratory therapy assess nares and determine need for appliance change or resting period  7/1/2025 0744 by Dmeetria Lyles RN  Outcome: Progressing  Goal: Oral mucous membranes remain intact  7/1/2025 0744

## 2025-07-01 NOTE — PROGRESS NOTES
Name: Adrian Hurley   : 1955   MRN: [id number]   Date: 2025        Chief Complaint   Patient presents with    Shortness of Breath         HPI:     70 yo M presents with acute c/o SOB at home. Called EMS. SpO2 0.7 on RA. EMS palced on BiPAP. No improvement on BiPAP. Intubated in ED by ED staff. ICU c/s for admit. Extubated on  but reintubated on  due to resp failure.    : stable overnight. Tolerating SBT but still delayed exhalation. On fentanyl - 100. Off prop.    : awake, alert and following commands. Tolerated SBT for a couple of hours but needed to go back on rate due to significant anxiety. On precedex and fent.    : anxious this morning. Unable to tolerate SBT. On 50 of fent and precedex - 1.0.    : stable overnight on prop nad fent overnight. Still air trapping. No agitation. No fever. Normal hemodynamics.      Active Problems Being Managed:   Acute on chronic respiratory failure  Hypoxia  Hypercapnea  COPD exacerbation secondary to URI  Lactic acidosis  Tobacco abuse      Assessment/Plan:     Pt with severe COPD who was extubated on  and placed on bipap was clinically doing well until he actuely decompensated this morning. He became unresponsive, tachy with afib in 130-140's and hypoxic to the 70's. He was oxygenated with BVM and then emergently intubated.    - wean propofol for SAT  - RASS -1 to -2  - Repeat SBT daily  - continue Buspar and Lexapro  - MAP goal > 65  - continue nebulizers  - continue solumedrol 60 q8h  - completed abx ; wbc wnl  -  blood cx: NGTD  - H/H stable  - monitor platelets  - Overall, patient is having difficulty weaning off from ventilator and has reached day 6 of intubation. Family is aware of potential for tracheostomy if no sigmificant.  - Palliative care consultation.      ICU DAILY CHECKLIST   Code Status:Full  DVT Prophylaxis:enoxaparin  T/L/D: Tubes: ETT  Lines: Peripheral IV  Drains: Mock Catheter  SUP:

## 2025-07-01 NOTE — CARE COORDINATION
Care Management Progress Note    Reason for Admission:   COPD exacerbation (HCC) [J44.1]  COPD with acute exacerbation (HCC) [J44.1]  Acute respiratory failure with hypoxia and hypercarbia (HCC) [J96.01, J96.02]         Patient Admission Status: Inpatient  RUR: 16%  Hospitalization in the last 30 days (Readmission):  no        Transition of care plan:  Per IDR discussions  No BM x 1 week   Bowel regimen started yesterday and increased today to BID  On tube feeds  Following commands  Gesturing to communicate  Weaning propofol  Once propofol weaned,SBT for two hours is planned  No extubation is planned for today  Palliative medicine has been consulted  Per attending,he began the discussions with pt.'s wife yesterday regarding tracheotomy  Wife has not made any decisions yet  Diuresing today with lasiix  Good uop  [DC plan]  Discharge planning is contingent upon wife's decision regarding tracheotomy versus compassionate withdrawal from the ventilator  Discharge plan communicated with patient and/or discharge caregiver:     Date 1st IMM letter given: has [employer]  Outpatient follow-up.to be determined  Transport at discharge: to be determined,likely [place] Royal RN

## 2025-07-02 LAB
ANION GAP SERPL CALC-SCNC: 7 MMOL/L (ref 2–12)
BASE EXCESS BLDV CALC-SCNC: 8.5 MMOL/L
BASOPHILS # BLD: 0.01 K/UL (ref 0–0.1)
BASOPHILS NFR BLD: 0.1 % (ref 0–1)
BUN SERPL-MCNC: ABNORMAL MG/DL (ref 6–20)
BUN/CREAT SERPL: ABNORMAL (ref 12–20)
CALCIUM SERPL-MCNC: 7.4 MG/DL (ref 8.5–10.1)
CHLORIDE SERPL-SCNC: 100 MMOL/L (ref 97–108)
CO2 SERPL-SCNC: 27 MMOL/L (ref 21–32)
CREAT SERPL-MCNC: 0.5 MG/DL (ref 0.7–1.3)
DIFFERENTIAL METHOD BLD: ABNORMAL
EOSINOPHIL # BLD: 0 K/UL (ref 0–0.4)
EOSINOPHIL NFR BLD: 0 % (ref 0–7)
ERYTHROCYTE [DISTWIDTH] IN BLOOD BY AUTOMATED COUNT: 15.4 % (ref 11.5–14.5)
GLUCOSE BLD STRIP.AUTO-MCNC: 184 MG/DL (ref 65–117)
GLUCOSE BLD STRIP.AUTO-MCNC: 202 MG/DL (ref 65–117)
GLUCOSE SERPL-MCNC: 240 MG/DL (ref 65–100)
HCO3 BLDV-SCNC: 33 MMOL/L (ref 23–28)
HCT VFR BLD AUTO: 31.6 % (ref 36.6–50.3)
HGB BLD-MCNC: 10.7 G/DL (ref 12.1–17)
IMM GRANULOCYTES # BLD AUTO: 0.05 K/UL (ref 0–0.04)
IMM GRANULOCYTES NFR BLD AUTO: 0.6 % (ref 0–0.5)
LYMPHOCYTES # BLD: 0.17 K/UL (ref 0.8–3.5)
LYMPHOCYTES NFR BLD: 2 % (ref 12–49)
MAGNESIUM SERPL-MCNC: 1.8 MG/DL (ref 1.6–2.4)
MCH RBC QN AUTO: 32.4 PG (ref 26–34)
MCHC RBC AUTO-ENTMCNC: 33.9 G/DL (ref 30–36.5)
MCV RBC AUTO: 95.8 FL (ref 80–99)
MONOCYTES # BLD: 0.47 K/UL (ref 0–1)
MONOCYTES NFR BLD: 5.4 % (ref 5–13)
NEUTS SEG # BLD: 8 K/UL (ref 1.8–8)
NEUTS SEG NFR BLD: 91.9 % (ref 32–75)
NRBC # BLD: 0 K/UL (ref 0–0.01)
NRBC BLD-RTO: 0 PER 100 WBC
PCO2 BLDV: 44.2 MMHG (ref 41–51)
PH BLDV: 7.48 (ref 7.32–7.42)
PHOSPHATE SERPL-MCNC: 3.3 MG/DL (ref 2.6–4.7)
PLATELET # BLD AUTO: 94 K/UL (ref 150–400)
PO2 BLDV: 34 MMHG (ref 25–40)
POTASSIUM SERPL-SCNC: 4.6 MMOL/L (ref 3.5–5.1)
RBC # BLD AUTO: 3.3 M/UL (ref 4.1–5.7)
RBC MORPH BLD: ABNORMAL
SAO2 % BLDV: 70 % (ref 65–88)
SERVICE CMNT-IMP: ABNORMAL
SODIUM SERPL-SCNC: 134 MMOL/L (ref 136–145)
SPECIMEN TYPE: ABNORMAL
WBC # BLD AUTO: 8.7 K/UL (ref 4.1–11.1)

## 2025-07-02 PROCEDURE — 80048 BASIC METABOLIC PNL TOTAL CA: CPT

## 2025-07-02 PROCEDURE — 94660 CPAP INITIATION&MGMT: CPT

## 2025-07-02 PROCEDURE — 85025 COMPLETE CBC W/AUTO DIFF WBC: CPT

## 2025-07-02 PROCEDURE — 94761 N-INVAS EAR/PLS OXIMETRY MLT: CPT

## 2025-07-02 PROCEDURE — 82962 GLUCOSE BLOOD TEST: CPT

## 2025-07-02 PROCEDURE — 94640 AIRWAY INHALATION TREATMENT: CPT

## 2025-07-02 PROCEDURE — 6370000000 HC RX 637 (ALT 250 FOR IP): Performed by: HOSPITALIST

## 2025-07-02 PROCEDURE — 6370000000 HC RX 637 (ALT 250 FOR IP): Performed by: ANESTHESIOLOGY

## 2025-07-02 PROCEDURE — 94003 VENT MGMT INPAT SUBQ DAY: CPT

## 2025-07-02 PROCEDURE — 83735 ASSAY OF MAGNESIUM: CPT

## 2025-07-02 PROCEDURE — 82803 BLOOD GASES ANY COMBINATION: CPT

## 2025-07-02 PROCEDURE — 6360000002 HC RX W HCPCS: Performed by: NURSE PRACTITIONER

## 2025-07-02 PROCEDURE — 2500000003 HC RX 250 WO HCPCS: Performed by: HOSPITALIST

## 2025-07-02 PROCEDURE — 84100 ASSAY OF PHOSPHORUS: CPT

## 2025-07-02 PROCEDURE — 2000000000 HC ICU R&B

## 2025-07-02 PROCEDURE — 6370000000 HC RX 637 (ALT 250 FOR IP): Performed by: NURSE PRACTITIONER

## 2025-07-02 PROCEDURE — 36415 COLL VENOUS BLD VENIPUNCTURE: CPT

## 2025-07-02 PROCEDURE — 2500000003 HC RX 250 WO HCPCS: Performed by: NURSE PRACTITIONER

## 2025-07-02 PROCEDURE — 6370000000 HC RX 637 (ALT 250 FOR IP): Performed by: INTERNAL MEDICINE

## 2025-07-02 PROCEDURE — 6360000002 HC RX W HCPCS: Performed by: INTERNAL MEDICINE

## 2025-07-02 PROCEDURE — 6360000002 HC RX W HCPCS: Performed by: HOSPITALIST

## 2025-07-02 RX ORDER — BUSPIRONE HYDROCHLORIDE 5 MG/1
10 TABLET ORAL 3 TIMES DAILY
Status: DISCONTINUED | OUTPATIENT
Start: 2025-07-02 | End: 2025-07-07

## 2025-07-02 RX ORDER — GUAIFENESIN 200 MG/10ML
400 LIQUID ORAL EVERY 12 HOURS
Status: DISCONTINUED | OUTPATIENT
Start: 2025-07-02 | End: 2025-07-11 | Stop reason: HOSPADM

## 2025-07-02 RX ORDER — SENNA AND DOCUSATE SODIUM 50; 8.6 MG/1; MG/1
1 TABLET, FILM COATED ORAL DAILY
Status: DISCONTINUED | OUTPATIENT
Start: 2025-07-03 | End: 2025-07-11 | Stop reason: HOSPADM

## 2025-07-02 RX ORDER — PANTOPRAZOLE SODIUM 40 MG/1
40 TABLET, DELAYED RELEASE ORAL
Status: DISCONTINUED | OUTPATIENT
Start: 2025-07-03 | End: 2025-07-03

## 2025-07-02 RX ORDER — HYDROXYZINE HYDROCHLORIDE 25 MG/1
25 TABLET, FILM COATED ORAL EVERY 4 HOURS PRN
Status: DISCONTINUED | OUTPATIENT
Start: 2025-07-02 | End: 2025-07-11 | Stop reason: HOSPADM

## 2025-07-02 RX ORDER — ESCITALOPRAM OXALATE 10 MG/1
10 TABLET ORAL DAILY
Status: DISCONTINUED | OUTPATIENT
Start: 2025-07-03 | End: 2025-07-11 | Stop reason: HOSPADM

## 2025-07-02 RX ORDER — MORPHINE SULFATE 4 MG/ML
2 INJECTION, SOLUTION INTRAMUSCULAR; INTRAVENOUS ONCE
Status: DISCONTINUED | OUTPATIENT
Start: 2025-07-02 | End: 2025-07-11 | Stop reason: HOSPADM

## 2025-07-02 RX ADMIN — INSULIN LISPRO 2 UNITS: 100 INJECTION, SOLUTION INTRAVENOUS; SUBCUTANEOUS at 06:23

## 2025-07-02 RX ADMIN — BUSPIRONE HYDROCHLORIDE 10 MG: 10 TABLET ORAL at 07:34

## 2025-07-02 RX ADMIN — PROPOFOL 40 MCG/KG/MIN: 10 INJECTION, EMULSION INTRAVENOUS at 02:28

## 2025-07-02 RX ADMIN — SODIUM CHLORIDE, PRESERVATIVE FREE 10 ML: 5 INJECTION INTRAVENOUS at 07:44

## 2025-07-02 RX ADMIN — BUDESONIDE 500 MCG: 0.5 INHALANT RESPIRATORY (INHALATION) at 07:46

## 2025-07-02 RX ADMIN — GUAIFENESIN 400 MG: 200 SOLUTION ORAL at 21:24

## 2025-07-02 RX ADMIN — INSULIN LISPRO 2 UNITS: 100 INJECTION, SOLUTION INTRAVENOUS; SUBCUTANEOUS at 17:28

## 2025-07-02 RX ADMIN — IPRATROPIUM BROMIDE AND ALBUTEROL SULFATE 1 DOSE: .5; 3 SOLUTION RESPIRATORY (INHALATION) at 14:17

## 2025-07-02 RX ADMIN — BUDESONIDE 500 MCG: 0.5 INHALANT RESPIRATORY (INHALATION) at 20:40

## 2025-07-02 RX ADMIN — BUSPIRONE HYDROCHLORIDE 10 MG: 10 TABLET ORAL at 14:39

## 2025-07-02 RX ADMIN — DEXMEDETOMIDINE HYDROCHLORIDE 0.8 MCG/KG/HR: 400 INJECTION INTRAVENOUS at 19:29

## 2025-07-02 RX ADMIN — SODIUM CHLORIDE, PRESERVATIVE FREE 10 ML: 5 INJECTION INTRAVENOUS at 21:00

## 2025-07-02 RX ADMIN — IPRATROPIUM BROMIDE AND ALBUTEROL SULFATE 1 DOSE: .5; 3 SOLUTION RESPIRATORY (INHALATION) at 20:45

## 2025-07-02 RX ADMIN — WATER 60 MG: 1 INJECTION INTRAMUSCULAR; INTRAVENOUS; SUBCUTANEOUS at 02:29

## 2025-07-02 RX ADMIN — DEXMEDETOMIDINE HYDROCHLORIDE 0.8 MCG/KG/HR: 400 INJECTION INTRAVENOUS at 13:23

## 2025-07-02 RX ADMIN — ARFORMOTEROL TARTRATE 15 MCG: 15 SOLUTION RESPIRATORY (INHALATION) at 20:40

## 2025-07-02 RX ADMIN — IPRATROPIUM BROMIDE AND ALBUTEROL SULFATE 1 DOSE: .5; 3 SOLUTION RESPIRATORY (INHALATION) at 07:46

## 2025-07-02 RX ADMIN — ENOXAPARIN SODIUM 40 MG: 100 INJECTION SUBCUTANEOUS at 08:46

## 2025-07-02 RX ADMIN — ESCITALOPRAM OXALATE 10 MG: 10 TABLET ORAL at 07:34

## 2025-07-02 RX ADMIN — DEXMEDETOMIDINE HYDROCHLORIDE 1 MCG/KG/HR: 400 INJECTION INTRAVENOUS at 03:58

## 2025-07-02 RX ADMIN — ARFORMOTEROL TARTRATE 15 MCG: 15 SOLUTION RESPIRATORY (INHALATION) at 07:46

## 2025-07-02 RX ADMIN — LANSOPRAZOLE 30 MG: 15 TABLET, ORALLY DISINTEGRATING, DELAYED RELEASE ORAL at 07:34

## 2025-07-02 RX ADMIN — INSULIN LISPRO 2 UNITS: 100 INJECTION, SOLUTION INTRAVENOUS; SUBCUTANEOUS at 12:12

## 2025-07-02 RX ADMIN — WATER 60 MG: 1 INJECTION INTRAMUSCULAR; INTRAVENOUS; SUBCUTANEOUS at 10:28

## 2025-07-02 RX ADMIN — IPRATROPIUM BROMIDE AND ALBUTEROL SULFATE 1 DOSE: .5; 3 SOLUTION RESPIRATORY (INHALATION) at 01:23

## 2025-07-02 RX ADMIN — WATER 60 MG: 1 INJECTION INTRAMUSCULAR; INTRAVENOUS; SUBCUTANEOUS at 18:55

## 2025-07-02 RX ADMIN — BUSPIRONE HYDROCHLORIDE 10 MG: 10 TABLET ORAL at 21:24

## 2025-07-02 RX ADMIN — DEXMEDETOMIDINE HYDROCHLORIDE 0.8 MCG/KG/HR: 400 INJECTION INTRAVENOUS at 07:42

## 2025-07-02 ASSESSMENT — PULMONARY FUNCTION TESTS
PIF_VALUE: 27
PIF_VALUE: 10
PIF_VALUE: 31

## 2025-07-02 ASSESSMENT — PAIN SCALES - GENERAL: PAINLEVEL_OUTOF10: 0

## 2025-07-02 NOTE — PROGRESS NOTES
Occupational/ Physical Therapy: defer    Note patient was extubated this AM.  Consulted with intensivist, who advised to hold EOB activity today but cleared patient for light in-bed AROM exercises.  Met with patient and wife, who has already been encouraging him to move his extremities as able.  Will follow-up tomorrow for further activity as appropriate.    Eduin Marshall, OTR/L

## 2025-07-02 NOTE — PLAN OF CARE
Problem: Safety - Medical Restraint  Goal: Remains free of injury from restraints (Restraint for Interference with Medical Device)  Description: INTERVENTIONS:  1. Determine that other, less restrictive measures have been tried or would not be effective before applying the restraint  2. Evaluate the patient's condition at the time of restraint application  3. Inform patient/family regarding the reason for restraint  4. Q2H: Monitor safety, psychosocial status, comfort, nutrition and hydration  7/2/2025 0018 by Arabella Abrams, RN  Outcome: Progressing  Flowsheets (Taken 7/2/2025 0000)  Remains free of injury from restraints (restraint for interference with medical device):   Determine that other, less restrictive measures have been tried or would not be effective before applying the restraint   Every 2 hours: Monitor safety, psychosocial status, comfort, nutrition and hydration  7/1/2025 1958 by Arabella Abrams, RN  Outcome: Progressing

## 2025-07-02 NOTE — PROGRESS NOTES
0735: Dr. Aguero, this RN and RT [place] at bedside prepared to extubate.    0740: Pt extubated and immediately placed on BiPap. Settings: 16/8 Fio2 40%.  Patient not showing any signs of distress or trouble breathing.

## 2025-07-02 NOTE — PROGRESS NOTES
Name: Adrian Hurley   : 1955   MRN: [id number]   Date: 2025        Chief Complaint   Patient presents with    Shortness of Breath         HPI:     68 yo M presents with acute c/o SOB at home. Called EMS. SpO2 0.7 on RA. EMS palced on BiPAP. No improvement on BiPAP. Intubated in ED by ED staff. ICU c/s for admit. Extubated on  but reintubated on  due to resp failure.    : stable overnight. Tolerating SBT but still delayed exhalation. On fentanyl - 100. Off prop.    : awake, alert and following commands. Tolerated SBT for a couple of hours but needed to go back on rate due to significant anxiety. On precedex and fent.    : anxious this morning. Unable to tolerate SBT. On 50 of fent and precedex - 1.0.    : stable overnight on prop nad fent overnight. Still air trapping. No agitation. No fever. Normal hemodynamics.    : Stable overnight.  Doing well with SAT and SBT this morning.  Extubated early in the morning and transition to BiPAP.      Active Problems Being Managed:   Acute on chronic respiratory failure  Hypoxia  Hypercapnea  COPD exacerbation secondary to URI  Lactic acidosis  Tobacco abuse      Assessment/Plan:     Pt with severe COPD who was intubated initially on  and extubated on .  Unfortunately, reintubated on  and had a prolonged exacerbation..  Extubated again on .    - Stable postextubation.  On BiPAP with intermittent breaks.  - Repeat SBT daily  - continue Buspar and Lexapro  - MAP goal > 65  - continue nebulizers  - continue solumedrol 60 q8h  - completed abx ; wbc wnl  -  blood cx: NGTD  - H/H stable  - monitor platelets  - PT/OT  - diet as tolerated  - Family aware that in case of requiring reintubation, patient would not have a good prognosis.  For now, he is full code.      ICU DAILY CHECKLIST   Code Status:Full  DVT Prophylaxis:enoxaparin  T/L/D: Tubes: ETT  Lines: Peripheral IV  Drains: Mock Catheter  SUP: protonix  Diet:

## 2025-07-02 NOTE — PLAN OF CARE
Problem: Safety - Medical Restraint  Goal: Remains free of injury from restraints (Restraint for Interference with Medical Device)  Description: INTERVENTIONS:  1. Determine that other, less restrictive measures have been tried or would not be effective before applying the restraint  2. Evaluate the patient's condition at the time of restraint application  3. Inform patient/family regarding the reason for restraint  4. Q2H: Monitor safety, psychosocial status, comfort, nutrition and hydration  Recent Flowsheet Documentation  Taken 7/2/2025 0730 by Prema Elizabeth RN  Remains free of injury from restraints (restraint for interference with medical device):   Determine that other, less restrictive measures have been tried or would not be effective before applying the restraint   Evaluate the patient's condition at the time of restraint application   Inform patient/family regarding the reason for restraint   Every 2 hours: Monitor safety, psychosocial status, comfort, nutrition and hydration  Taken 7/2/2025 0000 by Arabella Abrams RN  Remains free of injury from restraints (restraint for interference with medical device):   Determine that other, less restrictive measures have been tried or would not be effective before applying the restraint   Every 2 hours: Monitor safety, psychosocial status, comfort, nutrition and hydration     Problem: Safety - Medical Restraint  Goal: Remains free of injury from restraints (Restraint for Interference with Medical Device)  Description: INTERVENTIONS:  1. Determine that other, less restrictive measures have been tried or would not be effective before applying the restraint  2. Evaluate the patient's condition at the time of restraint application  3. Inform patient/family regarding the reason for restraint  4. Q2H: Monitor safety, psychosocial status, comfort, nutrition and hydration  7/2/2025 0933 by Prema Elizabeth RN  Outcome: Completed  Flowsheets (Taken 7/2/2025

## 2025-07-02 NOTE — CARE COORDINATION
Care Management Progress Note    Reason for Admission:   COPD exacerbation (HCC) [J44.1]  COPD with acute exacerbation (HCC) [J44.1]  Acute respiratory failure with hypoxia and hypercarbia (HCC) [J96.01, J96.02]    Patient Admission Status: Inpatient  RUR: 16%  Hospitalization in the last 30 days (Readmission):  No      Comments:  CM s/w patient's wife Melida.  Patient to be evaluated by PT/OT when appropriate.  Of note, wife states patient has had home health multiple times, and she is not interested in it.  Patient has no prior rehab history.  Will follow for therapy recommendations.      Transition of care plan:  Medical management continues- extubated this morning and on Bipap.    CM following for discharge needs.  Patient lives with spouse who works from home.  He has a home Bipap and home O2 from [employer].  Family not interested in HH if recommended.  No prior rehab history.    Discharge plan communicated with patient and/or discharge caregiver: Yes    Date 1st IMM letter given: N/A  Outpatient follow-up  Transport at discharge:  TBD

## 2025-07-02 NOTE — PROGRESS NOTES
Comprehensive Nutrition Assessment    Type and Reason for Visit:  Reassess    Nutrition Recommendations/Plan:   NPO, consider SLP consult due to prolonged intubation   - Eats regular texture diet at home  Continue bowel regimen      Malnutrition Assessment:  Malnutrition Status:  No malnutrition (06/30/25 1149)    Context:  Acute Illness     Findings of the 6 clinical characteristics of malnutrition:  Energy Intake:  No decrease in energy intake  Weight Loss:  No weight loss     Body Fat Loss:  No body fat loss     Muscle Mass Loss:  No muscle mass loss    Fluid Accumulation:  Moderate to Severe Extremities   Strength:  Not Performed    Nutrition Assessment:     7/2: Follow up. Patient extubated this AM, enteral access removed. Currently on 5L biPAP 40%.  Currently on precedex 0.8 mcg/kg/hr. +BM overnight. Patient reports hunger, no abd pain. +flatus. He does not usually drinks protein supplements at home, will monitor PO intakes and recommend as needed. Mild hyponatremia but other lytes WDL.  BG trending ~200-250 mg/dL on q8 solumedrol w/ medium dose sliding scale.     Last Weight Metrics:      7/2/2025     8:20 AM 7/1/2025    11:36 AM 7/1/2025     6:00 AM 6/29/2025     6:00 AM 6/28/2025     6:30 AM 6/27/2025     2:00 AM 6/26/2025     9:14 AM   Weight Loss Metrics   Height 5' 10.866\" 5' 10.866\"     5' 10.866\"   Weight - Scale   185 lbs 14 oz 193 lbs 9 oz 194 lbs 11 oz 193 lbs 9 oz    BMI (Calculated)   26.1 kg/m2 27.2 kg/m2 27.3 kg/m2 27.2 kg/m2        Nutrition Related Findings:      Wound Type: None     Last BM: 07/01/25  Edema: Right upper extremity, Left upper extremity, Right lower extremity, Left lower extremity   Edema Generalized: +1  RUE Edema: +2  LUE Edema: +1  RLE Edema: Trace  LLE Edema: Trace    Nutr. Labs:    Lab Results   Component Value Date    CREATININE 0.50 (L) 07/02/2025    BUN  07/02/2025     Unable to obtain accurate results due to gross lipemia     (L) 07/02/2025    K 4.6  07/02/2025     07/02/2025    CO2 27 07/02/2025       Lab Results   Component Value Date/Time    POCGLU 209 07/01/2025 11:14 PM    POCGLU 150 07/01/2025 05:20 PM    POCGLU 230 07/01/2025 11:39 AM    POCGLU 224 07/01/2025 01:04 AM    POCGLU 237 06/30/2025 05:22 PM    POCGLU 212 06/30/2025 11:45 AM        Hemoglobin A1C   Date Value Ref Range Status   11/04/2021 5.8 (H) 4.0 - 5.6 % Final     Comment:     NEW METHOD  PLEASE NOTE NEW REFERENCE RANGE  (NOTE)  HbA1C Interpretive Ranges  <5.7              Normal  5.7 - 6.4         Consider Prediabetes  >6.5              Consider Diabetes         Lab Results   Component Value Date/Time    MG 1.8 07/02/2025 05:13 AM       Lab Results   Component Value Date    CALCIUM 7.4 (L) 07/02/2025    PHOS 3.3 07/02/2025       No results found for: \"TRIG\"    Nutr. Meds:  Scheduled Meds:   morphine  2 mg IntraVENous Once    sennosides-docusate sodium  2 tablet Per NG tube BID    polyethylene glycol  17 g Per NG tube BID    insulin lispro  0-8 Units SubCUTAneous Q6H    lansoprazole  30 mg Orogastric Daily    methylPREDNISolone  60 mg IntraVENous Q8H    busPIRone  10 mg Per NG tube TID    escitalopram  10 mg Per NG tube Daily    guaiFENesin  400 mg Per NG tube Q12H    ipratropium 0.5 mg-albuterol 2.5 mg  1 Dose Inhalation Q6H WA RT    sodium chloride  1,000 mL IntraVENous Once    sodium chloride flush  5-40 mL IntraVENous 2 times per day    enoxaparin  40 mg SubCUTAneous Daily    budesonide  0.5 mg Nebulization BID RT    And    arformoterol tartrate  15 mcg Nebulization BID RT     Continuous Infusions:   dexmedeTOMIDine 0.8 mcg/kg/hr (07/02/25 0742)    sodium chloride 5 mL/hr at 06/25/25 0202     PRN Meds: hydrOXYzine HCl, midazolam, lidocaine, sodium chloride flush, sodium chloride, potassium chloride **OR** potassium chloride, magnesium sulfate, ondansetron **OR** ondansetron, acetaminophen **OR** acetaminophen, ipratropium 0.5 mg-albuterol 2.5 mg      Current Nutrition Intake &

## 2025-07-03 LAB
ANION GAP SERPL CALC-SCNC: 2 MMOL/L (ref 2–12)
BASOPHILS # BLD: 0 K/UL (ref 0–0.1)
BASOPHILS NFR BLD: 0 % (ref 0–1)
BUN SERPL-MCNC: 22 MG/DL (ref 6–20)
BUN/CREAT SERPL: 55 (ref 12–20)
CALCIUM SERPL-MCNC: 9.1 MG/DL (ref 8.5–10.1)
CHLORIDE SERPL-SCNC: 102 MMOL/L (ref 97–108)
CO2 SERPL-SCNC: 35 MMOL/L (ref 21–32)
CREAT SERPL-MCNC: 0.4 MG/DL (ref 0.7–1.3)
DIFFERENTIAL METHOD BLD: ABNORMAL
EOSINOPHIL # BLD: 0 K/UL (ref 0–0.4)
EOSINOPHIL NFR BLD: 0 % (ref 0–7)
ERYTHROCYTE [DISTWIDTH] IN BLOOD BY AUTOMATED COUNT: 15.2 % (ref 11.5–14.5)
GLUCOSE BLD STRIP.AUTO-MCNC: 116 MG/DL (ref 65–117)
GLUCOSE BLD STRIP.AUTO-MCNC: 174 MG/DL (ref 65–117)
GLUCOSE BLD STRIP.AUTO-MCNC: 202 MG/DL (ref 65–117)
GLUCOSE SERPL-MCNC: 154 MG/DL (ref 65–100)
HCT VFR BLD AUTO: 30.7 % (ref 36.6–50.3)
HGB BLD-MCNC: 10.1 G/DL (ref 12.1–17)
IMM GRANULOCYTES # BLD AUTO: 0.05 K/UL (ref 0–0.04)
IMM GRANULOCYTES NFR BLD AUTO: 0.9 % (ref 0–0.5)
LYMPHOCYTES # BLD: 0.6 K/UL (ref 0.8–3.5)
LYMPHOCYTES NFR BLD: 10.5 % (ref 12–49)
MAGNESIUM SERPL-MCNC: 1.8 MG/DL (ref 1.6–2.4)
MCH RBC QN AUTO: 31.2 PG (ref 26–34)
MCHC RBC AUTO-ENTMCNC: 32.9 G/DL (ref 30–36.5)
MCV RBC AUTO: 94.8 FL (ref 80–99)
MONOCYTES # BLD: 0.36 K/UL (ref 0–1)
MONOCYTES NFR BLD: 6.3 % (ref 5–13)
NEUTS SEG # BLD: 4.72 K/UL (ref 1.8–8)
NEUTS SEG NFR BLD: 82.3 % (ref 32–75)
NRBC # BLD: 0 K/UL (ref 0–0.01)
NRBC BLD-RTO: 0 PER 100 WBC
PHOSPHATE SERPL-MCNC: 3 MG/DL (ref 2.6–4.7)
PLATELET # BLD AUTO: 104 K/UL (ref 150–400)
POTASSIUM SERPL-SCNC: 4.4 MMOL/L (ref 3.5–5.1)
RBC # BLD AUTO: 3.24 M/UL (ref 4.1–5.7)
SERVICE CMNT-IMP: ABNORMAL
SERVICE CMNT-IMP: ABNORMAL
SERVICE CMNT-IMP: NORMAL
SODIUM SERPL-SCNC: 139 MMOL/L (ref 136–145)
WBC # BLD AUTO: 5.7 K/UL (ref 4.1–11.1)

## 2025-07-03 PROCEDURE — 2500000003 HC RX 250 WO HCPCS: Performed by: NURSE PRACTITIONER

## 2025-07-03 PROCEDURE — 82962 GLUCOSE BLOOD TEST: CPT

## 2025-07-03 PROCEDURE — 84100 ASSAY OF PHOSPHORUS: CPT

## 2025-07-03 PROCEDURE — 2700000000 HC OXYGEN THERAPY PER DAY

## 2025-07-03 PROCEDURE — 6360000002 HC RX W HCPCS: Performed by: NURSE PRACTITIONER

## 2025-07-03 PROCEDURE — 85025 COMPLETE CBC W/AUTO DIFF WBC: CPT

## 2025-07-03 PROCEDURE — 97164 PT RE-EVAL EST PLAN CARE: CPT

## 2025-07-03 PROCEDURE — 97165 OT EVAL LOW COMPLEX 30 MIN: CPT

## 2025-07-03 PROCEDURE — 6360000002 HC RX W HCPCS: Performed by: HOSPITALIST

## 2025-07-03 PROCEDURE — 2000000000 HC ICU R&B

## 2025-07-03 PROCEDURE — 97530 THERAPEUTIC ACTIVITIES: CPT

## 2025-07-03 PROCEDURE — 94660 CPAP INITIATION&MGMT: CPT

## 2025-07-03 PROCEDURE — 6370000000 HC RX 637 (ALT 250 FOR IP): Performed by: HOSPITALIST

## 2025-07-03 PROCEDURE — 2500000003 HC RX 250 WO HCPCS: Performed by: HOSPITALIST

## 2025-07-03 PROCEDURE — 94640 AIRWAY INHALATION TREATMENT: CPT

## 2025-07-03 PROCEDURE — 80048 BASIC METABOLIC PNL TOTAL CA: CPT

## 2025-07-03 PROCEDURE — 83735 ASSAY OF MAGNESIUM: CPT

## 2025-07-03 PROCEDURE — 6370000000 HC RX 637 (ALT 250 FOR IP): Performed by: INTERNAL MEDICINE

## 2025-07-03 PROCEDURE — 6360000002 HC RX W HCPCS: Performed by: INTERNAL MEDICINE

## 2025-07-03 PROCEDURE — 36415 COLL VENOUS BLD VENIPUNCTURE: CPT

## 2025-07-03 RX ORDER — PREDNISONE 20 MG/1
60 TABLET ORAL DAILY
Status: DISCONTINUED | OUTPATIENT
Start: 2025-07-03 | End: 2025-07-05

## 2025-07-03 RX ADMIN — PANTOPRAZOLE SODIUM 40 MG: 40 TABLET, DELAYED RELEASE ORAL at 06:25

## 2025-07-03 RX ADMIN — IPRATROPIUM BROMIDE AND ALBUTEROL SULFATE 1 DOSE: .5; 3 SOLUTION RESPIRATORY (INHALATION) at 13:44

## 2025-07-03 RX ADMIN — SODIUM CHLORIDE, PRESERVATIVE FREE 10 ML: 5 INJECTION INTRAVENOUS at 08:05

## 2025-07-03 RX ADMIN — INSULIN LISPRO 2 UNITS: 100 INJECTION, SOLUTION INTRAVENOUS; SUBCUTANEOUS at 17:43

## 2025-07-03 RX ADMIN — BUDESONIDE 500 MCG: 0.5 INHALANT RESPIRATORY (INHALATION) at 09:07

## 2025-07-03 RX ADMIN — ARFORMOTEROL TARTRATE 15 MCG: 15 SOLUTION RESPIRATORY (INHALATION) at 20:30

## 2025-07-03 RX ADMIN — BUSPIRONE HYDROCHLORIDE 10 MG: 10 TABLET ORAL at 13:33

## 2025-07-03 RX ADMIN — IPRATROPIUM BROMIDE AND ALBUTEROL SULFATE 1 DOSE: .5; 3 SOLUTION RESPIRATORY (INHALATION) at 09:07

## 2025-07-03 RX ADMIN — ENOXAPARIN SODIUM 40 MG: 100 INJECTION SUBCUTANEOUS at 08:47

## 2025-07-03 RX ADMIN — WATER 60 MG: 1 INJECTION INTRAMUSCULAR; INTRAVENOUS; SUBCUTANEOUS at 03:47

## 2025-07-03 RX ADMIN — IPRATROPIUM BROMIDE AND ALBUTEROL SULFATE 1 DOSE: .5; 3 SOLUTION RESPIRATORY (INHALATION) at 20:35

## 2025-07-03 RX ADMIN — GUAIFENESIN 400 MG: 200 SOLUTION ORAL at 20:27

## 2025-07-03 RX ADMIN — PREDNISONE 60 MG: 20 TABLET ORAL at 12:51

## 2025-07-03 RX ADMIN — ESCITALOPRAM OXALATE 10 MG: 10 TABLET ORAL at 08:05

## 2025-07-03 RX ADMIN — LANSOPRAZOLE 30 MG: 15 TABLET, ORALLY DISINTEGRATING, DELAYED RELEASE ORAL at 13:33

## 2025-07-03 RX ADMIN — BUDESONIDE 500 MCG: 0.5 INHALANT RESPIRATORY (INHALATION) at 20:30

## 2025-07-03 RX ADMIN — BUSPIRONE HYDROCHLORIDE 10 MG: 10 TABLET ORAL at 20:26

## 2025-07-03 RX ADMIN — GUAIFENESIN 400 MG: 200 SOLUTION ORAL at 08:05

## 2025-07-03 RX ADMIN — DEXMEDETOMIDINE HYDROCHLORIDE 0.8 MCG/KG/HR: 400 INJECTION INTRAVENOUS at 01:14

## 2025-07-03 RX ADMIN — SODIUM CHLORIDE, PRESERVATIVE FREE 10 ML: 5 INJECTION INTRAVENOUS at 20:28

## 2025-07-03 RX ADMIN — BUSPIRONE HYDROCHLORIDE 10 MG: 10 TABLET ORAL at 08:05

## 2025-07-03 RX ADMIN — DEXMEDETOMIDINE HYDROCHLORIDE 0.8 MCG/KG/HR: 400 INJECTION INTRAVENOUS at 06:25

## 2025-07-03 RX ADMIN — ARFORMOTEROL TARTRATE 15 MCG: 15 SOLUTION RESPIRATORY (INHALATION) at 09:07

## 2025-07-03 ASSESSMENT — PAIN SCALES - GENERAL
PAINLEVEL_OUTOF10: 0
PAINLEVEL_OUTOF10: 0

## 2025-07-03 NOTE — CARE COORDINATION
Care Management Progress Note    Reason for Admission:   COPD exacerbation (HCC) [J44.1]  COPD with acute exacerbation (HCC) [J44.1]  Acute respiratory failure with hypoxia and hypercarbia (HCC) [J96.01, J96.02]         Patient Admission Status: Inpatient  RUR: 15%  Hospitalization in the last 30 days (Readmission):  no        Transition of care plan:  [DC plan]xtubated this morning and on Bipap.    CM following for discharge needs.  Patient lives with spouse who works from home.  He has a home Bipap and home O2 from [employer].  Family not interested in HH if recommended.  No prior rehab history.  PT/OT consulted  Will follow their recommendations and will discuss with pt when completed   Discharge plan communicated with patient and/or discharge caregiver: no    Date 1st IMM letter given:   Outpatient follow-up.[place] [employer]  Transport at discharge: spouse      Wendy Royal RN

## 2025-07-03 NOTE — PLAN OF CARE
Problem: Occupational Therapy - Adult  Goal: By Discharge: Performs self-care activities at highest level of function for planned discharge setting.  See evaluation for individualized goals.  Description: FUNCTIONAL STATUS PRIOR TO ADMISSION:  Patient lives with very supportive spouse and was independent at baseline with most ADLs. Patient's wife reports she washes his hair and back in the shower, but he completes all other tasks and she takes care of the IADLs with patient contributing occasionally when able. Patient does not use AD around the house, but has an electric scooter he will utilize in the community.     , Prior Level of Assist for ADLs:  (wife washes hair and back in the shower),  ,  ,  ,  ,  , Prior Level of Assist for Homemaking: Needs assistance,  , Prior Level of Assist for Transfers: Independent, Active : Yes     HOME SUPPORT: Patient lived with wife who provides Michelle for bathing and full IADL assistance.    Occupational Therapy Goals:  Initiated 7/3/2025  1.  Patient will perform lower body dressing with Moderate Assist within 7 day(s).  2.  Patient will perform upper body dressing with Minimal Assist within 7 day(s).  3.  Patient will perform grooming with Minimal Assist within 7 day(s).  4.  Patient will perform toilet transfers with Moderate Assist and Assist x1  within 7 day(s).  5.  Patient will perform all aspects of toileting with Moderate Assist and Assist x1 within 7 day(s).  6.  Patient will participate in upper extremity therapeutic exercise/activities with Minimal Assist for 10 minutes within 7 day(s).    7.  Patient will utilize energy conservation techniques during functional activities with verbal cues within 7 day(s).    7/3/2025 1345 by Angie Barr  Outcome: Progressing   OCCUPATIONAL THERAPY EVALUATION    Patient: Adrian Hurley (69 y.o. male)  Date: 7/3/2025  Primary Diagnosis: COPD exacerbation (HCC) [J44.1]  COPD with acute exacerbation (HCC) [J44.1]  Acute

## 2025-07-03 NOTE — PLAN OF CARE
Problem: Physical Therapy - Adult  Goal: By Discharge: Performs mobility at highest level of function for planned discharge setting.  See evaluation for individualized goals.  Description: FUNCTIONAL STATUS PRIOR TO ADMISSION: Patient was independent and active without use of DME. and At baseline the patient was on 3 liters/min of supplemental O2 continuously.    HOME SUPPORT PRIOR TO ADMISSION: The patient lived with spouse.    Physical Therapy Goals  Re-eval 7/3/25 after extubation 7/2/25  1.  Patient will move from supine to sit and sit to supine and roll side to side in bed with [id number] within 7 day(s).    2.  Patient will perform sit to stand with minimal assistance within 7 day(s).  3.  Patient will transfer from bed to chair and chair to bed with minimal assistance using the least restrictive device within 7 day(s).  4.  Patient will ambulate with minimal assistance for 25 feet with the least restrictive device within 7 day(s).     Initiated 6/27/2025  1.  Patient will move from supine to sit and sit to supine and roll side to side in bed with maximal assistance within 7 day(s).    2.  Patient will perform sit to stand with maximal assistance within 7 day(s).  3.  Patient will transfer from bed to chair and chair to bed with maximal assistance using the least restrictive device within 7 day(s).  4.  Patient will ambulate with moderate assistance for 5 feet with the least restrictive device within 7 day(s).   5. Patient will tolerate bed in chair position x 1 hour with VSS within 7 days  6. Patient will tolerate seated EOB with mod A x 10 minutes within 7 days  7/3/2025 1403 by Alma Guan, PT  Outcome: Progressing     PHYSICAL THERAPY RE-EVALUATION    Patient: Adrian Hurley (69 y.o. male)  Date: 7/3/2025  Primary Diagnosis: COPD exacerbation (HCC) [J44.1]  COPD with acute exacerbation (HCC) [J44.1]  Acute respiratory failure with hypoxia and hypercarbia (HCC) [J96.01, J96.02]

## 2025-07-03 NOTE — PROGRESS NOTES
Name: Adrian Hurley   : 1955   MRN: [id number]   Date: 7/3/2025        Chief Complaint   Patient presents with    Shortness of Breath         HPI:     70 yo M presents with acute c/o SOB at home. Called EMS. SpO2 0.7 on RA. EMS palced on BiPAP. No improvement on BiPAP. Intubated in ED by ED staff. ICU c/s for admit. Extubated on  but reintubated on  due to resp failure.    : stable overnight. Tolerating SBT but still delayed exhalation. On fentanyl - 100. Off prop.    : awake, alert and following commands. Tolerated SBT for a couple of hours but needed to go back on rate due to significant anxiety. On precedex and fent.    : anxious this morning. Unable to tolerate SBT. On 50 of fent and precedex - 1.0.    : stable overnight on prop nad fent overnight. Still air trapping. No agitation. No fever. Normal hemodynamics.    : Stable overnight.  Doing well with SAT and SBT this morning.  Extubated early in the morning and transition to BiPAP.    7/3: Stable resp status. Precedex at 0.4.       Active Problems Being Managed:   Acute on chronic respiratory failure  Hypoxia  Hypercapnea  COPD exacerbation secondary to URI  Lactic acidosis  Tobacco abuse      Assessment/Plan:     Pt with severe COPD who was intubated initially on  and extubated on .  Unfortunately, reintubated on  and had a prolonged exacerbation..  Extubated again on .    - Stable postextubation.    - DC precedex  - continue Buspar and Lexapro  - prn atarax for anxiety  - MAP goal > 65  - continue nebulizers  - switch solumedrol to prednisone 60 mg daily  - completed abx ; wbc wnl  - Use bipap overnight as well as during the day with naps and rest. Patient's wife will bring his home bipap tomorrow for pulmonary consult service to look at and adjust if needed.   - PT/OT  - diet as tolerated - good PO intake.  - Family aware that in case of requiring reintubation, patient would not have a good  prognosis.  For now, he is full code.      ICU DAILY CHECKLIST   Code Status:Full  DVT Prophylaxis:enoxaparin  T/L/D: Tubes: ETT  Lines: Peripheral IV  Drains: Mock Catheter  SUP: protonix  Diet: NPO  Activity Level:bed rest  ABCDEF Bundle/Checklist Completed:Yes  Disposition: Stay in ICU  Multidisciplinary Rounds Completed:  Yes  Patient/Family Updated: Yes  Goals of Care Discussion/Palliative: Yes    I personally spent 45 minutes of critical care time.  This is time spent at this critically ill patient's bedside actively involved in patient care as well as the coordination of care and discussions with the patient's family.  This does not include any procedural time which has been billed separately.        Review of systems:     Review of Systems     Unable to provide ROS    Objective:     Vital Signs:  BP (!) 149/64   Pulse 65   Temp 98 °F (36.7 °C) (Oral)   Resp 12   Ht 1.8 m (5' 10.87\")   Wt 84.3 kg (185 lb 13.6 oz)   SpO2 99%   BMI 26.02 kg/m²      Temp (24hrs), Av.4 °F (36.9 °C), Min:98 °F (36.7 °C), Max:98.7 °F (37.1 °C)           Intake/Output:     Intake/Output Summary (Last 24 hours) at 7/3/2025 1038  Last data filed at 7/3/2025 0945  Gross per 24 hour   Intake 397.21 ml   Output 2600 ml   Net -2202.79 ml       Physical Exam:  Physical Exam    Intubated, awake, oriented and responsive  Non focal neuro eval  Rrr, - mrt  Delayed exp phase, scattered wheezes  No LE swelling    Past Medical History:        has a past medical history of COPD (chronic obstructive pulmonary disease) ([facility]), GERD (gastroesophageal reflux disease), Hyperlipidemia, Hypertension, Polyp of colon, and Sleep apnea.    Past Surgical History:      has a past surgical history that includes IR GUIDED INJ LUMB/SAC TRANSFORAMINAL EPI SINGLE LEVEL (10/30/2019); IR GUIDED INJ LUMB/SAC TRANSFORAMINAL EPI SINGLE LEVEL (10/1/2021); pr unlisted procedure cardiac surgery (); pr unlisted procedure cardiac surgery; lumbar fusion; lumbar

## 2025-07-03 NOTE — PLAN OF CARE
Problem: Discharge Planning  Goal: Discharge to home or other facility with appropriate resources  Outcome: Progressing     Problem: Safety - Adult  Goal: Free from fall injury  Outcome: Progressing     Problem: Pain  Goal: Verbalizes/displays adequate comfort level or baseline comfort level  Outcome: Progressing     Problem: Neurosensory - Adult  Goal: Achieves stable or improved neurological status  Outcome: Progressing     Problem: Skin/Tissue Integrity - Adult  Goal: Skin integrity remains intact  Description: 1.  Monitor for areas of redness and/or skin breakdown  2.  Assess vascular access sites hourly  3.  Every 4-6 hours minimum:  Change oxygen saturation probe site  4.  Every 4-6 hours:  If on nasal continuous positive airway pressure, respiratory therapy assess nares and determine need for appliance change or resting period  Recent Flowsheet Documentation  Taken 7/3/2025 0800 by Morales España RN  Skin Integrity Remains Intact:   Monitor for areas of redness and/or skin breakdown   Assess vascular access sites hourly   Every 4-6 hours minimum:  Change oxygen saturation probe site   Turn and reposition as indicated     Problem: Nutrition Deficit:  Goal: Optimize nutritional status  Outcome: Progressing

## 2025-07-04 LAB
ANION GAP SERPL CALC-SCNC: 5 MMOL/L (ref 2–12)
BASOPHILS # BLD: 0.01 K/UL (ref 0–0.1)
BASOPHILS NFR BLD: 0.1 % (ref 0–1)
BUN SERPL-MCNC: 19 MG/DL (ref 6–20)
BUN/CREAT SERPL: 48 (ref 12–20)
CALCIUM SERPL-MCNC: 8.6 MG/DL (ref 8.5–10.1)
CHLORIDE SERPL-SCNC: 104 MMOL/L (ref 97–108)
CO2 SERPL-SCNC: 29 MMOL/L (ref 21–32)
CREAT SERPL-MCNC: 0.4 MG/DL (ref 0.7–1.3)
DIFFERENTIAL METHOD BLD: ABNORMAL
EOSINOPHIL # BLD: 0.02 K/UL (ref 0–0.4)
EOSINOPHIL NFR BLD: 0.3 % (ref 0–7)
ERYTHROCYTE [DISTWIDTH] IN BLOOD BY AUTOMATED COUNT: 15.2 % (ref 11.5–14.5)
GLUCOSE BLD STRIP.AUTO-MCNC: 103 MG/DL (ref 65–117)
GLUCOSE BLD STRIP.AUTO-MCNC: 126 MG/DL (ref 65–117)
GLUCOSE BLD STRIP.AUTO-MCNC: 133 MG/DL (ref 65–117)
GLUCOSE BLD STRIP.AUTO-MCNC: 214 MG/DL (ref 65–117)
GLUCOSE BLD STRIP.AUTO-MCNC: 98 MG/DL (ref 65–117)
GLUCOSE SERPL-MCNC: 98 MG/DL (ref 65–100)
HCT VFR BLD AUTO: 35.1 % (ref 36.6–50.3)
HGB BLD-MCNC: 11.3 G/DL (ref 12.1–17)
IMM GRANULOCYTES # BLD AUTO: 0.09 K/UL (ref 0–0.04)
IMM GRANULOCYTES NFR BLD AUTO: 1.1 % (ref 0–0.5)
LYMPHOCYTES # BLD: 1.38 K/UL (ref 0.8–3.5)
LYMPHOCYTES NFR BLD: 17.3 % (ref 12–49)
MAGNESIUM SERPL-MCNC: 1.6 MG/DL (ref 1.6–2.4)
MCH RBC QN AUTO: 30.9 PG (ref 26–34)
MCHC RBC AUTO-ENTMCNC: 32.2 G/DL (ref 30–36.5)
MCV RBC AUTO: 95.9 FL (ref 80–99)
MONOCYTES # BLD: 0.74 K/UL (ref 0–1)
MONOCYTES NFR BLD: 9.3 % (ref 5–13)
NEUTS SEG # BLD: 5.74 K/UL (ref 1.8–8)
NEUTS SEG NFR BLD: 71.9 % (ref 32–75)
NRBC # BLD: 0 K/UL (ref 0–0.01)
NRBC BLD-RTO: 0 PER 100 WBC
PHOSPHATE SERPL-MCNC: 2.1 MG/DL (ref 2.6–4.7)
PLATELET # BLD AUTO: 141 K/UL (ref 150–400)
POTASSIUM SERPL-SCNC: 3.8 MMOL/L (ref 3.5–5.1)
RBC # BLD AUTO: 3.66 M/UL (ref 4.1–5.7)
SERVICE CMNT-IMP: ABNORMAL
SERVICE CMNT-IMP: NORMAL
SERVICE CMNT-IMP: NORMAL
SODIUM SERPL-SCNC: 138 MMOL/L (ref 136–145)
WBC # BLD AUTO: 8 K/UL (ref 4.1–11.1)

## 2025-07-04 PROCEDURE — 36415 COLL VENOUS BLD VENIPUNCTURE: CPT

## 2025-07-04 PROCEDURE — 6370000000 HC RX 637 (ALT 250 FOR IP): Performed by: HOSPITALIST

## 2025-07-04 PROCEDURE — 2500000003 HC RX 250 WO HCPCS: Performed by: NURSE PRACTITIONER

## 2025-07-04 PROCEDURE — 97530 THERAPEUTIC ACTIVITIES: CPT

## 2025-07-04 PROCEDURE — 94669 MECHANICAL CHEST WALL OSCILL: CPT

## 2025-07-04 PROCEDURE — 85025 COMPLETE CBC W/AUTO DIFF WBC: CPT

## 2025-07-04 PROCEDURE — 94761 N-INVAS EAR/PLS OXIMETRY MLT: CPT

## 2025-07-04 PROCEDURE — 94640 AIRWAY INHALATION TREATMENT: CPT

## 2025-07-04 PROCEDURE — 6360000002 HC RX W HCPCS: Performed by: NURSE PRACTITIONER

## 2025-07-04 PROCEDURE — 80048 BASIC METABOLIC PNL TOTAL CA: CPT

## 2025-07-04 PROCEDURE — 2500000003 HC RX 250 WO HCPCS: Performed by: HOSPITALIST

## 2025-07-04 PROCEDURE — 94660 CPAP INITIATION&MGMT: CPT

## 2025-07-04 PROCEDURE — 94667 MNPJ CHEST WALL 1ST: CPT

## 2025-07-04 PROCEDURE — 82962 GLUCOSE BLOOD TEST: CPT

## 2025-07-04 PROCEDURE — 97110 THERAPEUTIC EXERCISES: CPT

## 2025-07-04 PROCEDURE — 84100 ASSAY OF PHOSPHORUS: CPT

## 2025-07-04 PROCEDURE — 6370000000 HC RX 637 (ALT 250 FOR IP): Performed by: INTERNAL MEDICINE

## 2025-07-04 PROCEDURE — 2000000000 HC ICU R&B

## 2025-07-04 PROCEDURE — 6360000002 HC RX W HCPCS: Performed by: INTERNAL MEDICINE

## 2025-07-04 PROCEDURE — 83735 ASSAY OF MAGNESIUM: CPT

## 2025-07-04 PROCEDURE — 83036 HEMOGLOBIN GLYCOSYLATED A1C: CPT

## 2025-07-04 RX ADMIN — BUDESONIDE 500 MCG: 0.5 INHALANT RESPIRATORY (INHALATION) at 07:56

## 2025-07-04 RX ADMIN — BUSPIRONE HYDROCHLORIDE 10 MG: 10 TABLET ORAL at 15:40

## 2025-07-04 RX ADMIN — LANSOPRAZOLE 30 MG: 15 TABLET, ORALLY DISINTEGRATING, DELAYED RELEASE ORAL at 15:40

## 2025-07-04 RX ADMIN — LANSOPRAZOLE 30 MG: 15 TABLET, ORALLY DISINTEGRATING, DELAYED RELEASE ORAL at 05:34

## 2025-07-04 RX ADMIN — ARFORMOTEROL TARTRATE 15 MCG: 15 SOLUTION RESPIRATORY (INHALATION) at 21:06

## 2025-07-04 RX ADMIN — ARFORMOTEROL TARTRATE 15 MCG: 15 SOLUTION RESPIRATORY (INHALATION) at 07:56

## 2025-07-04 RX ADMIN — SODIUM CHLORIDE, PRESERVATIVE FREE 10 ML: 5 INJECTION INTRAVENOUS at 20:45

## 2025-07-04 RX ADMIN — BUSPIRONE HYDROCHLORIDE 10 MG: 10 TABLET ORAL at 20:45

## 2025-07-04 RX ADMIN — IPRATROPIUM BROMIDE AND ALBUTEROL SULFATE 1 DOSE: .5; 3 SOLUTION RESPIRATORY (INHALATION) at 14:58

## 2025-07-04 RX ADMIN — ESCITALOPRAM OXALATE 10 MG: 10 TABLET ORAL at 09:36

## 2025-07-04 RX ADMIN — BUDESONIDE 500 MCG: 0.5 INHALANT RESPIRATORY (INHALATION) at 21:06

## 2025-07-04 RX ADMIN — PREDNISONE 60 MG: 20 TABLET ORAL at 09:39

## 2025-07-04 RX ADMIN — HYDROXYZINE HYDROCHLORIDE 25 MG: 25 TABLET, FILM COATED ORAL at 00:04

## 2025-07-04 RX ADMIN — ENOXAPARIN SODIUM 40 MG: 100 INJECTION SUBCUTANEOUS at 09:36

## 2025-07-04 RX ADMIN — GUAIFENESIN 400 MG: 200 SOLUTION ORAL at 09:35

## 2025-07-04 RX ADMIN — INSULIN LISPRO 2 UNITS: 100 INJECTION, SOLUTION INTRAVENOUS; SUBCUTANEOUS at 17:44

## 2025-07-04 RX ADMIN — IPRATROPIUM BROMIDE AND ALBUTEROL SULFATE 1 DOSE: .5; 3 SOLUTION RESPIRATORY (INHALATION) at 21:06

## 2025-07-04 RX ADMIN — BUSPIRONE HYDROCHLORIDE 10 MG: 10 TABLET ORAL at 09:37

## 2025-07-04 RX ADMIN — IPRATROPIUM BROMIDE AND ALBUTEROL SULFATE 1 DOSE: .5; 3 SOLUTION RESPIRATORY (INHALATION) at 08:01

## 2025-07-04 RX ADMIN — GUAIFENESIN 400 MG: 200 SOLUTION ORAL at 20:45

## 2025-07-04 RX ADMIN — SODIUM CHLORIDE, PRESERVATIVE FREE 10 ML: 5 INJECTION INTRAVENOUS at 09:35

## 2025-07-04 ASSESSMENT — PAIN SCALES - GENERAL
PAINLEVEL_OUTOF10: 0

## 2025-07-04 NOTE — PLAN OF CARE
Restrictions/Precautions  Restrictions/Precautions: Fall Risk  Activity Level: Up with Assist            ASSESSMENT:  Patient continues to benefit from skilled PT services and is slowly progressing towards goals. MD has instructed patient to use bipap when resting/sleeping. Per nurse on 1 L NC 98%-100%. At home patient has instructions that he can go up to as high as 4L NC O2 prn for DEJESUS but wife reports he doesn't use O2 all the time. Today repetitive SLRs and knee to chest in supine without difficulty or ext lag. Instructed to continue those bed exercises in addition to Apumps, glut sets, then LAQs and Knee raises when in chair. 20 reps then resting and repeat. Wife is excellent advocate and will remind him of exercises. BP soft with EOB and sit in chair but consistent 109/69 to 105/80. Patient with slight dizziness reported with initial position changes but reports symptoms resolve. Patient was able to perform 4x sit<>stand with longer rest break provided before 3rd rep and SPT to chair. HR 86 to 124bpm but with PLB in chair recovers after solid 4 minutes. Titrated O2 up to 2 L and then back down to 1.5L and O2 sats 96-98%, HR back to 93bpm. Attained BSC so no longer has to use bedpan. Advised to be up for meals and in chair as much as can tolerate. Advised no up without hands on physical assist for fall prevention but patient is progressing toward one person assist when using RW for support. Subject of rehab recs of IPR were discussed. Pending progress but appeared patient and his wife are hopeful he can improve to a degree that he can return to home. Wife mentioned that after 8 day LOS in ICU in Feb 2025 he was able to improve such that he went home. Patient and wife appear realistic about goals after this 13 day stay and are stating they will consider rehab but hope he will progress enough with therapies to d/c home. Patient is profoundly weak in gluts and quads with atrophy and he is at high risk for falls if  Rating:  Denied pain/10   Pain Intervention(s):   nursing notified and addressing, rest, and repositioning    Activity Tolerance:   Poor, requires frequent rest breaks, observed shortness of breath on exertion, and desaturates with activity and requires oxygen    After treatment:   Patient left in no apparent distress sitting up in chair, Call bell within reach, and Caregiver / family present      COMMUNICATION/EDUCATION:   The patient's plan of care was discussed with: registered nurse    Patient Education  Education Given To: Patient;Family  Education Provided: Plan of Care;Home Exercise Program;Transfer Training;Mobility Training;Fall Prevention Strategies;Equipment  Education Method: Verbal  Barriers to Learning: None  Education Outcome: Verbalized understanding;Demonstrated understanding      LEYDI WAGGONER, PT  Minutes: 39

## 2025-07-04 NOTE — PROGRESS NOTES
Name: Adrian Hurley   : 1955   MRN: [id number]   Date: 2025        Chief Complaint   Patient presents with    Shortness of Breath         HPI:     70 yo M presents with acute c/o SOB at home. Called EMS. SpO2 0.7 on RA. EMS palced on BiPAP. No improvement on BiPAP. Intubated in ED by ED staff. ICU c/s for admit. Extubated on  but reintubated on  due to resp failure.    : stable overnight. Tolerating SBT but still delayed exhalation. On fentanyl - 100. Off prop.    : awake, alert and following commands. Tolerated SBT for a couple of hours but needed to go back on rate due to significant anxiety. On precedex and fent.    : anxious this morning. Unable to tolerate SBT. On 50 of fent and precedex - 1.0.    : stable overnight on prop nad fent overnight. Still air trapping. No agitation. No fever. Normal hemodynamics.    : Stable overnight.  Doing well with SAT and SBT this morning.  Extubated early in the morning and transition to BiPAP.    7/3: Stable resp status. Precedex at 0.4.   : Used BIPAP overnight. Denies any chest pain,sob, nausea, vomiting. Wearing BIPAP at the time of exam. Reports that he worse as he wanted to take a nap. Stable to be transfer out of ICU     Active Problems Being Managed:   Acute on chronic respiratory failure  Hypoxia  Hypercapnea  COPD exacerbation secondary to URI  Lactic acidosis  Tobacco abuse      Assessment/Plan:     Pt with severe COPD who was intubated initially on  and extubated on .  Unfortunately, reintubated on  and had a prolonged exacerbation..  Extubated again on .    - Stable postextubation.    - DC precedex  - continue Buspar and Lexapro  - prn atarax for anxiety  - MAP goal > 65  - continue nebulizers  - switch solumedrol to prednisone 60 mg daily  - completed abx ; wbc wnl  - Use bipap overnight as well as during the day with naps and rest. Patient's wife will bring his home bipap tomorrow for

## 2025-07-04 NOTE — PLAN OF CARE
Problem: Discharge Planning  Goal: Discharge to home or other facility with appropriate resources  7/4/2025 0016 by Bernadette Houston RN  Outcome: Progressing  7/3/2025 1642 by Morales España RN  Outcome: Progressing     Problem: Safety - Adult  Goal: Free from fall injury  7/4/2025 0016 by Bernadette Houston RN  Outcome: Progressing  7/3/2025 1642 by Morales España RN  Outcome: Progressing     Problem: Pain  Goal: Verbalizes/displays adequate comfort level or baseline comfort level  7/4/2025 0016 by Bernadette Houston RN  Outcome: Progressing  7/3/2025 1642 by Morales España RN  Outcome: Progressing     Problem: Respiratory - Adult  Goal: Achieves optimal ventilation and oxygenation  7/4/2025 0016 by Bernadette Houston RN  Outcome: Progressing  7/3/2025 2048 by Maricarmen Butcher RCP  Outcome: Progressing     Problem: Skin/Tissue Integrity  Goal: Skin integrity remains intact  Description: 1.  Monitor for areas of redness and/or skin breakdown  2.  Assess vascular access sites hourly  3.  Every 4-6 hours minimum:  Change oxygen saturation probe site  4.  Every 4-6 hours:  If on nasal continuous positive airway pressure, respiratory therapy assess nares and determine need for appliance change or resting period  Outcome: Progressing     Problem: Nutrition Deficit:  Goal: Optimize nutritional status  7/4/2025 0016 by Bernadette Houston RN  Outcome: Progressing  7/3/2025 1642 by Morales España RN  Outcome: Progressing     Problem: Neurosensory - Adult  Goal: Achieves stable or improved neurological status  7/4/2025 0016 by Bernadette Houston RN  Outcome: Progressing  7/3/2025 1642 by Morales España RN  Outcome: Progressing     Problem: Skin/Tissue Integrity - Adult  Goal: Skin integrity remains intact  Description: 1.  Monitor for areas of redness and/or skin breakdown  2.  Assess vascular access sites hourly  3.  Every 4-6 hours minimum:  Change oxygen saturation probe site  4.  Every 4-6 hours:  If on nasal

## 2025-07-04 NOTE — H&P
Hospitalist Admission Note    NAME:  Adrian Hurley   :  1955   MRN:  [id number]     Date/Time:  2025 3:04 PM    Patient PCP: Madiha Araujo PA  ________________________________________________________________________    Given the patient's current clinical presentation, I have a high level of concern for decompensation if discharged from the emergency department.  Complex decision making was performed, which includes reviewing the patient's available past medical records, laboratory results, and x-ray films.       My assessment of this patient's clinical condition and my plan of care is as follows.    Assessment / Plan:  Adrian Hurley is a 69 y.o.  male with COPD and chronic hypoxemic respiratory failure.  He was admitted to the ICU of this hospital on 2025 for management of acute on chronic hypoxemic respiratory failure secondary to COPD exacerbation.  Transferred to the hospitalist service on 2025.    #Acute on chronic hypoxemic respiratory failure: Reason for admission.  Previously required intubation on admission, extubated, then reintubated .  Finally extubated on  and oxygen requirements weaning down since.  Secondary to COPD exacerbation.  Low suspicion for pneumonia based on imaging and negative procalcitonin on admission.  He follows with [facility] as an outpatient.  -Continue prednisone 60 mg for now  -Consult pulmonary for continued assistance  -Asked patient to have his wife bring in his home CPAP machine to ensure is working properly and has the right settings  -Continue Pulmicort/Brovana nebs twice daily  -Continue DuoNebs as needed    #Normocytic anemia: No clinically evident bleeding.  Stable hemoglobin recently.    #Thrombocytopenia: Improving.  Likely from critical illness.    #CAD: Incidental finding on admission CTA chest.  No chest pain.    #Prediabetes with hyperglycemia: No recent A1c.  -Continue SSI  -A1c  for input(s): \"INR\" in the last 72 hours.    _______________________________________________________________________  Care Plan discussed with:    Comments   Patient x Discussed with patient in room. POC outlined and Questions answered    Family      RN x    Care Manager                    Consultant:  kasey POTTER MD   _______________________________________________________________________  Recommended Disposition:   Home with Family    HH/PT/OT/RN    SNF/LTC    HARRY x   ________________________________________________________________________      Comments   >50% of visit spent in counseling and coordination of care  Chart reviewed  Discussion with patient and/or family and questions answered     ________________________________________________________________________  Signed: Markus Simmons MD        Procedures: see electronic medical records for all procedures/Xrays/labs and details which were not copied into this note but were reviewed prior to creation of Plan.

## 2025-07-04 NOTE — PLAN OF CARE
Problem: Discharge Planning  Goal: Discharge to home or other facility with appropriate resources  7/4/2025 1041 by Enedelia Cody RN  Outcome: Progressing  7/4/2025 0016 by Bernadette Houston RN  Outcome: Progressing     Problem: Safety - Adult  Goal: Free from fall injury  7/4/2025 1041 by Enedelia Cody RN  Outcome: Progressing  7/4/2025 0016 by Bernadette Houston RN  Outcome: Progressing     Problem: Pain  Goal: Verbalizes/displays adequate comfort level or baseline comfort level  7/4/2025 1041 by Enedelia Cody RN  Outcome: Progressing  7/4/2025 0016 by Bernadette Houston RN  Outcome: Progressing     Problem: Respiratory - Adult  Goal: Achieves optimal ventilation and oxygenation  7/4/2025 1041 by Enedelia Cody RN  Outcome: Progressing  7/4/2025 1027 by Mayi Bear RCP  Outcome: Progressing  7/4/2025 0016 by Bernadette Houston RN  Outcome: Progressing  7/3/2025 2048 by Maricarmen Butcher RCP  Outcome: Progressing     Problem: Skin/Tissue Integrity  Goal: Skin integrity remains intact  Description: 1.  Monitor for areas of redness and/or skin breakdown  2.  Assess vascular access sites hourly  3.  Every 4-6 hours minimum:  Change oxygen saturation probe site  4.  Every 4-6 hours:  If on nasal continuous positive airway pressure, respiratory therapy assess nares and determine need for appliance change or resting period  7/4/2025 1041 by Enedelia Cody RN  Outcome: Progressing  7/4/2025 0016 by Bernadette Houston RN  Outcome: Progressing     Problem: Nutrition Deficit:  Goal: Optimize nutritional status  7/4/2025 1041 by Enedelia Cody RN  Outcome: Progressing  7/4/2025 0016 by Bernadette Houston RN  Outcome: Progressing     Problem: Neurosensory - Adult  Goal: Achieves stable or improved neurological status  7/4/2025 1041 by Enedelia Cody RN  Outcome: Progressing  7/4/2025 0016 by Bernadette Houston RN  Outcome: Progressing     Problem: Skin/Tissue Integrity - Adult  Goal: Skin

## 2025-07-05 LAB
ANION GAP SERPL CALC-SCNC: 7 MMOL/L (ref 2–12)
BASOPHILS # BLD: 0 K/UL (ref 0–0.1)
BASOPHILS NFR BLD: 0 % (ref 0–1)
BUN SERPL-MCNC: 14 MG/DL (ref 6–20)
BUN/CREAT SERPL: 27 (ref 12–20)
CALCIUM SERPL-MCNC: 9 MG/DL (ref 8.5–10.1)
CHLORIDE SERPL-SCNC: 99 MMOL/L (ref 97–108)
CO2 SERPL-SCNC: 36 MMOL/L (ref 21–32)
CREAT SERPL-MCNC: 0.52 MG/DL (ref 0.7–1.3)
DIFFERENTIAL METHOD BLD: ABNORMAL
EOSINOPHIL # BLD: 0.07 K/UL (ref 0–0.4)
EOSINOPHIL NFR BLD: 0.7 % (ref 0–7)
ERYTHROCYTE [DISTWIDTH] IN BLOOD BY AUTOMATED COUNT: 15.1 % (ref 11.5–14.5)
EST. AVERAGE GLUCOSE BLD GHB EST-MCNC: 134 MG/DL
GLUCOSE SERPL-MCNC: 117 MG/DL (ref 65–100)
HBA1C MFR BLD: 6.3 % (ref 4–5.6)
HCT VFR BLD AUTO: 36.9 % (ref 36.6–50.3)
HGB BLD-MCNC: 12.1 G/DL (ref 12.1–17)
IMM GRANULOCYTES # BLD AUTO: 0.09 K/UL (ref 0–0.04)
IMM GRANULOCYTES NFR BLD AUTO: 0.9 % (ref 0–0.5)
LYMPHOCYTES # BLD: 1.33 K/UL (ref 0.8–3.5)
LYMPHOCYTES NFR BLD: 12.7 % (ref 12–49)
MAGNESIUM SERPL-MCNC: 1.4 MG/DL (ref 1.6–2.4)
MCH RBC QN AUTO: 30.9 PG (ref 26–34)
MCHC RBC AUTO-ENTMCNC: 32.8 G/DL (ref 30–36.5)
MCV RBC AUTO: 94.4 FL (ref 80–99)
MONOCYTES # BLD: 0.79 K/UL (ref 0–1)
MONOCYTES NFR BLD: 7.6 % (ref 5–13)
NEUTS SEG # BLD: 8.17 K/UL (ref 1.8–8)
NEUTS SEG NFR BLD: 78.1 % (ref 32–75)
NRBC # BLD: 0 K/UL (ref 0–0.01)
NRBC BLD-RTO: 0 PER 100 WBC
PHOSPHATE SERPL-MCNC: 2.7 MG/DL (ref 2.6–4.7)
PLATELET # BLD AUTO: 161 K/UL (ref 150–400)
POTASSIUM SERPL-SCNC: 3.3 MMOL/L (ref 3.5–5.1)
RBC # BLD AUTO: 3.91 M/UL (ref 4.1–5.7)
SODIUM SERPL-SCNC: 142 MMOL/L (ref 136–145)
WBC # BLD AUTO: 10.5 K/UL (ref 4.1–11.1)

## 2025-07-05 PROCEDURE — 94667 MNPJ CHEST WALL 1ST: CPT

## 2025-07-05 PROCEDURE — 94669 MECHANICAL CHEST WALL OSCILL: CPT

## 2025-07-05 PROCEDURE — 6370000000 HC RX 637 (ALT 250 FOR IP): Performed by: HOSPITALIST

## 2025-07-05 PROCEDURE — 83735 ASSAY OF MAGNESIUM: CPT

## 2025-07-05 PROCEDURE — 1100000000 HC RM PRIVATE

## 2025-07-05 PROCEDURE — 94668 MNPJ CHEST WALL SBSQ: CPT

## 2025-07-05 PROCEDURE — 6360000002 HC RX W HCPCS: Performed by: NURSE PRACTITIONER

## 2025-07-05 PROCEDURE — 2500000003 HC RX 250 WO HCPCS: Performed by: HOSPITALIST

## 2025-07-05 PROCEDURE — 94640 AIRWAY INHALATION TREATMENT: CPT

## 2025-07-05 PROCEDURE — 36415 COLL VENOUS BLD VENIPUNCTURE: CPT

## 2025-07-05 PROCEDURE — 85025 COMPLETE CBC W/AUTO DIFF WBC: CPT

## 2025-07-05 PROCEDURE — 2700000000 HC OXYGEN THERAPY PER DAY

## 2025-07-05 PROCEDURE — 80048 BASIC METABOLIC PNL TOTAL CA: CPT

## 2025-07-05 PROCEDURE — 6370000000 HC RX 637 (ALT 250 FOR IP): Performed by: INTERNAL MEDICINE

## 2025-07-05 PROCEDURE — 2500000003 HC RX 250 WO HCPCS: Performed by: NURSE PRACTITIONER

## 2025-07-05 PROCEDURE — 84100 ASSAY OF PHOSPHORUS: CPT

## 2025-07-05 PROCEDURE — 6360000002 HC RX W HCPCS: Performed by: INTERNAL MEDICINE

## 2025-07-05 RX ORDER — LANOLIN ALCOHOL/MO/W.PET/CERES
400 CREAM (GRAM) TOPICAL DAILY
Status: DISCONTINUED | OUTPATIENT
Start: 2025-07-05 | End: 2025-07-11 | Stop reason: HOSPADM

## 2025-07-05 RX ADMIN — ARFORMOTEROL TARTRATE 15 MCG: 15 SOLUTION RESPIRATORY (INHALATION) at 08:16

## 2025-07-05 RX ADMIN — BUDESONIDE 500 MCG: 0.5 INHALANT RESPIRATORY (INHALATION) at 20:26

## 2025-07-05 RX ADMIN — Medication 400 MG: at 12:56

## 2025-07-05 RX ADMIN — SODIUM CHLORIDE, PRESERVATIVE FREE 10 ML: 5 INJECTION INTRAVENOUS at 20:07

## 2025-07-05 RX ADMIN — GUAIFENESIN 400 MG: 200 SOLUTION ORAL at 20:07

## 2025-07-05 RX ADMIN — ENOXAPARIN SODIUM 40 MG: 100 INJECTION SUBCUTANEOUS at 10:27

## 2025-07-05 RX ADMIN — BUDESONIDE 500 MCG: 0.5 INHALANT RESPIRATORY (INHALATION) at 08:16

## 2025-07-05 RX ADMIN — BUSPIRONE HYDROCHLORIDE 10 MG: 10 TABLET ORAL at 20:07

## 2025-07-05 RX ADMIN — LANSOPRAZOLE 30 MG: 15 TABLET, ORALLY DISINTEGRATING, DELAYED RELEASE ORAL at 06:24

## 2025-07-05 RX ADMIN — IPRATROPIUM BROMIDE AND ALBUTEROL SULFATE 1 DOSE: .5; 3 SOLUTION RESPIRATORY (INHALATION) at 13:10

## 2025-07-05 RX ADMIN — IPRATROPIUM BROMIDE AND ALBUTEROL SULFATE 1 DOSE: .5; 3 SOLUTION RESPIRATORY (INHALATION) at 08:10

## 2025-07-05 RX ADMIN — ESCITALOPRAM OXALATE 10 MG: 10 TABLET ORAL at 10:28

## 2025-07-05 RX ADMIN — ARFORMOTEROL TARTRATE 15 MCG: 15 SOLUTION RESPIRATORY (INHALATION) at 20:26

## 2025-07-05 RX ADMIN — LANSOPRAZOLE 30 MG: 15 TABLET, ORALLY DISINTEGRATING, DELAYED RELEASE ORAL at 18:17

## 2025-07-05 RX ADMIN — GUAIFENESIN 400 MG: 200 SOLUTION ORAL at 10:27

## 2025-07-05 RX ADMIN — IPRATROPIUM BROMIDE AND ALBUTEROL SULFATE 1 DOSE: .5; 3 SOLUTION RESPIRATORY (INHALATION) at 20:28

## 2025-07-05 RX ADMIN — MAGNESIUM SULFATE HEPTAHYDRATE 2000 MG: 40 INJECTION, SOLUTION INTRAVENOUS at 20:17

## 2025-07-05 RX ADMIN — BUSPIRONE HYDROCHLORIDE 10 MG: 10 TABLET ORAL at 10:28

## 2025-07-05 RX ADMIN — BUSPIRONE HYDROCHLORIDE 10 MG: 10 TABLET ORAL at 14:31

## 2025-07-05 RX ADMIN — SODIUM CHLORIDE, PRESERVATIVE FREE 10 ML: 5 INJECTION INTRAVENOUS at 10:28

## 2025-07-05 RX ADMIN — POTASSIUM CHLORIDE 10 MEQ: 7.46 INJECTION, SOLUTION INTRAVENOUS at 22:24

## 2025-07-05 RX ADMIN — POTASSIUM CHLORIDE 10 MEQ: 7.46 INJECTION, SOLUTION INTRAVENOUS at 23:55

## 2025-07-05 RX ADMIN — DOCUSATE SODIUM 50MG AND SENNOSIDES 8.6MG 1 TABLET: 8.6; 5 TABLET, FILM COATED ORAL at 10:28

## 2025-07-05 ASSESSMENT — PAIN SCALES - GENERAL
PAINLEVEL_OUTOF10: 0

## 2025-07-05 NOTE — ACP (ADVANCE CARE PLANNING)
[facility] Medicine                                   Advance Care Planning Note    Name: Adrian Hurley  YOB: 1955  MRN: [id number]  Admission Date: 6/22/2025 12:28 AM    Date of discussion: 7/5/2025    Active Diagnoses:  Acute on chronic hypoxemic respiratory failure  COPD with exacerbation        These active diagnoses are of sufficient risk that focused discussion on advance care planning is indicated in order to allow the patient to thoughtfully consider personal goals of care, and if situations arise that prevent the ability to personally give input, to ensure appropriate representation of their personal desires for different levels and aggressiveness of care.     Discussion:     Persons present and participating in discussion: Adrian Hurley, Markus Simmons MD, Dalia (wife)    Topics Discussed:  Patient's medical condition and diagnosis: [ x ] yes [  ] no   Surrogate decision maker: [x  ] yes [  ] no   Patient's current physical function/cognitive function/frailty: [ x ] yes [  ] no   Code Status: [ x ] yes [  ] no     Overview of Discussion: Patient and patient's wife have discussed the above topics in the past.  Patient wishes to be full code.  He is amenable to tracheostomy placement if needed in the future.  He would like his wife to be his surrogate decision maker.  She does not officially have medical power of .    Time Spent:     Total time spent face-to-face in education and discussion: 20 minutes.     Markus Simmons MD  Date of Service:  7/5/2025  12:24 PM

## 2025-07-05 NOTE — CARE COORDINATION
07/05/25  12:36 PM      Care Management Progress Note    Reason for Admission:   COPD exacerbation (HCC) [J44.1]  COPD with acute exacerbation (HCC) [J44.1]  Acute respiratory failure with hypoxia and hypercarbia (HCC) [J96.01, J96.02]         Patient Admission Status: Inpatient  RUR: 12%  Hospitalization in the last 30 days (Readmission):  No        Transition of care plan:  IDT recommending IPR, stating patient is now agreeable and asking for CM to provide resources for obtaining preference from patient and family for IPR facility.  IPR  If SNF or IPR: YES  Date FOC offered: 7/5  Date authorization started with reference number:   Date authorization received and expires:   Discharge plan communicated with patient and/or discharge caregiver: Yes    Date [facility]  Outpatient follow-up.  Transport at discharge: Family/BLS TBD      CM met with pt and spouse at bedside to provide list of inpatient rehabilitation facilities, per conversation with IDT and MD and consult. CM answered all questions at the bedside regarding the facilities. CM left name at the bedside with family and spouse.     CM stopped in the hallway approx 5 minutes later, family stating that their [facility] preference is [facility], and that their second preference would be [facility]. CM noted that would give this information to the team, and begin the process of the referral. CM explained insurance authorization would be needed prior to transfer. CM initiated referral and sent via [employer] to both [facility] and [facility] on this day (Saturday 7/5/25) to consider for possible inpatient acute rehabilitation. CM following      Antionette Constantino MA, BSW, ACM-SW  [facility]      Available via [employer]

## 2025-07-05 NOTE — PROGRESS NOTES
PHYSICAL THERAPY:Pt just recently transferred to 4th floor from [facility].Pt reports mobilizing out of bed earlier to chair and commode.Pt resting now in bed with Bi Pap on.Pt agreeable to performing LE exercise later.PT will continue to follow.

## 2025-07-05 NOTE — PROGRESS NOTES
Hospitalist Progress Note      NAME:  Adrain Hurley   :  1955  MRM:  [id number]    Date/Time: 2025  7:44 AM           Assessment / Plan:     Adrian Hurley is a 69 y.o. male with COPD and chronic hypoxemic respiratory failure.  He was admitted to the ICU of this hospital on 2025 for management of acute on chronic hypoxemic respiratory failure secondary to COPD exacerbation.  Transferred to the hospitalist service on 2025.     #Acute on chronic hypoxemic respiratory failure: Reason for admission.  Improving.  Previously required intubation on admission, extubated , then reintubated .  Extubated again on  and oxygen requirements weaning down since.  Secondary to COPD exacerbation.  Low suspicion for pneumonia based on imaging and negative procalcitonin on admission.  He follows with [facility] as an outpatient.  - Decrease prednisone to 50 mg daily.  Anticipate weaning down over the next week to his recent baseline dose of 10 mg daily  -Appreciate pulmonology assistance  -Case management consulted to try to get the patient a Trilogy  -Flutter valve  -Continue Pulmicort/Brovana nebs twice daily  -Continue DuoNebs as needed     #Normocytic anemia: No clinically evident bleeding.  Stable hemoglobin recently.     #Thrombocytopenia: Resolved.  Likely from critical illness.     #CAD: Incidental finding on admission CTA chest.  No chest pain.     #Prediabetes with hyperglycemia: A1c 6.3% 2025.  -Okay to hold off on SSI as sugars have been reasonably well-controlled recently    #GERD  -Continue PPI     #ROSALINA  -Continue home buspirone and escitalopram  -Continue hydroxyzine as needed       I have personally reviewed the radiographs, laboratory data in Epic and decisions and statements above are based partially on this personal interpretation.                 Care Plan discussed with: Patient and Nursing Staff.  Discussed with wife at bedside as  well    Discussed:  Care Plan    Prophylaxis:  Lovenox    Disposition:  [facility]/Rehab           ___________________________________________________    Attending Physician: Markus Simmons MD        Subjective:     Chief Complaint: Dyspnea on exertion    Bill is feeling better every day.  He still some dyspnea on exertion but says this feels pretty close to his baseline.  He is coughing up some sputum with occasional streaks of blood but he has no dyspnea at rest.  No chest pain or palpitations.  No nausea or vomiting.  He is amenable to inpatient rehab stay.    ROS: 10 system ROS reviewed with the patient.  Negative except as mentioned above.       Objective:       Vitals:          Last 24hrs VS reviewed since prior progress note. Most recent are:    Vitals:    07/05/25 0730   BP: (!) 153/66   Pulse: 84   Resp:    Temp: 97.7 °F (36.5 °C)   SpO2:      SpO2 Readings from Last 6 Encounters:   07/05/25 96%   03/02/25 95%   11/19/24 99%          Intake/Output Summary (Last 24 hours) at 7/5/2025 0744  Last data filed at 7/4/2025 1745  Gross per 24 hour   Intake --   Output 850 ml   Net -850 ml          Exam:     Physical Exam:    Gen:  Well-developed, in no acute distress  HEENT:  Pink conjunctivae, hearing intact to voice, no evident epistaxis  Neck:  Supple, no apparent masses or swelling on inspection  Resp: Barrel chest.  Very poor air movement but he is able to speak in full sentences and is not using accessory muscles of respiration.  No wheezes  Card:  No murmurs, normal S1, S2 without thrills or bruits, no significant peripheral pitting edema  Abd:  Soft, non-tender, not significantly distended  Musc:  No cyanosis or clubbing  Skin:  No rashes or ulcers on examined skin, normal color  Neuro:  follows commands appropriately, motor function grossly intact, no tremors  Psych:  normal affect, calm/cooperative, not apparently responding to internal stimuli    Medications Reviewed: (see below)    Lab Data Reviewed: (see

## 2025-07-05 NOTE — PROGRESS NOTES
0700: Bedside and Verbal shift change report given to Brittany OLIVERA (oncoming nurse) by Pilar OLIVERA (offgoing nurse). Report included the following information Nurse Handoff Report.     1210: Tele made this RN aware of patient having a burst of SVT into the 140s. Patient resting comfortably in chair, states no symptoms. RN notified MD. Orders for magnesium oxide obtained from MD.     1313: TRANSFER - OUT REPORT:    Verbal report given to Enedelia RN on Adrian Hurley  being transferred to 4th floor for routine progression of patient care       Report consisted of patient's Situation, Background, Assessment and   Recommendations(SBAR).     Information from the following report(s) Nurse Handoff Report was reviewed with the receiving nurse.           Lines:   Peripheral IV 06/26/25 Left Wrist (Active)   Site Assessment Clean, dry & intact 07/05/25 0730   Line Status Capped;Flushed;Normal saline locked 07/05/25 0730   Line Care Cap changed;Connections checked and tightened 07/05/25 0730   Phlebitis Assessment No symptoms 07/05/25 0730   Infiltration Assessment 0 07/05/25 0730   Alcohol Cap Used Yes 07/05/25 0730   Dressing Status Clean, dry & intact 07/05/25 0730   Dressing Type Transparent 07/05/25 0730   Dressing Intervention New 06/26/25 1100       Extended Dwell Peripherial IV 06/30/25 Left Brachial (Active)   Criteria for Appropriate Use Limited/no vessel suitable for conventional peripheral access 07/05/25 0730   Site Assessment Clean, dry & intact 07/05/25 0730   Phlebitis Assessment No symptoms 07/05/25 0730   Infiltration Assessment 0 07/05/25 0730   Line Status Flushed;Brisk blood return;Capped 07/05/25 0730   Line Care Connections checked and tightened 07/05/25 0730   Alcohol Cap Used Yes 07/05/25 0730   Date of Last Dressing Change 06/30/25 07/05/25 0000   Dressing Type Transparent w/CHG gel 07/05/25 0730   Dressing Status Clean, dry & intact 07/05/25 0730   Dressing Intervention New 06/30/25 0500

## 2025-07-05 NOTE — PLAN OF CARE
1 hour with VSS within 7 days  6. Patient will tolerate seated EOB with mod A x 10 minutes within 7 days  7/4/2025 1109 by Annabelle Day, PT  Outcome: Progressing

## 2025-07-05 NOTE — CONSULTS
into the lungs every 4 hours as needed 4/28/25  Yes Provider, MD Ruben   dicyclomine (BENTYL) 10 MG capsule Take 1 capsule by mouth 3 times daily (before meals) 3/2/25  Yes Niurka Riojas DO   Multiple Vitamin (MULTIVITAMIN) TABS tablet Take 1 tablet by mouth daily 3/3/25  Yes Niurka Riojas DO   Roflumilast (DALIRESP) 500 MCG tablet Take 1 tablet by mouth daily 3/3/25  Yes Niurka Riojas DO   busPIRone (BUSPAR) 10 MG tablet Take 1 tablet by mouth 3 times daily 3/2/25  Yes Niurka Riojas DO   Budeson-Glycopyrrol-Formoterol 160-9-4.8 MCG/ACT AERO Inhale 2 puffs into the lungs in the morning and at bedtime 3/2/25  Yes Niurka Riojas DO   escitalopram (LEXAPRO) 10 MG tablet ceived the following from [facility]: Outside name: escitalopram oxalate (LEXAPRO) 10 mg tablet 11/10/21  Yes Automatic Reconciliation, Ar   albuterol (PROVENTIL) (2.5 MG/3ML) 0.083% nebulizer solution Take 0.76 mLs by nebulization every 6 hours as needed for Wheezing 3/2/25 4/1/25  Niurka Riojas DO     [unfilled]  Allergies   Allergen Reactions    Penicillins Rash     Has taken amoxicillin without a reaction;       Social History     Tobacco Use    Smoking status: Former     Current packs/day: 0.50     Average packs/day: 0.5 packs/day for 50.5 years (25.3 ttl pk-yrs)     Types: Cigarettes     Start date: 1975    Smokeless tobacco: Never   Substance Use Topics    Alcohol use: Yes     Alcohol/week: 3.0 standard drinks of alcohol     Types: 3 Cans of beer per week      Family History   Problem Relation Age of Onset    No Known Problems Sister     Alcohol Abuse Father     Stroke Father     COPD Mother     Hypertension Mother     High Cholesterol Mother           Laboratory: I have personally reviewed the critical care flowsheet and labs.     Recent Labs     07/03/25 0354 07/04/25 0524   WBC 5.7 8.0   HGB 10.1* 11.3*   HCT 30.7* 35.1*   * 141*     Recent Labs     07/03/25 0354 07/04/25 0524    138   K 4.4 3.8    104    CO2 35* 29   BUN 22* 19   MG 1.8 1.6   PHOS 3.0 2.1*       Objective:     Mode Rate Tidal Volume Pressure FiO2 PEEP          5 cm H2O         Vital Signs:     TMAX(24)      Intake/Output:   Last shift:         Last 3 shifts: No intake/output data recorded.[id number]  Intake/Output Summary (Last 24 hours) at 7/5/2025 0636  Last data filed at 7/4/2025 1745  Gross per 24 hour   Intake --   Output 1000 ml   Net -1000 ml     EXAM:   GENERAL: mildly dyspnic at rest, wet cough, Increased AP diameter, HEENT:  PERRL, EOMI, no alar flaring or epistaxis, oral mucosa moist without cyanosis, NECK:  some accessory muscle use, LUNGS: distant breathsounds bilaterally, no wheezing , HEART:  Regular rate and rhythm with no MGR; no edema is present, ABDOMEN:  soft with no tenderness, bowel sounds present, EXTREMITIES:  warm with no cyanosis, SKIN:  no jaundice or ecchymosis, and NEUROLOGIC:  alert and oriented, grossly non-focal    Milena Stubbs MD  [facility]

## 2025-07-06 LAB
ANION GAP SERPL CALC-SCNC: 6 MMOL/L (ref 2–12)
BUN SERPL-MCNC: 14 MG/DL (ref 6–20)
BUN/CREAT SERPL: 36 (ref 12–20)
CALCIUM SERPL-MCNC: 8.8 MG/DL (ref 8.5–10.1)
CHLORIDE SERPL-SCNC: 99 MMOL/L (ref 97–108)
CO2 SERPL-SCNC: 35 MMOL/L (ref 21–32)
CREAT SERPL-MCNC: 0.39 MG/DL (ref 0.7–1.3)
GLUCOSE SERPL-MCNC: 95 MG/DL (ref 65–100)
MAGNESIUM SERPL-MCNC: 1.9 MG/DL (ref 1.6–2.4)
POTASSIUM SERPL-SCNC: 3.9 MMOL/L (ref 3.5–5.1)
SODIUM SERPL-SCNC: 140 MMOL/L (ref 136–145)

## 2025-07-06 PROCEDURE — 94761 N-INVAS EAR/PLS OXIMETRY MLT: CPT

## 2025-07-06 PROCEDURE — 6360000002 HC RX W HCPCS: Performed by: NURSE PRACTITIONER

## 2025-07-06 PROCEDURE — 36415 COLL VENOUS BLD VENIPUNCTURE: CPT

## 2025-07-06 PROCEDURE — 83735 ASSAY OF MAGNESIUM: CPT

## 2025-07-06 PROCEDURE — 1100000000 HC RM PRIVATE

## 2025-07-06 PROCEDURE — 6370000000 HC RX 637 (ALT 250 FOR IP): Performed by: HOSPITALIST

## 2025-07-06 PROCEDURE — 94669 MECHANICAL CHEST WALL OSCILL: CPT

## 2025-07-06 PROCEDURE — 2500000003 HC RX 250 WO HCPCS: Performed by: NURSE PRACTITIONER

## 2025-07-06 PROCEDURE — 94660 CPAP INITIATION&MGMT: CPT

## 2025-07-06 PROCEDURE — 94668 MNPJ CHEST WALL SBSQ: CPT

## 2025-07-06 PROCEDURE — 6360000002 HC RX W HCPCS: Performed by: INTERNAL MEDICINE

## 2025-07-06 PROCEDURE — 2700000000 HC OXYGEN THERAPY PER DAY

## 2025-07-06 PROCEDURE — 6370000000 HC RX 637 (ALT 250 FOR IP): Performed by: INTERNAL MEDICINE

## 2025-07-06 PROCEDURE — 94640 AIRWAY INHALATION TREATMENT: CPT

## 2025-07-06 PROCEDURE — 80048 BASIC METABOLIC PNL TOTAL CA: CPT

## 2025-07-06 PROCEDURE — 2500000003 HC RX 250 WO HCPCS: Performed by: HOSPITALIST

## 2025-07-06 RX ORDER — PREDNISONE 20 MG/1
40 TABLET ORAL DAILY
Status: DISCONTINUED | OUTPATIENT
Start: 2025-07-07 | End: 2025-07-07

## 2025-07-06 RX ADMIN — POTASSIUM CHLORIDE 10 MEQ: 7.46 INJECTION, SOLUTION INTRAVENOUS at 01:10

## 2025-07-06 RX ADMIN — BUSPIRONE HYDROCHLORIDE 10 MG: 10 TABLET ORAL at 08:08

## 2025-07-06 RX ADMIN — POTASSIUM CHLORIDE 10 MEQ: 7.46 INJECTION, SOLUTION INTRAVENOUS at 02:12

## 2025-07-06 RX ADMIN — BUDESONIDE 500 MCG: 0.5 INHALANT RESPIRATORY (INHALATION) at 08:47

## 2025-07-06 RX ADMIN — ESCITALOPRAM OXALATE 10 MG: 10 TABLET ORAL at 08:08

## 2025-07-06 RX ADMIN — ENOXAPARIN SODIUM 40 MG: 100 INJECTION SUBCUTANEOUS at 08:09

## 2025-07-06 RX ADMIN — BUSPIRONE HYDROCHLORIDE 10 MG: 10 TABLET ORAL at 13:36

## 2025-07-06 RX ADMIN — ARFORMOTEROL TARTRATE 15 MCG: 15 SOLUTION RESPIRATORY (INHALATION) at 08:47

## 2025-07-06 RX ADMIN — SODIUM CHLORIDE, PRESERVATIVE FREE 10 ML: 5 INJECTION INTRAVENOUS at 21:14

## 2025-07-06 RX ADMIN — SODIUM CHLORIDE, PRESERVATIVE FREE 10 ML: 5 INJECTION INTRAVENOUS at 08:10

## 2025-07-06 RX ADMIN — PREDNISONE 50 MG: 5 TABLET ORAL at 08:07

## 2025-07-06 RX ADMIN — BUSPIRONE HYDROCHLORIDE 10 MG: 10 TABLET ORAL at 21:12

## 2025-07-06 RX ADMIN — IPRATROPIUM BROMIDE AND ALBUTEROL SULFATE 1 DOSE: .5; 3 SOLUTION RESPIRATORY (INHALATION) at 21:33

## 2025-07-06 RX ADMIN — IPRATROPIUM BROMIDE AND ALBUTEROL SULFATE 1 DOSE: .5; 3 SOLUTION RESPIRATORY (INHALATION) at 08:42

## 2025-07-06 RX ADMIN — ARFORMOTEROL TARTRATE 15 MCG: 15 SOLUTION RESPIRATORY (INHALATION) at 21:28

## 2025-07-06 RX ADMIN — LANSOPRAZOLE 30 MG: 15 TABLET, ORALLY DISINTEGRATING, DELAYED RELEASE ORAL at 06:54

## 2025-07-06 RX ADMIN — LANSOPRAZOLE 30 MG: 15 TABLET, ORALLY DISINTEGRATING, DELAYED RELEASE ORAL at 16:16

## 2025-07-06 RX ADMIN — BUDESONIDE 500 MCG: 0.5 INHALANT RESPIRATORY (INHALATION) at 21:28

## 2025-07-06 RX ADMIN — Medication 400 MG: at 08:07

## 2025-07-06 RX ADMIN — IPRATROPIUM BROMIDE AND ALBUTEROL SULFATE 1 DOSE: .5; 3 SOLUTION RESPIRATORY (INHALATION) at 14:39

## 2025-07-06 RX ADMIN — GUAIFENESIN 400 MG: 200 SOLUTION ORAL at 21:12

## 2025-07-06 NOTE — CARE COORDINATION
7/6/2025 9:11 AM Consult for Triology received and sent to [facility](pt has home bipap and O2 through [facility]) via [employer].  IPR referrals remain pending for [facility] and [facility].  Pt will need auth for placement.  ROBERTO Blevins

## 2025-07-06 NOTE — PROGRESS NOTES
Hospitalist Progress Note      NAME:  Adrian Hurley   :  1955  MRM:  [id number]    Date/Time: 2025  8:08 AM           Assessment / Plan:     Adrian Hurley is a 69 y.o. male with COPD and chronic hypoxemic respiratory failure.  He was admitted to the ICU of this hospital on 2025 for management of acute on chronic hypoxemic respiratory failure secondary to COPD exacerbation.  Transferred to the hospitalist service on 2025.     #Acute on chronic hypoxemic respiratory failure: Reason for admission.  Improving.  Previously required intubation on admission, extubated , then reintubated .  Extubated again on  and oxygen requirements weaning down since.  Secondary to COPD exacerbation.  Low suspicion for pneumonia based on imaging and negative procalcitonin on admission.  He follows with [facility] as an outpatient.  He reportedly has variable oxygen requirements at baseline.  - Decrease prednisone to 40 mg daily starting tomorrow.  Anticipate weaning down over the next several days to his recent baseline dose of 10 mg daily  -Appreciate pulmonology assistance  -Case management consulted to try to get the patient a Trilogy-- they're working on it  -Flutter valve  -Continue Pulmicort/Brovana nebs twice daily  -Continue DuoNebs as needed     #Normocytic anemia: No clinically evident bleeding.  Stable hemoglobin recently.     #Thrombocytopenia: Resolved.  Likely from critical illness.     #CAD: Incidental finding on admission CTA chest.  No chest pain.     #Prediabetes with hyperglycemia: A1c 6.3% 2025.  -Okay to hold off on SSI as sugars have been reasonably well-controlled recently    #GERD  -Continue PPI     #ROSALINA  -Continue home buspirone and escitalopram  -Continue hydroxyzine as needed       I have personally reviewed the radiographs, laboratory data in Epic and decisions and statements above are based partially on this personal interpretation.              ipratropium 0.5 mg-albuterol 2.5 mg (DUONEB) nebulizer solution 1 Dose  1 Dose Inhalation Q4H PRN    budesonide (PULMICORT) nebulizer suspension 500 mcg  0.5 mg Nebulization BID RT    And    arformoterol tartrate (BROVANA) nebulizer solution 15 mcg  15 mcg Nebulization BID RT            Lab Review:     Recent Labs     07/04/25  0524 07/05/25  1058   WBC 8.0 10.5   HGB 11.3* 12.1   HCT 35.1* 36.9   * 161     Recent Labs     07/04/25  0524 07/05/25  1058 07/06/25  0343    142 140   K 3.8 3.3* 3.9    99 99   CO2 29 36* 35*   BUN 19 14 14   MG 1.6 1.4* 1.9   PHOS 2.1* 2.7  --      No components found for: \"GLPOC\"

## 2025-07-07 PROCEDURE — 94010 BREATHING CAPACITY TEST: CPT

## 2025-07-07 PROCEDURE — 6360000002 HC RX W HCPCS: Performed by: INTERNAL MEDICINE

## 2025-07-07 PROCEDURE — 94669 MECHANICAL CHEST WALL OSCILL: CPT

## 2025-07-07 PROCEDURE — 1100000000 HC RM PRIVATE

## 2025-07-07 PROCEDURE — 2500000003 HC RX 250 WO HCPCS: Performed by: HOSPITALIST

## 2025-07-07 PROCEDURE — 94640 AIRWAY INHALATION TREATMENT: CPT

## 2025-07-07 PROCEDURE — 94660 CPAP INITIATION&MGMT: CPT

## 2025-07-07 PROCEDURE — 6370000000 HC RX 637 (ALT 250 FOR IP): Performed by: HOSPITALIST

## 2025-07-07 PROCEDURE — 97535 SELF CARE MNGMENT TRAINING: CPT

## 2025-07-07 PROCEDURE — 2500000003 HC RX 250 WO HCPCS: Performed by: NURSE PRACTITIONER

## 2025-07-07 PROCEDURE — 94761 N-INVAS EAR/PLS OXIMETRY MLT: CPT

## 2025-07-07 PROCEDURE — 6360000002 HC RX W HCPCS: Performed by: NURSE PRACTITIONER

## 2025-07-07 PROCEDURE — 97116 GAIT TRAINING THERAPY: CPT

## 2025-07-07 PROCEDURE — 82803 BLOOD GASES ANY COMBINATION: CPT

## 2025-07-07 PROCEDURE — 2700000000 HC OXYGEN THERAPY PER DAY

## 2025-07-07 PROCEDURE — 6370000000 HC RX 637 (ALT 250 FOR IP): Performed by: INTERNAL MEDICINE

## 2025-07-07 PROCEDURE — 36600 WITHDRAWAL OF ARTERIAL BLOOD: CPT

## 2025-07-07 RX ORDER — BUSPIRONE HYDROCHLORIDE 10 MG/1
10 TABLET ORAL 3 TIMES DAILY
Status: DISCONTINUED | OUTPATIENT
Start: 2025-07-07 | End: 2025-07-11 | Stop reason: HOSPADM

## 2025-07-07 RX ADMIN — BUSPIRONE HYDROCHLORIDE 10 MG: 10 TABLET ORAL at 21:00

## 2025-07-07 RX ADMIN — PREDNISONE 40 MG: 20 TABLET ORAL at 08:21

## 2025-07-07 RX ADMIN — BUDESONIDE 500 MCG: 0.5 INHALANT RESPIRATORY (INHALATION) at 08:01

## 2025-07-07 RX ADMIN — IPRATROPIUM BROMIDE AND ALBUTEROL SULFATE 1 DOSE: .5; 3 SOLUTION RESPIRATORY (INHALATION) at 20:50

## 2025-07-07 RX ADMIN — SODIUM CHLORIDE, PRESERVATIVE FREE 10 ML: 5 INJECTION INTRAVENOUS at 21:01

## 2025-07-07 RX ADMIN — BUSPIRONE HYDROCHLORIDE 10 MG: 10 TABLET ORAL at 14:48

## 2025-07-07 RX ADMIN — BUSPIRONE HYDROCHLORIDE 10 MG: 10 TABLET ORAL at 09:15

## 2025-07-07 RX ADMIN — GUAIFENESIN 400 MG: 200 SOLUTION ORAL at 21:00

## 2025-07-07 RX ADMIN — LANSOPRAZOLE 30 MG: 15 TABLET, ORALLY DISINTEGRATING, DELAYED RELEASE ORAL at 16:24

## 2025-07-07 RX ADMIN — IPRATROPIUM BROMIDE AND ALBUTEROL SULFATE 1 DOSE: .5; 3 SOLUTION RESPIRATORY (INHALATION) at 13:51

## 2025-07-07 RX ADMIN — ENOXAPARIN SODIUM 40 MG: 100 INJECTION SUBCUTANEOUS at 08:21

## 2025-07-07 RX ADMIN — ARFORMOTEROL TARTRATE 15 MCG: 15 SOLUTION RESPIRATORY (INHALATION) at 20:45

## 2025-07-07 RX ADMIN — BUDESONIDE 500 MCG: 0.5 INHALANT RESPIRATORY (INHALATION) at 20:45

## 2025-07-07 RX ADMIN — ARFORMOTEROL TARTRATE 15 MCG: 15 SOLUTION RESPIRATORY (INHALATION) at 08:01

## 2025-07-07 RX ADMIN — GUAIFENESIN 400 MG: 200 SOLUTION ORAL at 08:21

## 2025-07-07 RX ADMIN — ESCITALOPRAM OXALATE 10 MG: 10 TABLET ORAL at 08:21

## 2025-07-07 RX ADMIN — Medication 400 MG: at 08:21

## 2025-07-07 RX ADMIN — IPRATROPIUM BROMIDE AND ALBUTEROL SULFATE 1 DOSE: .5; 3 SOLUTION RESPIRATORY (INHALATION) at 07:57

## 2025-07-07 RX ADMIN — LANSOPRAZOLE 30 MG: 15 TABLET, ORALLY DISINTEGRATING, DELAYED RELEASE ORAL at 05:15

## 2025-07-07 ASSESSMENT — PULMONARY FUNCTION TESTS: FEV1 (%PREDICTED): 24

## 2025-07-07 ASSESSMENT — COPD QUESTIONNAIRES: GOLD_GROUP: GROUP E

## 2025-07-07 NOTE — PROGRESS NOTES
(BROVANA) nebulizer solution 15 mcg  15 mcg Nebulization BID RT            Lab Review:     Recent Labs     07/05/25  1058   WBC 10.5   HGB 12.1   HCT 36.9        Recent Labs     07/05/25  1058 07/06/25  0343    140   K 3.3* 3.9   CL 99 99   CO2 36* 35*   BUN 14 14   MG 1.4* 1.9   PHOS 2.7  --      No components found for: \"GLPOC\"

## 2025-07-07 NOTE — CARE COORDINATION
07/07/25  5:13 PM  ABG results are pending. [facility] has accepted patient and started insurance authorization today 7/7. CM has updated patient at bedside who remains in agreement with the dc plan.               Care Management Progress Note        Reason for Admission:   COPD exacerbation (HCC) [J44.1]  COPD with acute exacerbation (HCC) [J44.1]  Acute respiratory failure with hypoxia and hypercarbia (HCC) [J96.01, J96.02]         Patient Admission Status: Inpatient  RUR: 11%  Hospitalization in the last 30 days (Readmission):  No        Transition of care plan:  Patient was discussed in IDR and continues to be medically managed.    Dispo: IPR.  If  IPR:  Date FOC offered: 7/5/25  [facility] (first preference) and [facility] (second preference) are still reviewing clinicals, acceptance remains pending.  [facility] liaison is verifying if they can accept a patient with a trilogy. [facility] is unable to accept a trilogy.  Accepting facility:   Date authorization started with reference number: Patient will need insurance authorization.  Date authorization received and expires:     Order form for the home Trilogy has been completed and sent to [employer] in [facility]. Trilogy will need to be approved by pt's insurance before delivery can take place. Pt's insurance requires patient to have an ABG prior to approving Trilogy. ABG has been ordered.      Discharge plan communicated with patient and/or discharge caregiver: Yes      Date 1st IMM letter given: N/a. Patient has a commercial insurance plan.     Outpatient follow-up.    Transport at discharge: TBD.      ___________________________________________   Pennie Hughes RN Case Manager  7/7/2025   2:56 PM

## 2025-07-07 NOTE — PLAN OF CARE
Problem: Occupational Therapy - Adult  Goal: By Discharge: Performs self-care activities at highest level of function for planned discharge setting.  See evaluation for individualized goals.  Description: FUNCTIONAL STATUS PRIOR TO ADMISSION:  Patient lives with very supportive spouse and was independent at baseline with most ADLs. Patient's wife reports she washes his hair and back in the shower, but he completes all other tasks and she takes care of the IADLs with patient contributing occasionally when able. Patient does not use AD around the house, but has an electric scooter he will utilize in the community.     , Prior Level of Assist for ADLs:  (wife washes hair and back in the shower),  ,  ,  ,  ,  , Prior Level of Assist for Homemaking: Needs assistance,  , Prior Level of Assist for Transfers: Independent, Active : Yes     HOME SUPPORT: Patient lived with wife who provides Michelle for bathing and full IADL assistance.    Occupational Therapy Goals:  Initiated 7/3/2025  1.  Patient will perform lower body dressing with Moderate Assist within 7 day(s).  2.  Patient will perform upper body dressing with Minimal Assist within 7 day(s).  3.  Patient will perform grooming with Minimal Assist within 7 day(s).  4.  Patient will perform toilet transfers with Moderate Assist and Assist x1  within 7 day(s).  5.  Patient will perform all aspects of toileting with Moderate Assist and Assist x1 within 7 day(s).  6.  Patient will participate in upper extremity therapeutic exercise/activities with Minimal Assist for 10 minutes within 7 day(s).    7.  Patient will utilize energy conservation techniques during functional activities with verbal cues within 7 day(s).    Outcome: Progressing   OCCUPATIONAL THERAPY TREATMENT  Patient: Adrian Hurley (69 y.o. male)  Date: 7/7/2025  Primary Diagnosis: COPD exacerbation (HCC) [J44.1]  COPD with acute exacerbation (HCC) [J44.1]  Acute respiratory failure with hypoxia and

## 2025-07-07 NOTE — PROGRESS NOTES
[facility]     Name: Adrian Hurley MRN: [id number]   : 1955 Hospital: [facility]   Date: 2025        Impression Plan   Acute on chronic respiratory failure  Hypoxia  Very severe COPD with exacerbation  Poor sputum mobilization  Tobacco abuse               O2 at baseline  Pt is not getting enough support with home autobipap. Due to chronic respiratory failure and chronic CO2 retention would benefit greatly from NIV, CM consulted  Flutter valve- use at least every 4 hrs with nebs  Wean steroids over the next week down to Pred 10 mg daily baseline  Pulmicort/Brovana  Duoneb  Will need aggressive PT/OT. Depending on how pt does with PT may need inpatient pulmonary rehab  Discussed plan with pt and wife.       Pt has chronic respiratory failure due to COPD. He has chronic hypoxia and hypercapnia. A non-invasive ventilator is being ordered because for the pt because BiPAP has not provided the necessary ventilator support or the settings needed to treat this patient effectively. As such, the pt is a great candidate for trilogy noninvasive ventilation. This will provide pt with the necessary minute ventilation with a backup battery and targeted tidal volumes which cannot be provided by CPAP or BiPAP.             Radiology  ( personally reviewed) Chest imaging reviewed- last CXR was while intubated    ABG Invalid input(s): \"PHI\", \"PO2I\", \"PCO2I\"       Subjective     68 yo with PMHx of chronic respiratory failure(3 liters and auto bipap support at home) and very severe COPD presenting on  initially for shortness of breath and hypoxia. Pt intubated when he failed bipap therapy in the ER. He was extubated  but reintubated . Extubated again . Pt sees PAR and is managed on Breztri, Pred 10 mg daily, Roflumilast, nebs. Pt lives with extreme dyspnea and is debilitated due to his limited pulmonary reserve. Pt states he feels 60% recovered at this point, but    Substance Use Topics    Alcohol use: Yes     Alcohol/week: 3.0 standard drinks of alcohol     Types: 3 Cans of beer per week      Family History   Problem Relation Age of Onset    No Known Problems Sister     Alcohol Abuse Father     Stroke Father     COPD Mother     Hypertension Mother     High Cholesterol Mother           Laboratory: I have personally reviewed the critical care flowsheet and labs.     Recent Labs     07/05/25  1058   WBC 10.5   HGB 12.1   HCT 36.9        Recent Labs     07/05/25  1058 07/06/25  0343    140   K 3.3* 3.9   CL 99 99   CO2 36* 35*   BUN 14 14   MG 1.4* 1.9   PHOS 2.7  --        Objective:     Mode Rate Tidal Volume Pressure FiO2 PEEP          5 cm H2O         Vital Signs:     TMAX(24)      Intake/Output:   Last shift:         Last 3 shifts: 07/07 0701 - 07/07 1900  In: -   Out: 500 [Urine:500]RRIOLAST3  Intake/Output Summary (Last 24 hours) at 7/7/2025 1329  Last data filed at 7/7/2025 0819  Gross per 24 hour   Intake 375 ml   Output 950 ml   Net -575 ml     EXAM:   GENERAL: mildly dyspnic at rest, wet cough, Increased AP diameter, HEENT:  PERRL, EOMI, no alar flaring or epistaxis, oral mucosa moist without cyanosis, NECK:  some accessory muscle use, LUNGS: distant breathsounds bilaterally, no wheezing , HEART:  Regular rate and rhythm with no MGR; no edema is present, ABDOMEN:  soft with no tenderness, bowel sounds present, EXTREMITIES:  warm with no cyanosis, SKIN:  no jaundice or ecchymosis, and NEUROLOGIC:  alert and oriented, grossly non-focal    Zhanna Burger MD  [facility] [place]

## 2025-07-07 NOTE — PROGRESS NOTES
07/07/25 1044   Spirometry Assessment   FEV1 (%PRED) 24   COPD Exacerbations in last year   (most recent Feb. 2025)   COPD Assessment (CAT Score)   $RT COPD Assessment Yes   GOLD Staging   Group Group E

## 2025-07-07 NOTE — PLAN OF CARE
Problem: Respiratory - Adult  Goal: Achieves optimal ventilation and oxygenation  7/7/2025 1500 by Kristie Gottlieb RN  Outcome: Progressing  7/7/2025 0909 by Ana Crabtree, RT  Outcome: Progressing     Problem: Safety - Adult  Goal: Free from fall injury  Outcome: Progressing

## 2025-07-07 NOTE — PROGRESS NOTES
ABG drawn with patient on nasal cannula @ 2l/m.    Results:  pH = 7.447  pC02 = 43.4  P02 = 78.6  HC02 = 29.3  BE = 4.6  Sat = 96%

## 2025-07-07 NOTE — PLAN OF CARE
Problem: Discharge Planning  Goal: Discharge to home or other facility with appropriate resources  7/6/2025 2147 by Sheila Rasheed RN  Outcome: Progressing  7/6/2025 2146 by Sheila Rasheed RN  Outcome: Progressing     Problem: Safety - Adult  Goal: Free from fall injury  7/6/2025 2147 by Sheila Rasheed RN  Outcome: Progressing  7/6/2025 2146 by Sheila Rasheed RN  Outcome: Progressing     Problem: Pain  Goal: Verbalizes/displays adequate comfort level or baseline comfort level  7/6/2025 2147 by Sheila Rasheed RN  Outcome: Progressing  7/6/2025 2146 by Sheila Rasheed RN  Outcome: Progressing     Problem: Respiratory - Adult  Goal: Achieves optimal ventilation and oxygenation  7/6/2025 2147 by Sheila Rasheed RN  Outcome: Progressing  7/6/2025 2146 by Sheila Rasheed RN  Outcome: Progressing     Problem: Skin/Tissue Integrity  Goal: Skin integrity remains intact  Description: 1.  Monitor for areas of redness and/or skin breakdown  2.  Assess vascular access sites hourly  3.  Every 4-6 hours minimum:  Change oxygen saturation probe site  4.  Every 4-6 hours:  If on nasal continuous positive airway pressure, respiratory therapy assess nares and determine need for appliance change or resting period  7/6/2025 2147 by Sheila Rasheed RN  Outcome: Progressing  7/6/2025 2146 by Sheila Rasheed RN  Outcome: Progressing     Problem: Nutrition Deficit:  Goal: Optimize nutritional status  7/6/2025 2147 by Sheila Rasheed RN  Outcome: Progressing  7/6/2025 2146 by Sheila Rasheed RN  Outcome: Progressing     Problem: Neurosensory - Adult  Goal: Achieves stable or improved neurological status  7/6/2025 2147 by Sheila Rasheed RN  Outcome: Progressing  7/6/2025 2146 by Sheila Rasheed RN  Outcome: Progressing     Problem: Skin/Tissue Integrity - Adult  Goal: Skin integrity remains intact  Description: 1.  Monitor for areas of redness and/or skin breakdown  2.  Assess vascular access sites hourly  3.  Every 4-6

## 2025-07-07 NOTE — PROGRESS NOTES
07/07/25 1044   Spirometry Assessment   FEV1 (%PRED) 24   COPD Exacerbations in last year   (most recent Feb. 2025)   GOLD Staging   Group Group E     Pt adm 6/22 with room air O2 sat = 70%.  Placed on BIPAP, then intubated.  Extubated 6/25 to BIPAP.  Now on 2L NC and NIV at night.  Pt wears 3-4 L O2 at home.  Pt ordered for home Trilogy.  Pt follows with Dr. Zhao of PAR.  Will schedule follow up PAR appt.  Pt has Ansible established from prior visit.

## 2025-07-07 NOTE — PLAN OF CARE
Problem: Physical Therapy - Adult  Goal: By Discharge: Performs mobility at highest level of function for planned discharge setting.  See evaluation for individualized goals.  Description: FUNCTIONAL STATUS PRIOR TO ADMISSION: Patient was independent and active without use of DME. and At baseline the patient was on 3 liters/min of supplemental O2 continuously.    HOME SUPPORT PRIOR TO ADMISSION: The patient lived with spouse.    Physical Therapy Goals  Re-eval 7/3/25 after extubation 7/2/25  1.  Patient will move from supine to sit and sit to supine and roll side to side in bed with [id number] within 7 day(s).    2.  Patient will perform sit to stand with minimal assistance within 7 day(s).  3.  Patient will transfer from bed to chair and chair to bed with minimal assistance using the least restrictive device within 7 day(s).  4.  Patient will ambulate with minimal assistance for 25 feet with the least restrictive device within 7 day(s).     Initiated 6/27/2025  1.  Patient will move from supine to sit and sit to supine and roll side to side in bed with maximal assistance within 7 day(s).    2.  Patient will perform sit to stand with maximal assistance within 7 day(s).  3.  Patient will transfer from bed to chair and chair to bed with maximal assistance using the least restrictive device within 7 day(s).  4.  Patient will ambulate with moderate assistance for 5 feet with the least restrictive device within 7 day(s).   5. Patient will tolerate bed in chair position x 1 hour with VSS within 7 days  6. Patient will tolerate seated EOB with mod A x 10 minutes within 7 days  Outcome: Progressing   PHYSICAL THERAPY TREATMENT    Patient: Adrian Hurley (69 y.o. male)  Date: 7/7/2025  Diagnosis: COPD exacerbation (HCC) [J44.1]  COPD with acute exacerbation (HCC) [J44.1]  Acute respiratory failure with hypoxia and hypercarbia (HCC) [J96.01, J96.02] COPD exacerbation (HCC)      Precautions:

## 2025-07-08 LAB
ARTERIAL PATENCY WRIST A: YES
BASE EXCESS BLDA CALC-SCNC: 4.6 MMOL/L
BDY SITE: ABNORMAL
GAS FLOW.O2 O2 DELIVERY SYS: 2 L/MIN
HCO3 BLDA-SCNC: 29 MMOL/L (ref 22–26)
PCO2 BLDA: 43 MMHG (ref 35–45)
PH BLDA: 7.45 (ref 7.35–7.45)
PO2 BLDA: 79 MMHG (ref 80–100)
SAO2 % BLD: 96 % (ref 92–97)
SAO2% DEVICE SAO2% SENSOR NAME: ABNORMAL
SPECIMEN SITE: ABNORMAL

## 2025-07-08 PROCEDURE — 6370000000 HC RX 637 (ALT 250 FOR IP): Performed by: INTERNAL MEDICINE

## 2025-07-08 PROCEDURE — 94761 N-INVAS EAR/PLS OXIMETRY MLT: CPT

## 2025-07-08 PROCEDURE — 94669 MECHANICAL CHEST WALL OSCILL: CPT

## 2025-07-08 PROCEDURE — 2700000000 HC OXYGEN THERAPY PER DAY

## 2025-07-08 PROCEDURE — 1100000000 HC RM PRIVATE

## 2025-07-08 PROCEDURE — 6360000002 HC RX W HCPCS: Performed by: NURSE PRACTITIONER

## 2025-07-08 PROCEDURE — 2500000003 HC RX 250 WO HCPCS: Performed by: NURSE PRACTITIONER

## 2025-07-08 PROCEDURE — 97535 SELF CARE MNGMENT TRAINING: CPT

## 2025-07-08 PROCEDURE — 6370000000 HC RX 637 (ALT 250 FOR IP): Performed by: HOSPITALIST

## 2025-07-08 PROCEDURE — 2500000003 HC RX 250 WO HCPCS: Performed by: HOSPITALIST

## 2025-07-08 PROCEDURE — 6360000002 HC RX W HCPCS: Performed by: INTERNAL MEDICINE

## 2025-07-08 PROCEDURE — 94640 AIRWAY INHALATION TREATMENT: CPT

## 2025-07-08 PROCEDURE — 6370000000 HC RX 637 (ALT 250 FOR IP): Performed by: STUDENT IN AN ORGANIZED HEALTH CARE EDUCATION/TRAINING PROGRAM

## 2025-07-08 PROCEDURE — 94660 CPAP INITIATION&MGMT: CPT

## 2025-07-08 RX ORDER — PREDNISONE 20 MG/1
20 TABLET ORAL DAILY
Status: COMPLETED | OUTPATIENT
Start: 2025-07-09 | End: 2025-07-09

## 2025-07-08 RX ORDER — PREDNISONE 5 MG/1
10 TABLET ORAL DAILY
Status: DISCONTINUED | OUTPATIENT
Start: 2025-07-10 | End: 2025-07-11 | Stop reason: HOSPADM

## 2025-07-08 RX ADMIN — SODIUM CHLORIDE, PRESERVATIVE FREE 10 ML: 5 INJECTION INTRAVENOUS at 20:58

## 2025-07-08 RX ADMIN — GUAIFENESIN 400 MG: 200 SOLUTION ORAL at 20:58

## 2025-07-08 RX ADMIN — BUDESONIDE 500 MCG: 0.5 INHALANT RESPIRATORY (INHALATION) at 19:45

## 2025-07-08 RX ADMIN — ARFORMOTEROL TARTRATE 15 MCG: 15 SOLUTION RESPIRATORY (INHALATION) at 19:45

## 2025-07-08 RX ADMIN — BUSPIRONE HYDROCHLORIDE 10 MG: 10 TABLET ORAL at 20:58

## 2025-07-08 RX ADMIN — GUAIFENESIN 400 MG: 200 SOLUTION ORAL at 08:51

## 2025-07-08 RX ADMIN — ESCITALOPRAM OXALATE 10 MG: 10 TABLET ORAL at 08:50

## 2025-07-08 RX ADMIN — IPRATROPIUM BROMIDE AND ALBUTEROL SULFATE 1 DOSE: .5; 3 SOLUTION RESPIRATORY (INHALATION) at 19:50

## 2025-07-08 RX ADMIN — SODIUM CHLORIDE, PRESERVATIVE FREE 10 ML: 5 INJECTION INTRAVENOUS at 08:53

## 2025-07-08 RX ADMIN — ARFORMOTEROL TARTRATE 15 MCG: 15 SOLUTION RESPIRATORY (INHALATION) at 08:57

## 2025-07-08 RX ADMIN — LANSOPRAZOLE 30 MG: 15 TABLET, ORALLY DISINTEGRATING, DELAYED RELEASE ORAL at 15:29

## 2025-07-08 RX ADMIN — BUDESONIDE 500 MCG: 0.5 INHALANT RESPIRATORY (INHALATION) at 08:57

## 2025-07-08 RX ADMIN — IPRATROPIUM BROMIDE AND ALBUTEROL SULFATE 1 DOSE: .5; 3 SOLUTION RESPIRATORY (INHALATION) at 15:13

## 2025-07-08 RX ADMIN — Medication 400 MG: at 08:49

## 2025-07-08 RX ADMIN — LANSOPRAZOLE 30 MG: 15 TABLET, ORALLY DISINTEGRATING, DELAYED RELEASE ORAL at 08:51

## 2025-07-08 RX ADMIN — IPRATROPIUM BROMIDE AND ALBUTEROL SULFATE 1 DOSE: .5; 3 SOLUTION RESPIRATORY (INHALATION) at 08:58

## 2025-07-08 RX ADMIN — ENOXAPARIN SODIUM 40 MG: 100 INJECTION SUBCUTANEOUS at 08:51

## 2025-07-08 RX ADMIN — BUSPIRONE HYDROCHLORIDE 10 MG: 10 TABLET ORAL at 13:30

## 2025-07-08 RX ADMIN — BUSPIRONE HYDROCHLORIDE 10 MG: 10 TABLET ORAL at 08:49

## 2025-07-08 RX ADMIN — PREDNISONE 30 MG: 5 TABLET ORAL at 08:50

## 2025-07-08 NOTE — PROGRESS NOTES
Hospitalist Progress Note      NAME:  Adrian Hurley   :  1955  MRM:  [id number]    Date/Time: 2025  6:54 AM           Assessment / Plan:     Adrian Hurley is a 69 y.o. male with COPD and chronic hypoxemic respiratory failure.  He was admitted to the ICU of this hospital on 2025 for management of acute on chronic hypoxemic respiratory failure secondary to COPD exacerbation.  Transferred to the hospitalist service on 2025.     Acute on chronic hypoxemic respiratory failure:   - Has variable oxygen requirements at baseline. Intubated on admission, extubated . Unfortunately, reintubated  & extubated again on .   - Secondary to COPD exacerbation.  Low suspicion for pneumonia based on imaging and negative procalcitonin on admission.    - Continue weaning prednisone -- wean down by 10 mg daily until he is on his baseline dose of 10 mg daily  - Appreciate pulmonology assistance. Continue OP PAR follow up.   - Approved for Trilogy device for home  - Continue Pulmicort/Brovana nebs twice daily  - Continue DuoNebs as needed  - Pending placement to IPR @ .      Normocytic anemia:   - No clinically evident bleeding.    - Hb stable/improved from admission   - OP follow up, repeat labs in 1 - 2 weeks      Thrombocytopenia:   - Resolved.  Likely from critical illness.  - OP follow up, repeat labs in 1 - 2 weeks     CAD:   - Incidental finding on admission CTA chest.  No chest pain.  - OP follow up, considerations for stress testing & lipid testing with PCP      Prediabetes with hyperglycemia:   - A1c 6.3% 2025.  - BS well controlled, not requiring insulin      GERD  - Continue PPI     ROSALINA  - Continue home buspirone and escitalopram  - Continue hydroxyzine as needed         #BMI (Calculated): 27.1    I have personally reviewed the radiographs, laboratory data in Epic and decisions and statements above are based partially on this personal interpretation.                 Care Plan  Caller would like to discuss a return call.      Patient Name: Genevieve Alex  Caller Name: Alexis Garcia Number: [phone]  Best Availability: after 3 PM   Did you confirm the message with the caller?: yes    Thank you,  Dylon Russell General: Abdomen is flat.   Musculoskeletal:      Right lower leg: No edema.      Left lower leg: No edema.   Skin:     General: Skin is warm and dry.   Neurological:      General: No focal deficit present.   Psychiatric:         Mood and Affect: Mood normal.          Medications Reviewed: (see below)    Lab Data Reviewed: (see below)    ______________________________________________________________________    Medications:     Current Facility-Administered Medications   Medication Dose Route Frequency    busPIRone (BUSPAR) tablet 10 mg  10 mg Oral TID    sodium chloride (OCEAN, BABY AYR) 0.65 % nasal spray 1 spray  1 spray Each Nostril PRN    predniSONE (DELTASONE) tablet 30 mg  30 mg Oral Daily    magnesium oxide (MAG-OX) tablet 400 mg  400 mg Oral Daily    lansoprazole (PREVACID SOLUTAB) disintegrating tablet 30 mg  30 mg Oral BID AC    morphine sulfate (PF) injection 2 mg  2 mg IntraVENous Once    escitalopram (LEXAPRO) tablet 10 mg  10 mg Oral Daily    guaiFENesin (ROBITUSSIN) 100 MG/5ML liquid 400 mg  400 mg Oral Q12H    hydrOXYzine HCl (ATARAX) tablet 25 mg  25 mg Oral Q4H PRN    sennosides-docusate sodium (SENOKOT-S) 8.6-50 MG tablet 1 tablet  1 tablet Oral Daily    ipratropium 0.5 mg-albuterol 2.5 mg (DUONEB) nebulizer solution 1 Dose  1 Dose Inhalation Q6H WA RT    lidocaine (XYLOCAINE) 2 % uro-jet   Topical Once PRN    sodium chloride flush 0.9 % injection 5-40 mL  5-40 mL IntraVENous 2 times per day    sodium chloride flush 0.9 % injection 5-40 mL  5-40 mL IntraVENous PRN    0.9 % sodium chloride infusion   IntraVENous PRN    potassium chloride 20 mEq/50 mL IVPB (Central Line)  20 mEq IntraVENous PRN    Or    potassium chloride 10 mEq/100 mL IVPB (Peripheral Line)  10 mEq IntraVENous PRN    magnesium sulfate 2000 mg in 50 mL IVPB premix  2,000 mg IntraVENous PRN    enoxaparin (LOVENOX) injection 40 mg  40 mg SubCUTAneous Daily    ondansetron (ZOFRAN-ODT) disintegrating tablet 4 mg  4 mg Oral Q8H PRN    Or

## 2025-07-08 NOTE — PROGRESS NOTES
Spiritual Health History and Assessment/Progress Note  [facility]    Follow-up, Code Blue, Adjustment to illness,      Name: Adrian Hurley MRN: [id number]    Age: 69 y.o.     Sex: male   Language: English   Scientology: Other   COPD exacerbation (HCC)     Date: 7/8/2025            Total Time Calculated: 30 min              Spiritual Assessment began in [facility] MULTI-SPECIALTY ORTHOPEDICS 2        Referral/Consult From: Palliative Care   Encounter Overview/Reason: Follow-up  Service Provided For: Patient    Autumn, Belief, Meaning:   Patient has beliefs or practices that help with coping during difficult times  Family/Friends No family/friends present      Importance and Influence:  Patient has spiritual/personal beliefs that influence decisions regarding their health  Family/Friends No family/friends present    Community:  Patient feels well-supported. Support system includes: Spouse/Partner and Children  Family/Friends No family/friends present    Assessment and Plan of Care:     Patient Interventions include: Facilitated expression of thoughts and feelings and Affirmed coping skills/support systems  Family/Friends Interventions include: No family/friends present    Patient Plan of Care: Spiritual Care available upon further referral  Family/Friends Plan of Care: Spiritual Care available upon further referral     follow up visit. Pt was sitting up in the recliner. Pt's wife had stepped out for lunch. Pt shared he expects to be discharged ti [facility] and is looking forward to improvement. Pt cpes through humor and the suppot of family. Shared  availability.    Electronically signed by ALEXIS Sidhu on 7/8/2025 at 12:31 PM

## 2025-07-08 NOTE — PLAN OF CARE
Problem: Discharge Planning  Goal: Discharge to home or other facility with appropriate resources  Outcome: Progressing     Problem: Safety - Adult  Goal: Free from fall injury  7/7/2025 2147 by June Chi RN  Outcome: Progressing  7/7/2025 1500 by Kristie Gottlieb RN  Outcome: Progressing     Problem: Pain  Goal: Verbalizes/displays adequate comfort level or baseline comfort level  Outcome: Progressing     Problem: Respiratory - Adult  Goal: Achieves optimal ventilation and oxygenation  7/7/2025 2147 by June Chi RN  Outcome: Progressing  7/7/2025 1500 by Kristie Gottlieb RN  Outcome: Progressing  7/7/2025 0909 by Ana Crabtree, RT  Outcome: Progressing     Problem: Skin/Tissue Integrity  Goal: Skin integrity remains intact  Description: 1.  Monitor for areas of redness and/or skin breakdown  2.  Assess vascular access sites hourly  3.  Every 4-6 hours minimum:  Change oxygen saturation probe site  4.  Every 4-6 hours:  If on nasal continuous positive airway pressure, respiratory therapy assess nares and determine need for appliance change or resting period  Outcome: Progressing     Problem: Nutrition Deficit:  Goal: Optimize nutritional status  Outcome: Progressing     Problem: Neurosensory - Adult  Goal: Achieves stable or improved neurological status  Outcome: Progressing     Problem: Skin/Tissue Integrity - Adult  Goal: Skin integrity remains intact  Description: 1.  Monitor for areas of redness and/or skin breakdown  2.  Assess vascular access sites hourly  3.  Every 4-6 hours minimum:  Change oxygen saturation probe site  4.  Every 4-6 hours:  If on nasal continuous positive airway pressure, respiratory therapy assess nares and determine need for appliance change or resting period  Outcome: Progressing  Goal: Oral mucous membranes remain intact  Outcome: Progressing     Problem: Musculoskeletal - Adult  Goal: Return mobility to safest level of function  Outcome: Progressing     Problem:  and was independent at baseline with most ADLs. Patient's wife reports she washes his hair and back in the shower, but he completes all other tasks and she takes care of the IADLs with patient contributing occasionally when able. Patient does not use AD around the house, but has an electric scooter he will utilize in the community.     , Prior Level of Assist for ADLs:  (wife washes hair and back in the shower),  ,  ,  ,  ,  , Prior Level of Assist for Homemaking: Needs assistance,  , Prior Level of Assist for Transfers: Independent, Active : Yes     HOME SUPPORT: Patient lived with wife who provides Michelle for bathing and full IADL assistance.    Occupational Therapy Goals:  Initiated 7/3/2025  1.  Patient will perform lower body dressing with Moderate Assist within 7 day(s).  2.  Patient will perform upper body dressing with Minimal Assist within 7 day(s).  3.  Patient will perform grooming with Minimal Assist within 7 day(s).  4.  Patient will perform toilet transfers with Moderate Assist and Assist x1  within 7 day(s).  5.  Patient will perform all aspects of toileting with Moderate Assist and Assist x1 within 7 day(s).  6.  Patient will participate in upper extremity therapeutic exercise/activities with Minimal Assist for 10 minutes within 7 day(s).    7.  Patient will utilize energy conservation techniques during functional activities with verbal cues within 7 day(s).    7/7/2025 1452 by Mimi Garduno, OT  Outcome: Progressing

## 2025-07-08 NOTE — CARE COORDINATION
Care Management Progress Note        Reason for Admission:   COPD exacerbation (HCC) [J44.1]  COPD with acute exacerbation (HCC) [J44.1]  Acute respiratory failure with hypoxia and hypercarbia (HCC) [J96.01, J96.02]         Patient Admission Status: Inpatient  RUR: 13%  Hospitalization in the last 30 days (Readmission):  No        Transition of care plan:  Patient was discussed in IDR and is medically ready for discharge pending insurance authorization.    Dispo: IPR.   If IPR:  Date FOC offered: 7/7/25.  Accepting facility: [facility]. Facility is able to accept pt's trilogy. CM has sent trilogy setting orders to IPR in [facility].   Date authorization started with reference number: IPR liaison has started insurance authorization on 7/7/25. REF # [id number]   Date authorization received and expires:     [email or web address] has accepted pt's Trilogy order form and supporting documentation. Pt's insurance has approved the trilogy. [email or web address] will deliver the trilogy to pt's bedside tomorrow 7/9 with a respiratory therapist to complete training with the patient and his wife.    Discharge plan communicated with patient and/or discharge caregiver: Yes . CM has updated patient and his wife at bedside today 7/8.    Date 1st IMM letter given: N/a. Patient has a commercial insurance plan.     Outpatient follow-up.    Transport at discharge: pt's wife with his portable oxygen tank.        ___________________________________________   Pennie Hughes RN Case Manager  7/8/2025   1:52 PM

## 2025-07-08 NOTE — PLAN OF CARE
Problem: Occupational Therapy - Adult  Goal: By Discharge: Performs self-care activities at highest level of function for planned discharge setting.  See evaluation for individualized goals.  Description: FUNCTIONAL STATUS PRIOR TO ADMISSION:  Patient lives with very supportive spouse and was independent at baseline with most ADLs. Patient's wife reports she washes his hair and back in the shower, but he completes all other tasks and she takes care of the IADLs with patient contributing occasionally when able. Patient does not use AD around the house, but has an electric scooter he will utilize in the community.     , Prior Level of Assist for ADLs:  (wife washes hair and back in the shower),  ,  ,  ,  ,  , Prior Level of Assist for Homemaking: Needs assistance,  , Prior Level of Assist for Transfers: Independent, Active : Yes     HOME SUPPORT: Patient lived with wife who provides Michelle for bathing and full IADL assistance.    Occupational Therapy Goals:  Initiated 7/3/2025  1.  Patient will perform lower body dressing with Moderate Assist within 7 day(s).  2.  Patient will perform upper body dressing with Minimal Assist within 7 day(s).  3.  Patient will perform grooming with Minimal Assist within 7 day(s).  4.  Patient will perform toilet transfers with Moderate Assist and Assist x1  within 7 day(s).  5.  Patient will perform all aspects of toileting with Moderate Assist and Assist x1 within 7 day(s).  6.  Patient will participate in upper extremity therapeutic exercise/activities with Minimal Assist for 10 minutes within 7 day(s).    7.  Patient will utilize energy conservation techniques during functional activities with verbal cues within 7 day(s).    Outcome: Progressing   OCCUPATIONAL THERAPY TREATMENT  Patient: Adrian Hurley (69 y.o. male)  Date: 7/8/2025  Primary Diagnosis: COPD exacerbation (HCC) [J44.1]  COPD with acute exacerbation (HCC) [J44.1]  Acute respiratory failure with hypoxia and

## 2025-07-08 NOTE — PROGRESS NOTES
[facility]     Name: Adrian Hurley MRN: [id number]   : 1955 Hospital: [facility]   Date: 2025        Impression Plan   Acute on chronic respiratory failure  Hypoxia  Very severe COPD with exacerbation  Poor sputum mobilization  Tobacco abuse               O2 at baseline  Pt is not getting enough support with home autobipap. Due to chronic respiratory failure and chronic CO2 retention would benefit greatly from NIV, this has been approved  Flutter valve- use at least every 4 hrs with nebs  Weaning steroids over the next week down to Pred 10 mg daily baseline  Pulmicort/Brovana  Duoneb  Will need aggressive PT/OT. It sounds like has been accepted at  inpatient rehab pending insurance authorization.  Discussed plan with pt and wife.       Pt has chronic respiratory failure due to COPD. He has chronic hypoxia and hypercapnia. A non-invasive ventilator is being ordered because for the pt because BiPAP has not provided the necessary ventilator support or the settings needed to treat this patient effectively. As such, the pt is a great candidate for trilogy noninvasive ventilation. This will provide pt with the necessary minute ventilation with a backup battery and targeted tidal volumes which cannot be provided by CPAP or BiPAP.             Radiology  ( personally reviewed) Chest imaging reviewed- last CXR was while intubated    ABG Invalid input(s): \"PHI\", \"PO2I\", \"PCO2I\"       Subjective     70 yo with PMHx of chronic respiratory failure(3 liters and auto bipap support at home) and very severe COPD presenting on  initially for shortness of breath and hypoxia. Pt intubated when he failed bipap therapy in the ER. He was extubated  but reintubated . Extubated again . Pt sees PAR and is managed on Breztri, Pred 10 mg daily, Roflumilast, nebs. Pt lives with extreme dyspnea and is debilitated due to his limited pulmonary reserve. Pt states he feels 60%

## 2025-07-08 NOTE — PLAN OF CARE
Problem: Discharge Planning  Goal: Discharge to home or other facility with appropriate resources  Outcome: Progressing     Problem: Safety - Adult  Goal: Free from fall injury  Outcome: Progressing     Problem: Pain  Goal: Verbalizes/displays adequate comfort level or baseline comfort level  Outcome: Progressing     Problem: Respiratory - Adult  Goal: Achieves optimal ventilation and oxygenation  Outcome: Progressing     Problem: Skin/Tissue Integrity  Goal: Skin integrity remains intact  Description: 1.  Monitor for areas of redness and/or skin breakdown  2.  Assess vascular access sites hourly  3.  Every 4-6 hours minimum:  Change oxygen saturation probe site  4.  Every 4-6 hours:  If on nasal continuous positive airway pressure, respiratory therapy assess nares and determine need for appliance change or resting period  Outcome: Progressing     Problem: Nutrition Deficit:  Goal: Optimize nutritional status  Outcome: Progressing     Problem: Neurosensory - Adult  Goal: Achieves stable or improved neurological status  Outcome: Progressing     Problem: Skin/Tissue Integrity - Adult  Goal: Skin integrity remains intact  Description: 1.  Monitor for areas of redness and/or skin breakdown  2.  Assess vascular access sites hourly  3.  Every 4-6 hours minimum:  Change oxygen saturation probe site  4.  Every 4-6 hours:  If on nasal continuous positive airway pressure, respiratory therapy assess nares and determine need for appliance change or resting period  Outcome: Progressing  Goal: Oral mucous membranes remain intact  Outcome: Progressing     Problem: Musculoskeletal - Adult  Goal: Return mobility to safest level of function  Outcome: Progressing     Problem: Occupational Therapy - Adult  Goal: By Discharge: Performs self-care activities at highest level of function for planned discharge setting.  See evaluation for individualized goals.  Description: FUNCTIONAL STATUS PRIOR TO ADMISSION:  Patient lives with very

## 2025-07-08 NOTE — PROGRESS NOTES
Comprehensive Nutrition Assessment    Type and Reason for Visit:  Reassess    Nutrition Recommendations/Plan:   Continue regular   Provide Armani BID to aid in wound healing (95 kcal, 9.8 g carbs, complete amino acids for wound healing 2.5 g)     Malnutrition Assessment:  Malnutrition Status:  No malnutrition (06/30/25 1149)    Context:  Acute Illness     Findings of the 6 clinical characteristics of malnutrition:  Energy Intake:  No decrease in energy intake  Weight Loss:  No weight loss     Body Fat Loss:  No body fat loss     Muscle Mass Loss:  No muscle mass loss    Fluid Accumulation:  Moderate to Severe Extremities   Strength:  Not Performed    Nutrition Assessment:       7/8 Follow Up: Pt is now day 6 s/p extubation. Has been ordered a regular diet since 7.3. Pt reports strong appetite and estimates eating % of all meals. He states that he felt nauseous this morning but this has since resolved. Last BM yesterday. Will begin sending Armani to aid in wound healing. Labs reviewed, lytes WNL. Continuing current diet and placing ONS order.     7/2: Follow up. Patient extubated this AM, enteral access removed. Currently on 5L biPAP 40%.  Currently on precedex 0.8 mcg/kg/hr. +BM overnight. Patient reports hunger, no abd pain. +flatus. He does not usually drinks protein supplements at home, will monitor PO intakes and recommend as needed. Mild hyponatremia but other lytes WDL.  BG trending ~200-250 mg/dL on q8 solumedrol w/ medium dose sliding scale.     Last Weight Metrics:      7/2/2025     8:20 AM 7/1/2025    11:36 AM 7/1/2025     6:00 AM 6/29/2025     6:00 AM 6/28/2025     6:30 AM 6/27/2025     2:00 AM 6/26/2025     9:14 AM   Weight Loss Metrics   Height 5' 10.866\" 5' 10.866\"     5' 10.866\"   Weight - Scale   185 lbs 14 oz 193 lbs 9 oz 194 lbs 11 oz 193 lbs 9 oz    BMI (Calculated)   26.1 kg/m2 27.2 kg/m2 27.3 kg/m2 27.2 kg/m2        Nutrition Related Findings:      Wound Type: None     Last BM:

## 2025-07-09 PROCEDURE — 94761 N-INVAS EAR/PLS OXIMETRY MLT: CPT

## 2025-07-09 PROCEDURE — 6370000000 HC RX 637 (ALT 250 FOR IP): Performed by: INTERNAL MEDICINE

## 2025-07-09 PROCEDURE — 97530 THERAPEUTIC ACTIVITIES: CPT

## 2025-07-09 PROCEDURE — 2500000003 HC RX 250 WO HCPCS: Performed by: NURSE PRACTITIONER

## 2025-07-09 PROCEDURE — 2700000000 HC OXYGEN THERAPY PER DAY

## 2025-07-09 PROCEDURE — 6370000000 HC RX 637 (ALT 250 FOR IP): Performed by: HOSPITALIST

## 2025-07-09 PROCEDURE — 6370000000 HC RX 637 (ALT 250 FOR IP): Performed by: STUDENT IN AN ORGANIZED HEALTH CARE EDUCATION/TRAINING PROGRAM

## 2025-07-09 PROCEDURE — 6360000002 HC RX W HCPCS: Performed by: INTERNAL MEDICINE

## 2025-07-09 PROCEDURE — 94660 CPAP INITIATION&MGMT: CPT

## 2025-07-09 PROCEDURE — 94669 MECHANICAL CHEST WALL OSCILL: CPT

## 2025-07-09 PROCEDURE — 94640 AIRWAY INHALATION TREATMENT: CPT

## 2025-07-09 PROCEDURE — 2500000003 HC RX 250 WO HCPCS: Performed by: HOSPITALIST

## 2025-07-09 PROCEDURE — 6360000002 HC RX W HCPCS: Performed by: NURSE PRACTITIONER

## 2025-07-09 PROCEDURE — 1100000000 HC RM PRIVATE

## 2025-07-09 PROCEDURE — 97116 GAIT TRAINING THERAPY: CPT

## 2025-07-09 RX ADMIN — BUDESONIDE 500 MCG: 0.5 INHALANT RESPIRATORY (INHALATION) at 07:44

## 2025-07-09 RX ADMIN — BUSPIRONE HYDROCHLORIDE 10 MG: 10 TABLET ORAL at 09:15

## 2025-07-09 RX ADMIN — ARFORMOTEROL TARTRATE 15 MCG: 15 SOLUTION RESPIRATORY (INHALATION) at 20:10

## 2025-07-09 RX ADMIN — BUSPIRONE HYDROCHLORIDE 10 MG: 10 TABLET ORAL at 14:05

## 2025-07-09 RX ADMIN — ENOXAPARIN SODIUM 40 MG: 100 INJECTION SUBCUTANEOUS at 09:16

## 2025-07-09 RX ADMIN — PREDNISONE 20 MG: 20 TABLET ORAL at 09:15

## 2025-07-09 RX ADMIN — LANSOPRAZOLE 30 MG: 15 TABLET, ORALLY DISINTEGRATING, DELAYED RELEASE ORAL at 06:24

## 2025-07-09 RX ADMIN — IPRATROPIUM BROMIDE AND ALBUTEROL SULFATE 1 DOSE: .5; 3 SOLUTION RESPIRATORY (INHALATION) at 20:15

## 2025-07-09 RX ADMIN — IPRATROPIUM BROMIDE AND ALBUTEROL SULFATE 1 DOSE: .5; 3 SOLUTION RESPIRATORY (INHALATION) at 13:59

## 2025-07-09 RX ADMIN — SODIUM CHLORIDE, PRESERVATIVE FREE 10 ML: 5 INJECTION INTRAVENOUS at 20:34

## 2025-07-09 RX ADMIN — BUDESONIDE 500 MCG: 0.5 INHALANT RESPIRATORY (INHALATION) at 20:10

## 2025-07-09 RX ADMIN — BUSPIRONE HYDROCHLORIDE 10 MG: 10 TABLET ORAL at 20:34

## 2025-07-09 RX ADMIN — GUAIFENESIN 400 MG: 200 SOLUTION ORAL at 09:15

## 2025-07-09 RX ADMIN — GUAIFENESIN 400 MG: 200 SOLUTION ORAL at 20:34

## 2025-07-09 RX ADMIN — SODIUM CHLORIDE, PRESERVATIVE FREE 10 ML: 5 INJECTION INTRAVENOUS at 09:16

## 2025-07-09 RX ADMIN — ARFORMOTEROL TARTRATE 15 MCG: 15 SOLUTION RESPIRATORY (INHALATION) at 07:44

## 2025-07-09 RX ADMIN — ESCITALOPRAM OXALATE 10 MG: 10 TABLET ORAL at 09:15

## 2025-07-09 RX ADMIN — IPRATROPIUM BROMIDE AND ALBUTEROL SULFATE 1 DOSE: .5; 3 SOLUTION RESPIRATORY (INHALATION) at 07:38

## 2025-07-09 RX ADMIN — LANSOPRAZOLE 30 MG: 15 TABLET, ORALLY DISINTEGRATING, DELAYED RELEASE ORAL at 15:42

## 2025-07-09 RX ADMIN — Medication 400 MG: at 09:15

## 2025-07-09 NOTE — PROGRESS NOTES
Hospitalist Progress Note      NAME:  Adrian Hurley   :  1955  MRM:  [id number]    Date/Time: 2025  7:07 AM           Assessment / Plan:     Adrian Hurley is a 69 y.o. male with COPD and chronic hypoxemic respiratory failure.  He was admitted to the ICU of this hospital on 2025 for management of acute on chronic hypoxemic respiratory failure secondary to COPD exacerbation.  Transferred to the hospitalist service on 2025.     Acute on chronic hypoxemic respiratory failure:   - Has variable oxygen requirements at baseline. Intubated on admission, extubated . Unfortunately, reintubated  & extubated again on .   - Secondary to COPD exacerbation.  Low suspicion for pneumonia based on imaging and negative procalcitonin on admission.    - Continue weaning prednisone -- wean down by 10 mg daily until he is on his baseline dose of 10 mg daily  - Appreciate pulmonology assistance. Continue OP PAR follow up.   - 2L NC during the day & PAP at night. Continue to wean O2 as tolerated. Approved for [facility] device for home, will be delivered today with home teaching   - Continue Pulmicort/Brovana nebs twice daily  - Continue DuoNebs as needed  - Pending placement to IPR @ . Has been approved. Waiting bed availability.      Normocytic anemia:   - No clinically evident bleeding.    - Hb stable/improved from admission   - OP follow up, repeat labs in 1 - 2 weeks      Thrombocytopenia:   - Resolved.  Likely from critical illness.  - OP follow up, repeat labs in 1 - 2 weeks     CAD:   - Incidental finding on admission CTA chest.  No chest pain.  - OP follow up, considerations for stress testing & lipid testing with PCP      Prediabetes with hyperglycemia:   - A1c 6.3% 2025.  - BS well controlled, not requiring insulin      GERD  - Continue PPI     ROSALINA  - Continue home buspirone and escitalopram  - Continue hydroxyzine as needed         #BMI (Calculated): 27.1    I have personally  Wheeze no longer appreciated. Breathing comfortably. NC in place  Abdominal:      General: Abdomen is flat.   Musculoskeletal:      Right lower leg: No edema.      Left lower leg: No edema.   Skin:     General: Skin is warm and dry.   Neurological:      General: No focal deficit present.   Psychiatric:         Mood and Affect: Mood normal.          Medications Reviewed: (see below)    Lab Data Reviewed: (see below)    ______________________________________________________________________    Medications:     Current Facility-Administered Medications   Medication Dose Route Frequency    predniSONE (DELTASONE) tablet 20 mg  20 mg Oral Daily    Followed by    [START ON 7/10/2025] predniSONE (DELTASONE) tablet 10 mg  10 mg Oral Daily    busPIRone (BUSPAR) tablet 10 mg  10 mg Oral TID    sodium chloride (OCEAN, BABY AYR) 0.65 % nasal spray 1 spray  1 spray Each Nostril PRN    magnesium oxide (MAG-OX) tablet 400 mg  400 mg Oral Daily    lansoprazole (PREVACID SOLUTAB) disintegrating tablet 30 mg  30 mg Oral BID AC    morphine sulfate (PF) injection 2 mg  2 mg IntraVENous Once    escitalopram (LEXAPRO) tablet 10 mg  10 mg Oral Daily    guaiFENesin (ROBITUSSIN) 100 MG/5ML liquid 400 mg  400 mg Oral Q12H    hydrOXYzine HCl (ATARAX) tablet 25 mg  25 mg Oral Q4H PRN    sennosides-docusate sodium (SENOKOT-S) 8.6-50 MG tablet 1 tablet  1 tablet Oral Daily    ipratropium 0.5 mg-albuterol 2.5 mg (DUONEB) nebulizer solution 1 Dose  1 Dose Inhalation Q6H WA RT    lidocaine (XYLOCAINE) 2 % uro-jet   Topical Once PRN    sodium chloride flush 0.9 % injection 5-40 mL  5-40 mL IntraVENous 2 times per day    sodium chloride flush 0.9 % injection 5-40 mL  5-40 mL IntraVENous PRN    0.9 % sodium chloride infusion   IntraVENous PRN    potassium chloride 20 mEq/50 mL IVPB (Central Line)  20 mEq IntraVENous PRN    Or    potassium chloride 10 mEq/100 mL IVPB (Peripheral Line)  10 mEq IntraVENous PRN    magnesium sulfate 2000 mg in 50 mL IVPB

## 2025-07-09 NOTE — PLAN OF CARE
Problem: Physical Therapy - Adult  Goal: By Discharge: Performs mobility at highest level of function for planned discharge setting.  See evaluation for individualized goals.  Description: FUNCTIONAL STATUS PRIOR TO ADMISSION: Patient was independent and active without use of DME. and At baseline the patient was on 3 liters/min of supplemental O2 continuously.    HOME SUPPORT PRIOR TO ADMISSION: The patient lived with spouse.    Physical Therapy Goals  Re-eval 7/3/25 after extubation 7/2/25  1.  Patient will move from supine to sit and sit to supine and roll side to side in bed with [id number] within 7 day(s).    2.  Patient will perform sit to stand with minimal assistance within 7 day(s).  3.  Patient will transfer from bed to chair and chair to bed with minimal assistance using the least restrictive device within 7 day(s).  4.  Patient will ambulate with minimal assistance for 25 feet with the least restrictive device within 7 day(s).     Initiated 6/27/2025  1.  Patient will move from supine to sit and sit to supine and roll side to side in bed with maximal assistance within 7 day(s).    2.  Patient will perform sit to stand with maximal assistance within 7 day(s).  3.  Patient will transfer from bed to chair and chair to bed with maximal assistance using the least restrictive device within 7 day(s).  4.  Patient will ambulate with moderate assistance for 5 feet with the least restrictive device within 7 day(s).   5. Patient will tolerate bed in chair position x 1 hour with VSS within 7 days  6. Patient will tolerate seated EOB with mod A x 10 minutes within 7 days  Outcome: Progressing   PHYSICAL THERAPY TREATMENT    Patient: Adrian Hurley (70 y.o. male)  Date: 7/9/2025  Diagnosis: COPD exacerbation (HCC) [J44.1]  COPD with acute exacerbation (HCC) [J44.1]  Acute respiratory failure with hypoxia and hypercarbia (HCC) [J96.01, J96.02] COPD exacerbation (HCC)      Precautions:

## 2025-07-09 NOTE — CARE COORDINATION
Care Management Progress Note        Reason for Admission:   COPD exacerbation (HCC) [J44.1]  COPD with acute exacerbation (HCC) [J44.1]  Acute respiratory failure with hypoxia and hypercarbia (HCC) [J96.01, J96.02]         Patient Admission Status: Inpatient  RUR: 9%  Hospitalization in the last 30 days (Readmission):  No        Transition of care plan:  Patient was discussed in IDR and is medically ready for discharge pending bed availability at [facility].    Dispo: IPR.  If IPR:  Date FOC offered: 7/7/25  Accepting facility: [facility] with pt's Trilogy.  Date authorization started with reference number: Ref# [id number] authorization started 7/7/25.  Date authorization received and expires: Authorization was approved on 7/9//25  [facility] anticipated a bed will be available on Friday 7/11/25.    [employer] has accepted pt's Trilogy order form and supporting documentation. Pt's insurance has approved the trilogy. Bedside delivery and RT teaching with patient and family to take place today at 1300.    Discharge plan communicated with patient and/or discharge caregiver: Yes  . CM has updated patinet and his wife today 7/9. Both remain in agreement with the dc plan.     Date 1st IMM letter given: N/a. Patient has a commercial insurance plan.     Outpatient follow-up.    Transport at discharge: pt's wife with his portable oxygen tank.         ___________________________________________   Pennie Hughes RN Case Manager  7/9/2025   4:52 PM

## 2025-07-09 NOTE — PLAN OF CARE
Problem: Discharge Planning  Goal: Discharge to home or other facility with appropriate resources  7/9/2025 1655 by Yara Arenas RN  Outcome: Progressing  7/9/2025 0331 by Bernadine Cedillo RN  Outcome: Progressing  Flowsheets (Taken 7/8/2025 2040)  Discharge to home or other facility with appropriate resources: Identify barriers to discharge with patient and caregiver     Problem: Safety - Adult  Goal: Free from fall injury  7/9/2025 1655 by Yara Arenas RN  Outcome: Progressing  7/9/2025 0331 by Bernadine Cedillo RN  Outcome: Progressing     Problem: Pain  Goal: Verbalizes/displays adequate comfort level or baseline comfort level  7/9/2025 1655 by Yara Arenas RN  Outcome: Progressing  7/9/2025 0331 by Bernadine Cedillo RN  Outcome: Progressing     Problem: Respiratory - Adult  Goal: Achieves optimal ventilation and oxygenation  7/9/2025 1655 by Yara Arenas RN  Outcome: Progressing  7/9/2025 0749 by Wendy Longo RT  Outcome: Progressing  7/9/2025 0331 by Bernadine Cedillo RN  Outcome: Progressing     Problem: Skin/Tissue Integrity  Goal: Skin integrity remains intact  Description: 1.  Monitor for areas of redness and/or skin breakdown  2.  Assess vascular access sites hourly  3.  Every 4-6 hours minimum:  Change oxygen saturation probe site  4.  Every 4-6 hours:  If on nasal continuous positive airway pressure, respiratory therapy assess nares and determine need for appliance change or resting period  7/9/2025 1655 by Yara Arenas RN  Outcome: Progressing  7/9/2025 0331 by Bernadine Cedillo RN  Outcome: Progressing     Problem: Nutrition Deficit:  Goal: Optimize nutritional status  7/9/2025 1655 by Yara Arenas RN  Outcome: Progressing  7/9/2025 0331 by Bernadine Cedillo RN  Outcome: Progressing     Problem: Neurosensory - Adult  Goal: Achieves stable or improved neurological status  7/9/2025 1655 by Yara Arenas RN  Outcome: Progressing  7/9/2025 0331 by Nguyen  sit to supine and roll side to side in bed with [id number] within 7 day(s).    2.  Patient will perform sit to stand with minimal assistance within 7 day(s).  3.  Patient will transfer from bed to chair and chair to bed with minimal assistance using the least restrictive device within 7 day(s).  4.  Patient will ambulate with minimal assistance for 25 feet with the least restrictive device within 7 day(s).     Initiated 6/27/2025  1.  Patient will move from supine to sit and sit to supine and roll side to side in bed with maximal assistance within 7 day(s).    2.  Patient will perform sit to stand with maximal assistance within 7 day(s).  3.  Patient will transfer from bed to chair and chair to bed with maximal assistance using the least restrictive device within 7 day(s).  4.  Patient will ambulate with moderate assistance for 5 feet with the least restrictive device within 7 day(s).   5. Patient will tolerate bed in chair position x 1 hour with VSS within 7 days  6. Patient will tolerate seated EOB with mod A x 10 minutes within 7 days  7/9/2025 1426 by Gladys Valero, PTA  Outcome: Progressing

## 2025-07-09 NOTE — PLAN OF CARE
Problem: Discharge Planning  Goal: Discharge to home or other facility with appropriate resources  7/9/2025 0331 by Bernadine Cedillo RN  Outcome: Progressing  7/8/2025 1850 by Yara Arenas RN  Outcome: Progressing     Problem: Safety - Adult  Goal: Free from fall injury  7/9/2025 0331 by Bernadine Cedillo, RN  Outcome: Progressing  7/8/2025 1850 by Yara Arenas RN  Outcome: Progressing

## 2025-07-09 NOTE — PROGRESS NOTES
[facility]     Name: Adrian Hurley MRN: [id number]   : 1955 Hospital: [facility]   Date: 2025        Impression Plan   Acute on chronic respiratory failure  Hypoxia  Very severe COPD with exacerbation  Poor sputum mobilization  Tobacco abuse               O2 at baseline  Pt is not getting enough support with home autobipap. Due to chronic respiratory failure and chronic CO2 retention would benefit greatly from NIV, this has been approved  Flutter valve- use at least every 4 hrs with nebs  Weaning steroids over the next week down to Pred 10 mg daily baseline  Pulmicort/Brovana  Duoneb  Will need aggressive PT/OT. It sounds like has been accepted at  inpatient rehab pending insurance authorization.  Discussed plan with pt and wife.       Pt has chronic respiratory failure due to COPD. He has chronic hypoxia and hypercapnia. A non-invasive ventilator is being ordered because for the pt because BiPAP has not provided the necessary ventilator support or the settings needed to treat this patient effectively. As such, the pt is a great candidate for trilogy noninvasive ventilation. This will provide pt with the necessary minute ventilation with a backup battery and targeted tidal volumes which cannot be provided by CPAP or BiPAP.             Radiology  ( personally reviewed) Chest imaging reviewed- last CXR was while intubated    ABG Invalid input(s): \"PHI\", \"PO2I\", \"PCO2I\"       Subjective     68 yo with PMHx of chronic respiratory failure(3 liters and auto bipap support at home) and very severe COPD presenting on  initially for shortness of breath and hypoxia. Pt intubated when he failed bipap therapy in the ER. He was extubated  but reintubated . Extubated again . Pt sees PAR and is managed on Breztri, Pred 10 mg daily, Roflumilast, nebs. Pt lives with extreme dyspnea and is debilitated due to his limited pulmonary reserve. Pt states he feels 60%

## 2025-07-10 PROCEDURE — 2500000003 HC RX 250 WO HCPCS: Performed by: NURSE PRACTITIONER

## 2025-07-10 PROCEDURE — 94640 AIRWAY INHALATION TREATMENT: CPT

## 2025-07-10 PROCEDURE — 6370000000 HC RX 637 (ALT 250 FOR IP): Performed by: HOSPITALIST

## 2025-07-10 PROCEDURE — 6370000000 HC RX 637 (ALT 250 FOR IP): Performed by: STUDENT IN AN ORGANIZED HEALTH CARE EDUCATION/TRAINING PROGRAM

## 2025-07-10 PROCEDURE — 6370000000 HC RX 637 (ALT 250 FOR IP): Performed by: INTERNAL MEDICINE

## 2025-07-10 PROCEDURE — 6360000002 HC RX W HCPCS: Performed by: NURSE PRACTITIONER

## 2025-07-10 PROCEDURE — 94761 N-INVAS EAR/PLS OXIMETRY MLT: CPT

## 2025-07-10 PROCEDURE — 2500000003 HC RX 250 WO HCPCS: Performed by: HOSPITALIST

## 2025-07-10 PROCEDURE — 6360000002 HC RX W HCPCS: Performed by: INTERNAL MEDICINE

## 2025-07-10 PROCEDURE — 1100000000 HC RM PRIVATE

## 2025-07-10 PROCEDURE — 94669 MECHANICAL CHEST WALL OSCILL: CPT

## 2025-07-10 RX ORDER — IPRATROPIUM BROMIDE AND ALBUTEROL SULFATE 2.5; .5 MG/3ML; MG/3ML
1 SOLUTION RESPIRATORY (INHALATION)
Status: COMPLETED | OUTPATIENT
Start: 2025-07-10 | End: 2025-07-10

## 2025-07-10 RX ORDER — PREDNISONE 5 MG/1
10 TABLET ORAL ONCE
Status: COMPLETED | OUTPATIENT
Start: 2025-07-10 | End: 2025-07-10

## 2025-07-10 RX ADMIN — IPRATROPIUM BROMIDE AND ALBUTEROL SULFATE 1 DOSE: .5; 3 SOLUTION RESPIRATORY (INHALATION) at 10:09

## 2025-07-10 RX ADMIN — ENOXAPARIN SODIUM 40 MG: 100 INJECTION SUBCUTANEOUS at 09:21

## 2025-07-10 RX ADMIN — LANSOPRAZOLE 30 MG: 15 TABLET, ORALLY DISINTEGRATING, DELAYED RELEASE ORAL at 05:57

## 2025-07-10 RX ADMIN — SODIUM CHLORIDE, PRESERVATIVE FREE 10 ML: 5 INJECTION INTRAVENOUS at 09:22

## 2025-07-10 RX ADMIN — ARFORMOTEROL TARTRATE 15 MCG: 15 SOLUTION RESPIRATORY (INHALATION) at 10:09

## 2025-07-10 RX ADMIN — BUSPIRONE HYDROCHLORIDE 10 MG: 10 TABLET ORAL at 09:20

## 2025-07-10 RX ADMIN — BUSPIRONE HYDROCHLORIDE 10 MG: 10 TABLET ORAL at 22:18

## 2025-07-10 RX ADMIN — Medication 400 MG: at 09:21

## 2025-07-10 RX ADMIN — IPRATROPIUM BROMIDE AND ALBUTEROL SULFATE 1 DOSE: .5; 3 SOLUTION RESPIRATORY (INHALATION) at 13:41

## 2025-07-10 RX ADMIN — BUDESONIDE 500 MCG: 0.5 INHALANT RESPIRATORY (INHALATION) at 19:30

## 2025-07-10 RX ADMIN — GUAIFENESIN 400 MG: 200 SOLUTION ORAL at 09:20

## 2025-07-10 RX ADMIN — SODIUM CHLORIDE, PRESERVATIVE FREE 10 ML: 5 INJECTION INTRAVENOUS at 22:18

## 2025-07-10 RX ADMIN — GUAIFENESIN 400 MG: 200 SOLUTION ORAL at 22:17

## 2025-07-10 RX ADMIN — PREDNISONE 10 MG: 5 TABLET ORAL at 10:39

## 2025-07-10 RX ADMIN — PREDNISONE 10 MG: 5 TABLET ORAL at 09:20

## 2025-07-10 RX ADMIN — BUDESONIDE 500 MCG: 0.5 INHALANT RESPIRATORY (INHALATION) at 10:09

## 2025-07-10 RX ADMIN — BUSPIRONE HYDROCHLORIDE 10 MG: 10 TABLET ORAL at 13:50

## 2025-07-10 RX ADMIN — ESCITALOPRAM OXALATE 10 MG: 10 TABLET ORAL at 09:21

## 2025-07-10 RX ADMIN — ARFORMOTEROL TARTRATE 15 MCG: 15 SOLUTION RESPIRATORY (INHALATION) at 19:30

## 2025-07-10 RX ADMIN — IPRATROPIUM BROMIDE AND ALBUTEROL SULFATE 1 DOSE: .5; 3 SOLUTION RESPIRATORY (INHALATION) at 19:25

## 2025-07-10 RX ADMIN — LANSOPRAZOLE 30 MG: 15 TABLET, ORALLY DISINTEGRATING, DELAYED RELEASE ORAL at 16:02

## 2025-07-10 RX ADMIN — IPRATROPIUM BROMIDE AND ALBUTEROL SULFATE 1 DOSE: .5; 3 SOLUTION RESPIRATORY (INHALATION) at 10:08

## 2025-07-10 NOTE — PROGRESS NOTES
Take 1 tablet by mouth nightly   Yes Ruben Mcgregor MD   ferrous sulfate 324 (65 Fe) MG EC tablet Take 1 tablet by mouth daily 5/2/25  Yes Ruben Mcgregor MD   fluticasone (FLONASE) 50 MCG/ACT nasal spray 1 spray by Nasal route daily   Yes Ruben Mcgregor MD   lisinopril (PRINIVIL;ZESTRIL) 30 MG tablet Take 1 tablet by mouth daily   Yes Ruben Mcgregor MD   omeprazole (PRILOSEC) 20 MG delayed release capsule Take 1 capsule by mouth Daily 6/22/25  Yes Ruben Mcgregor MD   predniSONE (DELTASONE) 10 MG tablet Take 1 tablet by mouth daily 6/4/25  Yes Ruben Mcgregor MD   albuterol sulfate HFA (PROVENTIL;VENTOLIN;PROAIR) 108 (90 Base) MCG/ACT inhaler Inhale 2 puffs into the lungs every 4 hours as needed 4/28/25  Yes Ruben Mcgregor MD   dicyclomine (BENTYL) 10 MG capsule Take 1 capsule by mouth 3 times daily (before meals) 3/2/25  Yes Niurka Riojas DO   Multiple Vitamin (MULTIVITAMIN) TABS tablet Take 1 tablet by mouth daily 3/3/25  Yes Niurka Riojas DO   Roflumilast (DALIRESP) 500 MCG tablet Take 1 tablet by mouth daily 3/3/25  Yes Niurka Riojas DO   busPIRone (BUSPAR) 10 MG tablet Take 1 tablet by mouth 3 times daily 3/2/25  Yes Niurka Riojas DO   Budeson-Glycopyrrol-Formoterol 160-9-4.8 MCG/ACT AERO Inhale 2 puffs into the lungs in the morning and at bedtime 3/2/25  Yes Niurka Riojas DO   escitalopram (LEXAPRO) 10 MG tablet ceived the following from [facility]: Outside name: escitalopram oxalate (LEXAPRO) 10 mg tablet 11/10/21  Yes Automatic Reconciliation, Ar   albuterol (PROVENTIL) (2.5 MG/3ML) 0.083% nebulizer solution Take 0.76 mLs by nebulization every 6 hours as needed for Wheezing 3/2/25 4/1/25  Niurka Riojas DO     [unfilled]  Allergies   Allergen Reactions    Penicillins Rash     Has taken amoxicillin without a reaction;       Social History     Tobacco Use    Smoking status: Former     Current packs/day: 0.50     Average packs/day: 0.5 packs/day for 50.5  years (25.3 ttl pk-yrs)     Types: Cigarettes     Start date: 1975    Smokeless tobacco: Never   Substance Use Topics    Alcohol use: Yes     Alcohol/week: 3.0 standard drinks of alcohol     Types: 3 Cans of beer per week      Family History   Problem Relation Age of Onset    No Known Problems Sister     Alcohol Abuse Father     Stroke Father     COPD Mother     Hypertension Mother     High Cholesterol Mother           Laboratory: I have personally reviewed the critical care flowsheet and labs.     No results for input(s): \"WBC\", \"HGB\", \"HCT\", \"PLT\" in the last 72 hours.    No results for input(s): \"NA\", \"K\", \"CL\", \"CO2\", \"GLU\", \"BUN\", \"MG\", \"PHOS\", \"ALT\", \"INR\" in the last 72 hours.    Invalid input(s): \"CREA\", \"CA\", \"ALB\", \"TBIL\", \"SGOT\"      Objective:     Mode Rate Tidal Volume Pressure FiO2 PEEP          5 cm H2O         Vital Signs:     TMAX(24)      Intake/Output:   Last shift:         Last 3 shifts: No intake/output data recorded.[id number]  Intake/Output Summary (Last 24 hours) at 7/10/2025 1148  Last data filed at 7/10/2025 0605  Gross per 24 hour   Intake 200 ml   Output 650 ml   Net -450 ml     EXAM:   GENERAL: pleasant, in no distress Increased AP diameter, HEENT:  PERRL, EOMI, no alar flaring or epistaxis, oral mucosa moist without cyanosis, NECK:  some accessory muscle use, LUNGS: distant breathsounds bilaterally, no wheezing or rhonchi heard HEART:  Regular rate and rhythm with no MGR; no edema is present, ABDOMEN:  soft with no tenderness, bowel sounds present, EXTREMITIES:  warm with no cyanosis, SKIN:  no jaundice or ecchymosis, and NEUROLOGIC:  alert and oriented, grossly non-focal    AJ Enriquez - NP  [facility]

## 2025-07-10 NOTE — PROGRESS NOTES
Hospitalist Progress Note      NAME:  Adrian Hurley   :  1955  MRM:  [id number]    Date/Time: 7/10/2025  5:59 PM           Assessment / Plan:     Adrian Hurley is a 69 y.o. male with COPD and chronic hypoxemic respiratory failure.  He was admitted to the ICU of this hospital on 2025 for management of acute on chronic hypoxemic respiratory failure secondary to COPD exacerbation.  Transferred to the hospitalist service on 2025.     Acute on chronic hypoxemic respiratory failure:   - Has variable oxygen requirements at baseline. Intubated on admission, extubated . Unfortunately, reintubated  & extubated again on .   - Secondary to COPD exacerbation.  Low suspicion for pneumonia based on imaging and negative procalcitonin on admission.    - Continue weaning prednisone -- wean down by 10 mg daily until he is on his baseline dose of 10 mg daily  - Appreciate pulmonology assistance. Continue OP PAR follow up.   - 2L NC during the day & PAP at night. Continue to wean O2 as tolerated. Approved for [employer] device for home, which has been delivered, patient has been educated and used overnight.   - Continue Pulmicort/Brovana nebs twice daily  - Continue DuoNebs as needed  - Pending placement to IPR @ . Has been approved. Waiting bed availability.      Normocytic anemia:   - No clinically evident bleeding.    - Hb stable/improved from admission   - OP follow up, repeat labs in 1 - 2 weeks      Thrombocytopenia:   - Resolved.  Likely from critical illness.  - OP follow up, repeat labs in 1 - 2 weeks     CAD:   - Incidental finding on admission CTA chest.  No chest pain.  - OP follow up, considerations for stress testing & lipid testing with PCP      Prediabetes with hyperglycemia:   - A1c 6.3% 2025.  - BS well controlled, not requiring insulin      GERD  - Continue PPI     ROSALINA  - Continue home buspirone and escitalopram  - Continue hydroxyzine as needed         #BMI (Calculated):  Rate and Rhythm: Normal rate and regular rhythm.      Heart sounds: Normal heart sounds.   Pulmonary:      Effort: Pulmonary effort is normal.      Comments: Decreased BS. Wheeze no longer appreciated. Breathing comfortably. NC in place  Abdominal:      General: Abdomen is flat.   Musculoskeletal:      Right lower leg: No edema.      Left lower leg: No edema.   Skin:     General: Skin is warm and dry.   Neurological:      General: No focal deficit present.   Psychiatric:         Mood and Affect: Mood normal.          Medications Reviewed: (see below)    Lab Data Reviewed: (see below)    ______________________________________________________________________    Medications:     Current Facility-Administered Medications   Medication Dose Route Frequency    predniSONE (DELTASONE) tablet 10 mg  10 mg Oral Daily    busPIRone (BUSPAR) tablet 10 mg  10 mg Oral TID    sodium chloride (OCEAN, BABY AYR) 0.65 % nasal spray 1 spray  1 spray Each Nostril PRN    magnesium oxide (MAG-OX) tablet 400 mg  400 mg Oral Daily    lansoprazole (PREVACID SOLUTAB) disintegrating tablet 30 mg  30 mg Oral BID AC    morphine sulfate (PF) injection 2 mg  2 mg IntraVENous Once    escitalopram (LEXAPRO) tablet 10 mg  10 mg Oral Daily    guaiFENesin (ROBITUSSIN) 100 MG/5ML liquid 400 mg  400 mg Oral Q12H    hydrOXYzine HCl (ATARAX) tablet 25 mg  25 mg Oral Q4H PRN    sennosides-docusate sodium (SENOKOT-S) 8.6-50 MG tablet 1 tablet  1 tablet Oral Daily    ipratropium 0.5 mg-albuterol 2.5 mg (DUONEB) nebulizer solution 1 Dose  1 Dose Inhalation Q6H WA RT    lidocaine (XYLOCAINE) 2 % uro-jet   Topical Once PRN    sodium chloride flush 0.9 % injection 5-40 mL  5-40 mL IntraVENous 2 times per day    sodium chloride flush 0.9 % injection 5-40 mL  5-40 mL IntraVENous PRN    0.9 % sodium chloride infusion   IntraVENous PRN    potassium chloride 20 mEq/50 mL IVPB (Central Line)  20 mEq IntraVENous PRN    Or    potassium chloride 10 mEq/100 mL IVPB

## 2025-07-10 NOTE — PROGRESS NOTES
Attempted to work with the patient for Physical Therapy, chart reviewed and discussed with nurse, cleared for therapy patient supine on bed requested to do his breathing  treatment, came back patient resting on bed request to defer for now feels tired. We will defer for now.

## 2025-07-10 NOTE — CARE COORDINATION
Care Management Progress Note        Reason for Admission:   COPD exacerbation (HCC) [J44.1]  COPD with acute exacerbation (HCC) [J44.1]  Acute respiratory failure with hypoxia and hypercarbia (HCC) [J96.01, J96.02]         Patient Admission Status: Inpatient  RUR: 9%  Hospitalization in the last 30 days (Readmission):  No        Transition of care plan:  Patient was discussed in IDR and is medically ready for discharge pending bed availability at [facility].      Dispo: IPR.   If  IPR:  Date FOC offered: 7/7/25  Accepting facility: [facility]. Facility is able to accept pt's trilogy. CM has previously sent trilogy setting orders to [facility] in [facility].   Date authorization started with reference number:  IPR liaison has started insurance authorization on 7/7/25. REF # [id number]   Date authorization received and expires: Insurance authorization was approved on 7/9/25.  [facility] will have a bed Friday 7/11.    [employer] has delivered trilogy to pt's bedside and completed teaching with patient and his wife. Patient experiences an air leak with his mask overnight. CM has contacted [employer]. RT with [employer] has reached out to the patient to provide additional assistance with trouble shooting trilogy and/or mask.     Discharge plan communicated with patient and/or discharge caregiver: Yes  . CM has updated patient at bedside today 7/11.    Date 1st IMM letter given: N/a. Patient has a commercial insurance plan.     Outpatient follow-up.    Transport at discharge: pt's wife with his portable oxygen tank.       ___________________________________________   Pennie Hughes RN Case Manager  7/10/2025   4:38 PM

## 2025-07-10 NOTE — PROGRESS NOTES
Occupational Therapy    Chart reviewed in prep for skilled OT treatment; however, upon initial attempt, MD requested OT to return to allow for stat breathing treatment.  Upon return to room, pt resting in bed and requests defer.   OT to continue to follow.    Thank you,  Matthew Kerley, OT

## 2025-07-11 VITALS
RESPIRATION RATE: 18 BRPM | HEART RATE: 87 BPM | SYSTOLIC BLOOD PRESSURE: 125 MMHG | OXYGEN SATURATION: 94 % | DIASTOLIC BLOOD PRESSURE: 64 MMHG | TEMPERATURE: 97.5 F | BODY MASS INDEX: 26.02 KG/M2 | WEIGHT: 185.85 LBS | HEIGHT: 71 IN

## 2025-07-11 PROCEDURE — 6370000000 HC RX 637 (ALT 250 FOR IP): Performed by: HOSPITALIST

## 2025-07-11 PROCEDURE — 94761 N-INVAS EAR/PLS OXIMETRY MLT: CPT

## 2025-07-11 PROCEDURE — 6370000000 HC RX 637 (ALT 250 FOR IP): Performed by: STUDENT IN AN ORGANIZED HEALTH CARE EDUCATION/TRAINING PROGRAM

## 2025-07-11 PROCEDURE — 6360000002 HC RX W HCPCS: Performed by: INTERNAL MEDICINE

## 2025-07-11 PROCEDURE — 6370000000 HC RX 637 (ALT 250 FOR IP): Performed by: INTERNAL MEDICINE

## 2025-07-11 PROCEDURE — 2500000003 HC RX 250 WO HCPCS: Performed by: HOSPITALIST

## 2025-07-11 PROCEDURE — 2700000000 HC OXYGEN THERAPY PER DAY

## 2025-07-11 PROCEDURE — 94640 AIRWAY INHALATION TREATMENT: CPT

## 2025-07-11 RX ORDER — PREDNISONE 10 MG/1
10 TABLET ORAL DAILY
Qty: 10 TABLET | Refills: 0 | Status: SHIPPED | OUTPATIENT
Start: 2025-07-12 | End: 2025-07-22

## 2025-07-11 RX ORDER — LISINOPRIL 10 MG/1
10 TABLET ORAL DAILY
Qty: 30 TABLET | Refills: 0 | Status: SHIPPED | OUTPATIENT
Start: 2025-07-11

## 2025-07-11 RX ORDER — HYDROXYZINE HYDROCHLORIDE 25 MG/1
25 TABLET, FILM COATED ORAL EVERY 4 HOURS PRN
Qty: 30 TABLET | Refills: 0 | Status: SHIPPED | OUTPATIENT
Start: 2025-07-11 | End: 2025-07-21

## 2025-07-11 RX ADMIN — ARFORMOTEROL TARTRATE 15 MCG: 15 SOLUTION RESPIRATORY (INHALATION) at 08:08

## 2025-07-11 RX ADMIN — PREDNISONE 10 MG: 5 TABLET ORAL at 09:58

## 2025-07-11 RX ADMIN — BUDESONIDE 500 MCG: 0.5 INHALANT RESPIRATORY (INHALATION) at 08:08

## 2025-07-11 RX ADMIN — BUSPIRONE HYDROCHLORIDE 10 MG: 10 TABLET ORAL at 09:58

## 2025-07-11 RX ADMIN — ESCITALOPRAM OXALATE 10 MG: 10 TABLET ORAL at 09:58

## 2025-07-11 RX ADMIN — IPRATROPIUM BROMIDE AND ALBUTEROL SULFATE 1 DOSE: .5; 3 SOLUTION RESPIRATORY (INHALATION) at 08:08

## 2025-07-11 RX ADMIN — GUAIFENESIN 400 MG: 200 SOLUTION ORAL at 09:58

## 2025-07-11 RX ADMIN — Medication 400 MG: at 09:58

## 2025-07-11 RX ADMIN — LANSOPRAZOLE 30 MG: 15 TABLET, ORALLY DISINTEGRATING, DELAYED RELEASE ORAL at 06:24

## 2025-07-11 NOTE — DISCHARGE INSTRUCTIONS
Discharge Summary         PATIENT ID: Adrian Hurley  MRN: [id number]   YOB: 1955    DATE OF ADMISSION: 6/22/2025 12:28 AM    DATE OF DISCHARGE: 7/11/2025 10:25 AM  PRIMARY CARE PROVIDER: Madiha Araujo PA      ATTENDING PHYSICIAN: Dee Buckner MD  DISCHARGING PROVIDER: Dee Buckner MD    To contact this individual call [phone] and ask the  to page.  If unavailable ask to be transferred the Adult Hospitalist Department.     CONSULTATIONS: IP CONSULT TO INTENSIVIST  IP CONSULT TO PALLIATIVE CARE  IP CONSULT TO PULMONOLOGY  IP CONSULT TO CASE MANAGEMENT     PROCEDURES/SURGERIES: * No surgery found *     ADMITTING DIAGNOSES & HOSPITAL COURSE:   Adrian Hurley is a 69 y.o. male with COPD and chronic hypoxemic respiratory failure. He was admitted to the ICU of this hospital on 6/22/2025 for management of acute on chronic hypoxemic respiratory failure secondary to COPD exacerbation. Transferred to the hospitalist service on 7/4/2025.      DISCHARGE DIAGNOSES / PLAN:       Acute on chronic hypoxemic respiratory failure due to acute exacerbation of COPD:   - Has variable oxygen requirements at baseline.   - Intubated on admission, extubated 6/25.  - Unfortunately, reintubated 6/27 & extubated again on 7/2.   - Secondary to COPD exacerbation.  Low suspicion for pneumonia based on imaging and negative procalcitonin on admission.    - Continue weaning prednisone -- wean down by 10 mg daily until he is on his baseline dose of 10 mg daily  - Appreciate pulmonology assistance. Continue OP PAR follow up.   - 2L NC during the day & PAP at night.   - [facility], which has been delivered, patient has been educated and used overnight.   - Continue Pulmicort/Brovana nebs twice daily  - Continue DuoNebs as needed  - Progress to Rehab at .       Normocytic anemia:   - No clinically evident bleeding.    - Hb stable/improved from admission   - OP follow up, repeat

## 2025-07-11 NOTE — PLAN OF CARE
Problem: Discharge Planning  Goal: Discharge to home or other facility with appropriate resources  7/11/2025 0447 by Jean Ramirez RN  Outcome: Progressing  7/10/2025 1727 by Yara Arenas RN  Outcome: Progressing     Problem: Safety - Adult  Goal: Free from fall injury  7/11/2025 0447 by Jean Ramirez RN  Outcome: Progressing  7/10/2025 1727 by Yara Arenas RN  Outcome: Progressing     Problem: Pain  Goal: Verbalizes/displays adequate comfort level or baseline comfort level  7/11/2025 0447 by Jean Ramirez RN  Outcome: Progressing  7/10/2025 1727 by Yara Arenas RN  Outcome: Progressing

## 2025-07-11 NOTE — CARE COORDINATION
Transition of Care Plan to SNF/Rehab    Communication to Patient/Family:   Met with patient and family and they are agreeable to the transition plan. The Plan for Transition of Care is related to the following treatment goals: COPD exacerbation (HCC) [J44.1]  COPD with acute exacerbation (HCC) [J44.1]  Acute respiratory failure with hypoxia and hypercarbia (HCC) [J96.01, J96.02]        The Patient and/or patient representative was provided with a choice of provider and agrees  with the discharge plan.      Yes [x] No []        A Freedom of choice list was provided with basic dialogue that supports the patient's individualized plan of care/goals and shares the quality data associated with the providers.       Yes [x] No []        SNF/Rehab Transition:  Patient has been accepted to [facility] and meets criteria for admission.       [facility] reports auth has been received? [x]  Medicare 3 night stay satisfied? []   Inpatient Admission Dates: 6/22/25 - 7/11/25        Patient will be transported by his wife and expected to leave at 1-2pm.   [x] Packet on chart (if needed)  [] PCS completed (if applicable)         Communication to SNF/Rehab:  Bedside RN, Geeta, has been notified to update the transition plan to the facility and call report ( [phone], room TBD on arrival ).  Discharge information has been updated on the AVS. And communicated to facility via [employer], or CC link.     Discharge instructions to be fax'd to facility via [x] [employer]      Nursing Please include all hard scripts for controlled substances, med rec and dc summary, and AVS in packet.       Nursing, please discuss the following applicable information with the facility's nursing during report:     Edd with (X) only those applicable:  Medication:  []Medications are available at the facility  []IV Antibiotics    []Controlled Substance - hard copies available sent.  []Weekly Labs    Equipment:  []CPAP/BiPAP  []Wound

## 2025-07-11 NOTE — DISCHARGE SUMMARY
Discharge Summary       PATIENT ID: Adrian Hurley  MRN: [id number]   YOB: 1955    DATE OF ADMISSION: 6/22/2025 12:28 AM    DATE OF DISCHARGE: 7/11/2025 10:25 AM  PRIMARY CARE PROVIDER: Madiha Araujo PA     ATTENDING PHYSICIAN: Dee Buckner MD  DISCHARGING PROVIDER: Dee Buckner MD    To contact this individual call [phone] and ask the  to page.  If unavailable ask to be transferred the Adult Hospitalist Department.    CONSULTATIONS: IP CONSULT TO INTENSIVIST  IP CONSULT TO PALLIATIVE CARE  IP CONSULT TO PULMONOLOGY  IP CONSULT TO CASE MANAGEMENT    PROCEDURES/SURGERIES: * No surgery found *    ADMITTING DIAGNOSES & HOSPITAL COURSE:   Adrian Hurley is a 69 y.o. male with COPD and chronic hypoxemic respiratory failure. He was admitted to the ICU of this hospital on 6/22/2025 for management of acute on chronic hypoxemic respiratory failure secondary to COPD exacerbation. Transferred to the hospitalist service on 7/4/2025.     DISCHARGE DIAGNOSES / PLAN:      Acute on chronic hypoxemic respiratory failure due to acute exacerbation of COPD:   - Has variable oxygen requirements at baseline.   - Intubated on admission, extubated 6/25.  - Unfortunately, reintubated 6/27 & extubated again on 7/2.   - Secondary to COPD exacerbation.  Low suspicion for pneumonia based on imaging and negative procalcitonin on admission.    - Continue weaning prednisone -- wean down by 10 mg daily until he is on his baseline dose of 10 mg daily  - Appreciate pulmonology assistance. Continue OP PAR follow up.   - 2L NC during the day & PAP at night.   [facility] which [facility], patient has been educated and used overnight.   - Continue Pulmicort/Brovana nebs twice daily  - Continue DuoNebs as needed  - Progress to Rehab at .       Normocytic anemia:   - No clinically evident bleeding.    - Hb stable/improved from admission   - OP follow up, repeat labs in 1

## (undated) DEVICE — 1200 GUARD II KIT W/5MM TUBE W/O VAC TUBE: Brand: GUARDIAN

## (undated) DEVICE — SOLUTION IRRIG 1000ML 0.9% SOD CHL USP POUR PLAS BTL

## (undated) DEVICE — TOTAL TRAY, 16FR 10ML SIL FOLEY, URN: Brand: MEDLINE

## (undated) DEVICE — HANDPIECE SET WITH BONE CLEANING TIP AND SUCTION TUBE: Brand: INTERPULSE

## (undated) DEVICE — BIPOLAR IRRIGATOR INTEGRATED TUBING AND BIPOLAR CORD SET, DISPOSABLE

## (undated) DEVICE — COVER,MAYO STAND,STERILE: Brand: MEDLINE

## (undated) DEVICE — DURASEAL® DURAL SEALANT SYSTEM 5ML 5 PACK: Brand: DURASEAL®

## (undated) DEVICE — SOLIDIFIER FLD 2OZ 1500CC N DISINF IN BTL DISP SAFESORB

## (undated) DEVICE — C-ARM: Brand: UNBRANDED

## (undated) DEVICE — GARMENT,MEDLINE,DVT,INT,CALF,MED, GEN2: Brand: MEDLINE

## (undated) DEVICE — GLOVE SURG SZ 7 L12IN FNGR THK79MIL GRN LTX FREE

## (undated) DEVICE — BITEBLOCK ENDOSCP 60FR MAXI WHT POLYETH STURDY W/ VELC WVN

## (undated) DEVICE — POSITIONER HD REST FOAM CMFRT TCH

## (undated) DEVICE — NEEDLE HYPO 22GA L1.5IN BLK S STL HUB POLYPR SHLD REG BVL

## (undated) DEVICE — SUTURE ABSORBABLE BRAIDED 2-0 CT-1 27 IN UD VICRYL J259H

## (undated) DEVICE — Device

## (undated) DEVICE — SET ADMIN 16ML TBNG L100IN 2 Y INJ SITE IV PIGGY BK DISP (ORDER IN MULIPLES OF 48)

## (undated) DEVICE — SUTURE ABSRB BRAID COAT UD OS-6 NO 1 27IN VCRL J535H

## (undated) DEVICE — SPONGE GZ W4XL4IN COT 12 PLY TYP VII WVN C FLD DSGN

## (undated) DEVICE — IV STRT KT 3282] LSL INDUSTRIES INC]

## (undated) DEVICE — HANDPIECE SET WITH COAXIAL HIGH FLOW TIP AND SUCTION TUBE: Brand: INTERPULSE

## (undated) DEVICE — SOLUTION IV 250ML 0.9% SOD CHL CLR INJ FLX BG CONT PRT CLSR

## (undated) DEVICE — LAMINECTOMY-SMH: Brand: MEDLINE INDUSTRIES, INC.

## (undated) DEVICE — SUTURE VCRL SZ 3-0 L27IN ABSRB UD CP-2 L26MM 1/2 CIR REV J868H

## (undated) DEVICE — ELECTRODE,RADIOTRANSLUCENT,FOAM,3PK: Brand: MEDLINE

## (undated) DEVICE — COVER,TABLE,HEAVY DUTY,60"X90",STRL: Brand: MEDLINE

## (undated) DEVICE — LAMINECTOMY-MRMC: Brand: MEDLINE INDUSTRIES, INC.

## (undated) DEVICE — BLUNTFILL WITH FILTER: Brand: MONOJECT

## (undated) DEVICE — KIT JACK TBL PT CARE

## (undated) DEVICE — C-ARMOR C-ARM EQUIPMENT COVERS CLEAR STERILE UNIVERSAL FIT 12 PER CASE: Brand: C-ARMOR

## (undated) DEVICE — TOOL 14BA50 LEGEND 14CM 5MM BA: Brand: MIDAS REX ™

## (undated) DEVICE — SUTURE VCRL 2-0 L27IN ABSRB UD CP-2 L26MM 1/2 CIR REV CUT J869H

## (undated) DEVICE — GLOVE SURG SZ 8 L12IN FNGR THK94MIL STD WHT LTX FREE

## (undated) DEVICE — SOLUTION IRRIG 3000ML 0.9% SOD CHL USP UROMATIC PLAS CONT

## (undated) DEVICE — 4-PORT MANIFOLD: Brand: NEPTUNE 2

## (undated) DEVICE — CANNULA CUSH AD W/ 14FT TBG

## (undated) DEVICE — GLOVE SURG SZ 65 THK91MIL LTX FREE SYN POLYISOPRENE

## (undated) DEVICE — PREP KIT PEEL PTCH POVIDONE IOD

## (undated) DEVICE — KIT EVAC 0.13IN RECT TB DIA10FR 400CC PVC 3 SPR Y CONN DRN

## (undated) DEVICE — BONE WAX WHITE: Brand: BONE WAX WHITE

## (undated) DEVICE — KIT COLON W/ 1.1OZ LUB AND 2 END

## (undated) DEVICE — BLUNTFILL: Brand: MONOJECT

## (undated) DEVICE — GLOVE ORANGE PI 7 1/2   MSG9075

## (undated) DEVICE — CATHETER IV 22GA L1IN OD0.8382-0.9144MM ID0.6096-0.6858MM 382523

## (undated) DEVICE — SYRINGE MEDICAL 3ML CLEAR PLASTIC STANDARD NON CONTROL LUERLOCK TIP DISPOSABLE

## (undated) DEVICE — SYR 20ML LL STRL LF --

## (undated) DEVICE — SURGIFOAM SPNG SZ 100

## (undated) DEVICE — SOLUTION SURG PREP 26 CC PURPREP

## (undated) DEVICE — SYRINGE MED 5ML STD CLR PLAS LUERLOCK TIP N CTRL DISP